# Patient Record
Sex: MALE | Race: WHITE | NOT HISPANIC OR LATINO | Employment: OTHER | URBAN - NONMETROPOLITAN AREA
[De-identification: names, ages, dates, MRNs, and addresses within clinical notes are randomized per-mention and may not be internally consistent; named-entity substitution may affect disease eponyms.]

---

## 2017-05-08 ENCOUNTER — APPOINTMENT (EMERGENCY)
Dept: CT IMAGING | Facility: HOSPITAL | Age: 52
End: 2017-05-08
Payer: COMMERCIAL

## 2017-05-08 ENCOUNTER — HOSPITAL ENCOUNTER (EMERGENCY)
Facility: HOSPITAL | Age: 52
Discharge: HOME | End: 2017-05-08
Attending: EMERGENCY MEDICINE | Admitting: EMERGENCY MEDICINE
Payer: COMMERCIAL

## 2017-05-08 VITALS
WEIGHT: 257.7 LBS | SYSTOLIC BLOOD PRESSURE: 121 MMHG | DIASTOLIC BLOOD PRESSURE: 80 MMHG | TEMPERATURE: 98.6 F | BODY MASS INDEX: 36.89 KG/M2 | HEART RATE: 78 BPM | RESPIRATION RATE: 18 BRPM | OXYGEN SATURATION: 98 % | HEIGHT: 70 IN

## 2017-05-08 DIAGNOSIS — N20.0 NEPHROLITHIASIS: Primary | ICD-10-CM

## 2017-05-08 LAB
ALBUMIN SERPL BCP-MCNC: 3.7 G/DL (ref 3.5–5)
ALP SERPL-CCNC: 72 U/L (ref 46–116)
ALT SERPL W P-5'-P-CCNC: 26 U/L (ref 12–78)
ANION GAP SERPL CALCULATED.3IONS-SCNC: 9 MMOL/L (ref 4–13)
AST SERPL W P-5'-P-CCNC: 15 U/L (ref 5–45)
BACTERIA UR QL AUTO: ABNORMAL /HPF
BASOPHILS # BLD AUTO: 0.03 THOUSANDS/ΜL (ref 0–0.1)
BASOPHILS NFR BLD AUTO: 0 % (ref 0–1)
BILIRUB DIRECT SERPL-MCNC: 0.13 MG/DL (ref 0–0.2)
BILIRUB SERPL-MCNC: 0.7 MG/DL (ref 0.2–1)
BILIRUB UR QL STRIP: NEGATIVE
BUN SERPL-MCNC: 17 MG/DL (ref 5–25)
CALCIUM SERPL-MCNC: 8.5 MG/DL (ref 8.3–10.1)
CHLORIDE SERPL-SCNC: 103 MMOL/L (ref 100–108)
CLARITY UR: CLEAR
CO2 SERPL-SCNC: 27 MMOL/L (ref 21–32)
COLOR UR: YELLOW
CREAT SERPL-MCNC: 1.57 MG/DL (ref 0.6–1.3)
EOSINOPHIL # BLD AUTO: 0.15 THOUSAND/ΜL (ref 0–0.61)
EOSINOPHIL NFR BLD AUTO: 2 % (ref 0–6)
ERYTHROCYTE [DISTWIDTH] IN BLOOD BY AUTOMATED COUNT: 13.7 % (ref 11.6–15.1)
GFR SERPL CREATININE-BSD FRML MDRD: 46.4 ML/MIN/1.73SQ M
GLUCOSE SERPL-MCNC: 82 MG/DL (ref 65–140)
GLUCOSE UR STRIP-MCNC: NEGATIVE MG/DL
HCT VFR BLD AUTO: 41 % (ref 36.5–49.3)
HGB BLD-MCNC: 14.2 G/DL (ref 12–17)
HGB UR QL STRIP.AUTO: ABNORMAL
HOLD SPECIMEN: NORMAL
KETONES UR STRIP-MCNC: ABNORMAL MG/DL
LEUKOCYTE ESTERASE UR QL STRIP: NEGATIVE
LYMPHOCYTES # BLD AUTO: 1.67 THOUSANDS/ΜL (ref 0.6–4.47)
LYMPHOCYTES NFR BLD AUTO: 17 % (ref 14–44)
MCH RBC QN AUTO: 31.1 PG (ref 26.8–34.3)
MCHC RBC AUTO-ENTMCNC: 34.6 G/DL (ref 31.4–37.4)
MCV RBC AUTO: 90 FL (ref 82–98)
MONOCYTES # BLD AUTO: 0.92 THOUSAND/ΜL (ref 0.17–1.22)
MONOCYTES NFR BLD AUTO: 9 % (ref 4–12)
NEUTROPHILS # BLD AUTO: 7.22 THOUSANDS/ΜL (ref 1.85–7.62)
NEUTS SEG NFR BLD AUTO: 72 % (ref 43–75)
NITRITE UR QL STRIP: NEGATIVE
NON-SQ EPI CELLS URNS QL MICRO: ABNORMAL /HPF
PH UR STRIP.AUTO: 5.5 [PH] (ref 4.5–8)
PLATELET # BLD AUTO: 233 THOUSANDS/UL (ref 149–390)
PMV BLD AUTO: 10.6 FL (ref 8.9–12.7)
POTASSIUM SERPL-SCNC: 3.6 MMOL/L (ref 3.5–5.3)
PROT SERPL-MCNC: 7.1 G/DL (ref 6.4–8.2)
PROT UR STRIP-MCNC: NEGATIVE MG/DL
RBC # BLD AUTO: 4.56 MILLION/UL (ref 3.88–5.62)
RBC #/AREA URNS AUTO: ABNORMAL /HPF
SODIUM SERPL-SCNC: 139 MMOL/L (ref 136–145)
SP GR UR STRIP.AUTO: 1.01 (ref 1–1.03)
UROBILINOGEN UR QL STRIP.AUTO: 0.2 E.U./DL
WBC # BLD AUTO: 9.99 THOUSAND/UL (ref 4.31–10.16)
WBC #/AREA URNS AUTO: ABNORMAL /HPF

## 2017-05-08 PROCEDURE — 81001 URINALYSIS AUTO W/SCOPE: CPT | Performed by: PHYSICIAN ASSISTANT

## 2017-05-08 PROCEDURE — 96361 HYDRATE IV INFUSION ADD-ON: CPT

## 2017-05-08 PROCEDURE — 80048 BASIC METABOLIC PNL TOTAL CA: CPT | Performed by: PHYSICIAN ASSISTANT

## 2017-05-08 PROCEDURE — 74176 CT ABD & PELVIS W/O CONTRAST: CPT

## 2017-05-08 PROCEDURE — 96374 THER/PROPH/DIAG INJ IV PUSH: CPT

## 2017-05-08 PROCEDURE — 36415 COLL VENOUS BLD VENIPUNCTURE: CPT | Performed by: PHYSICIAN ASSISTANT

## 2017-05-08 PROCEDURE — 85025 COMPLETE CBC W/AUTO DIFF WBC: CPT | Performed by: PHYSICIAN ASSISTANT

## 2017-05-08 PROCEDURE — 80076 HEPATIC FUNCTION PANEL: CPT | Performed by: PHYSICIAN ASSISTANT

## 2017-05-08 PROCEDURE — 87086 URINE CULTURE/COLONY COUNT: CPT | Performed by: PHYSICIAN ASSISTANT

## 2017-05-08 PROCEDURE — 99284 EMERGENCY DEPT VISIT MOD MDM: CPT

## 2017-05-08 PROCEDURE — 96376 TX/PRO/DX INJ SAME DRUG ADON: CPT

## 2017-05-08 RX ORDER — IBUPROFEN 600 MG/1
600 TABLET ORAL ONCE
Status: COMPLETED | OUTPATIENT
Start: 2017-05-08 | End: 2017-05-08

## 2017-05-08 RX ORDER — OXYCODONE HYDROCHLORIDE AND ACETAMINOPHEN 5; 325 MG/1; MG/1
1 TABLET ORAL EVERY 4 HOURS PRN
Qty: 12 TABLET | Refills: 0 | Status: SHIPPED | OUTPATIENT
Start: 2017-05-08 | End: 2017-05-18

## 2017-05-08 RX ORDER — IBUPROFEN 600 MG/1
600 TABLET ORAL EVERY 6 HOURS PRN
Qty: 20 TABLET | Refills: 0 | Status: SHIPPED | OUTPATIENT
Start: 2017-05-08 | End: 2018-02-09

## 2017-05-08 RX ADMIN — IBUPROFEN 600 MG: 600 TABLET, FILM COATED ORAL at 17:44

## 2017-05-08 RX ADMIN — HYDROMORPHONE HYDROCHLORIDE 1 MG: 1 INJECTION, SOLUTION INTRAMUSCULAR; INTRAVENOUS; SUBCUTANEOUS at 17:45

## 2017-05-08 RX ADMIN — HYDROMORPHONE HYDROCHLORIDE 0.5 MG: 1 INJECTION, SOLUTION INTRAMUSCULAR; INTRAVENOUS; SUBCUTANEOUS at 20:08

## 2017-05-08 RX ADMIN — HYDROMORPHONE HYDROCHLORIDE 1 MG: 1 INJECTION, SOLUTION INTRAMUSCULAR; INTRAVENOUS; SUBCUTANEOUS at 19:19

## 2017-05-08 RX ADMIN — SODIUM CHLORIDE 1000 ML: 0.9 INJECTION, SOLUTION INTRAVENOUS at 17:44

## 2017-05-08 RX ADMIN — SODIUM CHLORIDE 1000 ML: 0.9 INJECTION, SOLUTION INTRAVENOUS at 19:19

## 2017-05-09 LAB — BACTERIA UR CULT: NORMAL

## 2017-11-15 ENCOUNTER — GENERIC CONVERSION - ENCOUNTER (OUTPATIENT)
Dept: OTHER | Facility: OTHER | Age: 52
End: 2017-11-15

## 2017-11-15 ENCOUNTER — ALLSCRIPTS OFFICE VISIT (OUTPATIENT)
Dept: FAMILY MEDICINE CLINIC | Facility: CLINIC | Age: 52
End: 2017-11-15
Payer: COMMERCIAL

## 2017-11-15 DIAGNOSIS — Z12.5 ENCOUNTER FOR SCREENING FOR MALIGNANT NEOPLASM OF PROSTATE: ICD-10-CM

## 2017-11-15 DIAGNOSIS — E66.9 OBESITY: ICD-10-CM

## 2017-11-15 DIAGNOSIS — Z00.00 ENCOUNTER FOR GENERAL ADULT MEDICAL EXAMINATION WITHOUT ABNORMAL FINDINGS: ICD-10-CM

## 2017-11-15 DIAGNOSIS — N20.0 CALCULUS OF KIDNEY: ICD-10-CM

## 2017-11-15 DIAGNOSIS — M79.10 MYALGIA: ICD-10-CM

## 2017-11-15 DIAGNOSIS — F41.8 OTHER SPECIFIED ANXIETY DISORDERS: ICD-10-CM

## 2017-11-15 PROCEDURE — 99204 OFFICE O/P NEW MOD 45 MIN: CPT | Performed by: FAMILY MEDICINE

## 2017-11-16 ENCOUNTER — GENERIC CONVERSION - ENCOUNTER (OUTPATIENT)
Dept: OTHER | Facility: OTHER | Age: 52
End: 2017-11-16

## 2017-11-16 ENCOUNTER — APPOINTMENT (OUTPATIENT)
Dept: LAB | Facility: HOSPITAL | Age: 52
End: 2017-11-16
Payer: COMMERCIAL

## 2017-11-16 ENCOUNTER — TRANSCRIBE ORDERS (OUTPATIENT)
Dept: ADMINISTRATIVE | Facility: HOSPITAL | Age: 52
End: 2017-11-16

## 2017-11-16 DIAGNOSIS — F41.8 OTHER SPECIFIED ANXIETY DISORDERS: ICD-10-CM

## 2017-11-16 DIAGNOSIS — Z12.5 ENCOUNTER FOR SCREENING FOR MALIGNANT NEOPLASM OF PROSTATE: ICD-10-CM

## 2017-11-16 DIAGNOSIS — Z00.00 ENCOUNTER FOR GENERAL ADULT MEDICAL EXAMINATION WITHOUT ABNORMAL FINDINGS: ICD-10-CM

## 2017-11-16 DIAGNOSIS — M79.10 MYALGIA: ICD-10-CM

## 2017-11-16 DIAGNOSIS — E66.9 OBESITY: ICD-10-CM

## 2017-11-16 LAB
25(OH)D3 SERPL-MCNC: 15.4 NG/ML (ref 30–100)
ALBUMIN SERPL BCP-MCNC: 3.8 G/DL (ref 3.5–5)
ALP SERPL-CCNC: 74 U/L (ref 46–116)
ALT SERPL W P-5'-P-CCNC: 29 U/L (ref 12–78)
ANION GAP SERPL CALCULATED.3IONS-SCNC: 8 MMOL/L (ref 4–13)
AST SERPL W P-5'-P-CCNC: 15 U/L (ref 5–45)
BACTERIA UR QL AUTO: ABNORMAL /HPF
BASOPHILS # BLD AUTO: 0.02 THOUSANDS/ΜL (ref 0–0.1)
BASOPHILS NFR BLD AUTO: 0 % (ref 0–1)
BILIRUB SERPL-MCNC: 0.5 MG/DL (ref 0.2–1)
BILIRUB UR QL STRIP: NEGATIVE
BUN SERPL-MCNC: 14 MG/DL (ref 5–25)
CALCIUM SERPL-MCNC: 9.2 MG/DL (ref 8.3–10.1)
CHLORIDE SERPL-SCNC: 103 MMOL/L (ref 100–108)
CHOLEST SERPL-MCNC: 198 MG/DL (ref 50–200)
CLARITY UR: CLEAR
CO2 SERPL-SCNC: 28 MMOL/L (ref 21–32)
COLOR UR: YELLOW
CREAT SERPL-MCNC: 0.89 MG/DL (ref 0.6–1.3)
EOSINOPHIL # BLD AUTO: 0.1 THOUSAND/ΜL (ref 0–0.61)
EOSINOPHIL NFR BLD AUTO: 2 % (ref 0–6)
ERYTHROCYTE [DISTWIDTH] IN BLOOD BY AUTOMATED COUNT: 13.8 % (ref 11.6–15.1)
GFR SERPL CREATININE-BSD FRML MDRD: 98 ML/MIN/1.73SQ M
GLUCOSE P FAST SERPL-MCNC: 98 MG/DL (ref 65–99)
GLUCOSE UR STRIP-MCNC: NEGATIVE MG/DL
HCT VFR BLD AUTO: 42.7 % (ref 36.5–49.3)
HDLC SERPL-MCNC: 36 MG/DL (ref 40–60)
HGB BLD-MCNC: 14.8 G/DL (ref 12–17)
HGB UR QL STRIP.AUTO: ABNORMAL
KETONES UR STRIP-MCNC: NEGATIVE MG/DL
LDLC SERPL CALC-MCNC: 117 MG/DL (ref 0–100)
LEUKOCYTE ESTERASE UR QL STRIP: NEGATIVE
LYMPHOCYTES # BLD AUTO: 1.41 THOUSANDS/ΜL (ref 0.6–4.47)
LYMPHOCYTES NFR BLD AUTO: 22 % (ref 14–44)
MCH RBC QN AUTO: 31 PG (ref 26.8–34.3)
MCHC RBC AUTO-ENTMCNC: 34.7 G/DL (ref 31.4–37.4)
MCV RBC AUTO: 90 FL (ref 82–98)
MONOCYTES # BLD AUTO: 0.46 THOUSAND/ΜL (ref 0.17–1.22)
MONOCYTES NFR BLD AUTO: 7 % (ref 4–12)
NEUTROPHILS # BLD AUTO: 4.36 THOUSANDS/ΜL (ref 1.85–7.62)
NEUTS SEG NFR BLD AUTO: 69 % (ref 43–75)
NITRITE UR QL STRIP: NEGATIVE
NON-SQ EPI CELLS URNS QL MICRO: ABNORMAL /HPF
PH UR STRIP.AUTO: 7 [PH] (ref 4.5–8)
PLATELET # BLD AUTO: 269 THOUSANDS/UL (ref 149–390)
PMV BLD AUTO: 10.3 FL (ref 8.9–12.7)
POTASSIUM SERPL-SCNC: 4.4 MMOL/L (ref 3.5–5.3)
PROT SERPL-MCNC: 7.2 G/DL (ref 6.4–8.2)
PROT UR STRIP-MCNC: NEGATIVE MG/DL
PSA SERPL-MCNC: 0.4 NG/ML (ref 0–4)
RBC # BLD AUTO: 4.77 MILLION/UL (ref 3.88–5.62)
RBC #/AREA URNS AUTO: ABNORMAL /HPF
SODIUM SERPL-SCNC: 139 MMOL/L (ref 136–145)
SP GR UR STRIP.AUTO: 1.01 (ref 1–1.03)
TRIGL SERPL-MCNC: 227 MG/DL
TSH SERPL DL<=0.05 MIU/L-ACNC: 2.14 UIU/ML (ref 0.36–3.74)
UROBILINOGEN UR QL STRIP.AUTO: 0.2 E.U./DL
WBC # BLD AUTO: 6.35 THOUSAND/UL (ref 4.31–10.16)
WBC #/AREA URNS AUTO: ABNORMAL /HPF

## 2017-11-16 PROCEDURE — 81001 URINALYSIS AUTO W/SCOPE: CPT

## 2017-11-16 PROCEDURE — 80061 LIPID PANEL: CPT

## 2017-11-16 PROCEDURE — 82306 VITAMIN D 25 HYDROXY: CPT

## 2017-11-16 PROCEDURE — 84443 ASSAY THYROID STIM HORMONE: CPT

## 2017-11-16 PROCEDURE — 80053 COMPREHEN METABOLIC PANEL: CPT

## 2017-11-16 PROCEDURE — 86618 LYME DISEASE ANTIBODY: CPT

## 2017-11-16 PROCEDURE — G0103 PSA SCREENING: HCPCS

## 2017-11-16 PROCEDURE — 85025 COMPLETE CBC W/AUTO DIFF WBC: CPT

## 2017-11-16 PROCEDURE — 36415 COLL VENOUS BLD VENIPUNCTURE: CPT

## 2017-11-17 LAB
B BURGDOR IGG SER IA-ACNC: 0.12
B BURGDOR IGM SER IA-ACNC: 0.16

## 2017-11-20 ENCOUNTER — TRANSCRIBE ORDERS (OUTPATIENT)
Dept: ADMINISTRATIVE | Facility: HOSPITAL | Age: 52
End: 2017-11-20

## 2017-11-20 ENCOUNTER — APPOINTMENT (OUTPATIENT)
Dept: LAB | Facility: HOSPITAL | Age: 52
End: 2017-11-20
Attending: UROLOGY
Payer: COMMERCIAL

## 2017-11-20 ENCOUNTER — ALLSCRIPTS OFFICE VISIT (OUTPATIENT)
Dept: OTHER | Facility: OTHER | Age: 52
End: 2017-11-20

## 2017-11-20 DIAGNOSIS — N20.0 URIC ACID NEPHROLITHIASIS: Primary | ICD-10-CM

## 2017-11-20 DIAGNOSIS — N20.0 CALCULUS OF KIDNEY: ICD-10-CM

## 2017-11-20 PROCEDURE — 82360 CALCULUS ASSAY QUANT: CPT

## 2017-11-21 NOTE — CONSULTS
Assessment  1  Nephrolithiasis (592 0) (N20 0)    Plan  Nephrolithiasis    · * XR ABDOMEN 1 VIEW KUB; Status:Active; Requested for:2018; Perform:[facility] Radiology; UDF:[id number] Daylin Zimmer;   · CT ABDOMEN PELVIS W WO CONTRAST; Status:Need Information - FinancialAuthorization; Requested for:2017;    Perform:[facility] Radiology; [id number]; Ordered;For:Nephrolithiasis; Ordered By:Yasmany Juárez;   · [address] [place]; Status:Hold For - Scheduling; Requested for:2018; Perform:[facility] Radiology; Order Comments:with ureteral jet please; KWS:[id number];[id number];XZSZOBA Daylin Zimmer;   · Follow-up visit in 1 year Evaluation and Treatment  Follow-up  Status: Hold For -Scheduling  Requested for: [id number]   Ordered;Nephrolithiasis; Ordered By: Karol Olivo Performed:  Due: 28XQO3996    Discussion/Summary  Discussion Summary:   1  Nephrolithiasis-this appears to be chronic and most likely related the patient's dietary habits and poor fluid intake  He is not currently having symptoms although does have small amount of blood in his urine  He has requested repeat imaging and so I have ordered a baseline noncontrast CT scan low dose  As long as there are no obstructing stones, I will follow the patient on a yearly basis with a KUB and ultrasound  reviewed with the patient some general lifestyle and dietary modifications that can improve and prevent stone formation  This includes a large volume of water intake, approximately 1 5-2 L per day  Addition of citric acid with homemade lemonade or orange juice can help to it inhibit stone formation  Decreased sodium in the form of table salt and as sodium in prepared foods as well as decreased animal protein with approximately 1 palm-sized portion per meal can both lead to decrease in overall stone formation    the patient is able to maintain some of these lifestyle modifications and still has recurrent stone formation we discussed the need for a metabolic stone evaluation  reviewed with the patient urgent stones symptoms that should prompt emergent evaluation  These include intractable flank pain that does not respond to oral medications, nausea and vomiting that prevents intake of oral fluids, fevers greater than 100 3 measured by thermometer, or large amounts of blood in the urine  the patient has significant recurrence of his stones, we would consider metabolic evaluation  Prostate cancer screening to formally begin at age 54 based on AUA guidelines  Patient's Capacity to Self-Care: Patient is able to Self-Care  Goals and Barriers: The patient has the current Goals: Monitoring and maintenance of stone disease  The patent has the current Barriers: Poor fluid intake, dietary issues  History of Present Illness  HPI: 76-year-old gentleman with a longstanding history of stone disease  Patient has seen multiple urologists in the past, most recently and [place]  He has had multiple stone procedures including a percutaneous nephrolithotomy of the right kidney for staghorn calculus  The patient was last seen in the emergency room in May and had small nonobstructing stones in the kidney and some right distal ureteral stones  The patient was able to pass the stone spontaneously and brings them for analysis  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  He has no family history of prostate cancer  Review of Systems  Complete-Male Urology:  Constitutional: No fever or chills, feels well, no tiredness, no recent weight gain or weight loss  Respiratory: No complaints of shortness of breath, no wheezing, no cough, no SOB on exertion, no orthopnea or PND  Cardiovascular: No complaints of slow heart rate, no fast heart rate, no chest pain, no palpitations, no leg claudication, no lower extremity    Gastrointestinal: No complaints of abdominal pain, no constipation, no nausea or vomiting, no diarrhea or bloody stools  Genitourinary: No complaints of dysuria, no incontinence, no hesitancy, no nocturia, no genital lesion, no testicular pain  Musculoskeletal: No complaints of arthralgia, no myalgias, no joint swelling or stiffness, no limb pain or swelling  Integumentary: No complaints of skin rash or skin lesions, no itching, no skin wound, no dry skin  Hematologic/Lymphatic: No complaints of swollen glands, no swollen glands in the neck, does not bleed easily, no easy bruising  Neurological: No compliants of headache, no confusion, no convulsions, no numbness or tingling, no dizziness or fainting, no limb weakness, no difficulty walking  ROS Reviewed:   ROS reviewed  Active Problems  1  Chronic musculoskeletal pain (729 1,338 29) (M79 1,G89 29)   2  Depression screen (V79 0) (Z13 89)   3  Depression with anxiety (300 4) (F41 8)   4  Encounter for prostate cancer screening (V76 44) (Z12 5)   5  Generalized obesity (278 00) (E66 9)    Past Medical History  1  History of cardiac arrhythmia (V12 59) (Z86 79)   2  History of kidney stones (V13 01) (H92 962)  Active Problems And Past Medical History Reviewed: The active problems and past medical history were reviewed and updated today  Surgical History  1  History of Heart Surgery   2  History of Hernia Repair Femoral Via Groin Incision   3  History of Kidney Surgery   4  History of Knee Arthroscopy With Medial Meniscus Repair  Surgical History Reviewed: The surgical history was reviewed and updated today  Family History  Mother    1  Family history of malignant neoplasm of breast (V16 3) (Z80 3)  Father    2  Family history of malignant neoplasm of bone (V16 8) (Z80 8)  Family History Reviewed: The family history was reviewed and updated today  Social History   · Does not use illicit drugs (L76 17) (Z78 9)   · Never smoker   · Occasional alcohol use  Social History Reviewed:  The social history was reviewed and updated today       Current Meds   1  No Reported Medications Recorded  Medication List Reviewed: The medication list was reviewed and updated today  Allergies    1  Sulfa Drugs   2  Toradol    Physical Exam   Constitutional  General appearance: No acute distress, well appearing and well nourished  -- Well appearing, comfortable, nontoxic  Pulmonary  Respiratory effort: No increased work of breathing or signs of respiratory distress  Cardiovascular  Examination of extremities for edema and/or varicosities: Normal    Abdomen  Abdomen: Non-tender, no masses  Musculoskeletal  Gait and station: Normal    Skin  Skin and subcutaneous tissue: Normal without rashes or lesions  Results/Data  Diagnostic Studies Reviewed Urology:  CT Scan Review CT scan from 5/8/2017 demonstrates small stones in the distal right ureter ranging from 1-3 mm causing mild right hydro, 4 mm calculus in the right side of the bladder, 4 mm nonobstructing left renal calculus  (1) COMPREHENSIVE METABOLIC PANEL [id number] 63:54PK Nic Lopez    Order Number: [id number]     Test Name Result Flag Reference   SODIUM 139 mmol/L  136-145   POTASSIUM 4 4 mmol/L  3 5-5 3   CHLORIDE 103 mmol/L  100-108   CARBON DIOXIDE 28 mmol/L  21-32   ANION GAP (CALC) 8 mmol/L  4-13   BLOOD UREA NITROGEN 14 mg/dL  5-25   CREATININE 0 89 mg/dL  0 60-1 30   Standardized to IDMS reference method   CALCIUM 9 2 mg/dL  8 3-10 1   BILI, TOTAL 0 50 mg/dL  0 20-1 00   ALK PHOSPHATAS 74 U/L     ALT (SGPT) 29 U/L  12-78   Specimen collection should occur prior to Sulfasalazine administration due to the potential for falsely depressed results  AST(SGOT) 15 U/L  5-45   Specimen collection should occur prior to Sulfasalazine administration due to the potential for falsely depressed results     ALBUMIN 3 8 g/dL  3 5-5 0   TOTAL PROTEIN 7 2 g/dL  6 4-8 2   eGFR 98 ml/min/1 73sq m       [facility] Disease Education Program recommendations are as follows: GFR calculation is accurate only with a steady state creatinine Chronic Kidney disease less than 60 ml/min/1 73 sq  meters Kidney failure less than 15 ml/min/1 73 sq  meters  GLUCOSE FASTING 98 mg/dL  65-99   Specimen collection should occur prior to Sulfasalazine administration due to the potential for falsely depressed results  Specimen collection should occur prior to Sulfapyridine administration due to the potential for falsely elevated results  (1) PSA (SCREEN) (Dx V76 44 Screen for Prostate Cancer) [id number] 07:30AM [facility] Order Number: [id number]     Test Name Result Flag Reference   PROSTATE SPECIFIC ANTIGEN 0 4 ng/mL  0 0-4 0   American Urological Association Guidelines define biochemical recurrence of prostate cancer as a detectable or rising PSA value post-radical prostatectomy that is greater than or equal to 0 2 ng/mL with a second confirmatory level of greater than or equal to 0 2 ng/mL  (1) URINALYSIS w URINE C/S REFLEX (will reflex a microscopy if leukocytes, occult blood, or nitrites are not within normal limits) [id number] 07:30AM [facility] Order Number: [id number]     Test Name Result Flag Reference   COLOR Yellow     CLARITY Clear     PH UA 7 0  4 5-8 0   LEUKOCYTE ESTERASE UA Negative  Negative   NITRITE UA Negative  Negative   PROTEIN UA Negative mg/dl  Negative   GLUCOSE UA Negative mg/dl  Negative   KETONES UA Negative mg/dl  Negative   UROBILINOGEN UA 0 2 E U /dl  0 2, 1 0 E U /dl   BILIRUBIN UA Negative  Negative   BLOOD UA Small A Negative, Trace-Intact   SPECIFIC GRAVITY UA 1 015  1 003-1 030   BACTERIA Occasional /hpf  None Seen, Occasional   EPITHELIAL CELLS Occasional /hpf  None Seen, Occasional   RBC UA 0-1 /hpf A None Seen, 0-5   WBC UA None Seen /hpf  None Seen, 0-5, 5-55, 5-65     Future Appointments    Date/Time Provider Specialty Site   11/21/2018 08:45 AM MOY Henry   Urology [facility]   11/29/2017 09:00 AM Beena York, [address] [facility]       Signatures   Electronically signed by : MOY Darden ; Nov 20 2017  7:45AM EST                       (Author)

## 2017-11-29 ENCOUNTER — APPOINTMENT (OUTPATIENT)
Dept: FAMILY MEDICINE CLINIC | Facility: CLINIC | Age: 52
End: 2017-11-29
Payer: COMMERCIAL

## 2017-11-29 ENCOUNTER — GENERIC CONVERSION - ENCOUNTER (OUTPATIENT)
Dept: OTHER | Facility: OTHER | Age: 52
End: 2017-11-29

## 2017-11-29 PROCEDURE — 90791 PSYCH DIAGNOSTIC EVALUATION: CPT | Performed by: SOCIAL WORKER

## 2017-11-29 PROCEDURE — 99213 OFFICE O/P EST LOW 20 MIN: CPT | Performed by: FAMILY MEDICINE

## 2017-11-30 ENCOUNTER — GENERIC CONVERSION - ENCOUNTER (OUTPATIENT)
Dept: OTHER | Facility: OTHER | Age: 52
End: 2017-11-30

## 2017-12-04 ENCOUNTER — HOSPITAL ENCOUNTER (OUTPATIENT)
Dept: CT IMAGING | Facility: HOSPITAL | Age: 52
Discharge: HOME | End: 2017-12-04
Attending: UROLOGY
Payer: COMMERCIAL

## 2017-12-04 ENCOUNTER — GENERIC CONVERSION - ENCOUNTER (OUTPATIENT)
Dept: OTHER | Facility: OTHER | Age: 52
End: 2017-12-04

## 2017-12-04 DIAGNOSIS — N20.0 URIC ACID NEPHROLITHIASIS: ICD-10-CM

## 2017-12-04 LAB
CA PHOS MFR STONE: 2 %
COLOR STONE: NORMAL
COM MFR STONE: 5 %
COMMENT-STONE3: NORMAL
COMPOSITION: NORMAL
LABORATORY COMMENT REPORT: NORMAL
NIDUS STONE QL: NORMAL
PHOTO: NORMAL
SIZE STONE: NORMAL MM
STONE ANALYSIS-IMP: NORMAL
URATE MFR STONE: 93 %
WT STONE: 66 MG

## 2017-12-04 PROCEDURE — 74177 CT ABD & PELVIS W/CONTRAST: CPT

## 2017-12-04 RX ADMIN — IOHEXOL 100 ML: 350 INJECTION, SOLUTION INTRAVENOUS at 08:49

## 2017-12-06 ENCOUNTER — GENERIC CONVERSION - ENCOUNTER (OUTPATIENT)
Dept: OTHER | Facility: OTHER | Age: 52
End: 2017-12-06

## 2017-12-12 ENCOUNTER — GENERIC CONVERSION - ENCOUNTER (OUTPATIENT)
Dept: OTHER | Facility: OTHER | Age: 52
End: 2017-12-12

## 2017-12-12 ENCOUNTER — ALLSCRIPTS OFFICE VISIT (OUTPATIENT)
Dept: FAMILY MEDICINE CLINIC | Facility: CLINIC | Age: 52
End: 2017-12-12
Payer: COMMERCIAL

## 2017-12-12 ENCOUNTER — APPOINTMENT (OUTPATIENT)
Dept: FAMILY MEDICINE CLINIC | Facility: CLINIC | Age: 52
End: 2017-12-12
Payer: COMMERCIAL

## 2017-12-12 PROCEDURE — 99213 OFFICE O/P EST LOW 20 MIN: CPT | Performed by: FAMILY MEDICINE

## 2017-12-12 PROCEDURE — 90837 PSYTX W PT 60 MINUTES: CPT | Performed by: SOCIAL WORKER

## 2018-01-02 ENCOUNTER — ALLSCRIPTS OFFICE VISIT (OUTPATIENT)
Dept: FAMILY MEDICINE CLINIC | Facility: CLINIC | Age: 53
End: 2018-01-02
Payer: COMMERCIAL

## 2018-01-02 PROCEDURE — 90837 PSYTX W PT 60 MINUTES: CPT | Performed by: SOCIAL WORKER

## 2018-01-12 NOTE — MISCELLANEOUS
Message  Patient called 12:43 pm on 11/16/17  Left voicemail for patient to call to discuss lab results        Signatures   Electronically signed by : JADIEL Lawler; Nov 16 2017 12:44PM EST                       (Author)

## 2018-01-12 NOTE — MISCELLANEOUS
Message  Patient called at 9:30 am on 11/17/17  Reviewed bloodwork with patient  Advised patient to watch diet- decrease red meats, fatty foods and incorporate more fruits and vegetables to improve cholesterol-will recheck in 3-6 months  Advised patient to begin OTC Vitamin D supplement 1000 units daily due to low Vitamin D level  Patient advised to take supplement with food to avoid upset stomach  Patient states he is feeling okay/good and is going to be calling Psychiatry for an appointment  He denies referral to  at this time  He will follow up as scheduled or sooner if needed  Patient understands and agrees with treatment plan        Signatures   Electronically signed by : JADIEL Ching; Nov 17 2017  9:42AM EST                       (Author)

## 2018-01-16 ENCOUNTER — ALLSCRIPTS OFFICE VISIT (OUTPATIENT)
Dept: FAMILY MEDICINE CLINIC | Facility: CLINIC | Age: 53
End: 2018-01-16
Payer: COMMERCIAL

## 2018-01-16 PROCEDURE — 90837 PSYTX W PT 60 MINUTES: CPT | Performed by: SOCIAL WORKER

## 2018-01-22 VITALS
SYSTOLIC BLOOD PRESSURE: 115 MMHG | HEART RATE: 93 BPM | TEMPERATURE: 95.2 F | HEIGHT: 71 IN | BODY MASS INDEX: 35.84 KG/M2 | DIASTOLIC BLOOD PRESSURE: 80 MMHG | OXYGEN SATURATION: 98 % | WEIGHT: 256 LBS

## 2018-01-22 VITALS
DIASTOLIC BLOOD PRESSURE: 80 MMHG | HEIGHT: 71 IN | BODY MASS INDEX: 35.98 KG/M2 | TEMPERATURE: 97.6 F | HEART RATE: 86 BPM | WEIGHT: 257 LBS | SYSTOLIC BLOOD PRESSURE: 122 MMHG | OXYGEN SATURATION: 96 %

## 2018-01-23 NOTE — PSYCH
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  Edel Desouza is still feeling sad about missing the girls on the team that he has known since they were 8 and 6 yrs old, but the loss is not effecting him as deeply as it was prior to treatment  Edel Desouza shared his dilemma abou this current job and his worries about how he can continue in the future  Current Pain Assessment: moderate to severe   On a scale of 0 to 10, the patient rates current pain at 4   Current suicide risk is low   Assessment  Mood is more depressed than last session, but improved over the start of treatment  Edel Desouza is able to talk about his feelings related to recent losses and worried in his life  Plan  See in 2 weeks assess mood and discuss possible options with PCP if depression and insomnia has not improved further  Signatures   Electronically signed by :  Radha Tran [place]; Jan 2 0007 11:05PM EST                       (Author)

## 2018-01-23 NOTE — PSYCH
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, as they graduated last year  Lopez Dickson shares that he feels like he helped raise them and now they have all moved on  Lopez Dickson shares that he thought of these girls as daughter to him  Since then, he lays awake worrying about his own adult daughters and fears that they will have an accident while driving or that something will happen to them  Reason for evaluation and partial hospitalization as an alternative to inpatient hospitalization: N/A  Previous Psychiatric/psychological treatment/year: None  Current Psychiatrist/Therapist: Dolores Dick LCSW  Problem Assessment:   SOCIAL/VOCATION:   Family Constellation (include parents, relationship with each and pertinent Psych/Medical History):   Spouse: marreid for 32 years  Children: 2 adult children- 28 and 25   Other: 2 grandchildren He lives with wife of 28 years  He does not live alone  Additional Comments related to family/relationships/peer support: Lopez Dickson admits that he does not talk to his wife about how he is feeling  Lopez Dickson describes that he "loves" being a father, but is consumed with fear that something could happen to his daughters  Lopez Dickson shares that his grandchildren "get on my nerves", and feels disrespectful and unappreciated by his family in general   Lately, Lopez Dickson shares that he has been "feeling like a failure", bc he cannot keep up with all the demands on his time from his daughters, job and his wife  School or Work History (strengths/limitations/needs:  Lopez Teixeirademetria 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, and recently, he has had to return to a job in which he is more hands on  Carol Phillips describes that he has ongoing physical pain in his back, arms and hands, along with numbing  Carol Phillips relays that 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 nd is feeling "too old for this"  His highest grade level achieved was  graduated from high school  LEISURE ASSESSMENT (Include past and present hobbies/interests and level of involvement (Ex: Group/Club Affiliations): Enjoys coaching softball  His primary language is  [place]  SUBSTANCE ABUSE ASSESSMENT: past substance abuse, but no current substance abuse  Substance/Route/Age/Amount/Frequency/Last Use: Carol Phillips relays that he used to drink beer regularly, about 2 cases /week, but that he stopped when he turned 47 yrs old due to concerns For this health  Carol Phillips shares that he might have "had a problem", but that he had no problems stopping drinking o his own  No previous detox/rehab treatment  HEALTH ASSESSMENT: no referral to PCP needed  no referral to nutritionist needed  referred to PCP  Current PCP: [facility]  PCP notified  LEGAL: He does not want an information packet about Mental Health Advance Directives  The following ratings are based on my observation of this patient over the last During eval    Risk of Harm to Self:   Demographic risk factors include , alaskan, or native [place] and male  Recent Specific Risk Factors include: passive death wishes, sense of hopelessness/helplessness, unable to visualize a realistic positive future, feelings of guilt or self blame, recent losses: Loss of contact with a group of girls he thoguht of as daughters, worries about finances or work and chronic pain or health problems  These risk factors presented within the last few months  Risk of Harm to Others:   Demographic Risk Factors include: male     Recent Specific Risk Factors include: weapons or other means available and multiple stressors  Access to Weapons: The patient has access to the following weapons: Pt admits that he owns guns, but that he does not know where the amunition for it is  He expressed fear that coming into treatment would mean loosing his right to have firearms  Based on the above information, the client presents the following risk of harm to self or others: low  The following interventions are recommended: consultation with PCP  Notes regarding this Risk Assessment: Kim Ghosh admits to having suicidal ideation but also states that he has no plans on acting on these thoughts and he knows that this katt negatively impact his children  Kim Ghosh has access to weapons and is not willing to give them to someone else as a safety precaution,at this point  Therapist reviewed how to contact the Crisis line if suicidal ideation becomes worse, and assured him that if hospitalization would be necessary, that as long as it is voluntary, he would not loose his rights to own firearms  Therapist consulted with PCP and Kim Ghosh was in agreement with trying medication- discussed possible risks of symptoms getting worse and to alert PCP immediately if this occurs  Therapist will ask his wife to join session as soon as appropriate to review a safety plan  Review of Systems  anxiety, depression, interpersonal relationship problems, emotional problems/concerns and sleep disturbances, but no euphoria, no emotional lability, no hostility, not suidical, no compulsive behavior, no impulsive behavior, no unusual behavior, no violent behavior, no disturbing or unusual thoughts, feelings, or sensations, no unreasonable or irrational fears, no magical thinking, not having fantasies, normal functioning ability, no personality change and no character deficiency  Psychiatric: anxiety, sleep disturbances, depression and emotional problems, but not suicidal and no personality change  Active Problems    1   Bilateral hand numbness (782 0) (R20 0)   2  Chronic musculoskeletal pain (729 1,338 29) (M79 1,G89 29)   3  Depression screen (V79 0) (Z13 89)   4  Depression with anxiety (300 4) (F41 8)   5  Encounter for prostate cancer screening (V76 44) (Z12 5)   6  Generalized obesity (278 00) (E66 9)   7  Nephrolithiasis (592 0) (N20 0)   8  Tremor (781 0) (R25 1)   9  Vitamin D deficiency (268 9) (E55 9)    Past Medical History    1  History of cardiac arrhythmia (V12 59) (Z86 79)   2  History of kidney stones (V13 01) (H21 465)    Past Psychiatric History    Past Psychiatric History: No Prior Treatment  Family Psych History  Mother    1  Family history of malignant neoplasm of breast (V16 3) (Z80 3)  Father    2  Family history of malignant neoplasm of bone (V16 8) (Z80 8)    Substance Abuse Hx    Substance Abuse History: Past alcohol abuse/ dependnce-NOne currently  Social History    · Does not use illicit drugs (S45 54) (Z78 9)   · Never smoker   · Occasional alcohol use    Current Meds   1  DULoxetine HCl - 30 MG Oral Capsule Delayed Release Particles; TAKE 1 CAPSULE BY   MOUTH ONCE DAILY FOR ONE WEEK, THEN 1 CAPSULE BY MOUTH TWICE DAILY; Therapy: 71SQG1063 to (Last Rx:29Nov2017)  Requested for: 16XBB4566 Ordered   2  Vitamin D3 1000 UNIT Oral Tablet; TAKE 1 TABLET DAILY; Therapy: 57FCH2238 to (Evaluate:79Ito8912)  Requested for: 85JLR2154; Last   Rx:29Nov2017 Ordered    Allergies    1  Sulfa Drugs   2  Toradol    DSM    Provisional Diagnosis: ADJUSTMENT DISORDER WITH ANXIETY AND DEPRESSION  Cally Ramos is a pleasant,intelligent middle aged man who was referred to this therapist for a consultation due to reporting symptoms of depression    Amber Mccord describes feeling sad and worried most of the time, insomnia in which he lays awake worrying about the welfare of his daughters every night, fear and apprehension about the future, finances and his ability to maintain his current job, feeling sad and close to tears when he is not busy, and is also having significant physical pain  These symptoms seem to correspond with a significant loss i his life  Rosalba Bradley describes that 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, and 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, 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  Rosalba Bradley shares that he knew this was coming, but he didn't ot expect that he would have taken it this hard  Rosalba Bradley relays that they symptoms of anxiety and depression began around this time, and that this is when he began obsessively worrying about his daughters having an accident or something terrible happening to them  Rosalba Bradley als worries abut his pal and his ability to keep up with demands from his family and his job  Prior to 6 months ago, Rosalba Bradley had insomnia due to physical pain, but states he has never had the above stated psychiatric symptoms before in his life  Rosalba Bradley relays that he is  for 32 years, but he does not feel he can talk to his wife about his feeling  Emory Godoy expresses that it is hard for him to admit that he needs help, as he is used to being very independent, although he stated that he felt a little better after talking about what he is going through during the evaluation  Rosalba Bradley would benefit form weekly therapy, however, due to restrictions with therapist's schedule this may not be possible for a few weeks  Therapist is able to schedule Rosalba Bradley for biweekly sessions and has recommended to his PCP that he be started on an anti-depressant  Therapist will assess Gordy's readiness to allow his wife to join a session in the near future to increase support and ensure someone in his family is aware of the severity of his symptoms  Future Appointments    Date/Time Provider Specialty Site   11/21/2018 08:45 AM MOY [place]   Urology [facility]   12/12/2017 03:00 PM Eileen Hendrickson, [address] [place] [address] Signatures   Electronically signed by : Obi Andujar [facility]; Dec  3 7094  4:26PM EST                       (Author)    Electronically signed by :  Obi Andujar [facility]; Dec 12 4128  5:04PM EST                       (Author)

## 2018-01-23 NOTE — PSYCH
Progress Note  Individual Psychotherapy 60 min minutes provided today  Goals addressed in session:   [address] [facility]  "Fer Chatterjee" appears and reports feeling much better since his last session and since beginning to ttake the anti-depressant  Fer Chatterjee shares that he has been able to stay asleep at night and he is no longer remaining awake and worrying about his children  Gordy's PCP joined the session to discuss the effects of the Cymbalta, which Fer Chatterjee brought up as a concern, specifically sexual side effects  A discussion about how to address this took place  Fer Chatterjee shares that he is willing to continue tt venessa the medication and increase accordingly in spite of the side effects  Fer Chatterjee shares that he is feeling less irritated by his grandkids and less angry, although he can tell that if there was an upsetting event, it could lead to regression with is mood  Fer Chatterjee is hopeful that the increase in the medication will lead to feeling even better  Fer Chatterjee shares that it was also helpful to "get things off my chest", and he felt relief after the first session  Fer Chatterjee shared that communication with his wife is one of the stressors in his life, and he agreed that it may be appropriate to have some marital sessions in the future  On a scale of 0 to 10, the patient rates current pain at 4   Current suicide risk is low   Assessment  Mood is less depressed, symptoms have decreased, no suicidal thoughts and not feeling hopeless  Mediation seems to have been effective and Fer Chatterjee agrees that ongoing therapy will also benefit him  Plan  Due tot he holidays, Fer Chatterjee suggested and therapist agreed to next session in 3 weeks- reviewed calling the therapist and/or the [facility] if suicidal ideation returns  Signatures   Electronically signed by :  Adrian Roca [place]; Dec 15 6656  6:07PM EST                       (Author)

## 2018-01-23 NOTE — PSYCH
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 and he has interviews next week  He is feeling encouraged about these prospects  Kristine Butcher shared more information about the mood swings that his wife exhibits and how this interferes with him feeling he can open up with her  Therapist speculated that his wife may also being going through changes, possibly related to menopause, and she may also be depressed  Discussed ways that Kristine Butcher can be more patients and gentle with his wife and how he might encourage her to get help for herself  Current suicide risk is low   Assessment  Mood is less depressed and anxious, although Kristine Butcher is still not sleeping well  Plan  See i 2 weeks- Kristine Butcher will ask his wife to attend a Session when he feels comfortable doing this  Signatures   Electronically signed by :  Manju Luna; Jan 17 0183  9:23PM EST                       (Author)

## 2018-01-23 NOTE — MISCELLANEOUS
Message  Left voicemail for the patient with the results of his CT scan  No signs of stone  Will plan routine follow-up in 1 year        Signatures   Electronically signed by : MOY Napier ; Dec  6 2017 11:04AM EST                       (Author)

## 2018-01-24 VITALS
BODY MASS INDEX: 35.33 KG/M2 | WEIGHT: 252.38 LBS | HEART RATE: 86 BPM | SYSTOLIC BLOOD PRESSURE: 128 MMHG | OXYGEN SATURATION: 98 % | TEMPERATURE: 98 F | DIASTOLIC BLOOD PRESSURE: 80 MMHG | HEIGHT: 71 IN

## 2018-02-06 ENCOUNTER — APPOINTMENT (EMERGENCY)
Dept: CT IMAGING | Facility: HOSPITAL | Age: 53
End: 2018-02-06
Payer: COMMERCIAL

## 2018-02-06 ENCOUNTER — HOSPITAL ENCOUNTER (EMERGENCY)
Facility: HOSPITAL | Age: 53
Discharge: HOME | End: 2018-02-06
Attending: EMERGENCY MEDICINE
Payer: COMMERCIAL

## 2018-02-06 VITALS
SYSTOLIC BLOOD PRESSURE: 121 MMHG | DIASTOLIC BLOOD PRESSURE: 64 MMHG | TEMPERATURE: 97.9 F | RESPIRATION RATE: 17 BRPM | WEIGHT: 258.4 LBS | BODY MASS INDEX: 36.04 KG/M2 | OXYGEN SATURATION: 96 % | HEART RATE: 67 BPM

## 2018-02-06 DIAGNOSIS — N20.1 URETEROLITHIASIS: ICD-10-CM

## 2018-02-06 DIAGNOSIS — N23 URETERAL COLIC: Primary | ICD-10-CM

## 2018-02-06 LAB
ANION GAP SERPL CALCULATED.3IONS-SCNC: 12 MMOL/L (ref 4–13)
BACTERIA UR QL AUTO: ABNORMAL /HPF
BASOPHILS # BLD AUTO: 0.03 THOUSANDS/ΜL (ref 0–0.1)
BASOPHILS NFR BLD AUTO: 0 % (ref 0–1)
BILIRUB UR QL STRIP: NEGATIVE
BUN SERPL-MCNC: 26 MG/DL (ref 5–25)
CALCIUM SERPL-MCNC: 8.3 MG/DL (ref 8.3–10.1)
CHLORIDE SERPL-SCNC: 103 MMOL/L (ref 100–108)
CLARITY UR: CLEAR
CO2 SERPL-SCNC: 23 MMOL/L (ref 21–32)
COLOR UR: YELLOW
CREAT SERPL-MCNC: 1.11 MG/DL (ref 0.6–1.3)
EOSINOPHIL # BLD AUTO: 0.16 THOUSAND/ΜL (ref 0–0.61)
EOSINOPHIL NFR BLD AUTO: 2 % (ref 0–6)
ERYTHROCYTE [DISTWIDTH] IN BLOOD BY AUTOMATED COUNT: 14.3 % (ref 11.6–15.1)
GFR SERPL CREATININE-BSD FRML MDRD: 75 ML/MIN/1.73SQ M
GLUCOSE SERPL-MCNC: 114 MG/DL (ref 65–140)
GLUCOSE UR STRIP-MCNC: NEGATIVE MG/DL
HCT VFR BLD AUTO: 40.7 % (ref 36.5–49.3)
HGB BLD-MCNC: 14.5 G/DL (ref 12–17)
HGB UR QL STRIP.AUTO: ABNORMAL
KETONES UR STRIP-MCNC: NEGATIVE MG/DL
LEUKOCYTE ESTERASE UR QL STRIP: NEGATIVE
LYMPHOCYTES # BLD AUTO: 1.99 THOUSANDS/ΜL (ref 0.6–4.47)
LYMPHOCYTES NFR BLD AUTO: 25 % (ref 14–44)
MAGNESIUM SERPL-MCNC: 2 MG/DL (ref 1.6–2.6)
MCH RBC QN AUTO: 31.7 PG (ref 26.8–34.3)
MCHC RBC AUTO-ENTMCNC: 35.6 G/DL (ref 31.4–37.4)
MCV RBC AUTO: 89 FL (ref 82–98)
MONOCYTES # BLD AUTO: 0.55 THOUSAND/ΜL (ref 0.17–1.22)
MONOCYTES NFR BLD AUTO: 7 % (ref 4–12)
NEUTROPHILS # BLD AUTO: 5.11 THOUSANDS/ΜL (ref 1.85–7.62)
NEUTS SEG NFR BLD AUTO: 66 % (ref 43–75)
NITRITE UR QL STRIP: NEGATIVE
NON-SQ EPI CELLS URNS QL MICRO: ABNORMAL /HPF
PH UR STRIP.AUTO: 5.5 [PH] (ref 4.5–8)
PLATELET # BLD AUTO: 284 THOUSANDS/UL (ref 149–390)
PMV BLD AUTO: 9.8 FL (ref 8.9–12.7)
POTASSIUM SERPL-SCNC: 3.9 MMOL/L (ref 3.5–5.3)
PROT UR STRIP-MCNC: ABNORMAL MG/DL
RBC # BLD AUTO: 4.58 MILLION/UL (ref 3.88–5.62)
RBC #/AREA URNS AUTO: ABNORMAL /HPF
SODIUM SERPL-SCNC: 138 MMOL/L (ref 136–145)
SP GR UR STRIP.AUTO: >=1.03 (ref 1–1.03)
UROBILINOGEN UR QL STRIP.AUTO: 0.2 E.U./DL
WBC # BLD AUTO: 7.84 THOUSAND/UL (ref 4.31–10.16)
WBC #/AREA URNS AUTO: ABNORMAL /HPF

## 2018-02-06 PROCEDURE — 99284 EMERGENCY DEPT VISIT MOD MDM: CPT

## 2018-02-06 PROCEDURE — 74176 CT ABD & PELVIS W/O CONTRAST: CPT

## 2018-02-06 PROCEDURE — 81001 URINALYSIS AUTO W/SCOPE: CPT | Performed by: EMERGENCY MEDICINE

## 2018-02-06 PROCEDURE — 96361 HYDRATE IV INFUSION ADD-ON: CPT

## 2018-02-06 PROCEDURE — 36415 COLL VENOUS BLD VENIPUNCTURE: CPT | Performed by: EMERGENCY MEDICINE

## 2018-02-06 PROCEDURE — 85025 COMPLETE CBC W/AUTO DIFF WBC: CPT | Performed by: EMERGENCY MEDICINE

## 2018-02-06 PROCEDURE — 96375 TX/PRO/DX INJ NEW DRUG ADDON: CPT

## 2018-02-06 PROCEDURE — 96374 THER/PROPH/DIAG INJ IV PUSH: CPT

## 2018-02-06 PROCEDURE — 80048 BASIC METABOLIC PNL TOTAL CA: CPT | Performed by: EMERGENCY MEDICINE

## 2018-02-06 PROCEDURE — 96376 TX/PRO/DX INJ SAME DRUG ADON: CPT

## 2018-02-06 PROCEDURE — 83735 ASSAY OF MAGNESIUM: CPT | Performed by: EMERGENCY MEDICINE

## 2018-02-06 RX ORDER — ONDANSETRON 4 MG/1
4 TABLET, ORALLY DISINTEGRATING ORAL EVERY 6 HOURS PRN
Qty: 12 TABLET | Refills: 0 | Status: SHIPPED | OUTPATIENT
Start: 2018-02-06 | End: 2018-04-19 | Stop reason: ALTCHOICE

## 2018-02-06 RX ORDER — ACETAMINOPHEN 500 MG
1000 TABLET ORAL EVERY 6 HOURS PRN
Qty: 30 TABLET | Refills: 0 | Status: SHIPPED | OUTPATIENT
Start: 2018-02-06 | End: 2019-09-26 | Stop reason: HOSPADM

## 2018-02-06 RX ORDER — ONDANSETRON 2 MG/ML
4 INJECTION INTRAMUSCULAR; INTRAVENOUS ONCE
Status: COMPLETED | OUTPATIENT
Start: 2018-02-06 | End: 2018-02-06

## 2018-02-06 RX ORDER — IBUPROFEN 800 MG/1
800 TABLET ORAL ONCE
Status: COMPLETED | OUTPATIENT
Start: 2018-02-06 | End: 2018-02-06

## 2018-02-06 RX ORDER — DULOXETIN HYDROCHLORIDE 60 MG/1
20 CAPSULE, DELAYED RELEASE ORAL DAILY
COMMUNITY
End: 2018-05-11 | Stop reason: SDUPTHER

## 2018-02-06 RX ORDER — OXYCODONE HYDROCHLORIDE AND ACETAMINOPHEN 5; 325 MG/1; MG/1
1 TABLET ORAL EVERY 8 HOURS PRN
COMMUNITY
End: 2018-02-09

## 2018-02-06 RX ORDER — FENTANYL CITRATE 50 UG/ML
100 INJECTION, SOLUTION INTRAMUSCULAR; INTRAVENOUS ONCE
Status: COMPLETED | OUTPATIENT
Start: 2018-02-06 | End: 2018-02-06

## 2018-02-06 RX ORDER — IBUPROFEN 800 MG/1
800 TABLET ORAL 3 TIMES DAILY
Qty: 30 TABLET | Refills: 0 | Status: SHIPPED | OUTPATIENT
Start: 2018-02-06 | End: 2018-08-20

## 2018-02-06 RX ORDER — OXYCODONE HYDROCHLORIDE 5 MG/1
TABLET ORAL
Qty: 20 TABLET | Refills: 0 | Status: SHIPPED | OUTPATIENT
Start: 2018-02-06 | End: 2018-04-19 | Stop reason: ALTCHOICE

## 2018-02-06 RX ADMIN — FENTANYL CITRATE 100 MCG: 50 INJECTION, SOLUTION INTRAMUSCULAR; INTRAVENOUS at 04:51

## 2018-02-06 RX ADMIN — ONDANSETRON 4 MG: 2 INJECTION INTRAMUSCULAR; INTRAVENOUS at 07:19

## 2018-02-06 RX ADMIN — SODIUM CHLORIDE 1000 ML: 0.9 INJECTION, SOLUTION INTRAVENOUS at 07:19

## 2018-02-06 RX ADMIN — FENTANYL CITRATE 100 MCG: 50 INJECTION, SOLUTION INTRAMUSCULAR; INTRAVENOUS at 07:19

## 2018-02-06 RX ADMIN — SODIUM CHLORIDE 1000 ML: 0.9 INJECTION, SOLUTION INTRAVENOUS at 05:23

## 2018-02-06 RX ADMIN — IBUPROFEN 800 MG: 800 TABLET, FILM COATED ORAL at 08:55

## 2018-02-06 RX ADMIN — ONDANSETRON 4 MG: 2 INJECTION INTRAMUSCULAR; INTRAVENOUS at 05:48

## 2018-02-06 RX ADMIN — FENTANYL CITRATE 100 MCG: 50 INJECTION, SOLUTION INTRAMUSCULAR; INTRAVENOUS at 05:49

## 2018-02-06 NOTE — ED PROVIDER NOTES
History  Chief Complaint   Patient presents with    Flank Pain     Patient woke with right sided flank pain around 3am  Patient has a hx of stones  Patient has some nausea and vomiting   Patient took a Percocet 5-325 at 330am       Patient: Ellie Thurman y o /male  YOB: 1965  MRN: [id number]  PCP: Mk Shea PA-C  Date of evaluation: 2/6/2018    (N B  [address] may have been used in the preparation of this document )    CC: Right flank pain  He awoke pta with right flank pain which reminded him of his prior kidney stone pain  Associated with vomiting  Not improved with Percocet  History provided by:  Patient  Flank Pain   Pain radiation: groin  Pain severity:  Severe  Onset quality:  Sudden  Timing:  Constant  Progression:  Unchanged  Chronicity:  Recurrent  Relieved by:  Nothing  Worsened by:  Nothing  Associated symptoms: no dysuria, no fever and no nausea        Prior to Admission Medications   Prescriptions Last Dose Informant Patient Reported? Taking? DULoxetine (CYMBALTA) 60 mg delayed release capsule 2/5/2018  Yes Yes   Sig: Take 20 mg by mouth daily   ibuprofen (MOTRIN) 600 mg tablet   No No   Sig: Take 1 tablet by mouth every 6 (six) hours as needed for mild pain for up to 30 days   oxyCODONE-acetaminophen (PERCOCET) 5-325 mg per tablet 2/6/2018 at 0330  Yes Yes   Sig: Take 1 tablet by mouth every 8 (eight) hours as needed for moderate pain      Facility-Administered Medications: None       Past Medical History:   Diagnosis Date    Kidney stones        Past Surgical History:   Procedure Laterality Date    CARDIAC ELECTROPHYSIOLOGY STUDY AND ABLATION      HERNIA REPAIR      KIDNEY STONE SURGERY      KNEE CARTILAGE SURGERY         History reviewed  No pertinent family history  I have reviewed and agree with the history as documented      Social History   Substance Use Topics    Smoking status: Never Smoker    Smokeless tobacco: Never Used  Alcohol use No      Comment: moderate        Review of Systems   Constitutional: Negative  Negative for fever  HENT: Negative  Respiratory: Negative  Cardiovascular: Negative  Gastrointestinal: Negative  Negative for nausea  Endocrine: Negative  Genitourinary: Positive for flank pain  Negative for difficulty urinating, dysuria, frequency and urgency  Skin: Negative  Neurological: Negative  Hematological: Negative  All other systems reviewed and are negative  Physical Exam  ED Triage Vitals [02/06/18 0436]   Temperature Pulse Respirations Blood Pressure SpO2   97 9 °F (36 6 °C) 74 18 140/91 96 %      Temp Source Heart Rate Source Patient Position - Orthostatic VS BP Location FiO2 (%)   Temporal Monitor Lying Left arm --      Pain Score       8           Orthostatic Vital Signs  Vitals:    02/06/18 0436 02/06/18 0727 02/06/18 0853 02/06/18 1000   BP: 140/91 132/73 127/77 121/64   Pulse: 74 75 74 67   Patient Position - Orthostatic VS: Lying Lying Lying Lying       Physical Exam   Constitutional: He is oriented to person, place, and time  He appears well-developed and well-nourished  HENT:   Mouth/Throat: Oropharynx is clear and moist and mucous membranes are normal    Voice normal   Eyes: EOM are normal  Pupils are equal, round, and reactive to light  Cardiovascular: Normal rate and regular rhythm  Pulmonary/Chest: Effort normal    Abdominal: Soft  Bowel sounds are normal    Neurological: He is alert and oriented to person, place, and time  GCS eye subscore is 4  GCS verbal subscore is 5  GCS motor subscore is 6  Skin: Skin is warm and dry  Psychiatric: He has a normal mood and affect  His speech is normal and behavior is normal    Nursing note and vitals reviewed        ED Medications  Medications   fentanyl citrate (PF) 100 MCG/2ML 100 mcg (100 mcg Intravenous Given 2/6/18 2471)   sodium chloride 0 9 % bolus 1,000 mL (0 mL Intravenous Stopped 2/6/18 6849)   ondansetron [facility]) injection 4 mg (4 mg Intravenous Given 2/6/18 0548)   fentanyl citrate (PF) 100 MCG/2ML 100 mcg (100 mcg Intravenous Given 2/6/18 0549)   fentanyl citrate (PF) 100 MCG/2ML 100 mcg (100 mcg Intravenous Given 2/6/18 0719)   sodium chloride 0 9 % bolus 1,000 mL (0 mL Intravenous Stopped 2/6/18 1005)   ondansetron (ZOFRAN) injection 4 mg (4 mg Intravenous Given 2/6/18 0719)   ibuprofen (MOTRIN) tablet 800 mg (800 mg Oral Given 2/6/18 0855)       Diagnostic Studies  Results Reviewed     Procedure Component Value Units Date/Time    Urine Microscopic [[id number]]  (Abnormal) Collected:  02/06/18 0453    Lab Status:  Final result Specimen:  Urine from Urine, Clean Catch Updated:  02/06/18 0531     RBC, UA 20-30 (A) /hpf      WBC, UA 0-1 (A) /hpf      Epithelial Cells Occasional /hpf      Bacteria, UA Occasional /hpf     UA w Reflex to Microscopic w Reflex to Culture [[id number]]  (Abnormal) Collected:  02/06/18 0453    Lab Status:  Final result Specimen:  Urine from Urine, Clean Catch Updated:  02/06/18 0523     Color, UA Yellow     Clarity, UA Clear     Specific Gravity, UA >=1 030     pH, UA 5 5     Leukocytes, UA Negative     Nitrite, UA Negative     Protein, UA Trace (A) mg/dl      Glucose, UA Negative mg/dl      Ketones, UA Negative mg/dl      Urobilinogen, UA 0 2 E U /dl      Bilirubin, UA Negative     Blood, UA Large (A)    Basic metabolic panel [[id number]]  (Abnormal) Collected:  02/06/18 0453    Lab Status:  Final result Specimen:  Blood from Arm, Left Updated:  02/06/18 0517     Sodium 138 mmol/L      Potassium 3 9 mmol/L      Chloride 103 mmol/L      CO2 23 mmol/L      Anion Gap 12 mmol/L      BUN 26 (H) mg/dL      Creatinine 1 11 mg/dL      Glucose 114 mg/dL      Calcium 8 3 mg/dL      eGFR 75 ml/min/1 73sq m     Narrative:         National Kidney Disease Education Program recommendations are as follows:  GFR calculation is accurate only with a steady state creatinine  Chronic Kidney disease less than 60 ml/min/1 73 sq  meters  Kidney failure less than 15 ml/min/1 73 sq  meters  Magnesium [[id number]]  (Normal) Collected:  02/06/18 0453    Lab Status:  Final result Specimen:  Blood from Arm, Left Updated:  02/06/18 0517     Magnesium 2 0 mg/dL     CBC and differential [[id number]]  (Normal) Collected:  02/06/18 0453    Lab Status:  Final result Specimen:  Blood from Arm, Left Updated:  02/06/18 0513     WBC 7 84 Thousand/uL      RBC 4 58 Million/uL      Hemoglobin 14 5 g/dL      Hematocrit 40 7 %      MCV 89 fL      MCH 31 7 pg      MCHC 35 6 g/dL      RDW 14 3 %      MPV 9 8 fL      Platelets 246 Thousands/uL      Neutrophils Relative 66 %      Lymphocytes Relative 25 %      Monocytes Relative 7 %      Eosinophils Relative 2 %      Basophils Relative 0 %      Neutrophils Absolute 5 11 Thousands/µL      Lymphocytes Absolute 1 99 Thousands/µL      Monocytes Absolute 0 55 Thousand/µL      Eosinophils Absolute 0 16 Thousand/µL      Basophils Absolute 0 03 Thousands/µL     Narrative: This is an appended report  These results have been appended to a previously verified report  CT renal stone study abdomen pelvis without contrast   Final Result by Allison Thornton MD (02/06 7409)      Mild right-sided hydronephrosis secondary to a 4 mm proximal to mid right ureteral stone  Workstation performed: [id number]                    Procedures  Procedures       Phone Contacts  ED Phone Contact    ED Course  ED Course                                MDM  Number of Diagnoses or Management Options  Ureteral colic:   Ureterolithiasis:   Diagnosis management comments: Ureterolithiasis with obstruction; ureteral colic; nothing to suggest pyelonephritis or other intrinsic renal problem         Amount and/or Complexity of Data Reviewed  Tests in the radiology section of CPT®: ordered and reviewed  Tests in the medicine section of CPT®: ordered and reviewed  Decide to obtain previous medical records or to obtain history from someone other than the patient: yes  Obtain history from someone other than the patient: yes  Independent visualization of images, tracings, or specimens: yes    Patient Progress  Patient progress: improved    CritCare Time    Disposition  Final diagnoses:   Ureteral colic   Ureterolithiasis     Time reflects when diagnosis was documented in both MDM as applicable and the Disposition within this note     Time User Action Codes Description Comment    2/6/2018  9:19 AM Bridget Moreno Add [U26] Ureteral colic     4/6/9135  8:57 AM Amy WINTER Add [N20 1] Ureterolithiasis       ED Disposition     ED Disposition Condition Comment    Discharge  [address] discharge to home/self care  Condition at discharge: Good        Follow-up Information     Follow up With Specialties Details Why Senaida Lesches, MD Urology Call today For followup, Tell about this ER visit    [address]      Harley Curry PA-C Family Medicine Call today Tell about this ER visit   [address]  [phone]          Discharge Medication List as of 2/6/2018  9:25 AM      START taking these medications    Details   acetaminophen (TYLENOL) 500 mg tablet Take 2 tablets (1,000 mg total) by mouth every 6 (six) hours as needed (pain, fever), Starting Tue 2/6/2018, Normal      ondansetron (ZOFRAN-ODT) 4 mg disintegrating tablet Take 1 tablet (4 mg total) by mouth every 6 (six) hours as needed for nausea or vomiting, Starting Tue 2/6/2018, Normal      oxyCODONE (ROXICODONE) 5 mg immediate release tablet 1-2 tabs every 4-6 hours as needed for severe pain, Print         CONTINUE these medications which have CHANGED    Details   ibuprofen (MOTRIN) 800 mg tablet Take 1 tablet (800 mg total) by mouth 3 (three) times a day as needed for pain, fever, Starting Tue 2/6/2018, Normal         CONTINUE these medications which have NOT CHANGED    Details   DULoxetine (CYMBALTA) 60 mg delayed release capsule Take 20 mg by mouth daily, Historical Med      oxyCODONE-acetaminophen (PERCOCET) 5-325 mg per tablet Take 1 tablet by mouth every 8 (eight) hours as needed for moderate pain, Historical Med           No discharge procedures on file      ED Provider  Electronically Signed by           Abad Howell MD  02/08/18 5554

## 2018-02-06 NOTE — ED NOTES
Patient placed on 2L, nasal cannula at this time, Dr Ant Awad made aware  Patient also supplied with ice water, and asking to speak with physician regarding administering IV antibiotics because he has gone septic in the past  Dr Ant Awad made aware        Jeanette Son RN  02/06/18 7892

## 2018-02-06 NOTE — DISCHARGE INSTRUCTIONS
Renal Colic   WHAT YOU NEED TO KNOW:   Renal colic is severe pain in your lower back or sides  The pain is usually on one side, but may be on both sides of your lower back  Renal colic may start quickly, come and go, and become worse over time  Renal colic is caused by a blockage in your urinary tract  The most common cause of a blockage is a kidney stone  Blood clots, ureter spasms, and dead tissue may also block your urinary tract  DISCHARGE INSTRUCTIONS:   Return to the emergency department if:   · You cannot stop vomiting  · You see new or increased bleeding when you urinate  · You are urinating less than usual, or not at all  · Your pain is not getting better even after treatment  Contact your healthcare provider if:   · You have fever  · You need to urinate more often than usual, or right away  · You see a stone in your urine strainer after you urinate  · You have questions or concerns about your condition or care  Medicines:   · Medicines  may help decrease pain and muscle spasms  You may also need medicine to calm your stomach and stop vomiting  · Take your medicine as directed  Contact your healthcare provider if you think your medicine is not helping or if you have side effects  Tell him of her if you are allergic to any medicine  Keep a list of the medicines, vitamins, and herbs you take  Include the amounts, and when and why you take them  Bring the list or the pill bottles to follow-up visits  Carry your medicine list with you in case of an emergency  Manage your symptoms:   · Drink liquids as directed  to help decrease pain and flush blockages from your urinary tract  Ask how much liquid to drink each day and which liquids are best for you  You may need to drink about 3 liters (12 glasses) of liquids each day  Half of your total daily liquids should be water  Limit coffee, tea, and soda to 2 cups daily  Your urine should be pale and clear      · Strain your urine every time you urinate  Urinate into a strainer (funnel with a fine mesh on the bottom) or glass jar to collect kidney stones  Give the kidney stones to your healthcare provider at your next visit  · Eat a variety of healthy foods  Healthy foods include fruits, vegetables, whole-grain breads, low-fat dairy products, beans, lean meats, and fish  You may need to increase the amount of citrus fruit you eat, such as oranges  Ask your healthcare provider how much salt, calcium, and protein you should eat  · Avoid activity in hot temperatures  Heat may cause you to become dehydrated and urinate less  Follow up with your healthcare provider as directed: You may need to return for tests to check if your blockage has cleared  Write down your questions so you remember to ask them during your visits  © 2017 [employer] Information is for End User's use only and may not be sold, redistributed or otherwise used for commercial purposes  All illustrations and images included in CareNotes® are the copyrighted property of A D A M , Inc  or Leonel Hanley  The above information is an  only  It is not intended as medical advice for individual conditions or treatments  Talk to your doctor, nurse or pharmacist before following any medical regimen to see if it is safe and effective for you

## 2018-02-07 ENCOUNTER — HOSPITAL ENCOUNTER (EMERGENCY)
Facility: HOSPITAL | Age: 53
Discharge: HOME | End: 2018-02-08
Attending: EMERGENCY MEDICINE
Payer: COMMERCIAL

## 2018-02-07 DIAGNOSIS — N39.0 URINARY TRACT INFECTION: ICD-10-CM

## 2018-02-07 DIAGNOSIS — N23 RENAL COLIC ON RIGHT SIDE: Primary | ICD-10-CM

## 2018-02-07 LAB
ALBUMIN SERPL BCP-MCNC: 3.3 G/DL (ref 3.5–5)
ALP SERPL-CCNC: 68 U/L (ref 46–116)
ALT SERPL W P-5'-P-CCNC: 23 U/L (ref 12–78)
ANION GAP SERPL CALCULATED.3IONS-SCNC: 14 MMOL/L (ref 4–13)
AST SERPL W P-5'-P-CCNC: 13 U/L (ref 5–45)
BASOPHILS # BLD AUTO: 0.02 THOUSANDS/ΜL (ref 0–0.1)
BASOPHILS NFR BLD AUTO: 0 % (ref 0–1)
BILIRUB SERPL-MCNC: 0.6 MG/DL (ref 0.2–1)
BUN SERPL-MCNC: 20 MG/DL (ref 5–25)
CALCIUM SERPL-MCNC: 8.2 MG/DL (ref 8.3–10.1)
CHLORIDE SERPL-SCNC: 102 MMOL/L (ref 100–108)
CO2 SERPL-SCNC: 22 MMOL/L (ref 21–32)
CREAT SERPL-MCNC: 1.61 MG/DL (ref 0.6–1.3)
EOSINOPHIL # BLD AUTO: 0.1 THOUSAND/ΜL (ref 0–0.61)
EOSINOPHIL NFR BLD AUTO: 1 % (ref 0–6)
ERYTHROCYTE [DISTWIDTH] IN BLOOD BY AUTOMATED COUNT: 13.8 % (ref 11.6–15.1)
GFR SERPL CREATININE-BSD FRML MDRD: 48 ML/MIN/1.73SQ M
GLUCOSE SERPL-MCNC: 114 MG/DL (ref 65–140)
HCT VFR BLD AUTO: 38.4 % (ref 36.5–49.3)
HGB BLD-MCNC: 14 G/DL (ref 12–17)
LIPASE SERPL-CCNC: 117 U/L (ref 73–393)
LYMPHOCYTES # BLD AUTO: 1.92 THOUSANDS/ΜL (ref 0.6–4.47)
LYMPHOCYTES NFR BLD AUTO: 17 % (ref 14–44)
MCH RBC QN AUTO: 32.1 PG (ref 26.8–34.3)
MCHC RBC AUTO-ENTMCNC: 36.5 G/DL (ref 31.4–37.4)
MCV RBC AUTO: 88 FL (ref 82–98)
MONOCYTES # BLD AUTO: 1.26 THOUSAND/ΜL (ref 0.17–1.22)
MONOCYTES NFR BLD AUTO: 11 % (ref 4–12)
NEUTROPHILS # BLD AUTO: 7.8 THOUSANDS/ΜL (ref 1.85–7.62)
NEUTS SEG NFR BLD AUTO: 71 % (ref 43–75)
PLATELET # BLD AUTO: 260 THOUSANDS/UL (ref 149–390)
PMV BLD AUTO: 10 FL (ref 8.9–12.7)
POTASSIUM SERPL-SCNC: 3.5 MMOL/L (ref 3.5–5.3)
PROT SERPL-MCNC: 7 G/DL (ref 6.4–8.2)
RBC # BLD AUTO: 4.36 MILLION/UL (ref 3.88–5.62)
SODIUM SERPL-SCNC: 138 MMOL/L (ref 136–145)
WBC # BLD AUTO: 11.1 THOUSAND/UL (ref 4.31–10.16)

## 2018-02-07 PROCEDURE — 83690 ASSAY OF LIPASE: CPT | Performed by: EMERGENCY MEDICINE

## 2018-02-07 PROCEDURE — 36415 COLL VENOUS BLD VENIPUNCTURE: CPT | Performed by: EMERGENCY MEDICINE

## 2018-02-07 PROCEDURE — 80053 COMPREHEN METABOLIC PANEL: CPT | Performed by: EMERGENCY MEDICINE

## 2018-02-07 PROCEDURE — 96375 TX/PRO/DX INJ NEW DRUG ADDON: CPT

## 2018-02-07 PROCEDURE — 96361 HYDRATE IV INFUSION ADD-ON: CPT

## 2018-02-07 PROCEDURE — 85025 COMPLETE CBC W/AUTO DIFF WBC: CPT | Performed by: EMERGENCY MEDICINE

## 2018-02-07 RX ORDER — ONDANSETRON 2 MG/ML
4 INJECTION INTRAMUSCULAR; INTRAVENOUS ONCE
Status: COMPLETED | OUTPATIENT
Start: 2018-02-07 | End: 2018-02-07

## 2018-02-07 RX ORDER — FENTANYL CITRATE 50 UG/ML
75 INJECTION, SOLUTION INTRAMUSCULAR; INTRAVENOUS ONCE
Status: COMPLETED | OUTPATIENT
Start: 2018-02-07 | End: 2018-02-07

## 2018-02-07 RX ADMIN — FENTANYL CITRATE 75 MCG: 50 INJECTION INTRAMUSCULAR; INTRAVENOUS at 23:29

## 2018-02-07 RX ADMIN — SODIUM CHLORIDE 1000 ML: 0.9 INJECTION, SOLUTION INTRAVENOUS at 23:29

## 2018-02-07 RX ADMIN — ONDANSETRON 4 MG: 2 INJECTION INTRAMUSCULAR; INTRAVENOUS at 23:29

## 2018-02-08 ENCOUNTER — APPOINTMENT (EMERGENCY)
Dept: RADIOLOGY | Facility: HOSPITAL | Age: 53
End: 2018-02-08
Payer: COMMERCIAL

## 2018-02-08 ENCOUNTER — APPOINTMENT (EMERGENCY)
Dept: ULTRASOUND IMAGING | Facility: HOSPITAL | Age: 53
End: 2018-02-08
Payer: COMMERCIAL

## 2018-02-08 VITALS
DIASTOLIC BLOOD PRESSURE: 68 MMHG | BODY MASS INDEX: 35.98 KG/M2 | WEIGHT: 257.94 LBS | RESPIRATION RATE: 18 BRPM | OXYGEN SATURATION: 96 % | SYSTOLIC BLOOD PRESSURE: 134 MMHG | TEMPERATURE: 98.9 F | HEART RATE: 81 BPM

## 2018-02-08 LAB
BACTERIA UR QL AUTO: ABNORMAL /HPF
BILIRUB UR QL STRIP: NEGATIVE
CLARITY UR: ABNORMAL
COLOR UR: YELLOW
FINE GRAN CASTS URNS QL MICRO: ABNORMAL /LPF
GLUCOSE UR STRIP-MCNC: NEGATIVE MG/DL
HGB UR QL STRIP.AUTO: ABNORMAL
KETONES UR STRIP-MCNC: NEGATIVE MG/DL
LEUKOCYTE ESTERASE UR QL STRIP: ABNORMAL
MUCOUS THREADS UR QL AUTO: ABNORMAL
NITRITE UR QL STRIP: NEGATIVE
NON-SQ EPI CELLS URNS QL MICRO: ABNORMAL /HPF
PH UR STRIP.AUTO: 5 [PH] (ref 4.5–8)
PROT UR STRIP-MCNC: ABNORMAL MG/DL
RBC #/AREA URNS AUTO: ABNORMAL /HPF
SP GR UR STRIP.AUTO: 1.02 (ref 1–1.03)
UROBILINOGEN UR QL STRIP.AUTO: 0.2 E.U./DL
WBC #/AREA URNS AUTO: ABNORMAL /HPF
WBC CASTS URNS QL MICRO: ABNORMAL /LPF

## 2018-02-08 PROCEDURE — 99284 EMERGENCY DEPT VISIT MOD MDM: CPT

## 2018-02-08 PROCEDURE — 87086 URINE CULTURE/COLONY COUNT: CPT | Performed by: EMERGENCY MEDICINE

## 2018-02-08 PROCEDURE — 96361 HYDRATE IV INFUSION ADD-ON: CPT

## 2018-02-08 PROCEDURE — 74018 RADEX ABDOMEN 1 VIEW: CPT

## 2018-02-08 PROCEDURE — 76770 US EXAM ABDO BACK WALL COMP: CPT

## 2018-02-08 PROCEDURE — 81001 URINALYSIS AUTO W/SCOPE: CPT | Performed by: EMERGENCY MEDICINE

## 2018-02-08 PROCEDURE — 96365 THER/PROPH/DIAG IV INF INIT: CPT

## 2018-02-08 RX ORDER — CEFDINIR 300 MG/1
300 CAPSULE ORAL EVERY 12 HOURS SCHEDULED
Qty: 20 CAPSULE | Refills: 0 | Status: SHIPPED | OUTPATIENT
Start: 2018-02-08 | End: 2018-02-09

## 2018-02-08 RX ADMIN — CEFTRIAXONE 1000 MG: 1 INJECTION, SOLUTION INTRAVENOUS at 01:26

## 2018-02-08 NOTE — ED PROVIDER NOTES
History  Chief Complaint   Patient presents with    Flank Pain     Patient has a 4mm kidney stone on the right side  patient is in extreme pain and is throwing up  This is a 58-year-old male with an extensive history of nephrolithiasis who was seen in this emergency department on 6 February 2018 for right-sided flank pain and found to have a mid ureter 4 mm calculus  Most recent episode of nephrolithiasis was 4 months prior; this ultimately resolved without issue  He has had previous complicated nephrolithiasis that required nephrostomy tube placement well as multiple stents; he follows with Dr Ruiz Fails in this facility  Patient did ultimately have good symptomatic control while in the ED and was discharged home  He subsequently developed recurrent and significantly worsening right flank pain radiating into right lower quadrant within the past 12 hours  States he has taken 500 mg acetaminophen as well as 2 x 5 mg oxycodone IR tablets without relief of pain; did have multiple episodes of nausea and nonbilious/nonbloody vomiting at home with his worsening pain  Has sensation of abdominal distention and constipation over the past 3 days as well that he attributes to his oxycodone  Denies associated hematuria/dysuria/urgency/frequency/fever/chills  Has previous hx abdominal hernia repair in addition to  surgery       Reviewed results of previous diagnostic workup from 6 Feb visit  Had normal urinalysis apart from hematuria as well as right mid/proximal 4 mm ureteral calculus producing right-sided hydronephrosis  Will check CBC/CMP/lipase/urinalysis and obtain KUB and renal ultrasound  Symptomatic therapy while workup ongoing          History provided by:  Medical records and patient  Flank Pain   Associated symptoms: nausea and vomiting    Associated symptoms: no chest pain, no chills, no cough, no diarrhea, no dysuria, no fever, no hematuria and no shortness of breath        Prior to Admission Medications   Prescriptions Last Dose Informant Patient Reported? Taking? DULoxetine (CYMBALTA) 60 mg delayed release capsule   Yes Yes   Sig: Take 20 mg by mouth daily   acetaminophen (TYLENOL) 500 mg tablet   No Yes   Sig: Take 2 tablets (1,000 mg total) by mouth every 6 (six) hours as needed (pain, fever)   ibuprofen (MOTRIN) 600 mg tablet   No No   Sig: Take 1 tablet by mouth every 6 (six) hours as needed for mild pain for up to 30 days   ibuprofen (MOTRIN) 800 mg tablet   No Yes   Sig: Take 1 tablet (800 mg total) by mouth 3 (three) times a day as needed for pain, fever   ondansetron (ZOFRAN-ODT) 4 mg disintegrating tablet   No Yes   Sig: Take 1 tablet (4 mg total) by mouth every 6 (six) hours as needed for nausea or vomiting   oxyCODONE (ROXICODONE) 5 mg immediate release tablet   No Yes   Si-2 tabs every 4-6 hours as needed for severe pain   oxyCODONE-acetaminophen (PERCOCET) 5-325 mg per tablet   Yes No   Sig: Take 1 tablet by mouth every 8 (eight) hours as needed for moderate pain      Facility-Administered Medications: None       Past Medical History:   Diagnosis Date    Kidney stones        Past Surgical History:   Procedure Laterality Date    CARDIAC ELECTROPHYSIOLOGY STUDY AND ABLATION      HERNIA REPAIR      KIDNEY STONE SURGERY      KNEE CARTILAGE SURGERY         History reviewed  No pertinent family history  I have reviewed and agree with the history as documented  Social History   Substance Use Topics    Smoking status: Never Smoker    Smokeless tobacco: Never Used    Alcohol use No      Comment: moderate        Review of Systems   Constitutional: Negative for chills and fever  Respiratory: Negative for cough and shortness of breath  Cardiovascular: Negative for chest pain and palpitations  Gastrointestinal: Positive for abdominal pain, nausea and vomiting  Negative for diarrhea  Genitourinary: Positive for flank pain   Negative for difficulty urinating, dysuria and hematuria  Skin: Negative for color change, pallor, rash and wound  Hematological: Negative for adenopathy  Does not bruise/bleed easily  All other systems reviewed and are negative  Physical Exam  ED Triage Vitals   Temperature Pulse Respirations Blood Pressure SpO2   02/07/18 2330 02/07/18 2319 02/07/18 2319 02/07/18 2319 02/07/18 2319   98 9 °F (37 2 °C) 87 18 140/76 99 %      Temp Source Heart Rate Source Patient Position - Orthostatic VS BP Location FiO2 (%)   02/07/18 2330 02/07/18 2319 02/07/18 2319 02/07/18 2319 --   Temporal Monitor Lying Left arm       Pain Score       02/07/18 2319       Worst Possible Pain           Orthostatic Vital Signs  Vitals:    02/07/18 2319 02/07/18 2330 02/08/18 0200   BP: 140/76 138/66 134/68   Pulse: 87 83 81   Patient Position - Orthostatic VS: Lying Lying Lying       Physical Exam   Constitutional: He is oriented to person, place, and time  Vital signs are normal  He appears well-developed and well-nourished  He is cooperative  He appears distressed (moderate painful distress; patient was actively retching shortly prior to my examination)  HENT:   Head: Normocephalic and atraumatic  Right Ear: Hearing and external ear normal    Left Ear: Hearing and external ear normal    Nose: Nose normal    Neck: Trachea normal, normal range of motion and phonation normal  Neck supple  No JVD present  No tracheal tenderness, no spinous process tenderness and no muscular tenderness present  No tracheal deviation present  No thyroid mass and no thyromegaly present  Cardiovascular: Normal rate, regular rhythm, S1 normal, S2 normal, normal heart sounds and intact distal pulses  Exam reveals no gallop and no friction rub  No murmur heard  Pulses:       Radial pulses are 2+ on the right side, and 2+ on the left side  Pulmonary/Chest: Effort normal and breath sounds normal  No stridor  No respiratory distress  He has no decreased breath sounds  He has no wheezes   He has no rhonchi  He has no rales  He exhibits no tenderness  Abdominal: Soft  He exhibits no distension and no mass  There is tenderness in the right lower quadrant  There is CVA tenderness (very mild R CVA ttp)  There is no rigidity, no rebound and no guarding  Musculoskeletal: Normal range of motion  He exhibits no edema, tenderness or deformity  Neurological: He is alert and oriented to person, place, and time  GCS eye subscore is 4  GCS verbal subscore is 5  GCS motor subscore is 6  Skin: Skin is warm, dry and intact  No rash noted  No erythema  Nursing note and vitals reviewed  ED Medications  Medications   ondansetron (ZOFRAN) injection 4 mg (4 mg Intravenous Given 2/7/18 2329)   sodium chloride 0 9 % bolus 1,000 mL (0 mL Intravenous Stopped 2/8/18 0123)   fentanyl citrate (PF) 100 MCG/2ML 75 mcg (75 mcg Intravenous Given 2/7/18 2329)   cefTRIAXone (ROCEPHIN) IVPB (premix) 1,000 mg (0 mg Intravenous Stopped 2/8/18 0154)       Diagnostic Studies  Results Reviewed     Procedure Component Value Units Date/Time    Urine Microscopic [[id number]]  (Abnormal) Collected:  02/08/18 0013    Lab Status:  Final result Specimen:  Urine from Urine, Clean Catch Updated:  02/08/18 0055     RBC, UA 10-20 (A) /hpf      WBC, UA 30-50 (A) /hpf      Epithelial Cells Occasional /hpf      Bacteria, UA Moderate (A) /hpf      Fine granular casts 0-1 /lpf      MUCOUS THREADS Occasional     WBC Casts, UA 0-3 (A) /lpf     Urine culture [[id number]] Collected:  02/08/18 0013    Lab Status:   In process Specimen:  Urine from Urine, Clean Catch Updated:  02/08/18 0055    UA w Reflex to Microscopic w Reflex to Culture [[id number]]  (Abnormal) Collected:  02/08/18 0013    Lab Status:  Final result Specimen:  Urine from Urine, Clean Catch Updated:  02/08/18 0054     Color, UA Yellow     Clarity, UA Cloudy     Specific Gravity, UA 1 025     pH, UA 5 0     Leukocytes, UA Small (A)     Nitrite, UA Negative     Protein, UA 30 (1+) (A) mg/dl Glucose, UA Negative mg/dl      Ketones, UA Negative mg/dl      Urobilinogen, UA 0 2 E U /dl      Bilirubin, UA Negative     Blood, UA Large (A)    CMP [[id number]]  (Abnormal) Collected:  02/07/18 2330    Lab Status:  Final result Specimen:  Blood from Arm, Left Updated:  02/07/18 2350     Sodium 138 mmol/L      Potassium 3 5 mmol/L      Chloride 102 mmol/L      CO2 22 mmol/L      Anion Gap 14 (H) mmol/L      BUN 20 mg/dL      Creatinine 1 61 (H) mg/dL      Glucose 114 mg/dL      Calcium 8 2 (L) mg/dL      AST 13 U/L      ALT 23 U/L      Alkaline Phosphatase 68 U/L      Total Protein 7 0 g/dL      Albumin 3 3 (L) g/dL      Total Bilirubin 0 60 mg/dL      eGFR 48 ml/min/1 73sq m     Narrative:         National Kidney Disease Education Program recommendations are as follows:  GFR calculation is accurate only with a steady state creatinine  Chronic Kidney disease less than 60 ml/min/1 73 sq  meters  Kidney failure less than 15 ml/min/1 73 sq  meters      Lipase [[id number]]  (Normal) Collected:  02/07/18 2330    Lab Status:  Final result Specimen:  Blood from Arm, Left Updated:  02/07/18 2350     Lipase 117 u/L     CBC and differential [[id number]]  (Abnormal) Collected:  02/07/18 2330    Lab Status:  Final result Specimen:  Blood from Arm, Left Updated:  02/07/18 2336     WBC 11 10 (H) Thousand/uL      RBC 4 36 Million/uL      Hemoglobin 14 0 g/dL      Hematocrit 38 4 %      MCV 88 fL      MCH 32 1 pg      MCHC 36 5 g/dL      RDW 13 8 %      MPV 10 0 fL      Platelets 680 Thousands/uL      Neutrophils Relative 71 %      Lymphocytes Relative 17 %      Monocytes Relative 11 %      Eosinophils Relative 1 %      Basophils Relative 0 %      Neutrophils Absolute 7 80 (H) Thousands/µL      Lymphocytes Absolute 1 92 Thousands/µL      Monocytes Absolute 1 26 (H) Thousand/µL      Eosinophils Absolute 0 10 Thousand/µL      Basophils Absolute 0 02 Thousands/µL                  XR abdomen 1 view kub   ED Interpretation by Minnie Bustos Darrion Zamora DO (02/08 0670)   Nonobstructive bowel gas pattern  Unable to visualize any renal or bladder calculi  US kidney and bladder   ED Interpretation by Mode Dumont DO (02/08 0107)   Reviewed virtual radiology report:      IMPRESSION:   Nonobstructive nephrolithiasis and bladder calculus                 Procedures  Procedures       Phone Contacts  ED Phone Contact    ED Course  ED Course as of Feb 08 0204   Thu Feb 08, 2018   0022 1  WBC mildly elevated--increased from 7 84 two days prior  2  Hg/Hct wnl   3  Plt wnl   4  Electrolytes wnl  Cr increased from baseline 1 11 two days prior; baseline ~0 9   5  UA collected and pending  6  KUB/Renal US pending at this time also  7  Lipase wnl  Patient had transient improvement of pain immediately prior to urinating but then subsequently developed some recurrent severe right-sided flank pain with additional retching  This subsided after several minutes  Urinalysis was collected  1118 TRAVIS completed and awaiting interpretation at this time  Patient is currently pain-free and has been pain-free since start of [address] UA result: small leukocytes with large blood and moderate bacteria  Would treat for UTI in setting of sx and elevated WBC  Will give ceftriaxone IV and observe briefly        MDM  Number of Diagnoses or Management Options  Renal colic on right side: established and improving  Urinary tract infection: new and does not require workup     Amount and/or Complexity of Data Reviewed  Clinical lab tests: ordered  Tests in the radiology section of CPT®: ordered and reviewed  Decide to obtain previous medical records or to obtain history from someone other than the patient: yes  Review and summarize past medical records: yes  Independent visualization of images, tracings, or specimens: yes    Risk of Complications, Morbidity, and/or Mortality  Presenting problems: high  Diagnostic procedures: high  Management options: high  General comments: I discussed results of the diagnostic workup with the patient  Reviewed relevant findings and likely diagnosis: R-sided ureterolithiasis now with completed stone passage to bladder  Associated UTI without evidence of sepsis; patient additionally has mild JS that I suspect is related to dehydration from vomiting/decreased PO intake  Reviewed treatment plan: Patient has analgesic meds from previous ED visit as well as antiemetics; he is asymptomatic and has been pain-free since performance of US  Will not require the prescription of additional anaglesics  Will give oral abx to cover urinary pathogens--has no previous positive urine culture to guide therapy  Will give cefdinir 300 mg BID x 10d  Reviewed follow-up plan: Has f/u appt scheduled with Dr Griselda Rajan on 13 Feb 2018 and I encouraged him to keep this appt  Reviewed ED return precautions: return to ED for fever/uncontrollable pain/uncontrollable vomiting  All questions answered prior to discharge  Patient expressed understanding and agreed to plan  Ambulatory in ED prior to discharge without difficulty  Patient Progress  Patient progress: improved    CritCare Time    Disposition  Final diagnoses:   Renal colic on right side   Urinary tract infection     Time reflects when diagnosis was documented in both MDM as applicable and the Disposition within this note     Time User Action Codes Description Comment    2/8/2018  1:48 AM Mliss Citizen Add [Z75] Renal colic on right side     2/8/2018  1:48 AM Mliss Citizen Add [N39 0] Urinary tract infection       ED Disposition     ED Disposition Condition Comment    Discharge  [address] discharge to home/self care      Condition at discharge: Good        Follow-up Information     Follow up With Specialties Details Why Contact Info Additional Information    Nakia Starkey MD Urology Go to As scheduled on February 13 for further evaluation [address] Floor  [facility] [address] [address]  [phone]       [facility] Emergency Medicine Go to If you develop uncontrollable abdominal pain, fever, or uncontrollable vomiting  [address] [id number]  [phone] [place] [facility], [address], [facility] [employer] [place], [place], [address]        Discharge Medication List as of 2/8/2018  1:51 AM      START taking these medications    Details   cefdinir (OMNICEF) 300 mg capsule Take 1 capsule (300 mg total) by mouth every 12 (twelve) hours for 10 days, Starting u 2/8/2018, Until Sun 2/18/2018, Normal         CONTINUE these medications which have NOT CHANGED    Details   acetaminophen (TYLENOL) 500 mg tablet Take 2 tablets (1,000 mg total) by mouth every 6 (six) hours as needed (pain, fever), Starting Tue 2/6/2018, Normal      DULoxetine (CYMBALTA) 60 mg delayed release capsule Take 20 mg by mouth daily, Historical Med      ibuprofen (MOTRIN) 800 mg tablet Take 1 tablet (800 mg total) by mouth 3 (three) times a day as needed for pain, fever, Starting Tue 2/6/2018, Normal      ondansetron (ZOFRAN-ODT) 4 mg disintegrating tablet Take 1 tablet (4 mg total) by mouth every 6 (six) hours as needed for nausea or vomiting, Starting Tue 2/6/2018, Normal      oxyCODONE (ROXICODONE) 5 mg immediate release tablet 1-2 tabs every 4-6 hours as needed for severe pain, Print      oxyCODONE-acetaminophen (PERCOCET) 5-325 mg per tablet Take 1 tablet by mouth every 8 (eight) hours as needed for moderate pain, Historical Med           No discharge procedures on file      ED Provider  Electronically Signed by           Alex Rodriguez DO  02/08/18 6593

## 2018-02-08 NOTE — DISCHARGE INSTRUCTIONS
Renal Colic   WHAT YOU NEED TO KNOW:   Renal colic is severe pain in your lower back or sides  The pain is usually on one side, but may be on both sides of your lower back  Renal colic may start quickly, come and go, and become worse over time  Renal colic is caused by a blockage in your urinary tract  The most common cause of a blockage is a kidney stone  Blood clots, ureter spasms, and dead tissue may also block your urinary tract  DISCHARGE INSTRUCTIONS:   Return to the emergency department if:   · You cannot stop vomiting  · You see new or increased bleeding when you urinate  · You are urinating less than usual, or not at all  · Your pain is not getting better even after treatment  Contact your healthcare provider if:   · You have fever  · You need to urinate more often than usual, or right away  · You see a stone in your urine strainer after you urinate  · You have questions or concerns about your condition or care  Medicines:   · Medicines  may help decrease pain and muscle spasms  You may also need medicine to calm your stomach and stop vomiting  · Take your medicine as directed  Contact your healthcare provider if you think your medicine is not helping or if you have side effects  Tell him of her if you are allergic to any medicine  Keep a list of the medicines, vitamins, and herbs you take  Include the amounts, and when and why you take them  Bring the list or the pill bottles to follow-up visits  Carry your medicine list with you in case of an emergency  Manage your symptoms:   · Drink liquids as directed  to help decrease pain and flush blockages from your urinary tract  Ask how much liquid to drink each day and which liquids are best for you  You may need to drink about 3 liters (12 glasses) of liquids each day  Half of your total daily liquids should be water  Limit coffee, tea, and soda to 2 cups daily  Your urine should be pale and clear      · Strain your urine every time you urinate  Urinate into a strainer (funnel with a fine mesh on the bottom) or glass jar to collect kidney stones  Give the kidney stones to your healthcare provider at your next visit  · Eat a variety of healthy foods  Healthy foods include fruits, vegetables, whole-grain breads, low-fat dairy products, beans, lean meats, and fish  You may need to increase the amount of citrus fruit you eat, such as oranges  Ask your healthcare provider how much salt, calcium, and protein you should eat  · Avoid activity in hot temperatures  Heat may cause you to become dehydrated and urinate less  Follow up with your healthcare provider as directed: You may need to return for tests to check if your blockage has cleared  Write down your questions so you remember to ask them during your visits  © 2017 [employer] Information is for End User's use only and may not be sold, redistributed or otherwise used for commercial purposes  All illustrations and images included in CareNotes® are the copyrighted property of A D A M , [employer]  or Leonel Hanley  The above information is an  only  It is not intended as medical advice for individual conditions or treatments  Talk to your doctor, nurse or pharmacist before following any medical regimen to see if it is safe and effective for you  Urinary Tract Infection in Men   WHAT YOU NEED TO KNOW:   A urinary tract infection (UTI) is caused by bacteria that get inside your urinary tract  Most bacteria that enter your urinary tract come out when you urinate  If the bacteria stay in your urinary tract, you may get an infection  Your urinary tract includes your kidneys, ureters, bladder, and urethra  Urine is made in your kidneys, and it flows from the ureters to the bladder  Urine leaves the bladder through the urethra  A UTI is more common in your lower urinary tract, which includes your bladder and urethra          DISCHARGE INSTRUCTIONS:   Return to the emergency department if:   · You are urinating very little or not at all  · You have a high fever with shaking chills  · You have side or back pain that gets worse  Contact your healthcare provider if:   · You have a mild fever  · You do not feel better after 2 days of taking antibiotics  · You are vomiting  · You have new symptoms, such as blood or pus in your urine  · You have questions or concerns about your condition or care  Medicines:   · Antibiotics  help fight a bacterial infection  · Medicines  may be given to decrease pain and burning when you urinate  They will also help decrease the feeling that you need to urinate often  These medicines will make your urine orange or red  · Take your medicine as directed  Contact your healthcare provider if you think your medicine is not helping or if you have side effects  Tell him of her if you are allergic to any medicine  Keep a list of the medicines, vitamins, and herbs you take  Include the amounts, and when and why you take them  Bring the list or the pill bottles to follow-up visits  Carry your medicine list with you in case of an emergency  Prevent another UTI:   · Empty your bladder often  Urinate and empty your bladder as soon as you feel the need  Do not hold your urine for long periods of time  · Drink liquids as directed  Ask how much liquid to drink each day and which liquids are best for you  You may need to drink more liquids than usual to help flush out the bacteria  Do not drink alcohol, caffeine, or citrus juices  These can irritate your bladder and increase your symptoms  Your healthcare provider may recommend cranberry juice to help prevent a UTI  · Urinate after you have sex  This can help flush out bacteria passed during sex  · Do pelvic muscle exercises often  Pelvic muscle exercises may help you start and stop urinating   Strong pelvic muscles may help you empty your bladder easier  Squeeze these muscles tightly for 5 seconds like you are trying to hold back urine  Then relax for 5 seconds  Gradually work up to squeezing for 10 seconds  Do 3 sets of 15 repetitions a day, or as directed  Follow up with your healthcare provider as directed:  Write down your questions so you remember to ask them during your visits  © 2017 [employer] Information is for End User's use only and may not be sold, redistributed or otherwise used for commercial purposes  All illustrations and images included in CareNotes® are the copyrighted property of [employer] , [employer]  or Leonel Hanley  The above information is an  only  It is not intended as medical advice for individual conditions or treatments  Talk to your doctor, nurse or pharmacist before following any medical regimen to see if it is safe and effective for you

## 2018-02-09 ENCOUNTER — HOSPITAL ENCOUNTER (EMERGENCY)
Facility: HOSPITAL | Age: 53
Discharge: HOME | End: 2018-02-09
Payer: COMMERCIAL

## 2018-02-09 ENCOUNTER — APPOINTMENT (EMERGENCY)
Dept: CT IMAGING | Facility: HOSPITAL | Age: 53
End: 2018-02-09
Payer: COMMERCIAL

## 2018-02-09 VITALS
WEIGHT: 255 LBS | TEMPERATURE: 97.5 F | HEART RATE: 78 BPM | BODY MASS INDEX: 35.57 KG/M2 | DIASTOLIC BLOOD PRESSURE: 55 MMHG | OXYGEN SATURATION: 94 % | RESPIRATION RATE: 18 BRPM | SYSTOLIC BLOOD PRESSURE: 110 MMHG

## 2018-02-09 DIAGNOSIS — Z87.898 HISTORY OF DIZZINESS: Primary | ICD-10-CM

## 2018-02-09 DIAGNOSIS — Z87.442 HISTORY OF KIDNEY STONES: ICD-10-CM

## 2018-02-09 LAB
ALBUMIN SERPL BCP-MCNC: 3.3 G/DL (ref 3.5–5)
ALP SERPL-CCNC: 63 U/L (ref 46–116)
ALT SERPL W P-5'-P-CCNC: 23 U/L (ref 12–78)
ANION GAP SERPL CALCULATED.3IONS-SCNC: 9 MMOL/L (ref 4–13)
AST SERPL W P-5'-P-CCNC: 15 U/L (ref 5–45)
BACTERIA UR CULT: NORMAL
BACTERIA UR QL AUTO: ABNORMAL /HPF
BASOPHILS # BLD AUTO: 0.03 THOUSANDS/ΜL (ref 0–0.1)
BASOPHILS NFR BLD AUTO: 0 % (ref 0–1)
BILIRUB SERPL-MCNC: 0.6 MG/DL (ref 0.2–1)
BILIRUB UR QL STRIP: NEGATIVE
BUN SERPL-MCNC: 16 MG/DL (ref 5–25)
CALCIUM SERPL-MCNC: 9 MG/DL (ref 8.3–10.1)
CAOX CRY URNS QL MICRO: ABNORMAL /HPF
CHLORIDE SERPL-SCNC: 104 MMOL/L (ref 100–108)
CLARITY UR: CLEAR
CO2 SERPL-SCNC: 27 MMOL/L (ref 21–32)
COLOR UR: YELLOW
CREAT SERPL-MCNC: 0.99 MG/DL (ref 0.6–1.3)
EOSINOPHIL # BLD AUTO: 0.14 THOUSAND/ΜL (ref 0–0.61)
EOSINOPHIL NFR BLD AUTO: 2 % (ref 0–6)
ERYTHROCYTE [DISTWIDTH] IN BLOOD BY AUTOMATED COUNT: 13.9 % (ref 11.6–15.1)
GFR SERPL CREATININE-BSD FRML MDRD: 86 ML/MIN/1.73SQ M
GLUCOSE SERPL-MCNC: 93 MG/DL (ref 65–140)
GLUCOSE UR STRIP-MCNC: NEGATIVE MG/DL
HCT VFR BLD AUTO: 40.4 % (ref 36.5–49.3)
HGB BLD-MCNC: 14.4 G/DL (ref 12–17)
HGB UR QL STRIP.AUTO: NORMAL
KETONES UR STRIP-MCNC: NEGATIVE MG/DL
LEUKOCYTE ESTERASE UR QL STRIP: NEGATIVE
LYMPHOCYTES # BLD AUTO: 1.61 THOUSANDS/ΜL (ref 0.6–4.47)
LYMPHOCYTES NFR BLD AUTO: 21 % (ref 14–44)
MAGNESIUM SERPL-MCNC: 2 MG/DL (ref 1.6–2.6)
MCH RBC QN AUTO: 31.4 PG (ref 26.8–34.3)
MCHC RBC AUTO-ENTMCNC: 35.6 G/DL (ref 31.4–37.4)
MCV RBC AUTO: 88 FL (ref 82–98)
MONOCYTES # BLD AUTO: 0.67 THOUSAND/ΜL (ref 0.17–1.22)
MONOCYTES NFR BLD AUTO: 9 % (ref 4–12)
NEUTROPHILS # BLD AUTO: 5.42 THOUSANDS/ΜL (ref 1.85–7.62)
NEUTS SEG NFR BLD AUTO: 68 % (ref 43–75)
NITRITE UR QL STRIP: NEGATIVE
NON-SQ EPI CELLS URNS QL MICRO: ABNORMAL /HPF
PH UR STRIP.AUTO: 6 [PH] (ref 4.5–8)
PLATELET # BLD AUTO: 259 THOUSANDS/UL (ref 149–390)
PMV BLD AUTO: 9.9 FL (ref 8.9–12.7)
POTASSIUM SERPL-SCNC: 3.7 MMOL/L (ref 3.5–5.3)
PROT SERPL-MCNC: 7.3 G/DL (ref 6.4–8.2)
PROT UR STRIP-MCNC: NEGATIVE MG/DL
RBC # BLD AUTO: 4.58 MILLION/UL (ref 3.88–5.62)
RBC #/AREA URNS AUTO: ABNORMAL /HPF
SODIUM SERPL-SCNC: 140 MMOL/L (ref 136–145)
SP GR UR STRIP.AUTO: 1.01 (ref 1–1.03)
TROPONIN I SERPL-MCNC: <0.02 NG/ML
UROBILINOGEN UR QL STRIP.AUTO: 1 E.U./DL
WBC # BLD AUTO: 7.87 THOUSAND/UL (ref 4.31–10.16)
WBC #/AREA URNS AUTO: ABNORMAL /HPF

## 2018-02-09 PROCEDURE — 96375 TX/PRO/DX INJ NEW DRUG ADDON: CPT

## 2018-02-09 PROCEDURE — 81001 URINALYSIS AUTO W/SCOPE: CPT | Performed by: PHYSICIAN ASSISTANT

## 2018-02-09 PROCEDURE — 84484 ASSAY OF TROPONIN QUANT: CPT | Performed by: PHYSICIAN ASSISTANT

## 2018-02-09 PROCEDURE — 93005 ELECTROCARDIOGRAM TRACING: CPT

## 2018-02-09 PROCEDURE — 36415 COLL VENOUS BLD VENIPUNCTURE: CPT | Performed by: PHYSICIAN ASSISTANT

## 2018-02-09 PROCEDURE — 85025 COMPLETE CBC W/AUTO DIFF WBC: CPT | Performed by: PHYSICIAN ASSISTANT

## 2018-02-09 PROCEDURE — 80053 COMPREHEN METABOLIC PANEL: CPT | Performed by: PHYSICIAN ASSISTANT

## 2018-02-09 PROCEDURE — 83735 ASSAY OF MAGNESIUM: CPT | Performed by: PHYSICIAN ASSISTANT

## 2018-02-09 PROCEDURE — 99284 EMERGENCY DEPT VISIT MOD MDM: CPT

## 2018-02-09 PROCEDURE — 96374 THER/PROPH/DIAG INJ IV PUSH: CPT

## 2018-02-09 RX ORDER — METOCLOPRAMIDE HYDROCHLORIDE 5 MG/ML
10 INJECTION INTRAMUSCULAR; INTRAVENOUS ONCE
Status: COMPLETED | OUTPATIENT
Start: 2018-02-09 | End: 2018-02-09

## 2018-02-09 RX ORDER — CIPROFLOXACIN 500 MG/1
500 TABLET, FILM COATED ORAL 2 TIMES DAILY
Qty: 6 TABLET | Refills: 0 | Status: SHIPPED | OUTPATIENT
Start: 2018-02-09 | End: 2018-02-12

## 2018-02-09 RX ORDER — DIPHENHYDRAMINE HYDROCHLORIDE 50 MG/ML
25 INJECTION INTRAMUSCULAR; INTRAVENOUS ONCE
Status: COMPLETED | OUTPATIENT
Start: 2018-02-09 | End: 2018-02-09

## 2018-02-09 RX ADMIN — DIPHENHYDRAMINE HYDROCHLORIDE 25 MG: 50 INJECTION, SOLUTION INTRAMUSCULAR; INTRAVENOUS at 12:06

## 2018-02-09 RX ADMIN — METOCLOPRAMIDE 10 MG: 5 INJECTION, SOLUTION INTRAMUSCULAR; INTRAVENOUS at 12:08

## 2018-02-09 NOTE — ED PROVIDER NOTES
History  Chief Complaint   Patient presents with    Dizziness     dizziness and abdominal pain into testicles after passing 13 kidney stones in the past week     46 yr male presents with dizziness nausea and abdominal pain since yesterday    Seen in ED twice this week for ureteral colic  Right mid-ureter 4mm stone on CT 2/6/18  Patient reports passing stone and admits to passing additional "stones" (gravel)  No longer having any flank pain  Now having bilateral lower quadrant pain right worse than left  Does have h/o right inguinal hernia repair years ago  The pain is constant, unrelated to urination, standing/position  Pt reports taking ibuprofen at home for pain control  Denies taking oxycodone although prescribed states he did not feel like he needed it  Pt states that after receiving iv rocephin in ED 2 nights ago he developed a severe headache that resolved with 400mg advil  On the next dose 12 h later of PO cefdinir he developed the same headache  He did not take any further doses of abx for fear that it was cause more headaches  Urine culture reviewed No growth, however given sx will continue abx tx will switch abx  He also feels dizzy describes as spinning sensation which is accompanied by nausea at times  Seems unrelated to urination or pain  Pt admits to stopping his cymbalta 3 days ago due to starting the new meds for the kidney stone  He has been on cymbalta for 4-5 months and never stopped it previously  This could be attributing to his headaches/dizziness component of cymbalta withdrawal         History provided by:  Patient, medical records and relative      Prior to Admission Medications   Prescriptions Last Dose Informant Patient Reported? Taking?    DULoxetine (CYMBALTA) 60 mg delayed release capsule   Yes Yes   Sig: Take 20 mg by mouth daily   acetaminophen (TYLENOL) 500 mg tablet   No Yes   Sig: Take 2 tablets (1,000 mg total) by mouth every 6 (six) hours as needed (pain, fever) cefdinir (OMNICEF) 300 mg capsule   No Yes   Sig: Take 1 capsule (300 mg total) by mouth every 12 (twelve) hours for 10 days   ibuprofen (MOTRIN) 600 mg tablet   No Yes   Sig: Take 1 tablet by mouth every 6 (six) hours as needed for mild pain for up to 30 days   ibuprofen (MOTRIN) 800 mg tablet   No No   Sig: Take 1 tablet (800 mg total) by mouth 3 (three) times a day as needed for pain, fever   ondansetron (ZOFRAN-ODT) 4 mg disintegrating tablet   No Yes   Sig: Take 1 tablet (4 mg total) by mouth every 6 (six) hours as needed for nausea or vomiting   oxyCODONE (ROXICODONE) 5 mg immediate release tablet   No Yes   Si-2 tabs every 4-6 hours as needed for severe pain   oxyCODONE-acetaminophen (PERCOCET) 5-325 mg per tablet   Yes No   Sig: Take 1 tablet by mouth every 8 (eight) hours as needed for moderate pain      Facility-Administered Medications: None       Past Medical History:   Diagnosis Date    Kidney stones        Past Surgical History:   Procedure Laterality Date    CARDIAC ELECTROPHYSIOLOGY STUDY AND ABLATION      HERNIA REPAIR      KIDNEY STONE SURGERY      KNEE CARTILAGE SURGERY         History reviewed  No pertinent family history  I have reviewed and agree with the history as documented  Social History   Substance Use Topics    Smoking status: Never Smoker    Smokeless tobacco: Never Used    Alcohol use No      Comment: moderate        Review of Systems   Constitutional: Negative for activity change, appetite change, chills, diaphoresis, fatigue, fever and unexpected weight change  HENT: Negative for congestion, ear pain, rhinorrhea, sinus pressure, sore throat and tinnitus  Eyes: Negative for visual disturbance  Respiratory: Negative for cough, chest tightness, shortness of breath and wheezing  Cardiovascular: Negative for chest pain, palpitations and leg swelling  Gastrointestinal: Positive for abdominal pain and nausea  Negative for constipation, diarrhea and vomiting  Genitourinary: Positive for urgency  Negative for decreased urine volume, dysuria, flank pain, frequency, hematuria, penile pain and testicular pain  Musculoskeletal: Negative for arthralgias, back pain and myalgias  Skin: Negative for rash and wound  Neurological: Positive for dizziness and headaches  Negative for tremors, seizures, syncope, facial asymmetry, speech difficulty, weakness, light-headedness and numbness  Physical Exam  ED Triage Vitals [02/09/18 1109]   Temperature Pulse Respirations Blood Pressure SpO2   97 5 °F (36 4 °C) 78 18 143/86 98 %      Temp Source Heart Rate Source Patient Position - Orthostatic VS BP Location FiO2 (%)   Temporal Left Sitting Left arm --      Pain Score       2           Orthostatic Vital Signs  Vitals:    02/09/18 1109 02/09/18 1300 02/09/18 1500   BP: 143/86 109/56 110/55   Pulse: 78 73 78   Patient Position - Orthostatic VS: Sitting Sitting Sitting       Physical Exam   Constitutional: He is oriented to person, place, and time  Vital signs are normal  He appears well-developed and well-nourished  Non-toxic appearance  No distress  HENT:   Head: Normocephalic and atraumatic  Right Ear: Tympanic membrane and external ear normal    Left Ear: Tympanic membrane and external ear normal    Nose: Nose normal  No rhinorrhea  Mouth/Throat: Uvula is midline and oropharynx is clear and moist    Eyes: Conjunctivae, EOM and lids are normal  Pupils are equal, round, and reactive to light  Right eye exhibits no discharge  Left eye exhibits no discharge  Neck: Normal range of motion and full passive range of motion without pain  Neck supple  No JVD present  No thyromegaly present  Cardiovascular: Normal rate, regular rhythm, normal heart sounds and intact distal pulses  No murmur heard  Pulmonary/Chest: Effort normal and breath sounds normal  No accessory muscle usage  No tachypnea  No respiratory distress  He has no wheezes  He has no rales   He exhibits no tenderness  Abdominal: Soft  Normal appearance and bowel sounds are normal  He exhibits no distension and no mass  There is no hepatosplenomegaly  There is no tenderness (suprapubic, bilateral LQ subjective pain, no tenderness to palpation)  There is no rebound, no guarding and no CVA tenderness  No hernia  Musculoskeletal: Normal range of motion  He exhibits no edema or tenderness  Lymphadenopathy:     He has no cervical adenopathy  Neurological: He is alert and oriented to person, place, and time  No cranial nerve deficit or sensory deficit  Skin: Skin is warm, dry and intact  Capillary refill takes less than 2 seconds  No rash noted  He is not diaphoretic  No erythema  No pallor  Psychiatric: He has a normal mood and affect  His speech is normal and behavior is normal    Nursing note and vitals reviewed        ED Medications  Medications   metoclopramide (REGLAN) injection 10 mg (10 mg Intravenous Given 2/9/18 1208)   diphenhydrAMINE (BENADRYL) injection 25 mg (25 mg Intravenous Given 2/9/18 1206)       Diagnostic Studies  Results Reviewed     Procedure Component Value Units Date/Time    Urine Microscopic [[id number]]  (Abnormal) Collected:  02/09/18 1422    Lab Status:  Final result Specimen:  Urine from Urine, Clean Catch Updated:  02/09/18 1453     RBC, UA 2-4 (A) /hpf      WBC, UA None Seen /hpf      Epithelial Cells None Seen /hpf      Bacteria, UA Occasional /hpf      Ca Oxalate Charlee, UA Occasional (A) /hpf     UA w Reflex to Microscopic w Reflex to Culture [[id number]]  (Normal) Collected:  02/09/18 1422    Lab Status:  Final result Specimen:  Urine from Urine, Clean Catch Updated:  02/09/18 1435     Color, UA Yellow     Clarity, UA Clear     Specific Gravity, UA 1 015     pH, UA 6 0     Leukocytes, UA Negative     Nitrite, UA Negative     Protein, UA Negative mg/dl      Glucose, UA Negative mg/dl      Ketones, UA Negative mg/dl      Urobilinogen, UA 1 0 E U /dl      Bilirubin, UA Negative Blood, UA Trace-Intact    Dumont draw [[id number]] Collected:  02/09/18 1159    Lab Status: In process Specimen:  Blood Updated:  02/09/18 1402    Narrative: The following orders were created for panel order Dumont draw  Procedure                               Abnormality         Status                     ---------                               -----------         ------                     Jo Ann Jeong Top on KEOH[[id number]]                            Final result               Gold top on SIJO[[id number]]                                  In process                   Please view results for these tests on the individual orders  Troponin I [[id number]]  (Normal) Collected:  02/09/18 1159    Lab Status:  Final result Specimen:  Blood from Arm, Right Updated:  02/09/18 1226     Troponin I <0 02 ng/mL     Narrative:         [employer] Chemistry analyzer 99% cutoff is > 0 04 ng/mL in network labs    o cTnI 99% cutoff is useful only when applied to patients in the clinical setting of myocardial ischemia  o cTnI 99% cutoff should be interpreted in the context of clinical history, ECG findings and possibly cardiac imaging to establish correct diagnosis  o cTnI 99% cutoff may be suggestive but clearly not indicative of a coronary event without the clinical setting of myocardial ischemia      Comprehensive metabolic panel [[id number]]  (Abnormal) Collected:  02/09/18 1159    Lab Status:  Final result Specimen:  Blood from Arm, Right Updated:  02/09/18 1223     Sodium 140 mmol/L      Potassium 3 7 mmol/L      Chloride 104 mmol/L      CO2 27 mmol/L      Anion Gap 9 mmol/L      BUN 16 mg/dL      Creatinine 0 99 mg/dL      Glucose 93 mg/dL      Calcium 9 0 mg/dL      AST 15 U/L      ALT 23 U/L      Alkaline Phosphatase 63 U/L      Total Protein 7 3 g/dL      Albumin 3 3 (L) g/dL      Total Bilirubin 0 60 mg/dL      eGFR 86 ml/min/1 73sq m     Narrative:         National Kidney Disease Education Program recommendations are as follows:  GFR calculation is accurate only with a steady state creatinine  Chronic Kidney disease less than 60 ml/min/1 73 sq  meters  Kidney failure less than 15 ml/min/1 73 sq  meters      Magnesium [[id number]]  (Normal) Collected:  02/09/18 1159    Lab Status:  Final result Specimen:  Blood from Arm, Right Updated:  02/09/18 1223     Magnesium 2 0 mg/dL     CBC and differential [[id number]]  (Normal) Collected:  02/09/18 1159    Lab Status:  Final result Specimen:  Blood from Arm, Right Updated:  02/09/18 1208     WBC 7 87 Thousand/uL      RBC 4 58 Million/uL      Hemoglobin 14 4 g/dL      Hematocrit 40 4 %      MCV 88 fL      MCH 31 4 pg      MCHC 35 6 g/dL      RDW 13 9 %      MPV 9 9 fL      Platelets 655 Thousands/uL      Neutrophils Relative 68 %      Lymphocytes Relative 21 %      Monocytes Relative 9 %      Eosinophils Relative 2 %      Basophils Relative 0 %      Neutrophils Absolute 5 42 Thousands/µL      Lymphocytes Absolute 1 61 Thousands/µL      Monocytes Absolute 0 67 Thousand/µL      Eosinophils Absolute 0 14 Thousand/µL      Basophils Absolute 0 03 Thousands/µL                  No orders to display              Procedures  ECG 12 Lead Documentation  Date/Time: 2/9/2018 11:07 AM  Performed by: Harshal Soria  Authorized by: Harshal Soria     Indications / Diagnosis:  Dizzy  ECG reviewed by me, the ED Provider: yes    Patient location:  ED  Rate:     ECG rate:  77  Rhythm:     Rhythm: sinus rhythm    Ectopy:     Ectopy: none    QRS:     QRS axis:  Normal  Conduction:     Conduction: normal    T waves:     T waves: normal             Phone Contacts  ED Phone Contact    ED Course  ED Course as of Feb 09 2025 Fri Feb 09, 2018   1218 WBC: 7 87   1218 Hemoglobin: 14 4   1218 Hematocrit: 40 4   1218 Platelets: 748   6147 Troponin I: <0 02   1238 Magnesium: 2 0   1238 Sodium: 140   1238 Potassium: 3 7   1238 Chloride: 104   1238 CO2: 27   1238 Anion Gap: 9   1238 Creatinine: 0 99   1238 BUN: 16   1238 eGFR: 86     [facility]  Number of Diagnoses or Management Options     Amount and/or Complexity of Data Reviewed  Clinical lab tests: ordered and reviewed  Tests in the radiology section of CPT®: ordered and reviewed  Review and summarize past medical records: yes (ED visit notes 2/6 and 2/7)  Independent visualization of images, tracings, or specimens: yes    Patient Progress  Patient progress: stable    CritCare Time    Disposition  Final diagnoses:   History of dizziness   History of kidney stones     Time reflects when diagnosis was documented in both MDM as applicable and the Disposition within this note     Time User Action Codes Description Comment    2/9/2018  2:42 PM Wilbert Manleyith Add [[id number] [id number]] History of dizziness     2/9/2018  2:42 PM Johnnynor Caterina Add [[id number]] History of kidney stones       ED Disposition     ED Disposition Condition Comment    Discharge  [address] discharge to home/self care      Condition at discharge: Good        Follow-up Information     Follow up With Specialties Details Why Contact Info    Cesar Parra PA-C Family Medicine Schedule an appointment as soon as possible for a visit in 2 days ER followup [address])      Radha Price MD Urology  urology followup as scheduled next week [address]  [address] [facility] [address] [facility]  [phone]          Discharge Medication List as of 2/9/2018  2:43 PM      START taking these medications    Details   ciprofloxacin (CIPRO) 500 mg tablet Take 1 tablet (500 mg total) by mouth 2 (two) times a day for 3 days, Starting Fri 2/9/2018, Until Mon 2/12/2018, Print         CONTINUE these medications which have NOT CHANGED    Details   acetaminophen (TYLENOL) 500 mg tablet Take 2 tablets (1,000 mg total) by mouth every 6 (six) hours as needed (pain, fever), Starting Tue 2/6/2018, Normal      DULoxetine (CYMBALTA) 60 mg delayed release capsule Take 20 mg by mouth daily, Historical Med !! ibuprofen (MOTRIN) 600 mg tablet Take 1 tablet by mouth every 6 (six) hours as needed for mild pain for up to 30 days, Starting Mon 5/8/2017, Until Fri 2/9/2018, Print      ondansetron (ZOFRAN-ODT) 4 mg disintegrating tablet Take 1 tablet (4 mg total) by mouth every 6 (six) hours as needed for nausea or vomiting, Starting Tue 2/6/2018, Normal      oxyCODONE (ROXICODONE) 5 mg immediate release tablet 1-2 tabs every 4-6 hours as needed for severe pain, Print      !! ibuprofen (MOTRIN) 800 mg tablet Take 1 tablet (800 mg total) by mouth 3 (three) times a day as needed for pain, fever, Starting Tue 2/6/2018, Normal       !! - Potential duplicate medications found  Please discuss with provider  STOP taking these medications       cefdinir (OMNICEF) 300 mg capsule Comments:   Reason for Stopping:         oxyCODONE-acetaminophen (PERCOCET) 5-325 mg per tablet Comments:   Reason for Stopping:             No discharge procedures on file      ED Provider  Electronically Signed by           Ishaan Oden PA-C  02/09/18 2025

## 2018-02-09 NOTE — DISCHARGE INSTRUCTIONS
Dizziness   WHAT YOU NEED TO KNOW:   Dizziness is a feeling of being off balance or unsteady  Common causes of dizziness are an inner ear fluid imbalance or a lack of oxygen in your blood  Dizziness may be acute (lasts 3 days or less) or chronic (lasts longer than 3 days)  You may have dizzy spells that last from seconds to a few hours  DISCHARGE INSTRUCTIONS:   Return to the emergency department if:   · You have a headache and a stiff neck  · You have shaking chills and a fever  · You vomit over and over with no relief  · Your vomit or bowel movements are red or black  · You have pain in your chest, back, or abdomen  · You have numbness, especially in your face, arms, or legs  · You have trouble moving your arms or legs  · You are confused  Contact your healthcare provider if:   · You have a fever  · Your symptoms do not get better with treatment  · You have questions or concerns about your condition or care  Manage your symptoms:   · Do not drive  or operate heavy machinery when you are dizzy  · Get up slowly  from sitting or lying down  · Drink plenty of liquids  Liquids help prevent dehydration  Ask how much liquid to drink each day and which liquids are best for you  Follow up with your healthcare provider as directed:  Write down your questions so you remember to ask them during your visits  © 2017 [employer]  Information is for End User's use only and may not be sold, redistributed or otherwise used for commercial purposes  All illustrations and images included in CareNotes® are the copyrighted property of A D A M , Inc  or Leonel Hanley  The above information is an  only  It is not intended as medical advice for individual conditions or treatments  Talk to your doctor, nurse or pharmacist before following any medical regimen to see if it is safe and effective for you

## 2018-02-11 LAB
ATRIAL RATE: 77 BPM
P AXIS: 8 DEGREES
PR INTERVAL: 182 MS
QRS AXIS: 32 DEGREES
QRSD INTERVAL: 72 MS
QT INTERVAL: 402 MS
QTC INTERVAL: 454 MS
T WAVE AXIS: 32 DEGREES
VENTRICULAR RATE: 77 BPM

## 2018-02-11 PROCEDURE — 93010 ELECTROCARDIOGRAM REPORT: CPT | Performed by: INTERNAL MEDICINE

## 2018-02-12 NOTE — PROGRESS NOTES
2/13/2018      Chief Complaint   Patient presents with   Chuckie Pert Hydronephrosis     Hospital Follow up mild Right Hydro         Assessment and Plan    46 y o  male managed by Dr Skinny Yu    1   4 mm mid ureteral calculi on the right    Reviewed the patient's stone analysis with him and discussed that we can start him on potassium citrate due to his recurrent stone burden and prior stone analysis revealing uric acid  SE profile reviewed  Encouraged continue proper hydration with 60 oz of water daily  Will see him back in 6 months to see how he is doing on his new medication and will obtain an ultrasound to determine his current stone burden  Currently he has a 4 mm stone within his lower pole of his left kidney  Will start prostate cancer screening in 1 year  The patient understands agrees this treatment plan  All questions and concerns have been addressed answered  History of Present Illness  Calvin Vega is a 46 y o  male here for follow up evaluation of a right sided 4 mm mid ureteral calculi on the seen on CT scan during the ER visit 2/6/18  KUB obtained 2/8/18 reveals no stones and ultrasound reveals bilateral ureteral jets with mild right-sided fullness, a 4 mm echogenic focus in the lower pole measuring 4 mm, and a stone seen within the bladder that is likely the patient's recent ureteral stone seen on CT scan  The patient brings this stone today with him  Prior stone analysis reveals that his stones are composed of uric acid  Review of Systems    Urinary Incontinence Screening    Flowsheet Row Most Recent Value   Urinary Incontinence   Urinary Incontinence? No   Incomplete emptying? No   Urinary frequency? No   Urinary urgency? No   Urinary hesitancy? No   Dysuria (painful difficult urination)? No   Nocturia (waking up to use the bathroom)? Yes [3 times per night ]   Straining (having to push to go)? No   Weak stream?  No   Intermittent stream?  No   Post void dribbling?   Yes Vaginal pressure? No   Vaginal dryness? No          Past Medical History  Past Medical History:   Diagnosis Date    Kidney stones        Past Social History  Past Surgical History:   Procedure Laterality Date    CARDIAC ELECTROPHYSIOLOGY STUDY AND ABLATION      HERNIA REPAIR      KIDNEY STONE SURGERY      KNEE CARTILAGE SURGERY       History   Smoking Status    Never Smoker   Smokeless Tobacco    Never Used       Past Family History  No family history on file  Past Social history  Social History     Social History    Marital status: /Civil Union     Spouse name: N/A    Number of children: N/A    Years of education: N/A     Occupational History    Not on file       Social History Main Topics    Smoking status: Never Smoker    Smokeless tobacco: Never Used    Alcohol use No      Comment: moderate    Drug use: No    Sexual activity: Not on file     Other Topics Concern    Not on file     Social History Narrative    No narrative on file       Current Medications  Current Outpatient Prescriptions   Medication Sig Dispense Refill    acetaminophen (TYLENOL) 500 mg tablet Take 2 tablets (1,000 mg total) by mouth every 6 (six) hours as needed (pain, fever) 30 tablet 0    DULoxetine (CYMBALTA) 60 mg delayed release capsule Take 20 mg by mouth daily      ibuprofen (MOTRIN) 800 mg tablet Take 1 tablet (800 mg total) by mouth 3 (three) times a day as needed for pain, fever 30 tablet 0    ondansetron (ZOFRAN-ODT) 4 mg disintegrating tablet Take 1 tablet (4 mg total) by mouth every 6 (six) hours as needed for nausea or vomiting 12 tablet 0    oxyCODONE (ROXICODONE) 5 mg immediate release tablet 1-2 tabs every 4-6 hours as needed for severe pain 20 tablet 0    ibuprofen (MOTRIN) 600 mg tablet Take 1 tablet by mouth every 6 (six) hours as needed for mild pain for up to 30 days 20 tablet 0    potassium citrate (UROCIT-K) 10 mEq Take 1 tablet (10 mEq total) by mouth 3 (three) times a day with meals Start with 1 tablet and increase to 3 daily  90 tablet 6     No current facility-administered medications for this visit  Allergies  Allergies   Allergen Reactions    Other      Peanuts     Peanut-Containing Drug Products     Sulfa Antibiotics Rash    Toradol [Ketorolac Tromethamine] Rash     Tolerates ibuprofen         The following portions of the patient's history were reviewed and updated as appropriate: allergies, current medications, past medical history, past social history, past surgical history and problem list       Vitals  Vitals:    02/13/18 1314   BP: 122/70   Pulse: 81   Weight: 116 kg (255 lb)   Height: 5' 10" (1 778 m)         Physical Exam    Constitutional   General appearance: Patient is seated and in no acute distress, well appearing and well nourished  Head and Face   Head and face: Normal     Eyes   Conjunctiva and lids: No erythema, swelling or discharge  Ears, Nose, Mouth, and Throat   Hearing: Normal     Pulmonary   Respiratory effort: No increased work of breathing or signs of respiratory distress  Cardiovascular   Examination of extremities for edema and/or varicosities: Normal     Abdomen   Abdomen: Non-tender, no masses  Musculoskeletal   Gait and station: Normal   Skin   Skin and subcutaneous tissue: Warm, dry, and intact  No visible lesions or rashes    Psychiatric   Judgment and insight: Normal  Recent and remote memory:  Normal  Mood and affect: Normal        Results  Recent Results (from the past 1 hour(s))   POCT urine dip    Collection Time: 02/13/18  1:20 PM   Result Value Ref Range    LEUKOCYTE ESTERASE,UA -      NITRITE,UA -     SL AMB POCT UROBILINOGEN -     SL AMB POCT URINE PROTEIN trace      PH,UA 5 0      BLOOD,UA moderate      SPECIFIC GRAVITY,UA 1 010      KETONES,UA -      BILIRUBIN,UA -     GLUCOSE, UA -      COLOR,UA Yellow      CLARITY,UA Trans    ]  Lab Results   Component Value Date    PSA 0 4 11/16/2017    PSA 0 58 05/23/2014     Lab Results   Component Value Date    GLUCOSE 93 02/09/2018    CALCIUM 9 0 02/09/2018     02/09/2018    K 3 7 02/09/2018    CO2 27 02/09/2018     02/09/2018    BUN 16 02/09/2018    CREATININE 0 99 02/09/2018     Lab Results   Component Value Date    WBC 7 87 02/09/2018    HGB 14 4 02/09/2018    HCT 40 4 02/09/2018    MCV 88 02/09/2018     02/09/2018           Orders  Orders Placed This Encounter   Procedures    Urine culture    US kidney and bladder     Standing Status:   Future     Standing Expiration Date:   2/13/2022     Scheduling Instructions:      "Prep required if being scheduled in conjunction with other studies, refer to those examination's Preps first before scheduling  All patients for US Kidney and Bladder they must drink 24 oz of water 60 minutes before your scheduled appointment time  This test requires you to have a FULL bladder  Please do not urinate before your test             Please bring your physician order, insurance cards, a form of photo ID and a list of your medications with you  Arrive 15 minutes prior to your appointment time in order to register              If you need to have lab work or a urinalysis, please do this AFTER your ultrasound  "     Order Specific Question:   Reason for Exam:     Answer:   nephrolithiasis    Stone analysis    Urinalysis with microscopic    POCT urine dip

## 2018-02-13 ENCOUNTER — OFFICE VISIT (OUTPATIENT)
Dept: UROLOGY | Facility: HOSPITAL | Age: 53
End: 2018-02-13
Payer: COMMERCIAL

## 2018-02-13 VITALS
BODY MASS INDEX: 36.51 KG/M2 | HEART RATE: 81 BPM | WEIGHT: 255 LBS | DIASTOLIC BLOOD PRESSURE: 70 MMHG | SYSTOLIC BLOOD PRESSURE: 122 MMHG | HEIGHT: 70 IN

## 2018-02-13 DIAGNOSIS — N20.0 KIDNEY CALCULI: Primary | ICD-10-CM

## 2018-02-13 DIAGNOSIS — N13.30 HYDRONEPHROSIS, RIGHT: ICD-10-CM

## 2018-02-13 LAB
BACTERIA UR QL AUTO: ABNORMAL /HPF
BILIRUB UR QL STRIP: NEGATIVE
CLARITY UR: CLEAR
COLOR UR: YELLOW
GLUCOSE UR STRIP-MCNC: NEGATIVE MG/DL
HGB UR QL STRIP.AUTO: ABNORMAL
HYALINE CASTS #/AREA URNS LPF: ABNORMAL /LPF
KETONES UR STRIP-MCNC: NEGATIVE MG/DL
LEUKOCYTE ESTERASE UR QL STRIP: NEGATIVE
NITRITE UR QL STRIP: NEGATIVE
NON-SQ EPI CELLS URNS QL MICRO: ABNORMAL /HPF
PH UR STRIP.AUTO: 5.5 [PH] (ref 4.5–8)
PROT UR STRIP-MCNC: NEGATIVE MG/DL
RBC #/AREA URNS AUTO: ABNORMAL /HPF
SL AMB  POCT GLUCOSE, UA: ABNORMAL
SL AMB LEUKOCYTE ESTERASE,UA: ABNORMAL
SL AMB POCT BILIRUBIN,UA: ABNORMAL
SL AMB POCT BLOOD,UA: ABNORMAL
SL AMB POCT CLARITY,UA: ABNORMAL
SL AMB POCT COLOR,UA: YELLOW
SL AMB POCT KETONES,UA: ABNORMAL
SL AMB POCT NITRITE,UA: ABNORMAL
SL AMB POCT PH,UA: 5
SL AMB POCT SPECIFIC GRAVITY,UA: 1.01
SL AMB POCT URINE PROTEIN: ABNORMAL
SL AMB POCT UROBILINOGEN: ABNORMAL
SP GR UR STRIP.AUTO: 1.02 (ref 1–1.03)
UROBILINOGEN UR QL STRIP.AUTO: 0.2 E.U./DL
WBC #/AREA URNS AUTO: ABNORMAL /HPF

## 2018-02-13 PROCEDURE — 99213 OFFICE O/P EST LOW 20 MIN: CPT | Performed by: PHYSICIAN ASSISTANT

## 2018-02-13 PROCEDURE — 81002 URINALYSIS NONAUTO W/O SCOPE: CPT | Performed by: PHYSICIAN ASSISTANT

## 2018-02-13 PROCEDURE — 82360 CALCULUS ASSAY QUANT: CPT | Performed by: PHYSICIAN ASSISTANT

## 2018-02-13 PROCEDURE — 81001 URINALYSIS AUTO W/SCOPE: CPT | Performed by: PHYSICIAN ASSISTANT

## 2018-02-13 PROCEDURE — 87086 URINE CULTURE/COLONY COUNT: CPT | Performed by: PHYSICIAN ASSISTANT

## 2018-02-13 RX ORDER — POTASSIUM CITRATE 10 MEQ/1
10 TABLET, EXTENDED RELEASE ORAL
Qty: 90 TABLET | Refills: 6 | Status: SHIPPED | OUTPATIENT
Start: 2018-02-13 | End: 2019-02-15 | Stop reason: SDUPTHER

## 2018-02-14 ENCOUNTER — TELEPHONE (OUTPATIENT)
Dept: UROLOGY | Facility: HOSPITAL | Age: 53
End: 2018-02-14

## 2018-02-14 ENCOUNTER — OFFICE VISIT (OUTPATIENT)
Dept: FAMILY MEDICINE CLINIC | Facility: CLINIC | Age: 53
End: 2018-02-14
Payer: COMMERCIAL

## 2018-02-14 DIAGNOSIS — F43.23 ADJUSTMENT DISORDER WITH MIXED ANXIETY AND DEPRESSED MOOD: Primary | ICD-10-CM

## 2018-02-14 DIAGNOSIS — R31.29 HEMATURIA, MICROSCOPIC: Primary | ICD-10-CM

## 2018-02-14 LAB — BACTERIA UR CULT: NORMAL

## 2018-02-14 PROCEDURE — 90837 PSYTX W PT 60 MINUTES: CPT | Performed by: SOCIAL WORKER

## 2018-02-18 NOTE — PROGRESS NOTES
Psychotherapy Provided: Individual Psychotherapy 60  minutes          Goals addressed in session: Processing recent events/ addressing issues with meds  Tatianna Verde relays that it has been a very difficult 2 -3 weeks  He had kidney stones and was in the ER on several occasions, in sever pain  After the kidney stone passed, he suddenly became very dizzy and began to cry and sob uncontrollably, and he had chest pain  Tatianna Verde described a hough of depression and not wanting to live again, with any provocation for feeling this way  Tatianna Verde called his sister and went to the Er, and was informed that he is going through withdrawal from his anti-depressant, since he had not taken it in 5 days due to needing to take so many meds and the other medical issues he was dealing with  Tatianna Verde also called his brother and had him remove all of his guns from the house as a precaution  Tatianna Verde states he has been feeling stable with no thoughts or desires to die the day after he resumed his medication  Interventions: Therapist helped Tatianna Verde process how frightening this experience was and affirmed that this was a bio-chemical response, it does not represent a regression in his treatment  Therapist apologized to Tatianna Verde that the risks of abruptly going off of this medication was not explained to him  Assessment and Plan: Tatianna Verde is feeling stable again  Toy shares that softball season has started again and he is feeling excited vs when he first came in form treatment and he had no interest   Therapist affirmed how remaining involved with this sport has been a healthy outlet for him  See in 2 weeks  Pain:      PSYCH MENTAL STATUS PAIN :3    PHYSICAL PAIN SCALE NUMBERS:4    Current suicide risk : Low    Behavioral Health Treatment Plan [facility]: Diagnosis and Treatment Plan explained to Florence, Florence  relates understanding diagnosis and is agreeable to Treatment Plan  Yes

## 2018-02-23 LAB
COLOR STONE: NORMAL
COMMENT-STONE3: NORMAL
COMPOSITION: NORMAL
LABORATORY COMMENT REPORT: NORMAL
NIDUS STONE QL: NORMAL
PHOTO: NORMAL
SIZE STONE: NORMAL MM
STONE ANALYSIS-IMP: NORMAL
URATE MFR STONE: 100 %
WT STONE: 117 MG

## 2018-04-19 ENCOUNTER — OFFICE VISIT (OUTPATIENT)
Dept: FAMILY MEDICINE CLINIC | Facility: CLINIC | Age: 53
End: 2018-04-19
Payer: COMMERCIAL

## 2018-04-19 VITALS
OXYGEN SATURATION: 95 % | DIASTOLIC BLOOD PRESSURE: 78 MMHG | HEART RATE: 101 BPM | WEIGHT: 254 LBS | BODY MASS INDEX: 36.36 KG/M2 | SYSTOLIC BLOOD PRESSURE: 124 MMHG | TEMPERATURE: 96 F | HEIGHT: 70 IN

## 2018-04-19 DIAGNOSIS — F41.8 DEPRESSION WITH ANXIETY: ICD-10-CM

## 2018-04-19 DIAGNOSIS — E55.9 VITAMIN D DEFICIENCY: ICD-10-CM

## 2018-04-19 DIAGNOSIS — M79.18 CHRONIC MUSCULOSKELETAL PAIN: Primary | ICD-10-CM

## 2018-04-19 DIAGNOSIS — G89.29 CHRONIC MUSCULOSKELETAL PAIN: Primary | ICD-10-CM

## 2018-04-19 DIAGNOSIS — K59.00 CONSTIPATION, UNSPECIFIED CONSTIPATION TYPE: ICD-10-CM

## 2018-04-19 PROBLEM — R25.1 TREMOR: Status: ACTIVE | Noted: 2017-11-29

## 2018-04-19 PROBLEM — E66.9 GENERALIZED OBESITY: Status: ACTIVE | Noted: 2017-11-15

## 2018-04-19 PROBLEM — F52.32 ANORGASMIA OF MALE: Status: ACTIVE | Noted: 2017-12-12

## 2018-04-19 PROBLEM — N20.0 NEPHROLITHIASIS: Status: ACTIVE | Noted: 2017-11-20

## 2018-04-19 PROBLEM — R20.0 BILATERAL HAND NUMBNESS: Status: ACTIVE | Noted: 2017-11-29

## 2018-04-19 PROCEDURE — 99213 OFFICE O/P EST LOW 20 MIN: CPT | Performed by: FAMILY MEDICINE

## 2018-04-19 NOTE — PROGRESS NOTES
Assessment/Plan:    No problem-specific Assessment & Plan notes found for this encounter  Diagnoses and all orders for this visit:    Chronic musculoskeletal pain  -     diclofenac sodium (VOLTAREN) 1 %; Apply 2 g topically 4 (four) times a day    Constipation, unspecified constipation type    Depression with anxiety  -     CBC and differential; Future  -     Comprehensive metabolic panel; Future  -     TSH, 3rd generation with T4 reflex; Future    Vitamin D deficiency  -     Lipid Panel with Direct LDL reflex; Future  -     Vitamin D 1,25 dihydroxy; Future        Discussion/Plan:  Chronic Musculoskeletal Pain - Voltaren gel renewed  Constipation - advised OTC Miralax and stool softner as needed  F/U if sx worsen  Routine labs ordered  F/U in 4 months or sooner if needed  Subjective:      Patient ID: Rocio Forrest is a 46 y o  male  Chief Complaint   Patient presents with    Follow-up     Patient fell through a floor and went to ED, he is still sore from the fall   Medication Refill     Patient would like a RX for voltaren gel for arm pain, knee pain, and neck pain  Patient presents to the office today for ED f/u and renewal of voltaren gel  Patient went to [facility] in [place] on Sunday after he fell through the floor  He reports that he had left gluteal pain, still sore  He reports his CT scans were negative and he was discharged  Patient reports he was on Voltaren gel in the past for elbow pain and patient reports he has been putting it on his arms at night, which has been really helping his arm pain and numbness  Patient states he is able to sleep now           The following portions of the patient's history were reviewed and updated as appropriate: allergies, current medications, past family history, past medical history, past social history, past surgical history and problem list   Patient Active Problem List   Diagnosis    Adjustment disorder with mixed anxiety and depressed mood    Alcohol abuse    Anorgasmia of male    Bilateral hand numbness    Chronic musculoskeletal pain    Depression with anxiety    Generalized obesity    Nephrolithiasis    Tobacco use    Tremor    Urinary tract stones    Vitamin D deficiency     Current Outpatient Prescriptions on File Prior to Visit   Medication Sig Dispense Refill    acetaminophen (TYLENOL) 500 mg tablet Take 2 tablets (1,000 mg total) by mouth every 6 (six) hours as needed (pain, fever) 30 tablet 0    DULoxetine (CYMBALTA) 60 mg delayed release capsule Take 20 mg by mouth daily      ibuprofen (MOTRIN) 800 mg tablet Take 1 tablet (800 mg total) by mouth 3 (three) times a day as needed for pain, fever 30 tablet 0    potassium citrate (UROCIT-K) 10 mEq Take 1 tablet (10 mEq total) by mouth 3 (three) times a day with meals Start with 1 tablet and increase to 3 daily  90 tablet 6    [DISCONTINUED] ondansetron (ZOFRAN-ODT) 4 mg disintegrating tablet Take 1 tablet (4 mg total) by mouth every 6 (six) hours as needed for nausea or vomiting 12 tablet 0    ibuprofen (MOTRIN) 600 mg tablet Take 1 tablet by mouth every 6 (six) hours as needed for mild pain for up to 30 days 20 tablet 0    [DISCONTINUED] oxyCODONE (ROXICODONE) 5 mg immediate release tablet 1-2 tabs every 4-6 hours as needed for severe pain 20 tablet 0     No current facility-administered medications on file prior to visit  Review of Systems   Constitutional: Negative  Respiratory: Negative  Cardiovascular: Negative  Gastrointestinal: Positive for constipation  Musculoskeletal: Positive for myalgias  Psychiatric/Behavioral: Negative            Objective:      /78 (BP Location: Left arm, Patient Position: Sitting, Cuff Size: Large)   Pulse 101   Temp (!) 96 °F (35 6 °C) (Tympanic)   Ht 5' 10" (1 778 m)   Wt 115 kg (254 lb)   SpO2 95%   BMI 36 45 kg/m²          Physical Exam   Constitutional: He is oriented to person, place, and time  He appears well-developed and well-nourished  HENT:   Head: Normocephalic and atraumatic  Eyes: Conjunctivae are normal  Pupils are equal, round, and reactive to light  Neck: Neck supple  Cardiovascular: Normal rate and regular rhythm  Pulmonary/Chest: Effort normal and breath sounds normal    Abdominal: Soft  Bowel sounds are normal  There is no tenderness  Musculoskeletal: Normal range of motion  Ecchymosis of posterior upper arms bilaterally  Neurological: He is oriented to person, place, and time  Skin: Skin is warm and dry  Psychiatric: He has a normal mood and affect   His behavior is normal

## 2018-05-04 NOTE — PSYCH
Treatment Plan    Date of Initial Treatment Plan: 12/12/2017  Date of Current Treatment Plan: 12/12/2017  Treatment Plan 1  Strengths/Personal Resources for Self Care: HARD WORKER, LOVE AND COMMITTED TO FAMILY, MENTOR TO YOUNG PEOPLE,   SENSE OF HUMOR, CARING, LOVE COACHING JOB,  Diagnosis:   Axis I: ADJUSTMENT DISORDER WITH DEPRESSION AND ANXIETY    Axis II:      Area of Needs: Chepe Hammans OF PATIENCE,  FLEETING SUICIDAL IDEATION,  CHRONIC PHYSICAL PAIN  Long Term Goals:   MORE OPEN COMMUNICATION WITH WIFE AND CHILDREN   Target Date: 8/12/2018      SIGNIFICANT REDUCTION IN DEPRESSION AND ANXIETY   Target Date: 8/12/2018      EXPLORE OPTIONS FOR ALTERNATIVE JOBS   Target Date: 8/12/2018    Short Term Objectives:   Goal 1:   TAKE STEPS TO IMPROVE COMMUNICATION WITH WIFE AND CHILDREN  CONSIDER MARTIAL THERAPY  Target Date: 4//6/2018      Goal 2:   IDENTIFY TRIGGERS FOR ANXIETY, FRUSTRATION, DEPRESSION  ELIMINATION OF SUICIDAL IDEATION  CONTINUE TO WORK WITH PCP FOR MED MGMNT  Target Date: 4/6/2018      Goal 3:   CONTINUE TO LOOK FOR ALTERNATIVE JOBS AND DISCUSS PROS AND CONS  Target Date: 4/6/2018      GOAL 1: Modality: Individual 2-4 x per month Target Date: 4/6/2018    GOAL 1: Modality: Couples AS NEEDED x per month Target Date: 4/6/2018       GOAL 2: Modality: Individual 2-4 x per month Target Date: 4/6/2018         GOAL 3: Modality: Individual 2-4 x per month Target Date: 4/6/2018             The first scheduled review date is 4/6/2018  The expected length of service is 6- 12 MONTHS  Patient Signature: _________________________________ Date/Time: ______________      Active Problems    1  Bilateral hand numbness (782 0) (R20 0)   2  Chronic musculoskeletal pain (729 1,338 29) (M79 1,G89 29)   3  Depression screen (V79 0) (Z13 89)   4  Depression with anxiety (300 4) (F41 8)   5  Encounter for prostate cancer screening (V76 44) (Z12 5)   6   Generalized obesity (278 00) (E66 9)   7  Tremor (781 0) (R25 1)   8  Vitamin D deficiency (268 9) (E55 9)    Signatures   Electronically signed by :  Manju Yap; Dec 12 0492  5:00PM EST                       (Author) Yes

## 2018-05-11 DIAGNOSIS — F32.A DEPRESSION, UNSPECIFIED DEPRESSION TYPE: Primary | ICD-10-CM

## 2018-05-11 RX ORDER — DULOXETIN HYDROCHLORIDE 60 MG/1
60 CAPSULE, DELAYED RELEASE ORAL DAILY
Qty: 30 CAPSULE | Refills: 2 | Status: SHIPPED | OUTPATIENT
Start: 2018-05-11 | End: 2018-08-13 | Stop reason: SDUPTHER

## 2018-07-17 ENCOUNTER — DOCUMENTATION (OUTPATIENT)
Dept: FAMILY MEDICINE CLINIC | Facility: CLINIC | Age: 53
End: 2018-07-17

## 2018-07-17 DIAGNOSIS — L23.7 CONTACT DERMATITIS DUE TO POISON IVY: Primary | ICD-10-CM

## 2018-07-17 RX ORDER — METHYLPREDNISOLONE 4 MG/1
TABLET ORAL
Qty: 21 TABLET | Refills: 0 | Status: SHIPPED | OUTPATIENT
Start: 2018-07-17 | End: 2018-08-20

## 2018-07-17 NOTE — PROGRESS NOTES
Patient called with poison ivy dermatitis on face  Will send medrol dosepak and advised OTC calamine lotion  F/U if not improving  Seek ED if sx acutely worsen or if swelling of face, lips, tongue, throat, or sob occurs  RTC as scheduled

## 2018-07-17 NOTE — PROGRESS NOTES
Assessment/Plan:      There are no diagnoses linked to this encounter  Adjustment Disorder with Depression and Anxiety  Subjective:    Jose Root", began therapy at the advice of his PCP due to having bouts of severe depression and anxiety  Symptoms included feeling sad and crying easily, feeling hopeless about the future, laying awake at night worrying about his family, and having thoughts that he did not to live  Romana Jeffrey denied any history of depression until these sympotms became more prominent with the last 6 months  Romana Jeffrey was able to trace the change in his mood to shortly after the end of the last school year when he had 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24  Romana Jeffrey was able to talk about a grieve the loss of the girls in his life, and identify other stressors that may be impacting him  Romana Jeffrey was feeling stressed out and trapped in his job, and there were some chronic problems with communication in his marriage  Romana Jeffrey was able to gain some perspective of his external stressors and to accept things that are out of his control through the course of therapy  Gordy's depression improved after beginning an anti-depressant  Hi sleep improved and he no longer worried as much about his family, and the suicidal thoughts had dissipated  While Romana Jeffrey could have benefited from continued treatment, he was was functioning at a higher capacity and and was managing his symptoms effetively  Therapist assured Romana Jeffrey he could resume therapy in the future if he felt this was needed  Patient ID: Keyon Salazar is a 46 y o  male       Outpatient Discharge Summary:   Admission Date: 11/29/2018  Emeraldjessica Medina was referred by PCP  Discharge Date: 4/2/2018    Discharge Diagnosis:  Adjustment Disorder with Depression and Anxiety  No diagnosis found  Treating Physician: Kathie DUVALL  Treatment Complications: schedule conflicts    Presenting Problem: Severe depression, suicidal ideation  Course of treatment includes:    medication management and individual therapy   Treatment Progress: good  Criteria for Discharge: Scheduling conflicts  Aftercare recommendations include To continue with medication and to reach out for help if suicidal ideation returns, and to contact therapist to resume therapy if needed in the future  Discharge Medications include:  Current Outpatient Prescriptions:     acetaminophen (TYLENOL) 500 mg tablet, Take 2 tablets (1,000 mg total) by mouth every 6 (six) hours as needed (pain, fever), Disp: 30 tablet, Rfl: 0    diclofenac sodium (VOLTAREN) 1 %, Apply 2 g topically 4 (four) times a day, Disp: 200 g, Rfl: 3    DULoxetine (CYMBALTA) 60 mg delayed release capsule, Take 1 capsule (60 mg total) by mouth daily, Disp: 30 capsule, Rfl: 2    ibuprofen (MOTRIN) 600 mg tablet, Take 1 tablet by mouth every 6 (six) hours as needed for mild pain for up to 30 days, Disp: 20 tablet, Rfl: 0    ibuprofen (MOTRIN) 800 mg tablet, Take 1 tablet (800 mg total) by mouth 3 (three) times a day as needed for pain, fever, Disp: 30 tablet, Rfl: 0    Methylprednisolone 4 MG TBPK, Use as directed on package, Disp: 21 tablet, Rfl: 0    potassium citrate (UROCIT-K) 10 mEq, Take 1 tablet (10 mEq total) by mouth 3 (three) times a day with meals Start with 1 tablet and increase to 3 daily  , Disp: 90 tablet, Rfl: 6    Prognosis: good

## 2018-07-17 NOTE — PROGRESS NOTES
On 3/1/2018- "Lindsay Carlton" contacted therapist to state that he can no longer come in for therapy due to schedule demands  Lindsay Carlton states that he is doing ok, depression is better  Therapist offered a closing session, however, Lindsay Carlton did not return the call  4/2/2018- Therapist contacted Lindsay Carlton again and reached out to ask about discontinuing therapy for now  Lindsay Carlton stated that he is doing ok and then he does not kn ow when he can come in again, so he needs to  Discontinue for now  Therapist assured Lindsay Carlton that he can reach out to resume therapy if needed in the future

## 2018-08-08 DIAGNOSIS — N20.0 NEPHROLITHIASIS: Primary | ICD-10-CM

## 2018-08-09 ENCOUNTER — HOSPITAL ENCOUNTER (OUTPATIENT)
Dept: ULTRASOUND IMAGING | Facility: HOSPITAL | Age: 53
Discharge: HOME | End: 2018-08-09
Payer: COMMERCIAL

## 2018-08-09 DIAGNOSIS — N20.0 NEPHROLITHIASIS: ICD-10-CM

## 2018-08-09 PROCEDURE — 76770 US EXAM ABDO BACK WALL COMP: CPT

## 2018-08-10 NOTE — PROGRESS NOTES
8/13/2018      Chief Complaint   Patient presents with    Nephrolithiasis       Assessment and Plan    46 y o  male managed by Dr Carmen Bansal    1  Nephrolithiasis  - patient stone free on U/S  - will discontinue potassium citrate and proceed to nephrology at this time, may require a workup to see if potassium citrate is still necessary    2  Prostate cancer screening  - will being in 1 year    3  LUTs  - offered further evaluation and possible medication, patient defers for now    FU 1 year with KUB, U/S, and PSA prior and MARIA GUADALUPE at visit      History of Present Illness  Alejandrina Culver is a 46 y o  male here for follow up evaluation of nephrolithiasis  Patient previously found to have uric acid stones so potassium citrate was initiated  He presents today with an ultrasound revealing no current stone burden  His lower urinary tract symptoms are listed as below  He is not significantly bothered by these  Review of Systems   Constitutional: Negative for activity change, chills and fever  Gastrointestinal: Negative for abdominal distention and abdominal pain  Musculoskeletal: Negative for back pain and gait problem  Psychiatric/Behavioral: Negative for behavioral problems and confusion  Urinary Incontinence Screening      Most Recent Value   Urinary Incontinence   Urinary Incontinence? No   Incomplete emptying? No   Urinary frequency? Yes   Urinary urgency? Yes   Urinary hesitancy? No   Dysuria (painful difficult urination)? No   Nocturia (waking up to use the bathroom)? No   Straining (having to push to go)? No   Weak stream?  Yes [occasional]   Intermittent stream?  Yes   Post void dribbling? Yes        AUA SYMPTOM SCORE      Most Recent Value   AUA SYMPTOM SCORE   How often have you had a sensation of not emptying your bladder completely after you finished urinating? 1   How often have you had to urinate again less than two hours after you finished urinating?   4   How often have you found you stopped and started again several times when you urinate? 3   How often have you found it difficult to postpone urination? 1   How often have you had a weak urinary stream?  2   How often have you had to push or strain to begin urination? 0   How many times did you most typically get up to urinate from the time you went to bed at night until the time you got up in the morning? 1   Quality of Life: If you were to spend the rest of your life with your urinary condition just the way it is now, how would you feel about that?  3   AUA SYMPTOM SCORE  12          Past Medical History  Past Medical History:   Diagnosis Date    Kidney stones        Past Social History  Past Surgical History:   Procedure Laterality Date    CARDIAC ELECTROPHYSIOLOGY STUDY AND ABLATION      HERNIA REPAIR      KIDNEY STONE SURGERY      KNEE CARTILAGE SURGERY       History   Smoking Status    Never Smoker   Smokeless Tobacco    Never Used       Past Family History  History reviewed  No pertinent family history  Past Social history  Social History     Social History    Marital status: /Civil Union     Spouse name: N/A    Number of children: N/A    Years of education: N/A     Occupational History    Not on file       Social History Main Topics    Smoking status: Never Smoker    Smokeless tobacco: Never Used    Alcohol use No      Comment: moderate    Drug use: No    Sexual activity: Not on file     Other Topics Concern    Not on file     Social History Narrative    No narrative on file       Current Medications  Current Outpatient Prescriptions   Medication Sig Dispense Refill    acetaminophen (TYLENOL) 500 mg tablet Take 2 tablets (1,000 mg total) by mouth every 6 (six) hours as needed (pain, fever) 30 tablet 0    diclofenac sodium (VOLTAREN) 1 % Apply 2 g topically 4 (four) times a day 200 g 3    DULoxetine (CYMBALTA) 60 mg delayed release capsule Take 1 capsule (60 mg total) by mouth daily 30 capsule 2  ibuprofen (MOTRIN) 800 mg tablet Take 1 tablet (800 mg total) by mouth 3 (three) times a day as needed for pain, fever 30 tablet 0    Methylprednisolone 4 MG TBPK Use as directed on package 21 tablet 0    potassium citrate (UROCIT-K) 10 mEq Take 1 tablet (10 mEq total) by mouth 3 (three) times a day with meals Start with 1 tablet and increase to 3 daily  90 tablet 6    ibuprofen (MOTRIN) 600 mg tablet Take 1 tablet by mouth every 6 (six) hours as needed for mild pain for up to 30 days 20 tablet 0     No current facility-administered medications for this visit  Allergies  Allergies   Allergen Reactions    Other      Peanuts     Peanut-Containing Drug Products     Sulfa Antibiotics Rash    Toradol [Ketorolac Tromethamine] Rash     Tolerates ibuprofen         The following portions of the patient's history were reviewed and updated as appropriate: allergies, current medications, past medical history, past social history, past surgical history and problem list       Vitals  Vitals:    08/13/18 1444   BP: 122/78   Pulse: 83   Weight: 117 kg (257 lb)   Height: 5' 10" (1 778 m)           Physical Exam  Constitutional   General appearance: Patient is seated and in no acute distress, well appearing and well nourished  Head and Face   Head and face: Normal     Eyes   Conjunctiva and lids: No erythema, swelling or discharge  Ears, Nose, Mouth, and Throat   Hearing: Normal     Pulmonary   Respiratory effort: No increased work of breathing or signs of respiratory distress  Cardiovascular   Examination of extremities for edema and/or varicosities: Normal     Abdomen   Abdomen: Non-tender, no masses  Musculoskeletal   Gait and station: Normal     Skin  Skin and subcutaneous tissue: Warm, dry, and intact  No visible lesions or rashes    Psychiatric   Judgment and insight: Normal  Recent and remote memory:  Normal  Mood and affect: Norm      Results  No results found for this or any previous visit (from the past 1 hour(s)) ]  Lab Results   Component Value Date    PSA 0 4 11/16/2017    PSA 0 58 05/23/2014     Lab Results   Component Value Date    GLUCOSE 93 02/09/2018    CALCIUM 9 0 02/09/2018     02/09/2018    K 3 7 02/09/2018    CO2 27 02/09/2018     02/09/2018    BUN 16 02/09/2018    CREATININE 0 99 02/09/2018     Lab Results   Component Value Date    WBC 7 87 02/09/2018    HGB 14 4 02/09/2018    HCT 40 4 02/09/2018    MCV 88 02/09/2018     02/09/2018       RENAL ULTRASOUND  INDICATION:   N20 0: Calculus of kidney  COMPARISON: 2/8/2018; 2/6/2018  TECHNIQUE:   Ultrasound of the retroperitoneum was performed with a curvilinear transducer utilizing volumetric sweeps and still imaging techniques  FINDINGS:     KIDNEYS:  Symmetric and normal size  Right kidney:  10 cm  Left kidney:  11 9 cm      Right kidney  Normal echogenicity and contour  No suspicious masses detected  No hydronephrosis  No shadowing calculi  No perinephric fluid collections      Left kidney  Normal echogenicity and contour  No suspicious masses detected  No hydronephrosis  No shadowing calculi  No perinephric fluid collections      URETERS:  Nonvisualized      BLADDER:   Normally distended  No focal thickening or mass lesions  Bilateral ureteral jets detected      IMPRESSION:  1  Normal        Orders  Orders Placed This Encounter   Procedures    US kidney and bladder     Standing Status:   Future     Standing Expiration Date:   8/13/2022     Scheduling Instructions:      "Prep required if being scheduled in conjunction with other studies, refer to those examination's Preps first before scheduling  All patients for US Kidney and Bladder they must drink 24 oz of water 60 minutes before your scheduled appointment time  This test requires you to have a FULL bladder   Please do not urinate before your test             Please bring your physician order, insurance cards, a form of photo ID and a list of your medications with you  Arrive 15 minutes prior to your appointment time in order to register  If you need to have lab work or a urinalysis, please do this AFTER your ultrasound  "            To schedule this appointment, please contact Central Scheduling at [phone]  Order Specific Question:   Reason for Exam:     Answer:   calculi    XR abdomen 1 view kub     Standing Status:   Future     Standing Expiration Date:   8/13/2022     Scheduling Instructions:      Bring along any outside films relating to this procedure  Order Specific Question:   Reason for Exam:     Answer:   calculi    PSA, Total Screen     This is a patient instruction: This test is non-fasting  Please drink two glasses of water morning of bloodwork          Standing Status:   Future     Standing Expiration Date:   2/13/2020    Ambulatory referral to Nephrology     Standing Status:   Future     Standing Expiration Date:   2/13/2019     Referral Priority:   Routine     Referral Type:   Consult - AMB     Referral Reason:   Specialty Services Required     Requested Specialty:   Nephrology     Number of Visits Requested:   1     Expiration Date:   8/13/2019

## 2018-08-13 ENCOUNTER — OFFICE VISIT (OUTPATIENT)
Dept: UROLOGY | Facility: CLINIC | Age: 53
End: 2018-08-13
Payer: COMMERCIAL

## 2018-08-13 VITALS
WEIGHT: 257 LBS | HEIGHT: 70 IN | SYSTOLIC BLOOD PRESSURE: 122 MMHG | HEART RATE: 83 BPM | DIASTOLIC BLOOD PRESSURE: 78 MMHG | BODY MASS INDEX: 36.79 KG/M2

## 2018-08-13 DIAGNOSIS — F41.8 ANXIETY WITH DEPRESSION: Primary | ICD-10-CM

## 2018-08-13 DIAGNOSIS — F32.A DEPRESSION, UNSPECIFIED DEPRESSION TYPE: ICD-10-CM

## 2018-08-13 DIAGNOSIS — N20.0 NEPHROLITHIASIS: Primary | ICD-10-CM

## 2018-08-13 PROCEDURE — 99213 OFFICE O/P EST LOW 20 MIN: CPT | Performed by: PHYSICIAN ASSISTANT

## 2018-08-14 RX ORDER — DULOXETIN HYDROCHLORIDE 60 MG/1
60 CAPSULE, DELAYED RELEASE ORAL DAILY
Qty: 30 CAPSULE | Refills: 3 | Status: SHIPPED | OUTPATIENT
Start: 2018-08-14 | End: 2019-01-07 | Stop reason: SDUPTHER

## 2018-08-20 ENCOUNTER — OFFICE VISIT (OUTPATIENT)
Dept: FAMILY MEDICINE CLINIC | Facility: CLINIC | Age: 53
End: 2018-08-20
Payer: COMMERCIAL

## 2018-08-20 VITALS
TEMPERATURE: 97.3 F | BODY MASS INDEX: 36.79 KG/M2 | SYSTOLIC BLOOD PRESSURE: 134 MMHG | DIASTOLIC BLOOD PRESSURE: 78 MMHG | OXYGEN SATURATION: 94 % | WEIGHT: 257 LBS | HEART RATE: 92 BPM | HEIGHT: 70 IN | RESPIRATION RATE: 16 BRPM

## 2018-08-20 DIAGNOSIS — G89.29 CHRONIC MUSCULOSKELETAL PAIN: ICD-10-CM

## 2018-08-20 DIAGNOSIS — F41.8 DEPRESSION WITH ANXIETY: ICD-10-CM

## 2018-08-20 DIAGNOSIS — M79.18 CHRONIC MUSCULOSKELETAL PAIN: ICD-10-CM

## 2018-08-20 DIAGNOSIS — Z09 FOLLOW UP: Primary | ICD-10-CM

## 2018-08-20 DIAGNOSIS — J30.2 SEASONAL ALLERGIC RHINITIS, UNSPECIFIED TRIGGER: ICD-10-CM

## 2018-08-20 PROCEDURE — 99213 OFFICE O/P EST LOW 20 MIN: CPT | Performed by: FAMILY MEDICINE

## 2018-08-20 RX ORDER — LORATADINE 10 MG/1
10 TABLET ORAL DAILY
Qty: 30 TABLET | Refills: 2 | Status: SHIPPED | OUTPATIENT
Start: 2018-08-20 | End: 2019-01-08 | Stop reason: ALTCHOICE

## 2018-08-20 NOTE — PROGRESS NOTES
Assessment/Plan:     Diagnoses and all orders for this visit:    Follow up    Seasonal allergic rhinitis, unspecified trigger  -     loratadine (CLARITIN) 10 mg tablet; Take 1 tablet (10 mg total) by mouth daily    Chronic musculoskeletal pain  -     diclofenac sodium (VOLTAREN) 1 %; Apply 2 g topically 4 (four) times a day      Discussion/Plan:  Follow up - labs as previously ordered  Colonoscopy due next year  Seasonal allergies - start claritin 10mg daily  Monitor sx and f/u if symptoms worsen  Advised not to use q tips  Chronic musculoskeletal pain - voltaren gel renewed  Depression with anxiety - continue cymbalta 60mg daily  F/U with nephrology and urology as scheduled for nephrolithiasis  RTC 6 months or sooner if needed  Subjective:      Patient ID: Maki Oliver is a 46 y o  male  Chief Complaint   Patient presents with    Follow-up     left ear itchy       Patient is a 46year old male who presents to the office today for routine f/u  Patient reports left ear itching x 1 month  He states itching is relieved with peroxide and q tips  He states he does not have much wax in ears, but they are itching  He denies other symptoms  He is otherwise feeling well  He has not had labs done yet, states he forgot to get them done  He is on Cymbalta for chronic pain and depression, states this is working well for him  He is also requesting refill on voltaren gel for PRN use, states this works very well for him  He states he has appointment to see nephrology for kidney stones and is also following urology  He was on potassium citrate supplement with improvement in stones per patient, needs nephrology referral for evaluation of continued use of medication  He had colonoscopy at age 47 and is due at age 53           The following portions of the patient's history were reviewed and updated as appropriate: allergies, current medications, past family history, past medical history, past social history, past surgical history and problem list     Patient Active Problem List   Diagnosis    Adjustment disorder with mixed anxiety and depressed mood    Alcohol abuse    Anorgasmia of male    Bilateral hand numbness    Chronic musculoskeletal pain    Depression with anxiety    Generalized obesity    Nephrolithiasis    Tobacco use    Tremor    Urinary tract stones    Vitamin D deficiency     Current Outpatient Prescriptions on File Prior to Visit   Medication Sig Dispense Refill    acetaminophen (TYLENOL) 500 mg tablet Take 2 tablets (1,000 mg total) by mouth every 6 (six) hours as needed (pain, fever) 30 tablet 0    DULoxetine (CYMBALTA) 60 mg delayed release capsule Take 1 capsule (60 mg total) by mouth daily 30 capsule 3    ibuprofen (MOTRIN) 600 mg tablet Take 1 tablet by mouth every 6 (six) hours as needed for mild pain for up to 30 days 20 tablet 0    potassium citrate (UROCIT-K) 10 mEq Take 1 tablet (10 mEq total) by mouth 3 (three) times a day with meals Start with 1 tablet and increase to 3 daily  90 tablet 6    [DISCONTINUED] diclofenac sodium (VOLTAREN) 1 % Apply 2 g topically 4 (four) times a day 200 g 3    [DISCONTINUED] ibuprofen (MOTRIN) 800 mg tablet Take 1 tablet (800 mg total) by mouth 3 (three) times a day as needed for pain, fever 30 tablet 0    [DISCONTINUED] Methylprednisolone 4 MG TBPK Use as directed on package 21 tablet 0     No current facility-administered medications on file prior to visit  Review of Systems   Constitutional: Negative  HENT: Negative  (+) ear itching   Respiratory: Negative  Cardiovascular: Negative  Gastrointestinal: Negative  Genitourinary: Negative  Musculoskeletal: Negative  Neurological: Negative  Hematological: Negative  Psychiatric/Behavioral: Negative              Objective:      /78   Pulse 92   Temp (!) 97 3 °F (36 3 °C)   Resp 16   Ht 5' 10" (1 778 m)   Wt 117 kg (257 lb)   SpO2 94%   BMI 36 88 kg/m²        Physical Exam   Constitutional: He is oriented to person, place, and time  He appears well-developed and well-nourished  obese   HENT:   Head: Normocephalic and atraumatic  Right Ear: External ear normal    Left Ear: External ear normal    Nose: Nose normal    Mouth/Throat: Oropharynx is clear and moist    Eyes: Conjunctivae are normal  Pupils are equal, round, and reactive to light  Neck: Neck supple  No thyromegaly present  Cardiovascular: Normal rate and regular rhythm  Pulmonary/Chest: Effort normal and breath sounds normal    Abdominal: Soft  Bowel sounds are normal  He exhibits no distension and no mass  There is no tenderness  There is no rebound and no guarding  Lymphadenopathy:     He has no cervical adenopathy  Neurological: He is alert and oriented to person, place, and time  Skin: Skin is warm and dry  Psychiatric: He has a normal mood and affect   His behavior is normal  Judgment and thought content normal

## 2018-11-20 DIAGNOSIS — N20.0 CALCULUS OF KIDNEY: ICD-10-CM

## 2019-01-07 DIAGNOSIS — F41.8 ANXIETY WITH DEPRESSION: ICD-10-CM

## 2019-01-07 RX ORDER — DULOXETIN HYDROCHLORIDE 60 MG/1
60 CAPSULE, DELAYED RELEASE ORAL DAILY
Qty: 30 CAPSULE | Refills: 2 | Status: SHIPPED | OUTPATIENT
Start: 2019-01-07 | End: 2019-05-31 | Stop reason: SDUPTHER

## 2019-01-08 ENCOUNTER — OFFICE VISIT (OUTPATIENT)
Dept: FAMILY MEDICINE CLINIC | Facility: CLINIC | Age: 54
End: 2019-01-08
Payer: COMMERCIAL

## 2019-01-08 VITALS
HEART RATE: 83 BPM | RESPIRATION RATE: 16 BRPM | HEIGHT: 70 IN | OXYGEN SATURATION: 96 % | TEMPERATURE: 98.1 F | WEIGHT: 269 LBS | SYSTOLIC BLOOD PRESSURE: 124 MMHG | BODY MASS INDEX: 38.51 KG/M2 | DIASTOLIC BLOOD PRESSURE: 90 MMHG

## 2019-01-08 DIAGNOSIS — J02.9 SORE THROAT: ICD-10-CM

## 2019-01-08 DIAGNOSIS — J06.9 ACUTE URI: Primary | ICD-10-CM

## 2019-01-08 LAB — S PYO AG THROAT QL: NEGATIVE

## 2019-01-08 PROCEDURE — 87070 CULTURE OTHR SPECIMN AEROBIC: CPT | Performed by: FAMILY MEDICINE

## 2019-01-08 PROCEDURE — 99213 OFFICE O/P EST LOW 20 MIN: CPT | Performed by: FAMILY MEDICINE

## 2019-01-08 RX ORDER — DEXTROMETHORPHAN HYDROBROMIDE AND PROMETHAZINE HYDROCHLORIDE 15; 6.25 MG/5ML; MG/5ML
2.5 SYRUP ORAL 4 TIMES DAILY PRN
Qty: 180 ML | Refills: 0 | Status: SHIPPED | OUTPATIENT
Start: 2019-01-08 | End: 2019-01-28

## 2019-01-08 RX ORDER — CETIRIZINE HYDROCHLORIDE 10 MG/1
10 TABLET ORAL DAILY
Qty: 30 TABLET | Refills: 1 | Status: ON HOLD | OUTPATIENT
Start: 2019-01-08 | End: 2019-06-28 | Stop reason: CLARIF

## 2019-01-08 NOTE — PATIENT INSTRUCTIONS
Upper Respiratory Infection   WHAT YOU NEED TO KNOW:   An upper respiratory infection is also called the common cold  It is an infection that can affect your nose, throat, ears, and sinuses  For healthy people, the common cold is usually not serious and does not need special treatment  Cold symptoms are usually worst for the first 3 to 5 days  Most people get better in 7 to 14 days  You may continue to cough for 2 to 3 weeks  Colds are caused by viruses and do not get better with antibiotics  DISCHARGE INSTRUCTIONS:   Return to the emergency department if:   · You have chest pain or trouble breathing  Contact your healthcare provider if:   · You have a fever over 102ºF (39°C)  · Your sore throat gets worse or you see white or yellow spots in your throat  · Your symptoms get worse after 3 to 5 days or your cold is not better in 14 days  · You have a rash anywhere on your skin  · You have large, tender lumps in your neck  · You have thick, green or yellow drainage from your nose  · You cough up thick yellow, green, or bloody mucus  · You have vomiting for more than 24 hours and cannot keep fluids down  · You have a bad earache  · You have questions or concerns about your condition or care  Medicines: You may need any of the following:  · Decongestants  help reduce nasal congestion and help you breathe more easily  If you take decongestant pills, they may make you feel restless or cause problems with your sleep  Do not use decongestant sprays for more than a few days  · Cough suppressants  help reduce coughing  Ask your healthcare provider which type of cough medicine is best for you  · NSAIDs , such as ibuprofen, help decrease swelling, pain, and fever  NSAIDs can cause stomach bleeding or kidney problems in certain people  If you take blood thinner medicine, always ask your healthcare provider if NSAIDs are safe for you   Always read the medicine label and follow directions  · Acetaminophen  decreases pain and fever  It is available without a doctor's order  Ask how much to take and how often to take it  Follow directions  Read the labels of all other medicines you are using to see if they also contain acetaminophen, or ask your doctor or pharmacist  Acetaminophen can cause liver damage if not taken correctly  Do not use more than 4 grams (4,000 milligrams) total of acetaminophen in one day  · Take your medicine as directed  Contact your healthcare provider if you think your medicine is not helping or if you have side effects  Tell him or her if you are allergic to any medicine  Keep a list of the medicines, vitamins, and herbs you take  Include the amounts, and when and why you take them  Bring the list or the pill bottles to follow-up visits  Carry your medicine list with you in case of an emergency  Follow up with your healthcare provider as directed:  Write down your questions so you remember to ask them during your visits  Self-care:   · Rest as much as possible  Slowly start to do more each day  · Drink more liquids as directed  Liquids will help thin and loosen mucus so you can cough it up  Liquids will also help prevent dehydration  Liquids that help prevent dehydration include water, fruit juice, and broth  Do not drink liquids that contain caffeine  Caffeine can increase your risk for dehydration  Ask your healthcare provider how much liquid to drink each day  · Soothe a sore throat  Gargle with warm salt water  This helps your sore throat feel better  Make salt water by dissolving ¼ teaspoon salt in 1 cup warm water  You may also suck on hard candy or throat lozenges  You may use a sore throat spray  · Use a humidifier or vaporizer  Use a cool mist humidifier or a vaporizer to increase air moisture in your home  This may make it easier for you to breathe and help decrease your cough  · Use saline nasal drops as directed    These help relieve congestion  · Apply petroleum-based jelly around the outside of your nostrils  This can decrease irritation from blowing your nose  · Do not smoke  Nicotine and other chemicals in cigarettes and cigars can make your symptoms worse  They can also cause infections such as bronchitis or pneumonia  Ask your healthcare provider for information if you currently smoke and need help to quit  E-cigarettes or smokeless tobacco still contain nicotine  Talk to your healthcare provider before you use these products  Prevent spreading your cold to others:   · Try to stay away from other people during the first 2 to 3 days of your cold when it is more easily spread  · Do not share food or drinks  · Do not share hand towels with household members  · Wash your hands often, especially after you blow your nose  Turn away from other people and cover your mouth and nose with a tissue when you sneeze or cough  © 2017 [address] [facility] Information is for End User's use only and may not be sold, redistributed or otherwise used for commercial purposes  All illustrations and images included in CareNotes® are the copyrighted property of [employer]  or [employer]  The above information is an  only  It is not intended as medical advice for individual conditions or treatments  Talk to your doctor, nurse or pharmacist before following any medical regimen to see if it is safe and effective for you  Pharyngitis   WHAT YOU NEED TO KNOW:   Pharyngitis, or sore throat, is inflammation of the tissues and structures in your pharynx (throat)  Pharyngitis is most often caused by bacteria  It may also be caused by a cold or flu virus  Other causes include smoking, allergies, or acid reflux  DISCHARGE INSTRUCTIONS:   Call 911 for any of the following:   · You have trouble breathing or swallowing because your throat is swollen or sore      Return to the emergency department if: · You are drooling because it hurts too much to swallow  · Your fever is higher than 102? F (39?C) or lasts longer than 3 days  · You are confused  · You taste blood in your throat  Contact your healthcare provider if:   · Your throat pain gets worse  · You have a painful lump in your throat that does not go away after 5 days  · Your symptoms do not improve after 5 days  · You have questions or concerns about your condition or care  Medicines:  Viral pharyngitis will go away on its own without treatment  Your sore throat should start to feel better in 3 to 5 days for both viral and bacterial infections  You may need any of the following:  · Antibiotics  treat a bacterial infection  · NSAIDs , such as ibuprofen, help decrease swelling, pain, and fever  NSAIDs can cause stomach bleeding or kidney problems in certain people  If you take blood thinner medicine, always ask your healthcare provider if NSAIDs are safe for you  Always read the medicine label and follow directions  · Acetaminophen  decreases pain and fever  It is available without a doctor's order  Ask how much to take and how often to take it  Follow directions  Acetaminophen can cause liver damage if not taken correctly  · Take your medicine as directed  Contact your healthcare provider if you think your medicine is not helping or if you have side effects  Tell him or her if you are allergic to any medicine  Keep a list of the medicines, vitamins, and herbs you take  Include the amounts, and when and why you take them  Bring the list or the pill bottles to follow-up visits  Carry your medicine list with you in case of an emergency  Manage your symptoms:   · Gargle salt water  Mix ¼ teaspoon salt in an 8 ounce glass of warm water and gargle  This may help decrease swelling in your throat  · Drink liquids as directed    You may need to drink more liquids than usual  Liquids may help soothe your throat and prevent dehydration  Ask how much liquid to drink each day and which liquids are best for you  · Use a cool-steam humidifier  to help moisten the air in your room and calm your cough  · Soothe your throat  with cough drops, ice, soft foods, or popsicles  Prevent the spread of pharyngitis:  Cover your mouth and nose when you cough or sneeze  Do not share food or drinks  Wash your hands often  Use soap and water  If soap and water are unavailable, use an alcohol based hand   Follow up with your healthcare provider as directed:  Write down your questions so you remember to ask them during your visits  © 2017 [address] [facility] Information is for End User's use only and may not be sold, redistributed or otherwise used for commercial purposes  All illustrations and images included in CareNotes® are the copyrighted property of [employer]  or [employer]  The above information is an  only  It is not intended as medical advice for individual conditions or treatments  Talk to your doctor, nurse or pharmacist before following any medical regimen to see if it is safe and effective for you

## 2019-01-08 NOTE — PROGRESS NOTES
Assessment/Plan:     Diagnoses and all orders for this visit:    Acute URI  -     cetirizine (ZyrTEC) 10 mg tablet; Take 1 tablet (10 mg total) by mouth daily  -     promethazine-dextromethorphan (PHENERGAN-DM) 6 25-15 mg/5 mL oral syrup; Take 2 5 mL by mouth 4 (four) times a day as needed for cough    Sore throat  -     POCT rapid strepA  -     Throat culture; Future      Discussion/Plan:  Acute URI - etiology likely viral  Rest, fluids, tylenol/motrin PRN, zyrtec and phenergan DM syrup  Sore throat - rapid strep negative, will send for culture  Supportive measures advised  Seek ED or f/u if sx persist or acutely worsen  RTC 6 weeks for routine 6 month follow up or sooner if needed  Subjective:      Patient ID: Shama Krause is a 46 y o  male  Chief Complaint   Patient presents with    Sore Throat     started yesterday    Headache    Cough    Nausea    Chills       Patient is a 46year old male who presents to the office today for acute sick visit  He reports sore/raw throat, headache, congestion, swollen glands, body aches, chills, dry cough, intermittent wheezing, diarrhea, nausea that started Sunday night  He has been using advil for chills and ricola cough drops Afebrile currently  He denies headache, ear pain, PND, rhinorrhea, sneezing, vomiting, abdominal pain           The following portions of the patient's history were reviewed and updated as appropriate: allergies, current medications, past family history, past medical history, past social history, past surgical history and problem list     Patient Active Problem List   Diagnosis    Adjustment disorder with mixed anxiety and depressed mood    Alcohol abuse    Anorgasmia of male    Bilateral hand numbness    Chronic musculoskeletal pain    Depression with anxiety    Generalized obesity    Nephrolithiasis    Tobacco use    Tremor    Urinary tract stones    Vitamin D deficiency     Current Outpatient Prescriptions on File Prior to Visit   Medication Sig Dispense Refill    acetaminophen (TYLENOL) 500 mg tablet Take 2 tablets (1,000 mg total) by mouth every 6 (six) hours as needed (pain, fever) 30 tablet 0    diclofenac sodium (VOLTAREN) 1 % Apply 2 g topically 4 (four) times a day 200 g 5    DULoxetine (CYMBALTA) 60 mg delayed release capsule Take 1 capsule (60 mg total) by mouth daily 30 capsule 2    ibuprofen (MOTRIN) 600 mg tablet Take 1 tablet by mouth every 6 (six) hours as needed for mild pain for up to 30 days 20 tablet 0    potassium citrate (UROCIT-K) 10 mEq Take 1 tablet (10 mEq total) by mouth 3 (three) times a day with meals Start with 1 tablet and increase to 3 daily  90 tablet 6    [DISCONTINUED] loratadine (CLARITIN) 10 mg tablet Take 1 tablet (10 mg total) by mouth daily 30 tablet 2     No current facility-administered medications on file prior to visit  Review of Systems   Constitutional: Positive for chills and fatigue  Negative for activity change, appetite change, diaphoresis, fever and unexpected weight change  HENT: Positive for congestion and sore throat  Negative for ear discharge, ear pain, postnasal drip, rhinorrhea, sinus pain, sinus pressure, sneezing, tinnitus, trouble swallowing and voice change  Respiratory: Positive for cough and wheezing  Cardiovascular: Negative  Gastrointestinal: Positive for diarrhea and nausea  Negative for abdominal distention, abdominal pain, anal bleeding, blood in stool, constipation, rectal pain and vomiting  Musculoskeletal: Positive for myalgias  Neurological: Negative for dizziness and headaches  Hematological: Negative  Objective:      /90   Pulse 83   Temp 98 1 °F (36 7 °C)   Resp 16   Ht 5' 10" (1 778 m)   Wt 122 kg (269 lb)   SpO2 96%   BMI 38 60 kg/m²          Physical Exam   Constitutional: He is oriented to person, place, and time  He appears well-developed and well-nourished     obese   HENT:   Head: Normocephalic and atraumatic  Right Ear: Tympanic membrane, external ear and ear canal normal    Left Ear: Tympanic membrane, external ear and ear canal normal    Nose: Mucosal edema present  No rhinorrhea  Right sinus exhibits no maxillary sinus tenderness and no frontal sinus tenderness  Left sinus exhibits no maxillary sinus tenderness and no frontal sinus tenderness  Mouth/Throat: Uvula is midline and mucous membranes are normal  Posterior oropharyngeal erythema present  No oropharyngeal exudate, posterior oropharyngeal edema or tonsillar abscesses  Eyes: Pupils are equal, round, and reactive to light  Conjunctivae are normal    Neck: Neck supple  Cardiovascular: Normal rate, regular rhythm and normal heart sounds  Pulmonary/Chest: Effort normal and breath sounds normal    Abdominal: Soft  Bowel sounds are normal    Lymphadenopathy:     He has no cervical adenopathy  Neurological: He is alert and oriented to person, place, and time  Skin: Skin is warm and dry  Psychiatric: He has a normal mood and affect   His behavior is normal

## 2019-01-10 LAB — BACTERIA THROAT CULT: NORMAL

## 2019-01-28 ENCOUNTER — OFFICE VISIT (OUTPATIENT)
Dept: URGENT CARE | Facility: CLINIC | Age: 54
End: 2019-01-28
Payer: COMMERCIAL

## 2019-01-28 VITALS
TEMPERATURE: 101.1 F | WEIGHT: 260 LBS | OXYGEN SATURATION: 97 % | HEART RATE: 113 BPM | RESPIRATION RATE: 18 BRPM | HEIGHT: 70 IN | BODY MASS INDEX: 37.22 KG/M2

## 2019-01-28 DIAGNOSIS — B34.9 VIRAL INFECTION: Primary | ICD-10-CM

## 2019-01-28 PROCEDURE — 99203 OFFICE O/P NEW LOW 30 MIN: CPT | Performed by: PHYSICIAN ASSISTANT

## 2019-01-28 RX ORDER — OSELTAMIVIR PHOSPHATE 75 MG/1
75 CAPSULE ORAL EVERY 12 HOURS SCHEDULED
Qty: 10 CAPSULE | Refills: 0 | Status: SHIPPED | OUTPATIENT
Start: 2019-01-28 | End: 2019-02-02

## 2019-01-28 NOTE — PROGRESS NOTES
[facility] [address] [facility]  (office) [phone]  (fax) [phone]        NAME: Juan Cuevas is a 46 y o  male  : 1965    MRN: [id number]  DATE: 2019  TIME: 4:00 PM    Assessment and Plan   Viral infection [B34 9]  1  Viral infection  oseltamivir (TAMIFLU) 75 mg capsule       Patient Instructions   Discussed with patient that he does have flu like symptoms and that we could treat with Tamiflu  Reviewed side effects of medication and usefulness of this medication and patient did accept  Offered to swab for flu but patient declined  I reviewed with patient that viral infections last 7-10 days  If he has no improvement in symptoms in next week to follow up with PCP for further evaluation  If patient is unable to keep fluids down or unable to keep fever under 104F to present to ER  Patient did verbalize understanding  To present to the ER if symptoms worsen  Chief Complaint     Chief Complaint   Patient presents with    Generalized Body Aches     chills, cough x 1 day          History of Present Illness   Carlie Sanders presents to the clinic c/o    Did not get his flu shot this year  Generalized Body Aches   The current episode started yesterday  The problem occurs constantly  The problem is unchanged  The pain is moderate  Nothing aggravates the symptoms  Associated symptoms include congestion, fatigue, a fever and muscle aches  Pertinent negatives include no ear discharge, ear pain, eye discharge, eye itching, eye pain, eye redness, headaches, photophobia, rhinorrhea, sore throat, chest pain, coughing, shortness of breath, wheezing, abdominal pain, constipation, diarrhea, nausea, vomiting, joint swelling, neck pain or rash  Past treatments include acetaminophen  The treatment provided mild relief  Review of Systems   Review of Systems   Constitutional: Positive for fatigue and fever   Negative for activity change, appetite change, chills and diaphoresis  HENT: Positive for congestion  Negative for ear discharge, ear pain, facial swelling, rhinorrhea, sinus pain, sinus pressure, sneezing and sore throat  Eyes: Negative for photophobia, pain, discharge, redness, itching and visual disturbance  Respiratory: Negative for apnea, cough, chest tightness, shortness of breath and wheezing  Cardiovascular: Negative for chest pain  Gastrointestinal: Negative for abdominal distention, abdominal pain, anal bleeding, blood in stool, constipation, diarrhea, nausea and vomiting  Genitourinary: Negative for dysuria, flank pain, frequency, hematuria and urgency  Musculoskeletal: Negative for arthralgias, back pain, gait problem, joint swelling, myalgias, neck pain and neck stiffness  Skin: Negative for color change, rash and wound  Allergic/Immunologic: Negative for immunocompromised state  Neurological: Negative for dizziness, facial asymmetry and headaches  Hematological: Negative for adenopathy  Psychiatric/Behavioral: Negative for confusion and decreased concentration  Current Medications     Long-Term Prescriptions   Medication Sig Dispense Refill    cetirizine (ZyrTEC) 10 mg tablet Take 1 tablet (10 mg total) by mouth daily 30 tablet 1    diclofenac sodium (VOLTAREN) 1 % Apply 2 g topically 4 (four) times a day 200 g 5    DULoxetine (CYMBALTA) 60 mg delayed release capsule Take 1 capsule (60 mg total) by mouth daily 30 capsule 2    ibuprofen (MOTRIN) 600 mg tablet Take 1 tablet by mouth every 6 (six) hours as needed for mild pain for up to 30 days 20 tablet 0    potassium citrate (UROCIT-K) 10 mEq Take 1 tablet (10 mEq total) by mouth 3 (three) times a day with meals Start with 1 tablet and increase to 3 daily   90 tablet 6       Current Allergies     Allergies as of 01/28/2019 - Reviewed 01/28/2019   Allergen Reaction Noted    Other  02/06/2018    Peanut-containing drug products  02/06/2018    Sulfa antibiotics Rash 05/08/2017    Toradol [ketorolac tromethamine] Rash 05/08/2017            The following portions of the patient's history were reviewed and updated as appropriate: allergies, current medications, past family history, past medical history, past social history, past surgical history and problem list   Past Medical History:   Diagnosis Date    Kidney stones      Past Surgical History:   Procedure Laterality Date    CARDIAC ELECTROPHYSIOLOGY STUDY AND ABLATION      HERNIA REPAIR      KIDNEY STONE SURGERY      KNEE CARTILAGE SURGERY       Social History     Social History    Marital status: /Civil Union     Spouse name: N/A    Number of children: N/A    Years of education: N/A     Occupational History    Not on file  Social History Main Topics    Smoking status: Never Smoker    Smokeless tobacco: Never Used    Alcohol use No      Comment: moderate    Drug use: No    Sexual activity: Not on file     Other Topics Concern    Not on file     Social History Narrative    No narrative on file       Objective   Pulse (!) 113   Temp (!) 101 1 °F (38 4 °C)   Resp 18   Ht 5' 10" (1 778 m)   Wt 118 kg (260 lb)   SpO2 97%   BMI 37 31 kg/m²      Physical Exam     Physical Exam   Constitutional: He is oriented to person, place, and time  He appears well-developed and well-nourished  No distress  HENT:   Head: Normocephalic and atraumatic  Right Ear: Tympanic membrane and external ear normal    Left Ear: Tympanic membrane and external ear normal    Nose: Nose normal  Right sinus exhibits no maxillary sinus tenderness and no frontal sinus tenderness  Left sinus exhibits no maxillary sinus tenderness and no frontal sinus tenderness  Mouth/Throat: Oropharynx is clear and moist  No oropharyngeal exudate or posterior oropharyngeal erythema  Eyes: Pupils are equal, round, and reactive to light  Conjunctivae and EOM are normal  Right eye exhibits no discharge   Left eye exhibits no discharge  No scleral icterus  Neck: Normal range of motion  Neck supple  No JVD present  No tracheal deviation present  No thyromegaly present  Cardiovascular: Normal rate, regular rhythm and normal heart sounds  Exam reveals no gallop and no friction rub  No murmur heard  Pulmonary/Chest: Effort normal and breath sounds normal  No stridor  No respiratory distress  He has no decreased breath sounds  He has no wheezes  He has no rhonchi  He has no rales  He exhibits no tenderness  Musculoskeletal: Normal range of motion  He exhibits no tenderness or deformity  Lymphadenopathy:     He has no cervical adenopathy  Neurological: He is alert and oriented to person, place, and time  He has normal reflexes  Coordination normal    Skin: Skin is warm and dry  No rash noted  He is not diaphoretic  No erythema  No pallor  Psychiatric: He has a normal mood and affect  His behavior is normal  Judgment and thought content normal    Nursing note and vitals reviewed        Rubin Covington PA-C

## 2019-02-07 ENCOUNTER — OFFICE VISIT (OUTPATIENT)
Dept: FAMILY MEDICINE CLINIC | Facility: CLINIC | Age: 54
End: 2019-02-07
Payer: COMMERCIAL

## 2019-02-07 VITALS
WEIGHT: 270 LBS | HEIGHT: 70 IN | SYSTOLIC BLOOD PRESSURE: 138 MMHG | BODY MASS INDEX: 38.65 KG/M2 | DIASTOLIC BLOOD PRESSURE: 86 MMHG

## 2019-02-07 DIAGNOSIS — R53.83 FATIGUE, UNSPECIFIED TYPE: Primary | ICD-10-CM

## 2019-02-07 DIAGNOSIS — E66.01 SEVERE OBESITY (BMI 35.0-39.9) WITH COMORBIDITY (HCC): ICD-10-CM

## 2019-02-07 DIAGNOSIS — R05.9 COUGH: ICD-10-CM

## 2019-02-07 DIAGNOSIS — E55.9 VITAMIN D DEFICIENCY: ICD-10-CM

## 2019-02-07 DIAGNOSIS — Z13.220 SCREENING FOR HYPERLIPIDEMIA: ICD-10-CM

## 2019-02-07 DIAGNOSIS — Z11.59 NEED FOR HEPATITIS C SCREENING TEST: ICD-10-CM

## 2019-02-07 PROCEDURE — 99203 OFFICE O/P NEW LOW 30 MIN: CPT | Performed by: PHYSICIAN ASSISTANT

## 2019-02-07 PROCEDURE — 3008F BODY MASS INDEX DOCD: CPT | Performed by: PHYSICIAN ASSISTANT

## 2019-02-07 RX ORDER — BENZONATATE 100 MG/1
100 CAPSULE ORAL 3 TIMES DAILY PRN
Qty: 20 CAPSULE | Refills: 0 | Status: ON HOLD | OUTPATIENT
Start: 2019-02-07 | End: 2019-06-28 | Stop reason: CLARIF

## 2019-02-08 NOTE — PROGRESS NOTES
Assessment/Plan:     Diagnoses and all orders for this visit:    Fatigue, unspecified type  -     CBC and Platelet; Future  -     Comprehensive metabolic panel; Future  -     TSH, 3rd generation; Future    Vitamin D deficiency  -     Vitamin D 25 hydroxy; Future    Need for hepatitis C screening test  -     Hepatitis C antibody; Future    Screening for hyperlipidemia  -     Lipid panel; Future    Cough  -     benzonatate (TESSALON PERLES) 100 mg capsule; Take 1 capsule (100 mg total) by mouth 3 (three) times a day as needed for cough        Ordered screening labs and labs to assess fatigue  Discussed weight loss  Prescription for tessalon perles to help treat cough  Cough is most likely sequela from influenza  He will let us know if symptoms persist       Subjective:      Patient ID: Ayah Stevens is a 53 y o  male  Godwin Asher is a 53year old male who presents to [facility] care  He was seen last week at an Urgent Care and diagnosed with influenza  He was treated with Tamiflu, and is feeling much better  However, he complains of a residual cough  He states that the cough is productive at times  He has been taking Delsym, which provides mild relief  He denies any fevers, chills, or shortness of breath  He also complains of fatigue that has been going on for the past 6 months  He would like to have some labs checked to see if there is a cause for his fatigue  He suffers from chronic kidney stones, and follows with the urologist every 6 months  He has a history of depression that is well-controlled with Cymbalta  The following portions of the patient's history were reviewed and updated as appropriate:   He  has a past medical history of Kidney stones    He   Patient Active Problem List    Diagnosis Date Noted    Adjustment disorder with mixed anxiety and depressed mood 02/14/2018    Anorgasmia of male 12/12/2017    Bilateral hand numbness 11/29/2017    Tremor 11/29/2017    Vitamin D deficiency 11/29/2017    Nephrolithiasis 11/20/2017    Chronic musculoskeletal pain 11/15/2017    Depression with anxiety 11/15/2017    Generalized obesity 11/15/2017    Alcohol abuse 08/02/2013    Tobacco use 08/02/2013    Urinary tract stones 08/02/2013     He  has a past surgical history that includes Hernia repair; Knee cartilage surgery; Kidney stone surgery; and Cardiac electrophysiology study and ablation  His family history includes Cancer in his father and mother  He  reports that he has never smoked  He has never used smokeless tobacco  He reports that he does not drink alcohol or use drugs  Current Outpatient Prescriptions   Medication Sig Dispense Refill    acetaminophen (TYLENOL) 500 mg tablet Take 2 tablets (1,000 mg total) by mouth every 6 (six) hours as needed (pain, fever) 30 tablet 0    cetirizine (ZyrTEC) 10 mg tablet Take 1 tablet (10 mg total) by mouth daily 30 tablet 1    DULoxetine (CYMBALTA) 60 mg delayed release capsule Take 1 capsule (60 mg total) by mouth daily 30 capsule 2    potassium citrate (UROCIT-K) 10 mEq Take 1 tablet (10 mEq total) by mouth 3 (three) times a day with meals Start with 1 tablet and increase to 3 daily  90 tablet 6    benzonatate (TESSALON PERLES) 100 mg capsule Take 1 capsule (100 mg total) by mouth 3 (three) times a day as needed for cough 20 capsule 0    ibuprofen (MOTRIN) 600 mg tablet Take 1 tablet by mouth every 6 (six) hours as needed for mild pain for up to 30 days 20 tablet 0     No current facility-administered medications for this visit        Current Outpatient Prescriptions on File Prior to Visit   Medication Sig    acetaminophen (TYLENOL) 500 mg tablet Take 2 tablets (1,000 mg total) by mouth every 6 (six) hours as needed (pain, fever)    cetirizine (ZyrTEC) 10 mg tablet Take 1 tablet (10 mg total) by mouth daily    DULoxetine (CYMBALTA) 60 mg delayed release capsule Take 1 capsule (60 mg total) by mouth daily    potassium citrate (UROCIT-K) 10 mEq Take 1 tablet (10 mEq total) by mouth 3 (three) times a day with meals Start with 1 tablet and increase to 3 daily   ibuprofen (MOTRIN) 600 mg tablet Take 1 tablet by mouth every 6 (six) hours as needed for mild pain for up to 30 days    [DISCONTINUED] diclofenac sodium (VOLTAREN) 1 % Apply 2 g topically 4 (four) times a day     No current facility-administered medications on file prior to visit  He is allergic to other; peanut-containing drug products; sulfa antibiotics; and toradol [ketorolac tromethamine]       Review of Systems   Constitutional: Positive for fatigue  Negative for chills, diaphoresis and fever  HENT: Negative for congestion, ear pain, postnasal drip, rhinorrhea, sneezing, sore throat and trouble swallowing  Eyes: Negative for pain and visual disturbance  Respiratory: Positive for cough  Negative for apnea, shortness of breath and wheezing  Cardiovascular: Negative for chest pain and palpitations  Gastrointestinal: Negative for abdominal pain, constipation, diarrhea, nausea and vomiting  Genitourinary: Negative for dysuria and hematuria  Musculoskeletal: Positive for arthralgias and back pain  Negative for gait problem and myalgias  Neurological: Negative for dizziness, syncope, weakness, light-headedness, numbness and headaches  Psychiatric/Behavioral: Negative for suicidal ideas  The patient is not nervous/anxious  Objective:  /86   Ht 5' 10" (1 778 m)   Wt 122 kg (270 lb)   BMI 38 74 kg/m²      Physical Exam   Constitutional: He is oriented to person, place, and time  He appears well-developed and well-nourished  HENT:   Head: Normocephalic and atraumatic     Right Ear: Tympanic membrane, external ear and ear canal normal    Left Ear: Tympanic membrane, external ear and ear canal normal    Nose: Nose normal    Mouth/Throat: Oropharynx is clear and moist and mucous membranes are normal  No oropharyngeal exudate, posterior oropharyngeal edema or posterior oropharyngeal erythema  Eyes: Pupils are equal, round, and reactive to light  EOM are normal    Neck: Normal range of motion  Neck supple  Cardiovascular: Normal rate, regular rhythm and normal heart sounds  Exam reveals no gallop and no friction rub  No murmur heard  Pulmonary/Chest: Effort normal and breath sounds normal  No respiratory distress  He has no wheezes  He has no rales  Musculoskeletal: Normal range of motion  Lymphadenopathy:     He has no cervical adenopathy  Neurological: He is alert and oriented to person, place, and time  Skin: Skin is warm and dry  Psychiatric: He has a normal mood and affect  His behavior is normal  Judgment and thought content normal    Vitals reviewed  BMI Counseling: Body mass index is 38 74 kg/m²  Discussed the patient's BMI with him  The BMI is above average  BMI counseling and education was provided to the patient  Nutrition recommendations include reducing portion sizes, decreasing overall calorie intake, reducing fast food intake and consuming healthier snacks  Exercise recommendations include exercising 3-5 times per week

## 2019-02-11 ENCOUNTER — APPOINTMENT (EMERGENCY)
Dept: RADIOLOGY | Facility: HOSPITAL | Age: 54
End: 2019-02-11
Payer: COMMERCIAL

## 2019-02-11 ENCOUNTER — HOSPITAL ENCOUNTER (EMERGENCY)
Facility: HOSPITAL | Age: 54
Discharge: HOME | End: 2019-02-11
Attending: EMERGENCY MEDICINE
Payer: COMMERCIAL

## 2019-02-11 VITALS
RESPIRATION RATE: 18 BRPM | WEIGHT: 268.3 LBS | HEIGHT: 70 IN | BODY MASS INDEX: 38.41 KG/M2 | SYSTOLIC BLOOD PRESSURE: 124 MMHG | HEART RATE: 86 BPM | TEMPERATURE: 98.9 F | OXYGEN SATURATION: 93 % | DIASTOLIC BLOOD PRESSURE: 72 MMHG

## 2019-02-11 DIAGNOSIS — J02.9 ACUTE PHARYNGITIS, UNSPECIFIED ETIOLOGY: Primary | ICD-10-CM

## 2019-02-11 DIAGNOSIS — J20.9 ACUTE BRONCHITIS, UNSPECIFIED ORGANISM: ICD-10-CM

## 2019-02-11 LAB
ALBUMIN SERPL BCP-MCNC: 3.3 G/DL (ref 3.5–5)
ALP SERPL-CCNC: 81 U/L (ref 46–116)
ALT SERPL W P-5'-P-CCNC: 26 U/L (ref 12–78)
ANION GAP SERPL CALCULATED.3IONS-SCNC: 9 MMOL/L (ref 4–13)
AST SERPL W P-5'-P-CCNC: 16 U/L (ref 5–45)
BASOPHILS # BLD AUTO: 0.03 THOUSANDS/ΜL (ref 0–0.1)
BASOPHILS NFR BLD AUTO: 0 % (ref 0–1)
BILIRUB SERPL-MCNC: 0.8 MG/DL (ref 0.2–1)
BUN SERPL-MCNC: 16 MG/DL (ref 5–25)
CALCIUM SERPL-MCNC: 8.4 MG/DL (ref 8.3–10.1)
CHLORIDE SERPL-SCNC: 102 MMOL/L (ref 100–108)
CO2 SERPL-SCNC: 26 MMOL/L (ref 21–32)
CREAT SERPL-MCNC: 1.08 MG/DL (ref 0.6–1.3)
EOSINOPHIL # BLD AUTO: 0.13 THOUSAND/ΜL (ref 0–0.61)
EOSINOPHIL NFR BLD AUTO: 1 % (ref 0–6)
ERYTHROCYTE [DISTWIDTH] IN BLOOD BY AUTOMATED COUNT: 13.9 % (ref 11.6–15.1)
GFR SERPL CREATININE-BSD FRML MDRD: 77 ML/MIN/1.73SQ M
GLUCOSE SERPL-MCNC: 102 MG/DL (ref 65–140)
HCT VFR BLD AUTO: 41 % (ref 36.5–49.3)
HETEROPH AB SER QL: NEGATIVE
HGB BLD-MCNC: 14.3 G/DL (ref 12–17)
IMM GRANULOCYTES # BLD AUTO: 0.07 THOUSAND/UL (ref 0–0.2)
IMM GRANULOCYTES NFR BLD AUTO: 1 % (ref 0–2)
LYMPHOCYTES # BLD AUTO: 1.3 THOUSANDS/ΜL (ref 0.6–4.47)
LYMPHOCYTES NFR BLD AUTO: 14 % (ref 14–44)
MAGNESIUM SERPL-MCNC: 1.7 MG/DL (ref 1.6–2.6)
MCH RBC QN AUTO: 31.4 PG (ref 26.8–34.3)
MCHC RBC AUTO-ENTMCNC: 34.9 G/DL (ref 31.4–37.4)
MCV RBC AUTO: 90 FL (ref 82–98)
MONOCYTES # BLD AUTO: 0.86 THOUSAND/ΜL (ref 0.17–1.22)
MONOCYTES NFR BLD AUTO: 9 % (ref 4–12)
NEUTROPHILS # BLD AUTO: 6.87 THOUSANDS/ΜL (ref 1.85–7.62)
NEUTS SEG NFR BLD AUTO: 75 % (ref 43–75)
NRBC BLD AUTO-RTO: 0 /100 WBCS
PLATELET # BLD AUTO: 213 THOUSANDS/UL (ref 149–390)
PMV BLD AUTO: 9.3 FL (ref 8.9–12.7)
POTASSIUM SERPL-SCNC: 4.1 MMOL/L (ref 3.5–5.3)
PROT SERPL-MCNC: 7.1 G/DL (ref 6.4–8.2)
RBC # BLD AUTO: 4.56 MILLION/UL (ref 3.88–5.62)
S PYO AG THROAT QL: NEGATIVE
SODIUM SERPL-SCNC: 137 MMOL/L (ref 136–145)
TROPONIN I SERPL-MCNC: <0.02 NG/ML
WBC # BLD AUTO: 9.26 THOUSAND/UL (ref 4.31–10.16)

## 2019-02-11 PROCEDURE — 85025 COMPLETE CBC W/AUTO DIFF WBC: CPT | Performed by: EMERGENCY MEDICINE

## 2019-02-11 PROCEDURE — 87430 STREP A AG IA: CPT | Performed by: EMERGENCY MEDICINE

## 2019-02-11 PROCEDURE — 96360 HYDRATION IV INFUSION INIT: CPT

## 2019-02-11 PROCEDURE — 36415 COLL VENOUS BLD VENIPUNCTURE: CPT | Performed by: EMERGENCY MEDICINE

## 2019-02-11 PROCEDURE — 71046 X-RAY EXAM CHEST 2 VIEWS: CPT

## 2019-02-11 PROCEDURE — 99284 EMERGENCY DEPT VISIT MOD MDM: CPT

## 2019-02-11 PROCEDURE — 80053 COMPREHEN METABOLIC PANEL: CPT | Performed by: EMERGENCY MEDICINE

## 2019-02-11 PROCEDURE — 84484 ASSAY OF TROPONIN QUANT: CPT | Performed by: EMERGENCY MEDICINE

## 2019-02-11 PROCEDURE — 94640 AIRWAY INHALATION TREATMENT: CPT

## 2019-02-11 PROCEDURE — 96361 HYDRATE IV INFUSION ADD-ON: CPT

## 2019-02-11 PROCEDURE — 83735 ASSAY OF MAGNESIUM: CPT | Performed by: EMERGENCY MEDICINE

## 2019-02-11 PROCEDURE — 86308 HETEROPHILE ANTIBODY SCREEN: CPT | Performed by: EMERGENCY MEDICINE

## 2019-02-11 RX ORDER — BENZONATATE 100 MG/1
CAPSULE ORAL
Qty: 30 CAPSULE | Refills: 0 | Status: ON HOLD | OUTPATIENT
Start: 2019-02-11 | End: 2019-06-28 | Stop reason: CLARIF

## 2019-02-11 RX ORDER — PREDNISONE 10 MG/1
TABLET ORAL
Qty: 30 TABLET | Refills: 0 | Status: ON HOLD | OUTPATIENT
Start: 2019-02-11 | End: 2019-06-28 | Stop reason: CLARIF

## 2019-02-11 RX ORDER — IBUPROFEN 600 MG/1
600 TABLET ORAL ONCE
Status: COMPLETED | OUTPATIENT
Start: 2019-02-11 | End: 2019-02-11

## 2019-02-11 RX ORDER — IPRATROPIUM BROMIDE AND ALBUTEROL SULFATE 2.5; .5 MG/3ML; MG/3ML
3 SOLUTION RESPIRATORY (INHALATION) ONCE
Status: COMPLETED | OUTPATIENT
Start: 2019-02-11 | End: 2019-02-11

## 2019-02-11 RX ORDER — ALBUTEROL SULFATE 90 UG/1
AEROSOL, METERED RESPIRATORY (INHALATION)
Qty: 1 INHALER | Refills: 0 | Status: SHIPPED | OUTPATIENT
Start: 2019-02-11 | End: 2021-11-30

## 2019-02-11 RX ADMIN — SODIUM CHLORIDE 1000 ML: 0.9 INJECTION, SOLUTION INTRAVENOUS at 06:19

## 2019-02-11 RX ADMIN — IBUPROFEN 600 MG: 600 TABLET ORAL at 08:42

## 2019-02-11 RX ADMIN — IPRATROPIUM BROMIDE AND ALBUTEROL SULFATE 3 ML: 2.5; .5 SOLUTION RESPIRATORY (INHALATION) at 07:55

## 2019-02-13 DIAGNOSIS — N20.0 KIDNEY CALCULI: ICD-10-CM

## 2019-02-14 NOTE — ED PROVIDER NOTES
History  Chief Complaint   Patient presents with    Sore Throat     Pt c/o return of sore throat on Saturday - had been sick with URI then flu 2 weeks previously and was feeling better Friday - feeling worse since return of sore throat     Patient: Tawnya Ellis  54 y o /male  YOB: 1965  MRN: [id number]  PCP: True Gant DO  Date of evaluation: 2/11/2019    (JACINTO Mosquera may have been used in the preparation of this document  Occasional wrong word or "sound-alike" substitutions may have occurred due to the inherent limitations of voice recognition software  Interpretation should be guided by context )    History provided by:  Patient  Sore Throat   Location:  Generalized  Quality:  Sharp  Severity:  Moderate  Onset quality:  Gradual  Timing:  Constant  Progression:  Waxing and waning  Chronicity:  New  Relieved by:  Nothing  Worsened by:  Drinking and eating  Ineffective treatments:  None tried  Associated symptoms: cough    Associated symptoms: no abdominal pain, no adenopathy, no chest pain, no chills, no fever, no rash, no shortness of breath, no trouble swallowing and no voice change    Risk factors: no sick contacts        Prior to Admission Medications   Prescriptions Last Dose Informant Patient Reported? Taking?    DULoxetine (CYMBALTA) 60 mg delayed release capsule 2/10/2019 at Unknown time Self No Yes   Sig: Take 1 capsule (60 mg total) by mouth daily   acetaminophen (TYLENOL) 500 mg tablet Past Month at Unknown time Self No Yes   Sig: Take 2 tablets (1,000 mg total) by mouth every 6 (six) hours as needed (pain, fever)   benzonatate (TESSALON PERLES) 100 mg capsule 2/10/2019 at Unknown time Self No Yes   Sig: Take 1 capsule (100 mg total) by mouth 3 (three) times a day as needed for cough   cetirizine (ZyrTEC) 10 mg tablet Not Taking at Unknown time Self No No   Sig: Take 1 tablet (10 mg total) by mouth daily   Patient not taking: Reported on 2/11/2019 potassium citrate (UROCIT-K) 10 mEq 2/10/2019 at Unknown time Self No Yes   Sig: Take 1 tablet (10 mEq total) by mouth 3 (three) times a day with meals Start with 1 tablet and increase to 3 daily  Facility-Administered Medications: None       Past Medical History:   Diagnosis Date    Kidney stones        Past Surgical History:   Procedure Laterality Date    CARDIAC ELECTROPHYSIOLOGY STUDY AND ABLATION      HERNIA REPAIR      KIDNEY STONE SURGERY      KNEE CARTILAGE SURGERY         Family History   Problem Relation Age of Onset    Cancer Mother     Cancer Father      I have reviewed and agree with the history as documented  Social History     Tobacco Use    Smoking status: Never Smoker    Smokeless tobacco: Former User   Substance Use Topics    Alcohol use: Yes     Comment: occasionally    Drug use: No        Review of Systems   Constitutional: Negative  Negative for chills and fever  HENT: Positive for sore throat  Negative for trouble swallowing and voice change  Eyes: Negative for photophobia and visual disturbance  Respiratory: Positive for cough  Negative for shortness of breath  Cardiovascular: Negative  Negative for chest pain and palpitations  Gastrointestinal: Negative  Negative for abdominal pain and vomiting  Endocrine: Negative  Negative for polydipsia and polyuria  Genitourinary: Negative  Negative for difficulty urinating and urgency  Musculoskeletal: Negative  Negative for back pain and neck pain  Skin: Negative  Negative for rash and wound  Allergic/Immunologic: Negative for immunocompromised state  Neurological: Negative  Negative for speech difficulty and weakness  Hematological: Negative  Negative for adenopathy  Does not bruise/bleed easily  All other systems reviewed and are negative  Physical Exam  Physical Exam   Constitutional: He is oriented to person, place, and time  He appears well-developed and well-nourished     HENT:   Head: Normocephalic and atraumatic  Mouth/Throat: Mucous membranes are normal  Posterior oropharyngeal erythema present  Voice normal   Eyes: Pupils are equal, round, and reactive to light  EOM are normal    Cardiovascular: Normal rate and regular rhythm  Pulmonary/Chest: Effort normal    Abdominal: Soft  Bowel sounds are normal    Neurological: He is alert and oriented to person, place, and time  GCS eye subscore is 4  GCS verbal subscore is 5  GCS motor subscore is 6  Skin: Skin is warm and dry  Psychiatric: He has a normal mood and affect  His speech is normal and behavior is normal    Nursing note and vitals reviewed        Vital Signs  ED Triage Vitals [02/11/19 0456]   Temperature Pulse Respirations Blood Pressure SpO2   98 9 °F (37 2 °C) 90 16 127/78 96 %      Temp Source Heart Rate Source Patient Position - Orthostatic VS BP Location FiO2 (%)   Temporal Monitor Lying Left arm --      Pain Score       6           Vitals:    02/11/19 0530 02/11/19 0630 02/11/19 0700 02/11/19 0730   BP: 137/77 111/61 112/70 124/72   Pulse: 92 92 88 86   Patient Position - Orthostatic VS:           Visual Acuity      ED Medications  Medications   sodium chloride 0 9 % bolus 1,000 mL (0 mL Intravenous Stopped 2/11/19 0751)   ipratropium-albuterol (DUO-NEB) 0 5-2 5 mg/3 mL inhalation solution 3 mL (3 mL Nebulization Given 2/11/19 0755)   ibuprofen (MOTRIN) tablet 600 mg (600 mg Oral Given 2/11/19 0842)       Diagnostic Studies  Results Reviewed     Procedure Component Value Units Date/Time    Mononucleosis screen [[id number]]  (Normal) Collected:  02/11/19 0612    Lab Status:  Final result Specimen:  Blood from Arm, Left Updated:  02/11/19 1525     Monotest Negative    Troponin I [[id number]]  (Normal) Collected:  02/11/19 0612    Lab Status:  Final result Specimen:  Blood from Arm, Left Updated:  02/11/19 0637     Troponin I <0 02 ng/mL     Rapid Strep A Screen Only, Adults [[id number]]  (Normal) Collected:  02/11/19 0613    Lab Status:  Final result Specimen:  Throat Updated:  02/11/19 0636     Rapid Strep A Screen Negative    Comprehensive metabolic panel [[id number]]  (Abnormal) Collected:  02/11/19 0612    Lab Status:  Final result Specimen:  Blood from Arm, Left Updated:  02/11/19 5382     Sodium 137 mmol/L      Potassium 4 1 mmol/L      Chloride 102 mmol/L      CO2 26 mmol/L      ANION GAP 9 mmol/L      BUN 16 mg/dL      Creatinine 1 08 mg/dL      Glucose 102 mg/dL      Calcium 8 4 mg/dL      AST 16 U/L      ALT 26 U/L      Alkaline Phosphatase 81 U/L      Total Protein 7 1 g/dL      Albumin 3 3 g/dL      Total Bilirubin 0 80 mg/dL      eGFR 77 ml/min/1 73sq m     Narrative:       National Kidney Disease Education Program recommendations are as follows:  GFR calculation is accurate only with a steady state creatinine  Chronic Kidney disease less than 60 ml/min/1 73 sq  meters  Kidney failure less than 15 ml/min/1 73 sq  meters      Magnesium [[id number]]  (Normal) Collected:  02/11/19 0612    Lab Status:  Final result Specimen:  Blood from Arm, Left Updated:  02/11/19 0635     Magnesium 1 7 mg/dL     CBC and differential [[id number]] Collected:  02/11/19 0612    Lab Status:  Final result Specimen:  Blood from Arm, Left Updated:  02/11/19 0618     WBC 9 26 Thousand/uL      RBC 4 56 Million/uL      Hemoglobin 14 3 g/dL      Hematocrit 41 0 %      MCV 90 fL      MCH 31 4 pg      MCHC 34 9 g/dL      RDW 13 9 %      MPV 9 3 fL      Platelets 795 Thousands/uL      nRBC 0 /100 WBCs      Neutrophils Relative 75 %      Immat GRANS % 1 %      Lymphocytes Relative 14 %      Monocytes Relative 9 %      Eosinophils Relative 1 %      Basophils Relative 0 %      Neutrophils Absolute 6 87 Thousands/µL      Immature Grans Absolute 0 07 Thousand/uL      Lymphocytes Absolute 1 30 Thousands/µL      Monocytes Absolute 0 86 Thousand/µL      Eosinophils Absolute 0 13 Thousand/µL      Basophils Absolute 0 03 Thousands/µL                  XR chest 2 views   Final Result by Katerina Benson MD (02/11 2577)      No acute cardiopulmonary disease  Workstation performed: [id number]                    Procedures  Procedures       Phone Contacts  ED Phone Contact    ED Course                               MDM  Number of Diagnoses or Management Options  Acute bronchitis, unspecified organism: new and requires workup  Acute pharyngitis, unspecified etiology: new and requires workup     Amount and/or Complexity of Data Reviewed  Tests in the radiology section of CPT®: ordered and reviewed  Tests in the medicine section of CPT®: ordered and reviewed    Risk of Complications, Morbidity, and/or Mortality  Presenting problems: moderate    Patient Progress  Patient progress: improved      Disposition  Final diagnoses:   Acute pharyngitis, unspecified etiology   Acute bronchitis, unspecified organism     Time reflects when diagnosis was documented in both MDM as applicable and the Disposition within this note     Time User Action Codes Description Comment    2/11/2019  8:04 AM Chyna Moreno [J02 9] Acute pharyngitis, unspecified etiology     2/11/2019  8:04 AM Chyna Moreno Add [J20 9] Acute bronchitis, unspecified organism       ED Disposition     ED Disposition Condition Date/Time Comment    Discharge Stable Mon Feb 11, 2019  8:04 AM Su Foley discharge to home/self care  Follow-up Information     Follow up With Specialties Details Why [address], DO Family Medicine Call today Say you are following up from an ER visit   [address] [place] [address] [facility]            Discharge Medication List as of 2/11/2019  8:07 AM      START taking these medications    Details   albuterol (PROVENTIL HFA,VENTOLIN HFA) 90 mcg/act inhaler 2 puffs every 4 hours with spacer as needed for wheeze, cough, short of breath , Print      !! benzonatate (TESSALON PERLES) 100 mg capsule 1-2 tid prn cough, Print      predniSONE 10 mg tablet Taper daily as follows: 5 5 4 4 3 3 2 2 1 1 stop  Disp QS , Print      Spacer/Aero-Holding Chambers RAINER Spacer chamber for use with inhaler, Print       !! - Potential duplicate medications found  Please discuss with provider  CONTINUE these medications which have NOT CHANGED    Details   acetaminophen (TYLENOL) 500 mg tablet Take 2 tablets (1,000 mg total) by mouth every 6 (six) hours as needed (pain, fever), Starting Tue 2/6/2018, Normal      !! benzonatate (TESSALON PERLES) 100 mg capsule Take 1 capsule (100 mg total) by mouth 3 (three) times a day as needed for cough, Starting Thu 2/7/2019, Normal      DULoxetine (CYMBALTA) 60 mg delayed release capsule Take 1 capsule (60 mg total) by mouth daily, Starting Mon 1/7/2019, Normal      potassium citrate (UROCIT-K) 10 mEq Take 1 tablet (10 mEq total) by mouth 3 (three) times a day with meals Start with 1 tablet and increase to 3 daily  , Starting Tue 2/13/2018, Normal      cetirizine (ZyrTEC) 10 mg tablet Take 1 tablet (10 mg total) by mouth daily, Starting Tue 1/8/2019, Normal       !! - Potential duplicate medications found  Please discuss with provider  No discharge procedures on file      ED Provider  Electronically Signed by           Chucky De Leon MD  02/13/19 8746

## 2019-02-15 DIAGNOSIS — N20.0 KIDNEY CALCULI: ICD-10-CM

## 2019-02-15 RX ORDER — POTASSIUM CITRATE 10 MEQ/1
10 TABLET, EXTENDED RELEASE ORAL
Qty: 90 TABLET | Refills: 6 | Status: ON HOLD | OUTPATIENT
Start: 2019-02-15 | End: 2019-06-28 | Stop reason: CLARIF

## 2019-05-19 ENCOUNTER — APPOINTMENT (EMERGENCY)
Dept: CT IMAGING | Facility: HOSPITAL | Age: 54
End: 2019-05-19
Payer: COMMERCIAL

## 2019-05-19 ENCOUNTER — HOSPITAL ENCOUNTER (EMERGENCY)
Facility: HOSPITAL | Age: 54
Discharge: HOME | End: 2019-05-19
Attending: EMERGENCY MEDICINE | Admitting: EMERGENCY MEDICINE
Payer: COMMERCIAL

## 2019-05-19 ENCOUNTER — TELEPHONE (OUTPATIENT)
Dept: OTHER | Facility: HOSPITAL | Age: 54
End: 2019-05-19

## 2019-05-19 VITALS
WEIGHT: 266.1 LBS | TEMPERATURE: 98.4 F | HEART RATE: 74 BPM | OXYGEN SATURATION: 97 % | BODY MASS INDEX: 38.18 KG/M2 | RESPIRATION RATE: 18 BRPM | SYSTOLIC BLOOD PRESSURE: 128 MMHG | DIASTOLIC BLOOD PRESSURE: 81 MMHG

## 2019-05-19 DIAGNOSIS — R10.9 RIGHT FLANK PAIN: ICD-10-CM

## 2019-05-19 DIAGNOSIS — N20.1 URETEROLITHIASIS: ICD-10-CM

## 2019-05-19 DIAGNOSIS — N20.0 KIDNEY STONE: Primary | ICD-10-CM

## 2019-05-19 LAB
ALBUMIN SERPL BCP-MCNC: 3.6 G/DL (ref 3.5–5)
ALP SERPL-CCNC: 78 U/L (ref 46–116)
ALT SERPL W P-5'-P-CCNC: 25 U/L (ref 12–78)
ANION GAP SERPL CALCULATED.3IONS-SCNC: 10 MMOL/L (ref 4–13)
AST SERPL W P-5'-P-CCNC: 16 U/L (ref 5–45)
BACTERIA UR QL AUTO: ABNORMAL /HPF
BASOPHILS # BLD AUTO: 0.02 THOUSANDS/ΜL (ref 0–0.1)
BASOPHILS NFR BLD AUTO: 0 % (ref 0–1)
BILIRUB SERPL-MCNC: 1.1 MG/DL (ref 0.2–1)
BILIRUB UR QL STRIP: NEGATIVE
BUN SERPL-MCNC: 18 MG/DL (ref 5–25)
CALCIUM SERPL-MCNC: 8.8 MG/DL (ref 8.3–10.1)
CHLORIDE SERPL-SCNC: 97 MMOL/L (ref 100–108)
CLARITY UR: ABNORMAL
CO2 SERPL-SCNC: 25 MMOL/L (ref 21–32)
COLOR UR: YELLOW
CREAT SERPL-MCNC: 1.42 MG/DL (ref 0.6–1.3)
EOSINOPHIL # BLD AUTO: 0.03 THOUSAND/ΜL (ref 0–0.61)
EOSINOPHIL NFR BLD AUTO: 0 % (ref 0–6)
ERYTHROCYTE [DISTWIDTH] IN BLOOD BY AUTOMATED COUNT: 13.4 % (ref 11.6–15.1)
GFR SERPL CREATININE-BSD FRML MDRD: 55 ML/MIN/1.73SQ M
GLUCOSE SERPL-MCNC: 119 MG/DL (ref 65–140)
GLUCOSE UR STRIP-MCNC: NEGATIVE MG/DL
HCT VFR BLD AUTO: 42.6 % (ref 36.5–49.3)
HGB BLD-MCNC: 14.7 G/DL (ref 12–17)
HGB UR QL STRIP.AUTO: ABNORMAL
IMM GRANULOCYTES # BLD AUTO: 0.04 THOUSAND/UL (ref 0–0.2)
IMM GRANULOCYTES NFR BLD AUTO: 0 % (ref 0–2)
KETONES UR STRIP-MCNC: NEGATIVE MG/DL
LACTATE SERPL-SCNC: 1.8 MMOL/L (ref 0.5–2)
LEUKOCYTE ESTERASE UR QL STRIP: NEGATIVE
LIPASE SERPL-CCNC: 97 U/L (ref 73–393)
LYMPHOCYTES # BLD AUTO: 1.41 THOUSANDS/ΜL (ref 0.6–4.47)
LYMPHOCYTES NFR BLD AUTO: 11 % (ref 14–44)
MAGNESIUM SERPL-MCNC: 2.1 MG/DL (ref 1.6–2.6)
MCH RBC QN AUTO: 30.8 PG (ref 26.8–34.3)
MCHC RBC AUTO-ENTMCNC: 34.5 G/DL (ref 31.4–37.4)
MCV RBC AUTO: 89 FL (ref 82–98)
MONOCYTES # BLD AUTO: 0.98 THOUSAND/ΜL (ref 0.17–1.22)
MONOCYTES NFR BLD AUTO: 8 % (ref 4–12)
NEUTROPHILS # BLD AUTO: 10.41 THOUSANDS/ΜL (ref 1.85–7.62)
NEUTS SEG NFR BLD AUTO: 81 % (ref 43–75)
NITRITE UR QL STRIP: NEGATIVE
NON-SQ EPI CELLS URNS QL MICRO: ABNORMAL /HPF
NRBC BLD AUTO-RTO: 0 /100 WBCS
PH UR STRIP.AUTO: 6 [PH]
PLATELET # BLD AUTO: 270 THOUSANDS/UL (ref 149–390)
PMV BLD AUTO: 10 FL (ref 8.9–12.7)
POTASSIUM SERPL-SCNC: 3.8 MMOL/L (ref 3.5–5.3)
PROT SERPL-MCNC: 7.8 G/DL (ref 6.4–8.2)
PROT UR STRIP-MCNC: NEGATIVE MG/DL
RBC # BLD AUTO: 4.77 MILLION/UL (ref 3.88–5.62)
RBC #/AREA URNS AUTO: ABNORMAL /HPF
SODIUM SERPL-SCNC: 132 MMOL/L (ref 136–145)
SP GR UR STRIP.AUTO: <=1.005 (ref 1–1.03)
UROBILINOGEN UR QL STRIP.AUTO: 0.2 E.U./DL
WBC # BLD AUTO: 12.89 THOUSAND/UL (ref 4.31–10.16)
WBC #/AREA URNS AUTO: ABNORMAL /HPF

## 2019-05-19 PROCEDURE — 83605 ASSAY OF LACTIC ACID: CPT | Performed by: EMERGENCY MEDICINE

## 2019-05-19 PROCEDURE — 74176 CT ABD & PELVIS W/O CONTRAST: CPT

## 2019-05-19 PROCEDURE — 81001 URINALYSIS AUTO W/SCOPE: CPT | Performed by: EMERGENCY MEDICINE

## 2019-05-19 PROCEDURE — 96375 TX/PRO/DX INJ NEW DRUG ADDON: CPT

## 2019-05-19 PROCEDURE — 99285 EMERGENCY DEPT VISIT HI MDM: CPT | Performed by: EMERGENCY MEDICINE

## 2019-05-19 PROCEDURE — 80053 COMPREHEN METABOLIC PANEL: CPT | Performed by: EMERGENCY MEDICINE

## 2019-05-19 PROCEDURE — 36415 COLL VENOUS BLD VENIPUNCTURE: CPT | Performed by: EMERGENCY MEDICINE

## 2019-05-19 PROCEDURE — 96361 HYDRATE IV INFUSION ADD-ON: CPT

## 2019-05-19 PROCEDURE — 99284 EMERGENCY DEPT VISIT MOD MDM: CPT

## 2019-05-19 PROCEDURE — 83690 ASSAY OF LIPASE: CPT | Performed by: EMERGENCY MEDICINE

## 2019-05-19 PROCEDURE — 96374 THER/PROPH/DIAG INJ IV PUSH: CPT

## 2019-05-19 PROCEDURE — 85025 COMPLETE CBC W/AUTO DIFF WBC: CPT | Performed by: EMERGENCY MEDICINE

## 2019-05-19 PROCEDURE — 83735 ASSAY OF MAGNESIUM: CPT | Performed by: EMERGENCY MEDICINE

## 2019-05-19 PROCEDURE — NC001 PR NO CHARGE: Performed by: EMERGENCY MEDICINE

## 2019-05-19 RX ORDER — ONDANSETRON 4 MG/1
4 TABLET, FILM COATED ORAL EVERY 8 HOURS PRN
Qty: 30 TABLET | Refills: 0 | Status: SHIPPED | OUTPATIENT
Start: 2019-05-19 | End: 2019-09-09

## 2019-05-19 RX ORDER — TAMSULOSIN HYDROCHLORIDE 0.4 MG/1
0.4 CAPSULE ORAL
Qty: 15 CAPSULE | Refills: 0 | Status: SHIPPED | OUTPATIENT
Start: 2019-05-19 | End: 2019-09-26 | Stop reason: HOSPADM

## 2019-05-19 RX ORDER — OXYCODONE HYDROCHLORIDE AND ACETAMINOPHEN 5; 325 MG/1; MG/1
1 TABLET ORAL EVERY 4 HOURS PRN
Qty: 16 TABLET | Refills: 0 | Status: SHIPPED | OUTPATIENT
Start: 2019-05-19 | End: 2019-06-30 | Stop reason: HOSPADM

## 2019-05-19 RX ORDER — ONDANSETRON 2 MG/ML
4 INJECTION INTRAMUSCULAR; INTRAVENOUS ONCE
Status: COMPLETED | OUTPATIENT
Start: 2019-05-19 | End: 2019-05-19

## 2019-05-19 RX ORDER — MORPHINE SULFATE 4 MG/ML
4 INJECTION, SOLUTION INTRAMUSCULAR; INTRAVENOUS ONCE
Status: COMPLETED | OUTPATIENT
Start: 2019-05-19 | End: 2019-05-19

## 2019-05-19 RX ORDER — TAMSULOSIN HYDROCHLORIDE 0.4 MG/1
0.4 CAPSULE ORAL ONCE
Status: COMPLETED | OUTPATIENT
Start: 2019-05-19 | End: 2019-05-19

## 2019-05-19 RX ADMIN — ONDANSETRON 4 MG: 2 INJECTION INTRAMUSCULAR; INTRAVENOUS at 05:32

## 2019-05-19 RX ADMIN — MORPHINE SULFATE 4 MG: 4 INJECTION INTRAVENOUS at 05:34

## 2019-05-19 RX ADMIN — SODIUM CHLORIDE 1000 ML: 0.9 INJECTION, SOLUTION INTRAVENOUS at 05:33

## 2019-05-19 RX ADMIN — SODIUM CHLORIDE 1000 ML: 0.9 INJECTION, SOLUTION INTRAVENOUS at 07:12

## 2019-05-19 RX ADMIN — TAMSULOSIN HYDROCHLORIDE 0.4 MG: 0.4 CAPSULE ORAL at 06:34

## 2019-05-20 ENCOUNTER — TELEPHONE (OUTPATIENT)
Dept: UROLOGY | Facility: CLINIC | Age: 54
End: 2019-05-20

## 2019-05-21 ENCOUNTER — OFFICE VISIT (OUTPATIENT)
Dept: UROLOGY | Facility: CLINIC | Age: 54
End: 2019-05-21
Payer: COMMERCIAL

## 2019-05-21 VITALS
WEIGHT: 267 LBS | BODY MASS INDEX: 38.31 KG/M2 | HEART RATE: 78 BPM | DIASTOLIC BLOOD PRESSURE: 80 MMHG | SYSTOLIC BLOOD PRESSURE: 130 MMHG

## 2019-05-21 DIAGNOSIS — N20.1 URETERAL STONE: Primary | ICD-10-CM

## 2019-05-21 PROCEDURE — 99214 OFFICE O/P EST MOD 30 MIN: CPT | Performed by: PHYSICIAN ASSISTANT

## 2019-05-31 ENCOUNTER — TELEPHONE (OUTPATIENT)
Dept: FAMILY MEDICINE CLINIC | Facility: CLINIC | Age: 54
End: 2019-05-31

## 2019-05-31 DIAGNOSIS — F41.8 ANXIETY WITH DEPRESSION: ICD-10-CM

## 2019-05-31 RX ORDER — DULOXETIN HYDROCHLORIDE 60 MG/1
60 CAPSULE, DELAYED RELEASE ORAL DAILY
Qty: 2 CAPSULE | Refills: 0 | Status: SHIPPED | OUTPATIENT
Start: 2019-05-31 | End: 2019-11-08 | Stop reason: SDUPTHER

## 2019-06-28 ENCOUNTER — APPOINTMENT (EMERGENCY)
Dept: CT IMAGING | Facility: HOSPITAL | Age: 54
End: 2019-06-28
Payer: COMMERCIAL

## 2019-06-28 ENCOUNTER — HOSPITAL ENCOUNTER (INPATIENT)
Facility: HOSPITAL | Age: 54
LOS: 2 days | Discharge: HOME | DRG: 660 | End: 2019-06-30
Attending: INTERNAL MEDICINE | Admitting: INTERNAL MEDICINE
Payer: COMMERCIAL

## 2019-06-28 ENCOUNTER — HOSPITAL ENCOUNTER (EMERGENCY)
Facility: HOSPITAL | Age: 54
End: 2019-06-28
Attending: EMERGENCY MEDICINE
Payer: COMMERCIAL

## 2019-06-28 ENCOUNTER — ANESTHESIA EVENT (INPATIENT)
Dept: PERIOP | Facility: HOSPITAL | Age: 54
DRG: 660 | End: 2019-06-28
Payer: COMMERCIAL

## 2019-06-28 ENCOUNTER — TELEPHONE (OUTPATIENT)
Dept: UROLOGY | Facility: MEDICAL CENTER | Age: 54
End: 2019-06-28

## 2019-06-28 VITALS
BODY MASS INDEX: 37.96 KG/M2 | TEMPERATURE: 98 F | RESPIRATION RATE: 20 BRPM | HEART RATE: 87 BPM | SYSTOLIC BLOOD PRESSURE: 134 MMHG | DIASTOLIC BLOOD PRESSURE: 81 MMHG | WEIGHT: 264.55 LBS | OXYGEN SATURATION: 93 %

## 2019-06-28 DIAGNOSIS — N20.1 URETERAL CALCULUS: Primary | ICD-10-CM

## 2019-06-28 DIAGNOSIS — N13.30 HYDROURETERONEPHROSIS: ICD-10-CM

## 2019-06-28 PROBLEM — N17.9 AKI (ACUTE KIDNEY INJURY) (HCC): Status: ACTIVE | Noted: 2019-06-28

## 2019-06-28 PROBLEM — K59.00 CONSTIPATION: Status: ACTIVE | Noted: 2019-06-28

## 2019-06-28 LAB
ALBUMIN SERPL BCP-MCNC: 3.7 G/DL (ref 3.5–5)
ALP SERPL-CCNC: 83 U/L (ref 46–116)
ALT SERPL W P-5'-P-CCNC: 28 U/L (ref 12–78)
ANION GAP SERPL CALCULATED.3IONS-SCNC: 9 MMOL/L (ref 4–13)
APTT PPP: 27 SECONDS (ref 23–37)
AST SERPL W P-5'-P-CCNC: 20 U/L (ref 5–45)
BACTERIA UR QL AUTO: ABNORMAL /HPF
BASOPHILS # BLD AUTO: 0.01 THOUSANDS/ΜL (ref 0–0.1)
BASOPHILS NFR BLD AUTO: 0 % (ref 0–1)
BILIRUB SERPL-MCNC: 0.6 MG/DL (ref 0.2–1)
BILIRUB UR QL STRIP: NEGATIVE
BUN SERPL-MCNC: 25 MG/DL (ref 5–25)
CALCIUM SERPL-MCNC: 8.6 MG/DL (ref 8.3–10.1)
CHLORIDE SERPL-SCNC: 104 MMOL/L (ref 100–108)
CLARITY UR: CLEAR
CO2 SERPL-SCNC: 25 MMOL/L (ref 21–32)
COLOR UR: YELLOW
CREAT SERPL-MCNC: 1.43 MG/DL (ref 0.6–1.3)
EOSINOPHIL # BLD AUTO: 0.05 THOUSAND/ΜL (ref 0–0.61)
EOSINOPHIL NFR BLD AUTO: 1 % (ref 0–6)
ERYTHROCYTE [DISTWIDTH] IN BLOOD BY AUTOMATED COUNT: 13.5 % (ref 11.6–15.1)
GFR SERPL CREATININE-BSD FRML MDRD: 55 ML/MIN/1.73SQ M
GLUCOSE SERPL-MCNC: 134 MG/DL (ref 65–140)
GLUCOSE UR STRIP-MCNC: NEGATIVE MG/DL
HCT VFR BLD AUTO: 44.4 % (ref 36.5–49.3)
HGB BLD-MCNC: 15 G/DL (ref 12–17)
HGB UR QL STRIP.AUTO: ABNORMAL
IMM GRANULOCYTES # BLD AUTO: 0.06 THOUSAND/UL (ref 0–0.2)
IMM GRANULOCYTES NFR BLD AUTO: 1 % (ref 0–2)
INR PPP: 0.94 (ref 0.84–1.19)
KETONES UR STRIP-MCNC: NEGATIVE MG/DL
LEUKOCYTE ESTERASE UR QL STRIP: NEGATIVE
LIPASE SERPL-CCNC: 101 U/L (ref 73–393)
LYMPHOCYTES # BLD AUTO: 1.35 THOUSANDS/ΜL (ref 0.6–4.47)
LYMPHOCYTES NFR BLD AUTO: 13 % (ref 14–44)
MCH RBC QN AUTO: 30.7 PG (ref 26.8–34.3)
MCHC RBC AUTO-ENTMCNC: 33.8 G/DL (ref 31.4–37.4)
MCV RBC AUTO: 91 FL (ref 82–98)
MONOCYTES # BLD AUTO: 0.78 THOUSAND/ΜL (ref 0.17–1.22)
MONOCYTES NFR BLD AUTO: 8 % (ref 4–12)
NEUTROPHILS # BLD AUTO: 8.21 THOUSANDS/ΜL (ref 1.85–7.62)
NEUTS SEG NFR BLD AUTO: 77 % (ref 43–75)
NITRITE UR QL STRIP: NEGATIVE
NON-SQ EPI CELLS URNS QL MICRO: ABNORMAL /HPF
NRBC BLD AUTO-RTO: 0 /100 WBCS
PH UR STRIP.AUTO: 6 [PH]
PLATELET # BLD AUTO: 260 THOUSANDS/UL (ref 149–390)
PMV BLD AUTO: 9.8 FL (ref 8.9–12.7)
POTASSIUM SERPL-SCNC: 4.1 MMOL/L (ref 3.5–5.3)
PROT SERPL-MCNC: 7.7 G/DL (ref 6.4–8.2)
PROT UR STRIP-MCNC: NEGATIVE MG/DL
PROTHROMBIN TIME: 12.6 SECONDS (ref 11.6–14.5)
RBC # BLD AUTO: 4.88 MILLION/UL (ref 3.88–5.62)
RBC #/AREA URNS AUTO: ABNORMAL /HPF
SODIUM SERPL-SCNC: 138 MMOL/L (ref 136–145)
SP GR UR STRIP.AUTO: >=1.03 (ref 1–1.03)
URATE CRY URNS QL MICRO: ABNORMAL /HPF
UROBILINOGEN UR QL STRIP.AUTO: 0.2 E.U./DL
WBC # BLD AUTO: 10.46 THOUSAND/UL (ref 4.31–10.16)
WBC #/AREA URNS AUTO: ABNORMAL /HPF

## 2019-06-28 PROCEDURE — 87040 BLOOD CULTURE FOR BACTERIA: CPT | Performed by: PHYSICIAN ASSISTANT

## 2019-06-28 PROCEDURE — 74176 CT ABD & PELVIS W/O CONTRAST: CPT

## 2019-06-28 PROCEDURE — 96361 HYDRATE IV INFUSION ADD-ON: CPT

## 2019-06-28 PROCEDURE — 36415 COLL VENOUS BLD VENIPUNCTURE: CPT | Performed by: PHYSICIAN ASSISTANT

## 2019-06-28 PROCEDURE — 81001 URINALYSIS AUTO W/SCOPE: CPT | Performed by: PHYSICIAN ASSISTANT

## 2019-06-28 PROCEDURE — 99285 EMERGENCY DEPT VISIT HI MDM: CPT | Performed by: PHYSICIAN ASSISTANT

## 2019-06-28 PROCEDURE — 96376 TX/PRO/DX INJ SAME DRUG ADON: CPT

## 2019-06-28 PROCEDURE — 99254 IP/OBS CNSLTJ NEW/EST MOD 60: CPT | Performed by: UROLOGY

## 2019-06-28 PROCEDURE — 96365 THER/PROPH/DIAG IV INF INIT: CPT

## 2019-06-28 PROCEDURE — 85610 PROTHROMBIN TIME: CPT | Performed by: PHYSICIAN ASSISTANT

## 2019-06-28 PROCEDURE — 96375 TX/PRO/DX INJ NEW DRUG ADDON: CPT

## 2019-06-28 PROCEDURE — 85730 THROMBOPLASTIN TIME PARTIAL: CPT | Performed by: PHYSICIAN ASSISTANT

## 2019-06-28 PROCEDURE — 83690 ASSAY OF LIPASE: CPT | Performed by: PHYSICIAN ASSISTANT

## 2019-06-28 PROCEDURE — 85025 COMPLETE CBC W/AUTO DIFF WBC: CPT | Performed by: PHYSICIAN ASSISTANT

## 2019-06-28 PROCEDURE — 80053 COMPREHEN METABOLIC PANEL: CPT | Performed by: PHYSICIAN ASSISTANT

## 2019-06-28 PROCEDURE — 99223 1ST HOSP IP/OBS HIGH 75: CPT | Performed by: INTERNAL MEDICINE

## 2019-06-28 PROCEDURE — 99285 EMERGENCY DEPT VISIT HI MDM: CPT

## 2019-06-28 RX ORDER — ONDANSETRON 2 MG/ML
4 INJECTION INTRAMUSCULAR; INTRAVENOUS ONCE
Status: COMPLETED | OUTPATIENT
Start: 2019-06-28 | End: 2019-06-28

## 2019-06-28 RX ORDER — ONDANSETRON 2 MG/ML
4 INJECTION INTRAMUSCULAR; INTRAVENOUS EVERY 6 HOURS PRN
Status: DISCONTINUED | OUTPATIENT
Start: 2019-06-28 | End: 2019-06-30

## 2019-06-28 RX ORDER — HEPARIN SODIUM 5000 [USP'U]/ML
5000 INJECTION, SOLUTION INTRAVENOUS; SUBCUTANEOUS EVERY 8 HOURS SCHEDULED
Status: DISCONTINUED | OUTPATIENT
Start: 2019-06-28 | End: 2019-06-30

## 2019-06-28 RX ORDER — CEFAZOLIN SODIUM 2 G/50ML
2000 SOLUTION INTRAVENOUS ONCE
Status: COMPLETED | OUTPATIENT
Start: 2019-06-28 | End: 2019-06-28

## 2019-06-28 RX ORDER — SODIUM CHLORIDE 9 MG/ML
100 INJECTION, SOLUTION INTRAVENOUS CONTINUOUS
Status: DISCONTINUED | OUTPATIENT
Start: 2019-06-28 | End: 2019-06-30

## 2019-06-28 RX ORDER — ACETAMINOPHEN 325 MG/1
650 TABLET ORAL EVERY 6 HOURS PRN
Status: DISCONTINUED | OUTPATIENT
Start: 2019-06-28 | End: 2019-06-30

## 2019-06-28 RX ORDER — MORPHINE SULFATE 4 MG/ML
4 INJECTION, SOLUTION INTRAMUSCULAR; INTRAVENOUS ONCE
Status: COMPLETED | OUTPATIENT
Start: 2019-06-28 | End: 2019-06-28

## 2019-06-28 RX ORDER — OXYCODONE HYDROCHLORIDE AND ACETAMINOPHEN 5; 325 MG/1; MG/1
1 TABLET ORAL EVERY 4 HOURS PRN
Status: DISCONTINUED | OUTPATIENT
Start: 2019-06-28 | End: 2019-06-30

## 2019-06-28 RX ORDER — POLYETHYLENE GLYCOL 3350 17 G/17G
17 POWDER, FOR SOLUTION ORAL DAILY
Status: DISCONTINUED | OUTPATIENT
Start: 2019-06-29 | End: 2019-06-30

## 2019-06-28 RX ORDER — DULOXETIN HYDROCHLORIDE 60 MG/1
60 CAPSULE, DELAYED RELEASE ORAL DAILY
Status: DISCONTINUED | OUTPATIENT
Start: 2019-06-29 | End: 2019-06-30 | Stop reason: HOSPADM

## 2019-06-28 RX ORDER — TAMSULOSIN HYDROCHLORIDE 0.4 MG/1
0.4 CAPSULE ORAL
Status: DISCONTINUED | OUTPATIENT
Start: 2019-06-29 | End: 2019-06-30 | Stop reason: HOSPADM

## 2019-06-28 RX ORDER — AMOXICILLIN 250 MG
1 CAPSULE ORAL 2 TIMES DAILY
Status: DISCONTINUED | OUTPATIENT
Start: 2019-06-28 | End: 2019-06-30

## 2019-06-28 RX ADMIN — ONDANSETRON 4 MG: 2 INJECTION INTRAMUSCULAR; INTRAVENOUS at 11:57

## 2019-06-28 RX ADMIN — CEFAZOLIN SODIUM 2000 MG: 2 SOLUTION INTRAVENOUS at 14:43

## 2019-06-28 RX ADMIN — HEPARIN SODIUM 5000 UNITS: 5000 INJECTION INTRAVENOUS; SUBCUTANEOUS at 21:43

## 2019-06-28 RX ADMIN — ACETAMINOPHEN 650 MG: 325 TABLET ORAL at 20:05

## 2019-06-28 RX ADMIN — SODIUM CHLORIDE 1000 ML: 0.9 INJECTION, SOLUTION INTRAVENOUS at 11:56

## 2019-06-28 RX ADMIN — MORPHINE SULFATE 4 MG: 4 INJECTION INTRAVENOUS at 13:19

## 2019-06-28 RX ADMIN — MORPHINE SULFATE 4 MG: 4 INJECTION INTRAVENOUS at 15:47

## 2019-06-28 RX ADMIN — SODIUM CHLORIDE 100 ML/HR: 0.9 INJECTION, SOLUTION INTRAVENOUS at 20:13

## 2019-06-28 NOTE — EMTALA/ACUTE CARE TRANSFER
[facility] EMERGENCY DEPARTMENT  [address] [place] [id number]  Dept: [phone]      EMTALA TRANSFER CONSENT    NAME Kenyetta Wall                                         1965                              MRN [id number]    I have been informed of my rights regarding examination, treatment, and transfer   by Penny Mayorga MD    Benefits:  Inpt urology/surgery     Risks:  Transport risks      Consent for Transfer:  I acknowledge that my medical condition has been evaluated and explained to me by the emergency department physician or other qualified medical person and/or my attending physician, who has recommended that I be transferred to the service of   Dr Mohamud Schneider at  [address] [place] [address]  The above potential benefits of such transfer, the potential risks associated with such transfer, and the probable risks of not being transferred have been explained to me, and I fully understand them  The doctor has explained that, in my case, the benefits of transfer outweigh the risks  I agree to be transferred  I authorize the performance of emergency medical procedures and treatments upon me in both transit and upon arrival at the receiving facility  Additionally, I authorize the release of any and all medical records to the receiving facility and request they be transported with me, if possible  I understand that the safest mode of transportation during a medical emergency is an ambulance and that the Hospital advocates the use of this mode of transport  Risks of traveling to the receiving facility by car, including absence of medical control, life sustaining equipment, such as oxygen, and medical personnel has been explained to me and I fully understand them  (DO CORRECT BOX BELOW)  [  ]  I consent to the stated transfer and to be transported by ambulance/helicopter    [  ]  I consent to the stated transfer, but refuse transportation by ambulance and accept full responsibility for my transportation by car  I understand the risks of non-ambulance transfers and I exonerate the Hospital and its staff from any deterioration in my condition that results from this refusal     X___________________________________________    DATE  19  TIME________  Signature of patient or legally responsible individual signing on patient behalf           RELATIONSHIP TO PATIENT_________________________          Provider Certification    NAME Rocio Forrest                                         1965                              MRN [id number]    A medical screening exam was performed on the above named patient  Based on the examination:    Condition Necessitating Transfer There were no encounter diagnoses  Patient Condition:  Stable    Reason for Transfer:  Urology    Transfer Requirements: [address]  · Space available and qualified personnel available for treatment as acknowledged by  Vernon Nova  · Agreed to accept transfer and to provide appropriate medical treatment as acknowledged by          · Appropriate medical records of the examination and treatment of the patient are provided at the time of transfer   [address], [address] _______  · Transfer will be performed by qualified personnel from    and appropriate transfer equipment as required, including the use of necessary and appropriate life support measures      Provider Certification: I have examined the patient and explained the following risks and benefits of being transferred/refusing transfer to the patient/family:         Based on these reasonable risks and benefits to the patient and/or the unborn child(ihsan), and based upon the information available at the time of the patients examination, I certify that the medical benefits reasonably to be expected from the provision of appropriate medical treatments at another medical facility outweigh the increasing risks, if any, to the individuals medical condition, and in the case of labor to the unborn child, from effecting the transfer      X____________________________________________ DATE 06/28/19        TIME_______      ORIGINAL - SEND TO MEDICAL RECORDS   COPY - SEND WITH PATIENT DURING TRANSFER

## 2019-06-28 NOTE — ED PROVIDER NOTES
History  Chief Complaint   Patient presents with    Flank Pain     right flank pain starting yesterday  nausea with pain  history of kidney stones     Patient presents to the emergency department today offering a chief complaint of right-sided flank pain that started yesterday  It is associated with nausea no vomiting  Pain does radiate into the right testicle  States this is classic for his presentation of renal calculi which she has had in the past   He sees our Urology group  He denies hematuria however states he does have less volume of urine noted  Denies chest pain shortness of breath  Denies fever chills sweats  Prior to Admission Medications   Prescriptions Last Dose Informant Patient Reported? Taking? DULoxetine (CYMBALTA) 60 mg delayed release capsule   No Yes   Sig: Take 1 capsule (60 mg total) by mouth daily   Spacer/Aero-Holding Jason Jang   No No   Sig: Spacer chamber for use with inhaler   Patient not taking: Reported on 2019   acetaminophen (TYLENOL) 500 mg tablet  Self No Yes   Sig: Take 2 tablets (1,000 mg total) by mouth every 6 (six) hours as needed (pain, fever)   albuterol (PROVENTIL HFA,VENTOLIN HFA) 90 mcg/act inhaler   No No   Si puffs every 4 hours with spacer as needed for wheeze, cough, short of breath     Patient not taking: Reported on 2019   benzonatate (TESSALON PERLES) 100 mg capsule  Self No No   Sig: Take 1 capsule (100 mg total) by mouth 3 (three) times a day as needed for cough   Patient not taking: Reported on 2019   benzonatate (TESSALON PERLES) 100 mg capsule   No No   Si-2 tid prn cough   Patient not taking: Reported on 2019   cetirizine (ZyrTEC) 10 mg tablet  Self No No   Sig: Take 1 tablet (10 mg total) by mouth daily   Patient not taking: Reported on 2019   ondansetron (ZOFRAN) 4 mg tablet   No Yes   Sig: Take 1 tablet (4 mg total) by mouth every 8 (eight) hours as needed for nausea for up to 30 doses oxyCODONE-acetaminophen (PERCOCET) 5-325 mg per tablet   No Yes   Sig: Take 1 tablet by mouth every 4 (four) hours as needed for moderate pain for up to 16 dosesMax Daily Amount: 6 tablets   potassium citrate (UROCIT-K) 10 mEq   No Yes   Sig: Take 1 tablet (10 mEq total) by mouth 3 (three) times a day with meals Start with 1 tablet and increase to 3 daily  predniSONE 10 mg tablet   No No   Sig: Taper daily as follows: 5 5 4 4 3 3 2 2 1 1 stop  Disp QS  Patient not taking: Reported on 5/19/2019   tamsulosin (FLOMAX) 0 4 mg 6/28/2019 at Unknown time  No Yes   Sig: Take 1 capsule (0 4 mg total) by mouth daily with dinner for 15 days      Facility-Administered Medications: None       Past Medical History:   Diagnosis Date    Kidney stones        Past Surgical History:   Procedure Laterality Date    CARDIAC ELECTROPHYSIOLOGY STUDY AND ABLATION      HERNIA REPAIR      KIDNEY STONE SURGERY      KNEE CARTILAGE SURGERY         Family History   Problem Relation Age of Onset    Cancer Mother     Cancer Father      I have reviewed and agree with the history as documented  Social History     Tobacco Use    Smoking status: Never Smoker    Smokeless tobacco: Former User   Substance Use Topics    Alcohol use: Yes     Comment: occasionally    Drug use: No        Review of Systems   Constitutional: Negative  Negative for activity change, appetite change, chills, diaphoresis, fatigue, fever and unexpected weight change  HENT: Negative  Negative for sore throat, trouble swallowing and voice change  Eyes: Negative  Respiratory: Negative  Negative for cough, chest tightness, shortness of breath and wheezing  Cardiovascular: Negative  Negative for chest pain, palpitations and leg swelling  Gastrointestinal: Positive for nausea  Negative for abdominal pain, blood in stool and vomiting  Endocrine: Negative  Genitourinary: Positive for flank pain and testicular pain  Negative for hematuria  Musculoskeletal: Negative for arthralgias, back pain, gait problem, joint swelling, myalgias, neck pain and neck stiffness  Skin: Negative  Negative for rash and wound  Allergic/Immunologic: Negative  Neurological: Negative  Negative for dizziness, seizures, syncope, weakness, light-headedness and headaches  Hematological: Negative  Psychiatric/Behavioral: Negative  All other systems reviewed and are negative  Physical Exam  Physical Exam   Constitutional: He is oriented to person, place, and time  Vital signs are normal  He appears well-developed and well-nourished  He does not have a sickly appearance  He does not appear ill  No distress  HENT:   Right Ear: External ear normal  No swelling  Tympanic membrane is not bulging  Left Ear: External ear normal  No swelling  Tympanic membrane is not bulging  Nose: Nose normal    Mouth/Throat: Oropharynx is clear and moist  No oropharyngeal exudate  Eyes: Pupils are equal, round, and reactive to light  Conjunctivae, EOM and lids are normal    Neck: Normal range of motion  Neck supple  No JVD present  No tracheal deviation, no edema and normal range of motion present  No thyromegaly present  Cardiovascular: Normal rate, regular rhythm, normal heart sounds, intact distal pulses and normal pulses  Exam reveals no gallop and no friction rub  No murmur heard  Pulmonary/Chest: Effort normal and breath sounds normal  No stridor  No respiratory distress  He has no wheezes  He has no rales  He exhibits no tenderness  Abdominal: Soft  Bowel sounds are normal  He exhibits no distension and no mass  There is no tenderness  There is no rebound, no guarding and negative Douglas's sign  No hernia  Musculoskeletal: Normal range of motion  He exhibits no edema or tenderness  Minimal right-sided flank tenderness   Lymphadenopathy:     He has no cervical adenopathy  Neurological: He is alert and oriented to person, place, and time   He has normal strength and normal reflexes  No cranial nerve deficit or sensory deficit  GCS eye subscore is 4  GCS verbal subscore is 5  GCS motor subscore is 6  Skin: Skin is warm and dry  Capillary refill takes less than 2 seconds  No rash noted  He is not diaphoretic  No erythema  No pallor  Psychiatric: He has a normal mood and affect  His speech is normal and behavior is normal    Vitals reviewed        Vital Signs  ED Triage Vitals   Temperature Pulse Respirations Blood Pressure SpO2   06/28/19 1140 06/28/19 1140 06/28/19 1140 06/28/19 1140 06/28/19 1140   98 °F (36 7 °C) 90 18 125/77 96 %      Temp Source Heart Rate Source Patient Position - Orthostatic VS BP Location FiO2 (%)   06/28/19 1140 06/28/19 1400 06/28/19 1140 06/28/19 1140 --   Temporal Monitor Sitting Left arm       Pain Score       06/28/19 1140       5           Vitals:    06/28/19 1140 06/28/19 1400 06/28/19 1445   BP: 125/77 121/68 124/74   Pulse: 90 79 84   Patient Position - Orthostatic VS: Sitting Lying Lying         Visual Acuity      ED Medications  Medications   ceFAZolin (ANCEF) IVPB (premix) 2,000 mg (2,000 mg Intravenous New Bag 6/28/19 1443)   morphine (PF) 4 mg/mL injection 4 mg (has no administration in time range)   sodium chloride 0 9 % bolus 1,000 mL (0 mL Intravenous Stopped 6/28/19 1442)   ondansetron (ZOFRAN) injection 4 mg (4 mg Intravenous Given 6/28/19 1157)   morphine (PF) 4 mg/mL injection 4 mg (4 mg Intravenous Given 6/28/19 1319)       Diagnostic Studies  Results Reviewed     Procedure Component Value Units Date/Time    Blood culture #1 [[id number]]     Lab Status:  No result Specimen:  Blood     Blood culture #2 [[id number]]     Lab Status:  No result Specimen:  Blood     Urine Microscopic [[id number]]  (Abnormal) Collected:  06/28/19 1241    Lab Status:  Final result Specimen:  Urine, Clean Catch Updated:  06/28/19 1310     RBC, UA 2-4 /hpf      WBC, UA 0-1 /hpf      Epithelial Cells None Seen /hpf      Bacteria, UA None Seen /hpf      Uric Acid Charlee, UA Occasional /hpf     UA w Reflex to Microscopic [[id number]]  (Abnormal) Collected:  06/28/19 1241    Lab Status:  Final result Specimen:  Urine, Clean Catch Updated:  06/28/19 1249     Color, UA Yellow     Clarity, UA Clear     Specific Gravity, UA >=1 030     pH, UA 6 0     Leukocytes, UA Negative     Nitrite, UA Negative     Protein, UA Negative mg/dl      Glucose, UA Negative mg/dl      Ketones, UA Negative mg/dl      Urobilinogen, UA 0 2 E U /dl      Bilirubin, UA Negative     Blood, UA Moderate    Comprehensive metabolic panel [[id number]]  (Abnormal) Collected:  06/28/19 1159    Lab Status:  Final result Specimen:  Blood from Arm, Right Updated:  06/28/19 1221     Sodium 138 mmol/L      Potassium 4 1 mmol/L      Chloride 104 mmol/L      CO2 25 mmol/L      ANION GAP 9 mmol/L      BUN 25 mg/dL      Creatinine 1 43 mg/dL      Glucose 134 mg/dL      Calcium 8 6 mg/dL      AST 20 U/L      ALT 28 U/L      Alkaline Phosphatase 83 U/L      Total Protein 7 7 g/dL      Albumin 3 7 g/dL      Total Bilirubin 0 60 mg/dL      eGFR 55 ml/min/1 73sq m     Narrative:       [place] guidelines for Chronic Kidney Disease (CKD):     Stage 1 with normal or high GFR (GFR > 90 mL/min/1 73 square meters)    Stage 2 Mild CKD (GFR = 60-89 mL/min/1 73 square meters)    Stage 3A Moderate CKD (GFR = 45-59 mL/min/1 73 square meters)    Stage 3B Moderate CKD (GFR = 30-44 mL/min/1 73 square meters)    Stage 4 Severe CKD (GFR = 15-29 mL/min/1 73 square meters)    Stage 5 End Stage CKD (GFR <15 mL/min/1 73 square meters)  Note: GFR calculation is accurate only with a steady state creatinine    Lipase [[id number]]  (Normal) Collected:  06/28/19 1159    Lab Status:  Final result Specimen:  Blood from Arm, Right Updated:  06/28/19 1214     Lipase 101 u/L     Protime-INR [[id number]]  (Normal) Collected:  06/28/19 1159    Lab Status:  Final result Specimen:  Blood from Arm, Right Updated: 06/28/19 1213     Protime 12 6 seconds      INR 0 94    APTT [[id number]]  (Normal) Collected:  06/28/19 1159    Lab Status:  Final result Specimen:  Blood from Arm, Right Updated:  06/28/19 1213     PTT 27 seconds     CBC and differential [[id number]]  (Abnormal) Collected:  06/28/19 1159    Lab Status:  Final result Specimen:  Blood from Arm, Right Updated:  06/28/19 1205     WBC 10 46 Thousand/uL      RBC 4 88 Million/uL      Hemoglobin 15 0 g/dL      Hematocrit 44 4 %      MCV 91 fL      MCH 30 7 pg      MCHC 33 8 g/dL      RDW 13 5 %      MPV 9 8 fL      Platelets 578 Thousands/uL      nRBC 0 /100 WBCs      Neutrophils Relative 77 %      Immat GRANS % 1 %      Lymphocytes Relative 13 %      Monocytes Relative 8 %      Eosinophils Relative 1 %      Basophils Relative 0 %      Neutrophils Absolute 8 21 Thousands/µL      Immature Grans Absolute 0 06 Thousand/uL      Lymphocytes Absolute 1 35 Thousands/µL      Monocytes Absolute 0 78 Thousand/µL      Eosinophils Absolute 0 05 Thousand/µL      Basophils Absolute 0 01 Thousands/µL                  CT renal stone study abdomen pelvis wo contrast   Final Result by Clau Stokes MD (06/28 1249)      Right ureteropelvic junction calculus measuring up to 13 mm and resulting in right-sided hydronephrosis and perinephric stranding  Nonobstructing granuloma in the lower pole left renal calculus  Workstation performed: [id number]                    Procedures  Procedures       ED Course  ED Course as of Jun 28 1507   Fri Jun 28, 2019   1144 Blood Pressure: 125/77   1144 Temperature: 98 °F (36 7 °C)   1144 Pulse: 90   1144 Respirations: 18   1144 SpO2: 96 %   1220 Lipase: 101   1220 INR: 0 94   1220 Patient appears to have an approximate 13 mm stone at the UPJ with associated hydroureteronephrosis        1223 Contacting on call urologyKristine      1225 Sodium: 138   1225 Potassium: 4 1   1225 Glucose, Random: 134   1225 Albumin: 3 7   1225 TOTAL BILIRUBIN: 0 60   1226 eGFR: 55   1226 Albumin: 3 7   1259 Blood, UA(!): Moderate   1259 Bilirubin, UA: Negative   1259 SL AMB POCT UROBILINOGEN: 0 2   1259 POCT URINE PROTEIN: Negative   1259 pH, UA: 6 0   1259 Color, UA: Yellow   1259 Awaiting urine microscopy      1310 Impression       Right ureteropelvic junction calculus measuring up to 13 mm and resulting in right-sided hydronephrosis and perinephric stranding  Nonobstructing granuloma in the lower pole left renal calculus  1313 RBC, UA(!): 2-4   1313 Uric Acid Charlee, UA: Occasional   1313 No evidence of infection  There is stranding noted on the CT scan however  Patient now complaining of increased pain  Will likely require admission  [phone] Patient was re-evaluated again at 1315 hours  He feels the pain is too great for him to be discharged home  1403 I spoke with Dr Sedrick Holguin   He is recommending stenting in this patient is requesting patient be transferred  [address] [facility] from [facility]                                  MDM    Disposition  Final diagnoses:   Ureteral calculus   Hydroureteronephrosis     Time reflects when diagnosis was documented in both MDM as applicable and the Disposition within this note     Time User Action Codes Description Comment    6/28/2019  2:34 PM Guyuliana Nevarez D Add [N20 1] Ureteral calculus     6/28/2019  2:34 PM Charlie July Add [N13 30] Hydroureteronephrosis       ED Disposition     ED Disposition Condition Date/Time Comment    Transfer to Another Facility-In Network  Fri Jun 28, 2019  2:04 PM Judson Olmstead should be transferred out to [facility]          MD Documentation      Most Recent Value   Accepting Physician  Dr Vandana Hernandez Name, Ignacia Kumar PA-C      RN Documentation      Most Recent Value   Accepting Facility Name, [address]   [facility]    Bed Assignment  Room 218 bed 1       Follow-up Information    None         Patient's Medications   Discharge Prescriptions    No medications on file     No discharge procedures on file      ED Provider  Electronically Signed by           Anastasia Barrera PA-C  06/28/19 5161

## 2019-06-29 ENCOUNTER — APPOINTMENT (INPATIENT)
Dept: RADIOLOGY | Facility: HOSPITAL | Age: 54
DRG: 660 | End: 2019-06-29
Payer: COMMERCIAL

## 2019-06-29 ENCOUNTER — ANESTHESIA (INPATIENT)
Dept: PERIOP | Facility: HOSPITAL | Age: 54
DRG: 660 | End: 2019-06-29
Payer: COMMERCIAL

## 2019-06-29 LAB
ALBUMIN SERPL BCP-MCNC: 3.5 G/DL (ref 3.5–5)
ALP SERPL-CCNC: 79 U/L (ref 46–116)
ALT SERPL W P-5'-P-CCNC: 22 U/L (ref 12–78)
ANION GAP SERPL CALCULATED.3IONS-SCNC: 14 MMOL/L (ref 4–13)
AST SERPL W P-5'-P-CCNC: 17 U/L (ref 5–45)
BILIRUB SERPL-MCNC: 0.87 MG/DL (ref 0.2–1)
BUN SERPL-MCNC: 22 MG/DL (ref 5–25)
CALCIUM SERPL-MCNC: 8.9 MG/DL (ref 8.3–10.1)
CHLORIDE SERPL-SCNC: 103 MMOL/L (ref 100–108)
CO2 SERPL-SCNC: 22 MMOL/L (ref 21–32)
CREAT SERPL-MCNC: 1.57 MG/DL (ref 0.6–1.3)
ERYTHROCYTE [DISTWIDTH] IN BLOOD BY AUTOMATED COUNT: 13.9 % (ref 11.6–15.1)
GFR SERPL CREATININE-BSD FRML MDRD: 49 ML/MIN/1.73SQ M
GLUCOSE SERPL-MCNC: 115 MG/DL (ref 65–140)
HCT VFR BLD AUTO: 42.7 % (ref 36.5–49.3)
HGB BLD-MCNC: 14.4 G/DL (ref 12–17)
MCH RBC QN AUTO: 31 PG (ref 26.8–34.3)
MCHC RBC AUTO-ENTMCNC: 33.7 G/DL (ref 31.4–37.4)
MCV RBC AUTO: 92 FL (ref 82–98)
PLATELET # BLD AUTO: 257 THOUSANDS/UL (ref 149–390)
PMV BLD AUTO: 10.3 FL (ref 8.9–12.7)
POTASSIUM SERPL-SCNC: 3.8 MMOL/L (ref 3.5–5.3)
PROT SERPL-MCNC: 7.7 G/DL (ref 6.4–8.2)
RBC # BLD AUTO: 4.64 MILLION/UL (ref 3.88–5.62)
SODIUM SERPL-SCNC: 139 MMOL/L (ref 136–145)
WBC # BLD AUTO: 10.75 THOUSAND/UL (ref 4.31–10.16)

## 2019-06-29 PROCEDURE — 0T7D8ZZ DILATION OF URETHRA, VIA NATURAL OR ARTIFICIAL OPENING ENDOSCOPIC: ICD-10-PCS | Performed by: UROLOGY

## 2019-06-29 PROCEDURE — C2617 STENT, NON-COR, TEM W/O DEL: HCPCS | Performed by: UROLOGY

## 2019-06-29 PROCEDURE — 99232 SBSQ HOSP IP/OBS MODERATE 35: CPT | Performed by: INTERNAL MEDICINE

## 2019-06-29 PROCEDURE — 52332 CYSTOSCOPY AND TREATMENT: CPT | Performed by: UROLOGY

## 2019-06-29 PROCEDURE — 80053 COMPREHEN METABOLIC PANEL: CPT | Performed by: PHYSICIAN ASSISTANT

## 2019-06-29 PROCEDURE — 74420 UROGRAPHY RTRGR +-KUB: CPT

## 2019-06-29 PROCEDURE — 85027 COMPLETE CBC AUTOMATED: CPT | Performed by: PHYSICIAN ASSISTANT

## 2019-06-29 PROCEDURE — 0T768DZ DILATION OF RIGHT URETER WITH INTRALUMINAL DEVICE, VIA NATURAL OR ARTIFICIAL OPENING ENDOSCOPIC: ICD-10-PCS | Performed by: UROLOGY

## 2019-06-29 DEVICE — STENT CONTOUR INJ 6FR 30CM
Type: IMPLANTABLE DEVICE | Site: URETER | Status: NON-FUNCTIONAL
Removed: 2019-09-25

## 2019-06-29 RX ORDER — FENTANYL CITRATE/PF 50 MCG/ML
25 SYRINGE (ML) INJECTION
Status: CANCELLED | OUTPATIENT
Start: 2019-06-29

## 2019-06-29 RX ORDER — SODIUM CHLORIDE 9 MG/ML
125 INJECTION, SOLUTION INTRAVENOUS CONTINUOUS
Status: CANCELLED | OUTPATIENT
Start: 2019-06-29

## 2019-06-29 RX ORDER — PROPOFOL 10 MG/ML
INJECTION, EMULSION INTRAVENOUS AS NEEDED
Status: DISCONTINUED | OUTPATIENT
Start: 2019-06-29 | End: 2019-06-29 | Stop reason: SURG

## 2019-06-29 RX ORDER — FENTANYL CITRATE/PF 50 MCG/ML
25 SYRINGE (ML) INJECTION
Status: DISCONTINUED | OUTPATIENT
Start: 2019-06-29 | End: 2019-06-29 | Stop reason: HOSPADM

## 2019-06-29 RX ORDER — ONDANSETRON 2 MG/ML
4 INJECTION INTRAMUSCULAR; INTRAVENOUS ONCE AS NEEDED
Status: CANCELLED | OUTPATIENT
Start: 2019-06-29

## 2019-06-29 RX ORDER — ROCURONIUM BROMIDE 10 MG/ML
INJECTION, SOLUTION INTRAVENOUS AS NEEDED
Status: DISCONTINUED | OUTPATIENT
Start: 2019-06-29 | End: 2019-06-29 | Stop reason: SURG

## 2019-06-29 RX ORDER — ONDANSETRON 2 MG/ML
INJECTION INTRAMUSCULAR; INTRAVENOUS AS NEEDED
Status: DISCONTINUED | OUTPATIENT
Start: 2019-06-29 | End: 2019-06-29 | Stop reason: SURG

## 2019-06-29 RX ORDER — LIDOCAINE HYDROCHLORIDE 20 MG/ML
JELLY TOPICAL AS NEEDED
Status: DISCONTINUED | OUTPATIENT
Start: 2019-06-29 | End: 2019-06-29 | Stop reason: HOSPADM

## 2019-06-29 RX ORDER — FENTANYL CITRATE 50 UG/ML
INJECTION, SOLUTION INTRAMUSCULAR; INTRAVENOUS AS NEEDED
Status: DISCONTINUED | OUTPATIENT
Start: 2019-06-29 | End: 2019-06-29 | Stop reason: SURG

## 2019-06-29 RX ORDER — CEFAZOLIN SODIUM 2 G/50ML
2000 SOLUTION INTRAVENOUS ONCE
Status: DISCONTINUED | OUTPATIENT
Start: 2019-06-29 | End: 2019-06-29 | Stop reason: HOSPADM

## 2019-06-29 RX ORDER — GLYCOPYRROLATE 0.2 MG/ML
INJECTION INTRAMUSCULAR; INTRAVENOUS AS NEEDED
Status: DISCONTINUED | OUTPATIENT
Start: 2019-06-29 | End: 2019-06-29 | Stop reason: SURG

## 2019-06-29 RX ORDER — NEOSTIGMINE METHYLSULFATE 1 MG/ML
INJECTION INTRAVENOUS AS NEEDED
Status: DISCONTINUED | OUTPATIENT
Start: 2019-06-29 | End: 2019-06-29 | Stop reason: SURG

## 2019-06-29 RX ORDER — ONDANSETRON 2 MG/ML
4 INJECTION INTRAMUSCULAR; INTRAVENOUS ONCE AS NEEDED
Status: DISCONTINUED | OUTPATIENT
Start: 2019-06-29 | End: 2019-06-29 | Stop reason: HOSPADM

## 2019-06-29 RX ORDER — MIDAZOLAM HYDROCHLORIDE 1 MG/ML
INJECTION INTRAMUSCULAR; INTRAVENOUS AS NEEDED
Status: DISCONTINUED | OUTPATIENT
Start: 2019-06-29 | End: 2019-06-29 | Stop reason: SURG

## 2019-06-29 RX ORDER — MAGNESIUM HYDROXIDE 1200 MG/15ML
LIQUID ORAL AS NEEDED
Status: DISCONTINUED | OUTPATIENT
Start: 2019-06-29 | End: 2019-06-29 | Stop reason: HOSPADM

## 2019-06-29 RX ORDER — POTASSIUM CITRATE 10 MEQ/1
10 TABLET, EXTENDED RELEASE ORAL 2 TIMES DAILY
Status: DISCONTINUED | OUTPATIENT
Start: 2019-06-30 | End: 2019-06-30 | Stop reason: HOSPADM

## 2019-06-29 RX ADMIN — SODIUM CHLORIDE 100 ML/HR: 0.9 INJECTION, SOLUTION INTRAVENOUS at 07:43

## 2019-06-29 RX ADMIN — ACETAMINOPHEN 650 MG: 325 TABLET ORAL at 14:50

## 2019-06-29 RX ADMIN — FENTANYL CITRATE 150 MCG: 50 INJECTION, SOLUTION INTRAMUSCULAR; INTRAVENOUS at 08:09

## 2019-06-29 RX ADMIN — HEPARIN SODIUM 5000 UNITS: 5000 INJECTION INTRAVENOUS; SUBCUTANEOUS at 21:12

## 2019-06-29 RX ADMIN — SODIUM CHLORIDE 100 ML/HR: 0.9 INJECTION, SOLUTION INTRAVENOUS at 14:35

## 2019-06-29 RX ADMIN — ROCURONIUM BROMIDE 35 MG: 10 INJECTION, SOLUTION INTRAVENOUS at 08:11

## 2019-06-29 RX ADMIN — PROPOFOL 200 MG: 10 INJECTION, EMULSION INTRAVENOUS at 08:09

## 2019-06-29 RX ADMIN — MORPHINE SULFATE 1 MG: 2 INJECTION, SOLUTION INTRAMUSCULAR; INTRAVENOUS at 03:54

## 2019-06-29 RX ADMIN — Medication 3000 MG: at 08:16

## 2019-06-29 RX ADMIN — MIDAZOLAM 2 MG: 1 INJECTION INTRAMUSCULAR; INTRAVENOUS at 08:01

## 2019-06-29 RX ADMIN — ONDANSETRON HYDROCHLORIDE 4 MG: 2 INJECTION, SOLUTION INTRAVENOUS at 08:44

## 2019-06-29 RX ADMIN — TAMSULOSIN HYDROCHLORIDE 0.4 MG: 0.4 CAPSULE ORAL at 17:42

## 2019-06-29 RX ADMIN — SODIUM CHLORIDE: 0.9 INJECTION, SOLUTION INTRAVENOUS at 08:23

## 2019-06-29 RX ADMIN — FENTANYL CITRATE 50 MCG: 50 INJECTION, SOLUTION INTRAMUSCULAR; INTRAVENOUS at 08:49

## 2019-06-29 RX ADMIN — GLYCOPYRROLATE 0.4 MG: 0.2 INJECTION INTRAMUSCULAR; INTRAVENOUS at 08:43

## 2019-06-29 RX ADMIN — NEOSTIGMINE METHYLSULFATE 3 MG: 1 INJECTION, SOLUTION INTRAVENOUS at 08:43

## 2019-06-29 RX ADMIN — ONDANSETRON 4 MG: 2 INJECTION INTRAMUSCULAR; INTRAVENOUS at 08:59

## 2019-06-29 RX ADMIN — HEPARIN SODIUM 5000 UNITS: 5000 INJECTION INTRAVENOUS; SUBCUTANEOUS at 13:12

## 2019-06-29 RX ADMIN — PHENYLEPHRINE HYDROCHLORIDE 100 MCG: 10 INJECTION INTRAVENOUS at 08:30

## 2019-06-29 RX ADMIN — LIDOCAINE HYDROCHLORIDE 50 MG: 20 INJECTION, SOLUTION INTRAVENOUS at 08:09

## 2019-06-29 RX ADMIN — PHENYLEPHRINE HYDROCHLORIDE 100 MCG: 10 INJECTION INTRAVENOUS at 08:32

## 2019-06-29 RX ADMIN — DULOXETINE HYDROCHLORIDE 60 MG: 60 CAPSULE, DELAYED RELEASE ORAL at 09:00

## 2019-06-29 RX ADMIN — ACETAMINOPHEN 650 MG: 325 TABLET ORAL at 05:20

## 2019-06-29 RX ADMIN — ONDANSETRON 4 MG: 2 INJECTION INTRAMUSCULAR; INTRAVENOUS at 04:00

## 2019-06-29 NOTE — UTILIZATION REVIEW
Initial Clinical Review    Admission: Date/Time/Statement: 6/28/19 @ 1720  TRANSFERRED FROM [facility] TO [facility] FOR HIGHER LEVEL OF CARE - UROLOGIC CONSULT AND STENT INSERTION    Orders Placed This Encounter   Procedures    Inpatient Admission     Standing Status:   Standing     Number of Occurrences:   1     Order Specific Question:   Admitting Physician     Answer:   nEoch Fortune     Order Specific Question:   Level of Care     Answer:   Med Surg [16]     Order Specific Question:   Estimated length of stay     Answer:   More than 2 Midnights     Order Specific Question:   Certification     Answer:   I certify that inpatient services are medically necessary for this patient for a duration of greater than two midnights  See H&P and MD Progress Notes for additional information about the patient's course of treatment  Assessment/Plan: MR JACKMAN PRESENTED TO THE ED AT [facility] WITH R FLANK PAIN AND WAS FOUND TO HAVE A 13 MM URETERAL CALCULUS WITH HYDROURTERTONEPHROSIS AND JS WITH CREAT 1 43  HE WAS TRANSFERRED TO [facility] FOR UROLOGY CONSULT  ABD PAIN PERSISTED AND PT FELT BLOATED WITH + CONSTIPATION  HE IS ADMITTED AS AN INPATIENT WITH URETERAL CALCULUS AND WAS TAKEN TO THE OR 6/29 FOR CYSTO WITH INSERTION OF URETERAL STENT  PT CONTINUES ON IV FLUIDS, PAIN CONTROL AND WILL START ON POTASSIUM CITRATE        ED Triage Vitals   Temperature Pulse Respirations Blood Pressure SpO2   06/28/19 1748 06/28/19 1748 06/28/19 1748 06/28/19 1748 06/28/19 1748   97 9 °F (36 6 °C) 87 18 145/91 96 %      Temp Source Heart Rate Source Patient Position - Orthostatic VS BP Location FiO2 (%)   06/28/19 1748 06/29/19 0733 06/28/19 1748 06/28/19 1748 --   Temporal Monitor Lying Left arm       Pain Score       06/28/19 1727       5        Wt Readings from Last 1 Encounters:   06/28/19 120 kg (264 lb 8 8 oz)     Additional Vital Signs:   06/29/19 1519  97 1 °F (36 2 °C)Abnormal   84  18  126/82  95 %  None (Room air)     06/29/19 0925    87  16  123/64  93 %   None (Room air)     06/29/19 0855  98 °F (36 7 °C)  98  20  147/85  94 %   Nasal cannula  WDL   06/29/19 0733    84  18  121/82  92 %  None (Room air)     06/28/19 2211  98 5 °F (36 9 °C)  95  20  131/81  96 %  None (Room air)     06/28/19 1855  99 3 °F (37 4 °C)                 Pertinent Labs/Diagnostic Test Results:     6/28 CT RENAL STONE STUDY ABD, PELVIS - Right ureteropelvic junction calculus measuring up to 13 mm and resulting in right-sided hydronephrosis and perinephric stranding  Nonobstructing granuloma in the lower pole left renal calculus       Results from last 7 days   Lab Units 06/29/19  0514 06/28/19  1159   WBC Thousand/uL 10 75* 10 46*   HEMOGLOBIN g/dL 14 4 15 0   HEMATOCRIT % 42 7 44 4   PLATELETS Thousands/uL 257 260   NEUTROS ABS Thousands/µL  --  8 21*     Results from last 7 days   Lab Units 06/29/19  0514 06/28/19  1159   SODIUM mmol/L 139 138   POTASSIUM mmol/L 3 8 4 1   CHLORIDE mmol/L 103 104   CO2 mmol/L 22 25   ANION GAP mmol/L 14* 9   BUN mg/dL 22 25   CREATININE mg/dL 1 57* 1 43*   EGFR ml/min/1 73sq m 49 55   CALCIUM mg/dL 8 9 8 6     Results from last 7 days   Lab Units 06/29/19  0514 06/28/19  1159   AST U/L 17 20   ALT U/L 22 28   ALK PHOS U/L 79 83   TOTAL PROTEIN g/dL 7 7 7 7   ALBUMIN g/dL 3 5 3 7   TOTAL BILIRUBIN mg/dL 0 87 0 60     Results from last 7 days   Lab Units 06/29/19  0514 06/28/19  1159   GLUCOSE RANDOM mg/dL 115 134     Results from last 7 days   Lab Units 06/28/19  1159   PROTIME seconds 12 6   INR  0 94   PTT seconds 27     Results from last 7 days   Lab Units 06/28/19  1159   LIPASE u/L 101     Results from last 7 days   Lab Units 06/28/19  1241   CLARITY UA  Clear   COLOR UA  Yellow   SPEC GRAV UA  >=1 030   PH UA  6 0   GLUCOSE UA mg/dl Negative   KETONES UA mg/dl Negative   BLOOD UA  Moderate*   PROTEIN UA mg/dl Negative   NITRITE UA  Negative   BILIRUBIN UA  Negative   UROBILINOGEN UA E U /dl 0 2   LEUKOCYTES UA  Negative   WBC UA /hpf 0-1*   RBC UA /hpf 2-4*   BACTERIA UA /hpf None Seen   EPITHELIAL CELLS WET PREP /hpf None Seen       Past Medical History:   Diagnosis Date    Kidney stones      Present on Admission:   Ureteral calculus   Hydroureteronephrosis   Depression with anxiety   JS (acute kidney injury) ([facility] [address] )   Constipation    Admitting Diagnosis: Kidney stone [N20 0]     Age/Sex: 53 y o  male     Admission Orders:    Current Facility-Administered Medications:  acetaminophen 650 mg Oral Q6H PRN  X3 SINCE ADMIT     DULoxetine 60 mg Oral Daily     heparin (porcine) 5,000 Units Subcutaneous Q8H [address]     MORPHINE  1 MG  IV  X1 6/29 STOPPED 6/29 @ 1158   ondansetron 4 mg Intravenous Q6H PRN  X1 6/29   oxyCODONE-acetaminophen 1 tablet Oral Q4H PRN     polyethylene glycol 17 g Oral Daily     [START ON 6/30/2019] potassium citrate 10 mEq Oral BID     senna-docusate sodium 1 tablet Oral BID     sodium chloride 100 mL/hr Intravenous Continuous Last Rate: 100 mL/hr (06/29/19 1435)    tamsulosin 0 4 mg Oral Daily With Dinner       SCDs  STRAIN ALL URINE   OOB AS TOLD   NPO PRE-OP, REGULAR DIET POST OP   IP CONSULT TO UROLOGY    Network Utilization Review Department  Phone: [phone]; Fax [phone]  [email or web address]  ATTENTION: Please call with any questions or concerns to [phone]  and carefully listen to the prompts so that you are directed to the right person  Send all requests for admission clinical reviews, approved or denied determinations and any other requests to fax [phone]   All voicemails are confidential

## 2019-06-29 NOTE — PROGRESS NOTES
Progress Note - Rosalia Son 53 y o  male MRN: [id number]    Unit/Bed#: [address] 2 -02 Encounter: [id number]    Assessment/Plan:    Ureteral calculi  patient is status post cystoscopy with stent placement, continue IV fluids follow up with Urology    A KI    related to obstructive uropathy creatinine increased to 1 57 continue IV fluids and monitor    Renal calculi   discussed with Urology possible uric acid related will initiate potassium citrate    MDD/anxiety   controlled with Cymbalta    Subjective:   Denies flank pain no fevers chills today no chest pain no shortness of breath no nausea vomiting diarrhea appetite okay    Objective:     Vitals: Blood pressure 123/64, pulse 87, temperature 98 °F (36 7 °C), temperature source Tympanic, resp  rate 16, SpO2 93 %  ,There is no height or weight on file to calculate BMI        Results from last 7 days   Lab Units 06/29/19  0514 06/28/19  1159   WBC Thousand/uL 10 75* 10 46*   HEMOGLOBIN g/dL 14 4 15 0   HEMATOCRIT % 42 7 44 4   PLATELETS Thousands/uL 257 260   INR   --  0 94     Results from last 7 days   Lab Units 06/29/19  0514   POTASSIUM mmol/L 3 8   CHLORIDE mmol/L 103   CO2 mmol/L 22   BUN mg/dL 22   CREATININE mg/dL 1 57*   CALCIUM mg/dL 8 9   ALK PHOS U/L 79   ALT U/L 22   AST U/L 17       Scheduled Meds:    Current Facility-Administered Medications:  acetaminophen 650 mg Oral Q6H PRN Shiraz Huntley MD    DULoxetine 60 mg Oral Daily Shiraz Huntley MD    heparin (porcine) 5,000 Units Subcutaneous [facility] Shiraz Huntley MD    morphine injection 1 mg Intravenous Q4H PRN Shiraz Huntley MD    ondansetron 4 mg Intravenous Q6H PRN Shiraz Huntley MD    oxyCODONE-acetaminophen 1 tablet Oral Q4H PRN Shiraz Huntley MD    polyethylene glycol 17 g Oral Daily Shiraz Huntley MD    [START ON 6/30/2019] potassium citrate 10 mEq Oral BID Shiraz Huntely MD    senna-docusate sodium 1 tablet Oral BID Shiraz Huntley MD    sodium chloride 100 mL/hr Intravenous Continuous Jetolivier Sultana MD Last Rate: 100 mL/hr (06/29/19 0743)   tamsulosin 0 4 mg Oral Daily With Francesca Enciso MD        Continuous Infusions:    sodium chloride 100 mL/hr Last Rate: 100 mL/hr (06/29/19 0743)         Physical exam:  General appearance:  Alert no distress interaction appropriate  Head/Eyes:  Nonicteric PERRL EOMI  Neck:  Supple  Lungs: CTA bilateral no wheezing rhonchi or rales  Heart: normal S1 S2 regular  Abdomen:  Obese nontender with bowel sounds  Extremities: no edema  Skin: no rash    Invasive Devices     Peripheral Intravenous Line            Peripheral IV 06/28/19 Right Antecubital less than 1 day          Drain            Ureteral Drain/Stent Right ureter 6 Fr  less than 1 day                  VTE Pharmacologic Prophylaxis:  Heparin      Counseling / Coordination of Care  Total floor / unit time spent today 30  minutes  Greater than 50% of total time was spent with the patient and / or family counseling and / or coordination of care    A description of the counseling / coordination of care:  Discussed with Urology

## 2019-06-29 NOTE — PLAN OF CARE
Problem: GASTROINTESTINAL - ADULT  Goal: Minimal or absence of nausea and/or vomiting  Description  INTERVENTIONS:  - Administer IV fluids as ordered to ensure adequate hydration  - Maintain NPO status until nausea and vomiting are resolved  - Nasogastric tube as ordered  - Administer ordered antiemetic medications as needed  - Provide nonpharmacologic comfort measures as appropriate  - Advance diet as tolerated, if ordered  - Nutrition services referral to assist patient with adequate nutrition and appropriate food choices  Outcome: Progressing     Problem: GENITOURINARY - ADULT  Goal: Maintains or returns to baseline urinary function  Description  INTERVENTIONS:  - Assess urinary function  - Encourage oral fluids to ensure adequate hydration  - Administer IV fluids as ordered to ensure adequate hydration  - Administer ordered medications as needed  - Offer frequent toileting  - Follow urinary retention protocol if ordered  Outcome: Progressing  Goal: Absence of urinary retention  Description  INTERVENTIONS:  - Assess patients ability to void and empty bladder  - Monitor I/O  - Bladder scan as needed  - Discuss with physician/AP medications to alleviate retention as needed  - Discuss catheterization for long term situations as appropriate  Outcome: Progressing     Problem: MUSCULOSKELETAL - ADULT  Goal: Maintain or return mobility to safest level of function  Description  INTERVENTIONS:  - Assess patient's ability to carry out ADLs; assess patient's baseline for ADL function and identify physical deficits which impact ability to perform ADLs (bathing, care of mouth/teeth, toileting, grooming, dressing, etc )  - Assess/evaluate cause of self-care deficits   - Assess range of motion  - Assess patient's mobility; develop plan if impaired  - Assess patient's need for assistive devices and provide as appropriate  - Encourage maximum independence but intervene and supervise when necessary  - Involve family in performance of ADLs  - Assess for home care needs following discharge   - Request OT consult to assist with ADL evaluation and planning for discharge  - Provide patient education as appropriate  Outcome: Progressing  Goal: Maintain proper alignment of affected body part  Description  INTERVENTIONS:  - Support, maintain and protect limb and body alignment  - Provide pt/fam with appropriate education  Outcome: Progressing     Problem: PAIN - ADULT  Goal: Verbalizes/displays adequate comfort level or baseline comfort level  Description  Interventions:  - Encourage patient to monitor pain and request assistance  - Assess pain using appropriate pain scale  - Administer analgesics based on type and severity of pain and evaluate response  - Implement non-pharmacological measures as appropriate and evaluate response  - Consider cultural and social influences on pain and pain management  - Notify physician/advanced practitioner if interventions unsuccessful or patient reports new pain  Outcome: Progressing     Problem: INFECTION - ADULT  Goal: Absence or prevention of progression during hospitalization  Description  INTERVENTIONS:  - Assess and monitor for signs and symptoms of infection  - Monitor lab/diagnostic results  - Monitor all insertion sites, i e  indwelling lines, tubes, and drains  - Monitor endotracheal (as able) and nasal secretions for changes in amount and color  - [place] appropriate cooling/warming therapies per order  - Administer medications as ordered  - Instruct and encourage patient and family to use good hand hygiene technique  - Identify and instruct in appropriate isolation precautions for identified infection/condition  Outcome: Progressing  Goal: Absence of fever/infection during neutropenic period  Description  INTERVENTIONS:  - Monitor WBC  - Implement neutropenic guidelines  Outcome: Progressing     Problem: SAFETY ADULT  Goal: Patient will remain free of falls  Description  INTERVENTIONS:  - Assess patient frequently for physical needs  -  Identify cognitive and physical deficits and behaviors that affect risk of falls    -  [place] fall precautions as indicated by assessment   - Educate patient/family on patient safety including physical limitations  - Instruct patient to call for assistance with activity based on assessment  - Modify environment to reduce risk of injury  - Consider OT/PT consult to assist with strengthening/mobility  Outcome: Progressing  Goal: Maintain or return to baseline ADL function  Description  INTERVENTIONS:  -  Assess patient's ability to carry out ADLs; assess patient's baseline for ADL function and identify physical deficits which impact ability to perform ADLs (bathing, care of mouth/teeth, toileting, grooming, dressing, etc )  - Assess/evaluate cause of self-care deficits   - Assess range of motion  - Assess patient's mobility; develop plan if impaired  - Assess patient's need for assistive devices and provide as appropriate  - Encourage maximum independence but intervene and supervise when necessary  ¯ Involve family in performance of ADLs  ¯ Assess for home care needs following discharge   ¯ Request OT consult to assist with ADL evaluation and planning for discharge  ¯ Provide patient education as appropriate  Outcome: Progressing  Goal: Maintain or return mobility status to optimal level  Description  INTERVENTIONS:  - Assess patient's baseline mobility status (ambulation, transfers, stairs, etc )    - Identify cognitive and physical deficits and behaviors that affect mobility  - Identify mobility aids required to assist with transfers and/or ambulation (gait belt, sit-to-stand, lift, walker, cane, etc )  - [place] fall precautions as indicated by assessment  - Record patient progress and toleration of activity level on Mobility SBAR; progress patient to next Phase/Stage  - Instruct patient to call for assistance with activity based on assessment  - Request Rehabilitation consult to assist with strengthening/weightbearing, etc   Outcome: Progressing     Problem: DISCHARGE PLANNING  Goal: Discharge to home or other facility with appropriate resources  Description  INTERVENTIONS:  - Identify barriers to discharge w/patient and caregiver  - Arrange for needed discharge resources and transportation as appropriate  - Identify discharge learning needs (meds, wound care, etc )  - Arrange for interpretive services to assist at discharge as needed  - Refer to Case Management Department for coordinating discharge planning if the patient needs post-hospital services based on physician/advanced practitioner order or complex needs related to functional status, cognitive ability, or social support system  Outcome: Progressing     Problem: Knowledge Deficit  Goal: Patient/family/caregiver demonstrates understanding of disease process, treatment plan, medications, and discharge instructions  Description  Complete learning assessment and assess knowledge base    Interventions:  - Provide teaching at level of understanding  - Provide teaching via preferred learning methods  Outcome: Progressing

## 2019-06-29 NOTE — PLAN OF CARE
Problem: GASTROINTESTINAL - ADULT  Goal: Minimal or absence of nausea and/or vomiting  Description  INTERVENTIONS:  - Administer IV fluids as ordered to ensure adequate hydration  - Maintain NPO status until nausea and vomiting are resolved  - Nasogastric tube as ordered  - Administer ordered antiemetic medications as needed  - Provide nonpharmacologic comfort measures as appropriate  - Advance diet as tolerated, if ordered  - Nutrition services referral to assist patient with adequate nutrition and appropriate food choices  6/28/2019 2118 by Ros Guerrero RN  Outcome: Progressing  6/28/2019 2026 by Ros Guerrero RN  Outcome: Progressing     Problem: GENITOURINARY - ADULT  Goal: Maintains or returns to baseline urinary function  Description  INTERVENTIONS:  - Assess urinary function  - Encourage oral fluids to ensure adequate hydration  - Administer IV fluids as ordered to ensure adequate hydration  - Administer ordered medications as needed  - Offer frequent toileting  - Follow urinary retention protocol if ordered  6/28/2019 2118 by Ros Guerrero RN  Outcome: Progressing  6/28/2019 2026 by Ros Guerrero RN  Outcome: Progressing  Goal: Absence of urinary retention  Description  INTERVENTIONS:  - Assess patients ability to void and empty bladder  - Monitor I/O  - Bladder scan as needed  - Discuss with physician/AP medications to alleviate retention as needed  - Discuss catheterization for long term situations as appropriate  6/28/2019 2118 by Ros Guerrero RN  Outcome: Progressing  6/28/2019 2026 by Ros Guerrero RN  Outcome: Progressing     Problem: MUSCULOSKELETAL - ADULT  Goal: Maintain or return mobility to safest level of function  Description  INTERVENTIONS:  - Assess patient's ability to carry out ADLs; assess patient's baseline for ADL function and identify physical deficits which impact ability to perform ADLs (bathing, care of mouth/teeth, toileting, grooming, dressing, etc )  - Assess/evaluate cause of self-care deficits   - Assess range of motion  - Assess patient's mobility; develop plan if impaired  - Assess patient's need for assistive devices and provide as appropriate  - Encourage maximum independence but intervene and supervise when necessary  - Involve family in performance of ADLs  - Assess for home care needs following discharge   - Request OT consult to assist with ADL evaluation and planning for discharge  - Provide patient education as appropriate  6/28/2019 2118 by Deion Hartmann RN  Outcome: Progressing  6/28/2019 2026 by Deion Hartmann RN  Outcome: Progressing  Goal: Maintain proper alignment of affected body part  Description  INTERVENTIONS:  - Support, maintain and protect limb and body alignment  - Provide pt/fam with appropriate education  6/28/2019 2118 by Deion Hartmann RN  Outcome: Progressing  6/28/2019 2026 by Deion Hartmann RN  Outcome: Progressing     Problem: PAIN - ADULT  Goal: Verbalizes/displays adequate comfort level or baseline comfort level  Description  Interventions:  - Encourage patient to monitor pain and request assistance  - Assess pain using appropriate pain scale  - Administer analgesics based on type and severity of pain and evaluate response  - Implement non-pharmacological measures as appropriate and evaluate response  - Consider cultural and social influences on pain and pain management  - Notify physician/advanced practitioner if interventions unsuccessful or patient reports new pain  6/28/2019 2118 by Deion Hartmann RN  Outcome: Progressing  6/28/2019 2026 by Deion Hartmann RN  Outcome: Progressing     Problem: INFECTION - ADULT  Goal: Absence or prevention of progression during hospitalization  Description  INTERVENTIONS:  - Assess and monitor for signs and symptoms of infection  - Monitor lab/diagnostic results  - Monitor all insertion sites, i e  indwelling lines, tubes, and drains  - Monitor endotracheal (as able) and nasal secretions for changes in amount and color  - [place] appropriate cooling/warming therapies per order  - Administer medications as ordered  - Instruct and encourage patient and family to use good hand hygiene technique  - Identify and instruct in appropriate isolation precautions for identified infection/condition  6/28/2019 2118 by Liu Mistry RN  Outcome: Progressing  6/28/2019 2026 by Liu Mistry RN  Outcome: Progressing  Goal: Absence of fever/infection during neutropenic period  Description  INTERVENTIONS:  - Monitor WBC  - Implement neutropenic guidelines  6/28/2019 2118 by Liu Mistry RN  Outcome: Progressing  6/28/2019 2026 by Liu Mistry RN  Outcome: Progressing     Problem: SAFETY ADULT  Goal: Patient will remain free of falls  Description  INTERVENTIONS:  - Assess patient frequently for physical needs  -  Identify cognitive and physical deficits and behaviors that affect risk of falls    -  [place] fall precautions as indicated by assessment   - Educate patient/family on patient safety including physical limitations  - Instruct patient to call for assistance with activity based on assessment  - Modify environment to reduce risk of injury  - Consider OT/PT consult to assist with strengthening/mobility  6/28/2019 2118 by Liu Mistry RN  Outcome: Progressing  6/28/2019 2026 by Liu Mistry RN  Outcome: Progressing  Goal: Maintain or return to baseline ADL function  Description  INTERVENTIONS:  -  Assess patient's ability to carry out ADLs; assess patient's baseline for ADL function and identify physical deficits which impact ability to perform ADLs (bathing, care of mouth/teeth, toileting, grooming, dressing, etc )  - Assess/evaluate cause of self-care deficits   - Assess range of motion  - Assess patient's mobility; develop plan if impaired  - Assess patient's need for assistive devices and provide as appropriate  - Encourage maximum independence but intervene and supervise when necessary  ¯ Involve family in performance of ADLs  ¯ Assess for home care needs following discharge   ¯ Request OT consult to assist with ADL evaluation and planning for discharge  ¯ Provide patient education as appropriate  6/28/2019 2118 by Deepali Nance RN  Outcome: Progressing  6/28/2019 2026 by Deepali Nance RN  Outcome: Progressing  Goal: Maintain or return mobility status to optimal level  Description  INTERVENTIONS:  - Assess patient's baseline mobility status (ambulation, transfers, stairs, etc )    - Identify cognitive and physical deficits and behaviors that affect mobility  - Identify mobility aids required to assist with transfers and/or ambulation (gait belt, sit-to-stand, lift, walker, cane, etc )  - [place] fall precautions as indicated by assessment  - Record patient progress and toleration of activity level on Mobility SBAR; progress patient to next Phase/Stage  - Instruct patient to call for assistance with activity based on assessment  - Request Rehabilitation consult to assist with strengthening/weightbearing, etc   6/28/2019 2118 by Deepali Nance RN  Outcome: Progressing  6/28/2019 2026 by Deepali Nance RN  Outcome: Progressing     Problem: DISCHARGE PLANNING  Goal: Discharge to home or other facility with appropriate resources  Description  INTERVENTIONS:  - Identify barriers to discharge w/patient and caregiver  - Arrange for needed discharge resources and transportation as appropriate  - Identify discharge learning needs (meds, wound care, etc )  - Arrange for interpretive services to assist at discharge as needed  - Refer to Case Management Department for coordinating discharge planning if the patient needs post-hospital services based on physician/advanced practitioner order or complex needs related to functional status, cognitive ability, or social support system  6/28/2019 2118 by Deepali Nance RN  Outcome: Progressing  6/28/2019 2026 by Deepali Nance RN  Outcome: Progressing     Problem: Knowledge Deficit  Goal: Patient/family/caregiver demonstrates understanding of disease process, treatment plan, medications, and discharge instructions  Description  Complete learning assessment and assess knowledge base    Interventions:  - Provide teaching at level of understanding  - Provide teaching via preferred learning methods  6/28/2019 2118 by Shelton Edmondson RN  Outcome: Progressing  6/28/2019 2026 by Shelton Edmondson RN  Outcome: Progressing

## 2019-06-29 NOTE — INTERVAL H&P NOTE
H&P reviewed  After examining the patient I find no changes in the patients condition since the H&P had been written  Laterality marked (R)

## 2019-06-29 NOTE — H&P
H&P- Mark Anthony Schultz 1965, 53 y o  male MRN: [id number]    Unit/Bed#: [facility] 2 -02 Encounter: [id number]    Primary Care Provider: Dylan Trujillo DO   Date and time admitted to hospital: 6/28/2019  5:20 PM        Ureteral calculus  Assessment & Plan  History of multiple ureteral calculus  Prior stone extraction from right kidney  Initially presented [facility] with the complaint of R flank pain  Found to have a right ureteropelvic junction calculus measuring up to 13 mm, resulting in right-sided hydronephrosis with perinephric stranding  Transfer to [facility] for further urological intervention  Dr Patel Kil aware of patient and plans to take him to the OR tomorrow  NPO after midnight  Continue IV fluids  Already received IV cefazolin at [facility]  Hold off on further antibiotics at this point as patient is afebrile, no white count, clean UA- defer to Urology  Strain urine  Constipation  Assessment & Plan  Has not had BM in 2 days  Start on bowel regimen senokot and miralax    JS (acute kidney injury) ([facility] [address] )  Assessment & Plan  Presenting with creatinine of 1 43  Baseline appears to be around 1  Continue with IV fluids and recheck in a m  Hydroureteronephrosis  Assessment & Plan  Secondary to 13 mm calculus  Plans for OR tomorrow for removal    Depression with anxiety  Assessment & Plan  Continue Cymbalta  Mood stable      VTE Prophylaxis: Heparin  / sequential compression device   Code Status: full code  Anticipated Length of Stay:  Patient will be admitted on an Inpatient basis with an anticipated length of stay of  Greater than 2 midnights   Justification for Hospital Stay:  Ureteral calculus with need for further removal in the OR by Urology    Chief Complaint:   Transfer from [facility] - ureteral calculus with hydronephrosis    History of Present Illness:    Mark Anthony Schultz is a 53 y o  male with past medical history of recurrent ureteral/nephrolithiasis, depression/anxiety  Follows with Dr Amairani Asher of Urology in the office  Patient initially despite presented to [facility] with complaint right-sided flank pain  He was found to have a 13 mm ureteral calculus with hydroureteronephrosis  Additionally found to have an JS with a creatinine 1 43  Baseline around 1  [facility]  Patient complains of right-sided flank pain as well as abdominal pain  Notes his abdomen feels bloated like he just ate many large meals  He also admits to constipation and has not had a bowel movement for the past 2 days  Denies any dysuria, frequency, urgency, hesitancy  No chest pain, chest pressure, palpitations  No nausea or vomiting  Lives at home  No assistive devices ambulate  Occasional alcoholic beverage  Denies current tobacco abuse or drug use  Review of Systems:    General:   No Fever + chills; No significant weight loss or gain  EENT:   No ear pain, facial swelling;    Skin:   No rashes, color changes  Respiratory:     No shortness of breath, cough,    Cardiovascular:     No chest pain, palpitations  Gastrointestinal:    No nausea, vomiting, diarrhea; + abdominal pain  Musculoskeletal:     No arthralgias, myalgias, swelling  Neurologic:   No dizziness, numbness, weakness  No speech difficulties     Psych:   No agitation,     Otherwise, All other twelve-point review of systems normal      Past Medical and Surgical History:     Past Medical History:   Diagnosis Date    Kidney stones        Past Surgical History:   Procedure Laterality Date    CARDIAC ELECTROPHYSIOLOGY STUDY AND ABLATION      HERNIA REPAIR      KIDNEY STONE SURGERY      KNEE CARTILAGE SURGERY         Meds/Allergies:    Current Facility-Administered Medications   Medication Dose Route Frequency Provider Last Rate Last Dose    acetaminophen (TYLENOL) tablet 650 mg  650 mg Oral Q6H PRN Jack Bridges PA-C   650 mg at 06/28/19 2005    [START ON 6/29/2019] DULoxetine (CYMBALTA) delayed release capsule 60 mg  60 mg Oral Daily Elicia Santillan PA-C        heparin (porcine) subcutaneous injection 5,000 Units  5,000 Units Subcutaneous Q8H [address] Elicia Santillan PA-C        morphine injection 1 mg  1 mg Intravenous Q4H PRN Jack Bridges PA-C        ondansetron (ZOFRAN) injection 4 mg  4 mg Intravenous Q6H PRN Jack Bridges PA-C        oxyCODONE-acetaminophen (PERCOCET) 5-325 mg per tablet 1 tablet  1 tablet Oral Q4H PRN Jack Bridges PA-C        sodium chloride 0 9 % infusion  100 mL/hr Intravenous Continuous Elicia Santillan PA-C 100 mL/hr at 06/28/19 2013 100 mL/hr at 06/28/19 2013    [START ON 6/29/2019] tamsulosin (FLOMAX) capsule 0 4 mg  0 4 mg Oral Daily With [employer]           Allergies   Allergen Reactions    Other Itching     Peanuts     Peanut-Containing Drug Products Itching    Sulfa Antibiotics Rash    Toradol [Ketorolac Tromethamine] Rash     Tolerates ibuprofen       Allergies:    Allergies   Allergen Reactions    Other Itching     Peanuts     Peanut-Containing Drug Products Itching    Sulfa Antibiotics Rash    Toradol [Ketorolac Tromethamine] Rash     Tolerates ibuprofen       Social History:     Marital Status: /Civil Union     Substance Use History:   Social History     Substance and Sexual Activity   Alcohol Use Not Currently    Frequency: Monthly or less    Comment: occasionally     Social History     Tobacco Use   Smoking Status Never Smoker   Smokeless Tobacco Former User     Social History     Substance and Sexual Activity   Drug Use No       Family History:    non-contributory    Physical Exam:     Vitals:   Blood Pressure: 145/91 (06/28/19 1748)  Pulse: 87 (06/28/19 1748)  Temperature: 99 3 °F (37 4 °C) (06/28/19 1855)  Temp Source: Temporal (06/28/19 1855)  Respirations: 18 (06/28/19 1748)  SpO2: 96 % (06/28/19 1748)    Physical Exam Constitutional: He is oriented to person, place, and time  He appears well-developed and well-nourished  No distress  HENT:   Head: Normocephalic and atraumatic  Cardiovascular: Normal rate, regular rhythm and normal heart sounds  Pulmonary/Chest: Effort normal and breath sounds normal  No stridor  No respiratory distress  He has no wheezes  He has no rales  He exhibits no tenderness  Abdominal: Soft  Bowel sounds are normal  He exhibits no distension and no mass  There is no tenderness  There is no rebound and no guarding  No hernia  Neurological: He is alert and oriented to person, place, and time  Skin: Skin is warm and dry  He is not diaphoretic  Nursing note and vitals reviewed  Additional Data:     Lab Results: I have personally reviewed pertinent reports  Results from last 7 days   Lab Units 06/28/19  1159   WBC Thousand/uL 10 46*   HEMOGLOBIN g/dL 15 0   HEMATOCRIT % 44 4   PLATELETS Thousands/uL 260   NEUTROS PCT % 77*   LYMPHS PCT % 13*   MONOS PCT % 8   EOS PCT % 1     Results from last 7 days   Lab Units 06/28/19  1159   POTASSIUM mmol/L 4 1   CHLORIDE mmol/L 104   CO2 mmol/L 25   BUN mg/dL 25   CREATININE mg/dL 1 43*   CALCIUM mg/dL 8 6   ALK PHOS U/L 83   ALT U/L 28   AST U/L 20     Results from last 7 days   Lab Units 06/28/19  1159   INR  0 94       Imaging: I have personally reviewed pertinent reports  Ct Renal Stone Study Abdomen Pelvis Wo Contrast    Result Date: 6/28/2019  Narrative: CT ABDOMEN AND PELVIS WITHOUT IV CONTRAST - LOW DOSE RENAL STONE INDICATION:   Right-sided flank pain  COMPARISON:  May 19, 2019  TECHNIQUE:  Low dose thin section CT examination of the abdomen and pelvis was performed without intravenous or oral contrast according to a protocol specifically designed to evaluate for urinary tract calculus  Axial, sagittal, and coronal 2D reformatted images were created from the source data and submitted for interpretation     Evaluation for pathology in the abdomen and pelvis that is unrelated to urinary tract calculi is limited  Radiation dose length product (DLP) for this visit:  924 33 mGy-cm   This examination, like all CT scans performed in the [facility], was performed utilizing techniques to minimize radiation dose exposure, including the use of iterative  reconstruction and automated exposure control  FINDINGS: RIGHT KIDNEY AND URETER: There is a 13 x 5 mm right ureteropelvic junction calculus resulting in right-sided hydronephrosis and perinephric stranding  The right ureter is collapsed beyond this point no other right-sided urinary tract calculi evident  LEFT KIDNEY AND URETER: There is a nonobstructing 3 mm calculus at the lower pole of the left kidney  No other left-sided urinary tract calculi are seen  URINARY BLADDER: Unremarkable  No significant abnormality in the visualized lung bases  Limited low radiation dose noncontrast CT evaluation demonstrates no clinically significant abnormality of liver, spleen, pancreas, or adrenal glands  No calcified gallstones or gallbladder wall thickening noted  No ascites or bulky lymphadenopathy on this limited noncontrast study  Colonic diverticula are noted, without evidence to suggest acute diverticulitis  Visualized bowel appears otherwise unremarkable  Limited evaluation demonstrates no evidence to suggest acute appendicitis  No acute fracture or destructive osseous lesion is identified  Impression: Right ureteropelvic junction calculus measuring up to 13 mm and resulting in right-sided hydronephrosis and perinephric stranding  Nonobstructing granuloma in the lower pole left renal calculus  Workstation performed: [id number]       [employer] Records Reviewed: Yes     Total Time for Visit, including Counseling / Coordination of Care: 45 minutes  Greater than 50% of this total time spent on direct patient counseling and coordination of care        ** Please Note: This note has been constructed using a voice recognition system   **

## 2019-06-29 NOTE — ASSESSMENT & PLAN NOTE
History of multiple ureteral calculus  Prior stone extraction from right kidney  Initially presented to [facility] 6/28/19 with the complaint of R flank pain  Found to have a right ureteropelvic junction calculus measuring up to 13 mm, resulting in right-sided hydronephrosis with perinephric stranding  Transfer to [facility] for further urological intervention  Dr Hebert Barrier aware of patient and plans to take him to the OR tomorrow  NPO after midnight  Continue IV fluids  Already received IV cefazolin at [facility]  Hold off on further antibiotics at this point as patient is afebrile, no white count, clean UA- defer to Urology  Strain urine

## 2019-06-29 NOTE — PLAN OF CARE
Problem: GASTROINTESTINAL - ADULT  Goal: Minimal or absence of nausea and/or vomiting  Description  INTERVENTIONS:  - Administer IV fluids as ordered to ensure adequate hydration  - Maintain NPO status until nausea and vomiting are resolved  - Nasogastric tube as ordered  - Administer ordered antiemetic medications as needed  - Provide nonpharmacologic comfort measures as appropriate  - Advance diet as tolerated, if ordered  - Nutrition services referral to assist patient with adequate nutrition and appropriate food choices  Outcome: Progressing     Problem: GENITOURINARY - ADULT  Goal: Maintains or returns to baseline urinary function  Description  INTERVENTIONS:  - Assess urinary function  - Encourage oral fluids to ensure adequate hydration  - Administer IV fluids as ordered to ensure adequate hydration  - Administer ordered medications as needed  - Offer frequent toileting  - Follow urinary retention protocol if ordered  Outcome: Progressing  Goal: Absence of urinary retention  Description  INTERVENTIONS:  - Assess patients ability to void and empty bladder  - Monitor I/O  - Bladder scan as needed  - Discuss with physician/AP medications to alleviate retention as needed  - Discuss catheterization for long term situations as appropriate  Outcome: Progressing     Problem: MUSCULOSKELETAL - ADULT  Goal: Maintain or return mobility to safest level of function  Description  INTERVENTIONS:  - Assess patient's ability to carry out ADLs; assess patient's baseline for ADL function and identify physical deficits which impact ability to perform ADLs (bathing, care of mouth/teeth, toileting, grooming, dressing, etc )  - Assess/evaluate cause of self-care deficits   - Assess range of motion  - Assess patient's mobility; develop plan if impaired  - Assess patient's need for assistive devices and provide as appropriate  - Encourage maximum independence but intervene and supervise when necessary  - Involve family in performance of ADLs  - Assess for home care needs following discharge   - Request OT consult to assist with ADL evaluation and planning for discharge  - Provide patient education as appropriate  Outcome: Progressing  Goal: Maintain proper alignment of affected body part  Description  INTERVENTIONS:  - Support, maintain and protect limb and body alignment  - Provide pt/fam with appropriate education  Outcome: Progressing     Problem: PAIN - ADULT  Goal: Verbalizes/displays adequate comfort level or baseline comfort level  Description  Interventions:  - Encourage patient to monitor pain and request assistance  - Assess pain using appropriate pain scale  - Administer analgesics based on type and severity of pain and evaluate response  - Implement non-pharmacological measures as appropriate and evaluate response  - Consider cultural and social influences on pain and pain management  - Notify physician/advanced practitioner if interventions unsuccessful or patient reports new pain  Outcome: Progressing     Problem: INFECTION - ADULT  Goal: Absence or prevention of progression during hospitalization  Description  INTERVENTIONS:  - Assess and monitor for signs and symptoms of infection  - Monitor lab/diagnostic results  - Monitor all insertion sites, i e  indwelling lines, tubes, and drains  - Monitor endotracheal (as able) and nasal secretions for changes in amount and color  - [place] appropriate cooling/warming therapies per order  - Administer medications as ordered  - Instruct and encourage patient and family to use good hand hygiene technique  - Identify and instruct in appropriate isolation precautions for identified infection/condition  Outcome: Progressing  Goal: Absence of fever/infection during neutropenic period  Description  INTERVENTIONS:  - Monitor WBC  - Implement neutropenic guidelines  Outcome: Progressing     Problem: SAFETY ADULT  Goal: Patient will remain free of falls  Description  INTERVENTIONS:  - Assess patient frequently for physical needs  -  Identify cognitive and physical deficits and behaviors that affect risk of falls    -  [place] fall precautions as indicated by assessment   - Educate patient/family on patient safety including physical limitations  - Instruct patient to call for assistance with activity based on assessment  - Modify environment to reduce risk of injury  - Consider OT/PT consult to assist with strengthening/mobility  Outcome: Progressing  Goal: Maintain or return to baseline ADL function  Description  INTERVENTIONS:  -  Assess patient's ability to carry out ADLs; assess patient's baseline for ADL function and identify physical deficits which impact ability to perform ADLs (bathing, care of mouth/teeth, toileting, grooming, dressing, etc )  - Assess/evaluate cause of self-care deficits   - Assess range of motion  - Assess patient's mobility; develop plan if impaired  - Assess patient's need for assistive devices and provide as appropriate  - Encourage maximum independence but intervene and supervise when necessary  ¯ Involve family in performance of ADLs  ¯ Assess for home care needs following discharge   ¯ Request OT consult to assist with ADL evaluation and planning for discharge  ¯ Provide patient education as appropriate  Outcome: Progressing  Goal: Maintain or return mobility status to optimal level  Description  INTERVENTIONS:  - Assess patient's baseline mobility status (ambulation, transfers, stairs, etc )    - Identify cognitive and physical deficits and behaviors that affect mobility  - Identify mobility aids required to assist with transfers and/or ambulation (gait belt, sit-to-stand, lift, walker, cane, etc )  - [place] fall precautions as indicated by assessment  - Record patient progress and toleration of activity level on Mobility SBAR; progress patient to next Phase/Stage  - Instruct patient to call for assistance with activity based on assessment  - Request Rehabilitation consult to assist with strengthening/weightbearing, etc   Outcome: Progressing     Problem: DISCHARGE PLANNING  Goal: Discharge to home or other facility with appropriate resources  Description  INTERVENTIONS:  - Identify barriers to discharge w/patient and caregiver  - Arrange for needed discharge resources and transportation as appropriate  - Identify discharge learning needs (meds, wound care, etc )  - Arrange for interpretive services to assist at discharge as needed  - Refer to Case Management Department for coordinating discharge planning if the patient needs post-hospital services based on physician/advanced practitioner order or complex needs related to functional status, cognitive ability, or social support system  Outcome: Progressing     Problem: Knowledge Deficit  Goal: Patient/family/caregiver demonstrates understanding of disease process, treatment plan, medications, and discharge instructions  Description  Complete learning assessment and assess knowledge base    Interventions:  - Provide teaching at level of understanding  - Provide teaching via preferred learning methods  Outcome: Progressing

## 2019-06-29 NOTE — OP NOTE
OPERATIVE REPORT  PATIENT NAME: Mynor Milan    :  1965  MRN: [id number]  Pt Location: [facility] OR ROOM 08    SURGERY DATE: 2019    Surgeon(s) and Role:     * Sharon Crowe MD - Primary    Preop Diagnosis:  Ureteral calculus [N20 1]  Hydroureteronephrosis [N13 30]    Post-Op Diagnosis Codes:     * Ureteral calculus [N20 1]     * Hydroureteronephrosis [N13 30]    Procedure(s) (LRB):  CYSTOSCOPY RETROGRADE PYELOGRAM WITH INSERTION STENT URETERAL (Right)    Specimen(s):  * No specimens in log *    Estimated Blood Loss:   Minimal    Drains:  Ureteral Drain/Stent Right ureter 6 Fr  (Active)   Number of days: 0       Anesthesia Type:   General    Operative Indications:  Ureteral calculus [N20 1]  Hydroureteronephrosis [N13 30]      Operative Findings:  Tight urethral meatus requiring dilation to 25 [place] otherwise normal urethra, predominantly bilobar prostatic hypertrophy causing was complete visual outflow obstruction  Bladder is mildly trabeculated  Ureteral orifices are unremarkable  The stone was not seen on preliminary fluoroscopic images but did appear as a filling defect on right retrograde pyelography  A 6 [place] right stent was successfully placed and seen to be in good position fluoroscopically and cystoscopically  Complications:   None    Procedure and Technique:  The patient was brought to the operating room properly identified  General anesthesia was administered  He was placed in lithotomy position prepped draped in usual sterile fashion  Compression boots were employed  Intravenous antibiotic was administered   An appropriate time-out was performed  Cystourethroscopy was performed with a 22 [place] cystoscope  The urethral meatus required dilation with sounds to 24 [place] to except the scope  Findings of cystoscopy are as above  An open-ended ureteral catheter was then used along with dilute Omnipaque to perform a right retrograde pyelogram   Again, findings are as above    No other stones or filling defects were seen  A 0 035 guidewire was passed up the right ureter under fluoroscopic guidance  Manipulation was required to get past the stone  The guidewire was then placed into the kidney  A 6 [place] stent was then passed over the guidewire and pushed into position with the pusher  The guidewire was withdrawn  A contrast injection was then performed and the stent seen to be coiled in good position in the renal pelvis  The stent was detached from the pusher leaving a nice coil in the bladder  The bladder was drained and the cystoscope removed  The patient tolerated the procedure well  He was awakened from anesthesia and taken to the recovery room in satisfactory condition       I was present for the entire procedure and A qualified resident physician was not available    Patient Disposition:  PACU     SIGNATURE: Steven Patrick MD  DATE: June 29, 2019  TIME: 8:45 AM

## 2019-06-29 NOTE — H&P (VIEW-ONLY)
Consultation - Urology   Koffi Ellis 53 y o  male MRN: [id number]  Unit/Bed#: [address] 2 [address] 218-02 Encounter: [id number] 6/28/2019           Assessment/Plan      Assessment:  1) Right upper ureteral stone- large with right-sided hydro and perirenal stranding  2) Acute kidney injury  Plan:  The patient has been admitted for pain control , antibiotics and further therapy  He remains symptomatic  Treatment options were discussed with the patient  We discussed cystoscopy and right stent placement  The procedure was described and risks discussed  Consent form was signed  History of Present Illness   53year old male with history of multiple ureteral stones in the past s/p  percutaneous nephrostolithotomy approximately 5 years ago we will the onset of right testicular discomfort and right flank pain yesterday  The pain became more severe and he felt bloated  He had nausea  He presented to the emergency room where a CT scan revealed a large right upper ureteral stone with hydronephrosis  Acute kidney injury was noted  He remains symptomatic in the emergency room  The patient has been transferred to [facility] for further evaluation and therapy  The patient notes he voided in small amounts yesterday but today he is voiding in better quantities  He denies gross hematuria, dysuria or symptoms of infection  He did not have a fever but he has had chills  Urology has been consulted for further therapy        Attending: Clifton Vargas DO  Reason for Consult / Principal Problem:  Patient Active Problem List   Diagnosis    Adjustment disorder with mixed anxiety and depressed mood    Alcohol abuse    Anorgasmia of male    Bilateral hand numbness    Chronic musculoskeletal pain    Depression with anxiety    Generalized obesity    Kidney stone    Tobacco use    Tremor    Urinary tract stones    Vitamin D deficiency    Ureteral calculus    Hydroureteronephrosis    Kidney stones    JS (acute kidney injury) ([facility] [address] )    Constipation       Inpatient consult to Urology  Consult performed by: Grayson Crawford MD  Consult ordered by: Christina Sutton PA-C          Review of Systems   Constitutional: Positive for chills  Negative for diaphoresis, fatigue and fever  HENT: Negative  Eyes: Negative  Respiratory: Negative  Cardiovascular: Negative  Gastrointestinal: Positive for abdominal distention, abdominal pain and nausea  Endocrine: Negative  Genitourinary: Positive for flank pain and testicular pain  See HPI   Skin: Negative  Allergic/Immunologic: Negative  Neurological: Negative  Hematological: Negative  Psychiatric/Behavioral: Negative          Historical Information   Allergies   Allergen Reactions    Other Itching     Peanuts     Peanut-Containing Drug Products Itching    Sulfa Antibiotics Rash    Toradol [Ketorolac Tromethamine] Rash     Tolerates ibuprofen     Past Medical History:   Diagnosis Date    Kidney stones      Past Surgical History:   Procedure Laterality Date    CARDIAC ELECTROPHYSIOLOGY STUDY AND ABLATION      HERNIA REPAIR      KIDNEY STONE SURGERY      KNEE CARTILAGE SURGERY       Social History   Social History     Substance and Sexual Activity   Alcohol Use Not Currently    Frequency: Monthly or less    Comment: occasionally     Social History     Substance and Sexual Activity   Drug Use No     Social History     Tobacco Use   Smoking Status Never Smoker   Smokeless Tobacco Former User     Family History: non-contributory    Meds/Allergies   all current active meds have been reviewed    Current Facility-Administered Medications:     acetaminophen (TYLENOL) tablet 650 mg, 650 mg, Oral, Q6H PRN, Elicia Santillan PA-C, 650 mg at 06/28/19 2005    [START ON 6/29/2019] DULoxetine (CYMBALTA) delayed release capsule 60 mg, 60 mg, Oral, Daily, Elicia Santillan PA-C    heparin (porcine) subcutaneous injection 5,000 Units, 5,000 Units, Subcutaneous, Q8H [facility] **AND** Platelet count, , , Once, [employer], PA-C    morphine injection 1 mg, 1 mg, Intravenous, Q4H PRN, Elicia Santillan PA-C    ondansetron (ZOFRAN) injection 4 mg, 4 mg, Intravenous, Q6H PRN, Lily Romero PA-C    oxyCODONE-acetaminophen (PERCOCET) 5-325 mg per tablet 1 tablet, 1 tablet, Oral, Q4H PRN, Lily Romero PA-C    [START ON 6/29/2019] polyethylene glycol (MIRALAX) packet 17 g, 17 g, Oral, Daily, Elicia Santillan PA-C    senna-docusate sodium (SENOKOT S) 8 6-50 mg per tablet 1 tablet, 1 tablet, Oral, BID, Elicia Santillan PA-C    sodium chloride 0 9 % infusion, 100 mL/hr, Intravenous, Continuous, Elicia Santillan PA-C, Last Rate: 100 mL/hr at 06/28/19 2013, 100 mL/hr at 06/28/19 2013    [START ON 6/29/2019] tamsulosin (FLOMAX) capsule 0 4 mg, 0 4 mg, Oral, Daily With Ceferino Pineda PA-C    Current Facility-Administered Medications:  acetaminophen 650 mg Oral Q6H PRN Lily Romero PA-C    [START ON 6/29/2019] DULoxetine 60 mg Oral Daily Elicia Santillan PA-C    heparin (porcine) 5,000 Units Subcutaneous Q8H [facility] Elicia Santillan PA-C    morphine injection 1 mg Intravenous Q4H PRN Elicia Santillan PA-C    ondansetron 4 mg Intravenous Q6H PRN Lily Romero PA-C    oxyCODONE-acetaminophen 1 tablet Oral Q4H PRN Lily Romero PA-C    [START ON 6/29/2019] polyethylene glycol 17 g Oral Daily Elicia Santillan PA-C    senna-docusate sodium 1 tablet Oral BID Elicia Santillan PA-C    sodium chloride 100 mL/hr Intravenous Continuous Elicia Santillan PA-C Last Rate: 100 mL/hr (06/28/19 2013)   [START ON 6/29/2019] tamsulosin 0 4 mg Oral Daily With Ceferino Pineda PA-C        acetaminophen    morphine injection    ondansetron    oxyCODONE-acetaminophen    sodium chloride 100 mL/hr Last Rate: 100 mL/hr (06/28/19 2013)       Objective   Vitals: Blood pressure 145/91, pulse 87, temperature 99 3 °F (37 4 °C), temperature source Temporal, resp  rate 18, SpO2 96 %      No intake/output data recorded  Invasive Devices     Peripheral Intravenous Line            Peripheral IV 06/28/19 Right Antecubital less than 1 day                Physical Exam    Lab Results:   I have personally reviewed pertinent reports  CBC:   Lab Results   Component Value Date    WBC 10 46 (H) 06/28/2019    HGB 15 0 06/28/2019    HCT 44 4 06/28/2019    MCV 91 06/28/2019     06/28/2019    MCH 30 7 06/28/2019    MCHC 33 8 06/28/2019    RDW 13 5 06/28/2019    MPV 9 8 06/28/2019    NRBC 0 06/28/2019     CMP:   Lab Results   Component Value Date    SODIUM 138 06/28/2019     06/28/2019    CO2 25 06/28/2019    BUN 25 06/28/2019    CREATININE 1 43 (H) 06/28/2019    CALCIUM 8 6 06/28/2019    AST 20 06/28/2019    ALT 28 06/28/2019    ALKPHOS 83 06/28/2019    EGFR 55 06/28/2019     Urinalysis:   Lab Results   Component Value Date    COLORU Yellow 06/28/2019    CLARITYU Clear 06/28/2019    SPECGRAV >=1 030 06/28/2019    PHUR 6 0 06/28/2019    LEUKOCYTESUR Negative 06/28/2019    NITRITE Negative 06/28/2019    GLUCOSEU Negative 06/28/2019    KETONESU Negative 06/28/2019    BILIRUBINUR Negative 06/28/2019    BLOODU Moderate (A) 06/28/2019       Imaging Studies: I have personally reviewed pertinent films in PACS  Ct Renal Stone Study Abdomen Pelvis Wo Contrast    Result Date: 6/28/2019  Narrative: CT ABDOMEN AND PELVIS WITHOUT IV CONTRAST - LOW DOSE RENAL STONE INDICATION:   Right-sided flank pain  COMPARISON:  May 19, 2019  TECHNIQUE:  Low dose thin section CT examination of the abdomen and pelvis was performed without intravenous or oral contrast according to a protocol specifically designed to evaluate for urinary tract calculus  Axial, sagittal, and coronal 2D reformatted images were created from the source data and submitted for interpretation  Evaluation for pathology in the abdomen and pelvis that is unrelated to urinary tract calculi is limited  Radiation dose length product (DLP) for this visit:  924 33 mGy-cm   This examination, like all CT scans performed in the [facility], was performed utilizing techniques to minimize radiation dose exposure, including the use of iterative  reconstruction and automated exposure control  FINDINGS: RIGHT KIDNEY AND URETER: There is a 13 x 5 mm right ureteropelvic junction calculus resulting in right-sided hydronephrosis and perinephric stranding  The right ureter is collapsed beyond this point no other right-sided urinary tract calculi evident  LEFT KIDNEY AND URETER: There is a nonobstructing 3 mm calculus at the lower pole of the left kidney  No other left-sided urinary tract calculi are seen  URINARY BLADDER: Unremarkable  No significant abnormality in the visualized lung bases  Limited low radiation dose noncontrast CT evaluation demonstrates no clinically significant abnormality of liver, spleen, pancreas, or adrenal glands  No calcified gallstones or gallbladder wall thickening noted  No ascites or bulky lymphadenopathy on this limited noncontrast study  Colonic diverticula are noted, without evidence to suggest acute diverticulitis  Visualized bowel appears otherwise unremarkable  Limited evaluation demonstrates no evidence to suggest acute appendicitis  No acute fracture or destructive osseous lesion is identified  Impression: Right ureteropelvic junction calculus measuring up to 13 mm and resulting in right-sided hydronephrosis and perinephric stranding  Nonobstructing granuloma in the lower pole left renal calculus  Workstation performed: [id number]       EKG, Pathology, and Other Studies: I have personally reviewed pertinent reports        Code Status: Level 1 - Full Code     Bill Blanca MD

## 2019-06-29 NOTE — ASSESSMENT & PLAN NOTE
Presenting with creatinine of 1 43  Baseline appears to be around 1  Continue with IV fluids and recheck in a m

## 2019-06-29 NOTE — CONSULTS
Consultation - Urology   Andres Kincaid 53 y o  male MRN: [id number]  Unit/Bed#: [address] 218-02 Encounter: [id number] 6/28/2019           Assessment/Plan      Assessment:  1) Right upper ureteral stone- large with right-sided hydro and perirenal stranding  2) Acute kidney injury  Plan:  The patient has been admitted for pain control , antibiotics and further therapy  He remains symptomatic  Treatment options were discussed with the patient  We discussed cystoscopy and right stent placement  The procedure was described and risks discussed  Consent form was signed  History of Present Illness   53year old male with history of multiple ureteral stones in the past s/p  percutaneous nephrostolithotomy approximately 5 years ago we will the onset of right testicular discomfort and right flank pain yesterday  The pain became more severe and he felt bloated  He had nausea  He presented to the emergency room where a CT scan revealed a large right upper ureteral stone with hydronephrosis  Acute kidney injury was noted  He remains symptomatic in the emergency room  The patient has been transferred to [place] [address] for further evaluation and therapy  The patient notes he voided in small amounts yesterday but today he is voiding in better quantities  He denies gross hematuria, dysuria or symptoms of infection  He did not have a fever but he has had chills  Urology has been consulted for further therapy        Attending: Marilu Muhammad DO  Reason for Consult / Principal Problem:  Patient Active Problem List   Diagnosis    Adjustment disorder with mixed anxiety and depressed mood    Alcohol abuse    Anorgasmia of male    Bilateral hand numbness    Chronic musculoskeletal pain    Depression with anxiety    Generalized obesity    Kidney stone    Tobacco use    Tremor    Urinary tract stones    Vitamin D deficiency    Ureteral calculus    Hydroureteronephrosis    Kidney stones    JS (acute kidney injury) ([facility] [address]    Constipation       Inpatient consult to Urology  Consult performed by: Ramiro Lin MD  Consult ordered by: Govind Pressley PA-C          Review of Systems   Constitutional: Positive for chills  Negative for diaphoresis, fatigue and fever  HENT: Negative  Eyes: Negative  Respiratory: Negative  Cardiovascular: Negative  Gastrointestinal: Positive for abdominal distention, abdominal pain and nausea  Endocrine: Negative  Genitourinary: Positive for flank pain and testicular pain  See HPI   Skin: Negative  Allergic/Immunologic: Negative  Neurological: Negative  Hematological: Negative  Psychiatric/Behavioral: Negative          Historical Information   Allergies   Allergen Reactions    Other Itching     Peanuts     Peanut-Containing Drug Products Itching    Sulfa Antibiotics Rash    Toradol [Ketorolac Tromethamine] Rash     Tolerates ibuprofen     Past Medical History:   Diagnosis Date    Kidney stones      Past Surgical History:   Procedure Laterality Date    CARDIAC ELECTROPHYSIOLOGY STUDY AND ABLATION      HERNIA REPAIR      KIDNEY STONE SURGERY      KNEE CARTILAGE SURGERY       Social History   Social History     Substance and Sexual Activity   Alcohol Use Not Currently    Frequency: Monthly or less    Comment: occasionally     Social History     Substance and Sexual Activity   Drug Use No     Social History     Tobacco Use   Smoking Status Never Smoker   Smokeless Tobacco Former User     Family History: non-contributory    Meds/Allergies   all current active meds have been reviewed    Current Facility-Administered Medications:     acetaminophen (TYLENOL) tablet 650 mg, 650 mg, Oral, Q6H PRN, Elicia Santillan PA-C, 650 mg at 06/28/19 2005    [START ON 6/29/2019] DULoxetine (CYMBALTA) delayed release capsule 60 mg, 60 mg, Oral, Daily, Elicia Santillan PA-C    heparin (porcine) subcutaneous injection 5,000 Units, 5,000 Units, Subcutaneous, Q8H [facility] **AND** Platelet count, , , Once, Teresa Stiles PA-C    morphine injection 1 mg, 1 mg, Intravenous, Q4H PRN, Elicia Santillan PA-C    ondansetron (ZOFRAN) injection 4 mg, 4 mg, Intravenous, Q6H PRN, Teresa Stiles PA-C    oxyCODONE-acetaminophen (PERCOCET) 5-325 mg per tablet 1 tablet, 1 tablet, Oral, Q4H PRN, Teresa Stiles PA-C    [START ON 6/29/2019] polyethylene glycol (MIRALAX) packet 17 g, 17 g, Oral, Daily, Elicia Santillan PA-C    senna-docusate sodium (SENOKOT S) 8 6-50 mg per tablet 1 tablet, 1 tablet, Oral, BID, Elicia Santillan PA-C    sodium chloride 0 9 % infusion, 100 mL/hr, Intravenous, Continuous, Elicia Santillan PA-C, Last Rate: 100 mL/hr at 06/28/19 2013, 100 mL/hr at 06/28/19 2013    [START ON 6/29/2019] tamsulosin (FLOMAX) capsule 0 4 mg, 0 4 mg, Oral, Daily With [employer]    Current Facility-Administered Medications:  acetaminophen 650 mg Oral Q6H PRN Teresa Stiles PA-C    [START ON 6/29/2019] DULoxetine 60 mg Oral Daily Elicia Santillan PA-C    heparin (porcine) 5,000 Units Subcutaneous Q8H [facility] Elicia Santillan PA-C    morphine injection 1 mg Intravenous Q4H PRN Eilcia Santillan PA-C    ondansetron 4 mg Intravenous Q6H PRN Teresa Stiles PA-C    oxyCODONE-acetaminophen 1 tablet Oral Q4H PRN Teresa Stiles PA-C    [START ON 6/29/2019] polyethylene glycol 17 g Oral Daily Elicia Santillan PA-C    senna-docusate sodium 1 tablet Oral BID Elicia Santillan PA-C    sodium chloride 100 mL/hr Intravenous Continuous Elicia Santillan PA-C Last Rate: 100 mL/hr (06/28/19 2013)   [START ON 6/29/2019] tamsulosin 0 4 mg Oral Daily With [employer], AYAAN        acetaminophen    morphine injection    ondansetron    oxyCODONE-acetaminophen    sodium chloride 100 mL/hr Last Rate: 100 mL/hr (06/28/19 2013)       Objective   Vitals: Blood pressure 145/91, pulse 87, temperature 99 3 °F (37 4 °C), temperature source Temporal, resp  rate 18, SpO2 96 %      No intake/output data recorded  Invasive Devices     Peripheral Intravenous Line            Peripheral IV 06/28/19 Right Antecubital less than 1 day                Physical Exam    Lab Results:   I have personally reviewed pertinent reports  CBC:   Lab Results   Component Value Date    WBC 10 46 (H) 06/28/2019    HGB 15 0 06/28/2019    HCT 44 4 06/28/2019    MCV 91 06/28/2019     06/28/2019    MCH 30 7 06/28/2019    MCHC 33 8 06/28/2019    RDW 13 5 06/28/2019    MPV 9 8 06/28/2019    NRBC 0 06/28/2019     CMP:   Lab Results   Component Value Date    SODIUM 138 06/28/2019     06/28/2019    CO2 25 06/28/2019    BUN 25 06/28/2019    CREATININE 1 43 (H) 06/28/2019    CALCIUM 8 6 06/28/2019    AST 20 06/28/2019    ALT 28 06/28/2019    ALKPHOS 83 06/28/2019    EGFR 55 06/28/2019     Urinalysis:   Lab Results   Component Value Date    COLORU Yellow 06/28/2019    CLARITYU Clear 06/28/2019    SPECGRAV >=1 030 06/28/2019    PHUR 6 0 06/28/2019    LEUKOCYTESUR Negative 06/28/2019    NITRITE Negative 06/28/2019    GLUCOSEU Negative 06/28/2019    KETONESU Negative 06/28/2019    BILIRUBINUR Negative 06/28/2019    BLOODU Moderate (A) 06/28/2019       Imaging Studies: I have personally reviewed pertinent films in PACS  Ct Renal Stone Study Abdomen Pelvis Wo Contrast    Result Date: 6/28/2019  Narrative: CT ABDOMEN AND PELVIS WITHOUT IV CONTRAST - LOW DOSE RENAL STONE INDICATION:   Right-sided flank pain  COMPARISON:  May 19, 2019  TECHNIQUE:  Low dose thin section CT examination of the abdomen and pelvis was performed without intravenous or oral contrast according to a protocol specifically designed to evaluate for urinary tract calculus  Axial, sagittal, and coronal 2D reformatted images were created from the source data and submitted for interpretation  Evaluation for pathology in the abdomen and pelvis that is unrelated to urinary tract calculi is limited  Radiation dose length product (DLP) for this visit:  924 33 mGy-cm   This examination, like all CT scans performed in the [facility], was performed utilizing techniques to minimize radiation dose exposure, including the use of iterative  reconstruction and automated exposure control  FINDINGS: RIGHT KIDNEY AND URETER: There is a 13 x 5 mm right ureteropelvic junction calculus resulting in right-sided hydronephrosis and perinephric stranding  The right ureter is collapsed beyond this point no other right-sided urinary tract calculi evident  LEFT KIDNEY AND URETER: There is a nonobstructing 3 mm calculus at the lower pole of the left kidney  No other left-sided urinary tract calculi are seen  URINARY BLADDER: Unremarkable  No significant abnormality in the visualized lung bases  Limited low radiation dose noncontrast CT evaluation demonstrates no clinically significant abnormality of liver, spleen, pancreas, or adrenal glands  No calcified gallstones or gallbladder wall thickening noted  No ascites or bulky lymphadenopathy on this limited noncontrast study  Colonic diverticula are noted, without evidence to suggest acute diverticulitis  Visualized bowel appears otherwise unremarkable  Limited evaluation demonstrates no evidence to suggest acute appendicitis  No acute fracture or destructive osseous lesion is identified  Impression: Right ureteropelvic junction calculus measuring up to 13 mm and resulting in right-sided hydronephrosis and perinephric stranding  Nonobstructing granuloma in the lower pole left renal calculus  Workstation performed: [id number]       EKG, Pathology, and Other Studies: I have personally reviewed pertinent reports        Code Status: Level 1 - Full Code     Bud Scanlon MD

## 2019-06-30 ENCOUNTER — TELEPHONE (OUTPATIENT)
Dept: UROLOGY | Facility: MEDICAL CENTER | Age: 54
End: 2019-06-30

## 2019-06-30 VITALS
TEMPERATURE: 98 F | OXYGEN SATURATION: 93 % | RESPIRATION RATE: 18 BRPM | HEART RATE: 92 BPM | DIASTOLIC BLOOD PRESSURE: 78 MMHG | SYSTOLIC BLOOD PRESSURE: 124 MMHG

## 2019-06-30 LAB
ANION GAP SERPL CALCULATED.3IONS-SCNC: 8 MMOL/L (ref 4–13)
BUN SERPL-MCNC: 20 MG/DL (ref 5–25)
CALCIUM SERPL-MCNC: 8.7 MG/DL (ref 8.3–10.1)
CHLORIDE SERPL-SCNC: 106 MMOL/L (ref 100–108)
CO2 SERPL-SCNC: 27 MMOL/L (ref 21–32)
CREAT SERPL-MCNC: 1.13 MG/DL (ref 0.6–1.3)
GFR SERPL CREATININE-BSD FRML MDRD: 73 ML/MIN/1.73SQ M
GLUCOSE SERPL-MCNC: 99 MG/DL (ref 65–140)
POTASSIUM SERPL-SCNC: 3.9 MMOL/L (ref 3.5–5.3)
SODIUM SERPL-SCNC: 141 MMOL/L (ref 136–145)

## 2019-06-30 PROCEDURE — 99239 HOSP IP/OBS DSCHRG MGMT >30: CPT | Performed by: INTERNAL MEDICINE

## 2019-06-30 PROCEDURE — 80048 BASIC METABOLIC PNL TOTAL CA: CPT | Performed by: INTERNAL MEDICINE

## 2019-06-30 PROCEDURE — 99232 SBSQ HOSP IP/OBS MODERATE 35: CPT | Performed by: UROLOGY

## 2019-06-30 RX ORDER — POTASSIUM CITRATE 10 MEQ/1
10 TABLET, EXTENDED RELEASE ORAL 2 TIMES DAILY
Qty: 60 TABLET | Refills: 0 | Status: SHIPPED | OUTPATIENT
Start: 2019-06-30 | End: 2019-10-08

## 2019-06-30 RX ADMIN — ACETAMINOPHEN 650 MG: 325 TABLET ORAL at 00:45

## 2019-06-30 RX ADMIN — POLYETHYLENE GLYCOL 3350 17 G: 17 POWDER, FOR SOLUTION ORAL at 08:41

## 2019-06-30 RX ADMIN — DULOXETINE HYDROCHLORIDE 60 MG: 60 CAPSULE, DELAYED RELEASE ORAL at 08:42

## 2019-06-30 RX ADMIN — HEPARIN SODIUM 5000 UNITS: 5000 INJECTION INTRAVENOUS; SUBCUTANEOUS at 05:01

## 2019-06-30 RX ADMIN — POTASSIUM CITRATE 10 MEQ: 10 TABLET, EXTENDED RELEASE ORAL at 08:42

## 2019-06-30 NOTE — PROGRESS NOTES
Progress Note - [facility] 53 y o  male MRN: [id number]    Unit/Bed#: [address] 68 2 -02 Encounter: [id number]    Assessment/Plan:    A KI    related to obstructive uropathy, status post stenting and IV fluids creatinine improved to 1 13 today DC IV fluids    Ureteral calculi  status post cystoscopy and stent placement follow-up with Urology    Renal calculi   possible uric acid related continue potassium citrate alkalize urine    Subjective:   Feels good today no complaints denies chest pain shortness of breath nausea vomiting diarrhea no fevers chills appetite is okay no flank pain urine clear    Objective:     Vitals: Blood pressure 124/78, pulse 92, temperature 98 °F (36 7 °C), temperature source Temporal, resp  rate 18, SpO2 93 %  ,There is no height or weight on file to calculate BMI        Results from last 7 days   Lab Units 06/29/19  0514 06/28/19  1159   WBC Thousand/uL 10 75* 10 46*   HEMOGLOBIN g/dL 14 4 15 0   HEMATOCRIT % 42 7 44 4   PLATELETS Thousands/uL 257 260   INR   --  0 94     Results from last 7 days   Lab Units 06/30/19  0457 06/29/19  0514   POTASSIUM mmol/L 3 9 3 8   CHLORIDE mmol/L 106 103   CO2 mmol/L 27 22   BUN mg/dL 20 22   CREATININE mg/dL 1 13 1 57*   CALCIUM mg/dL 8 7 8 9   ALK PHOS U/L  --  79   ALT U/L  --  22   AST U/L  --  17       Scheduled Meds:    Current Facility-Administered Medications:  acetaminophen 650 mg Oral Q6H PRN Steven Patrick MD    DULoxetine 60 mg Oral Daily Steven Patrick MD    heparin (porcine) 5,000 Units Subcutaneous [facility] Steven Patrick MD    ondansetron 4 mg Intravenous Q6H PRN Steven Patrick MD    oxyCODONE-acetaminophen 1 tablet Oral Q4H PRN Steven Patrick MD    polyethylene glycol 17 g Oral Daily Steven Patrick MD    potassium citrate 10 mEq Oral BID Steven Patrick MD    senna-docusate sodium 1 tablet Oral BID Steven Patrick MD    sodium chloride 100 mL/hr Intravenous Continuous Steven Patrick MD Last Rate: 100 mL/hr (06/29/19 1435)   tamsulosin 0 4 mg Oral Daily With Aman Eisenberg MD        Continuous Infusions:    sodium chloride 100 mL/hr Last Rate: 100 mL/hr (06/29/19 1435)     Physical exam:  General appearance:  Alert no distress interaction appropriate  Head/Eyes:  Nonicteric PERRL EOMI  Neck:  Supple  Lungs: CTA bilateral no wheezing rhonchi or rales  Heart: normal S1 S2 regular  Abdomen:  Obese nontender with bowel sounds  Extremities: no edema  Skin: no rash    Invasive Devices     Peripheral Intravenous Line            Peripheral IV 06/28/19 Right Antecubital 1 day          Drain            Ureteral Drain/Stent Right ureter 6 Fr  1 day                  Counseling / Coordination of Care  Total floor / unit time spent today 30  minutes  Greater than 50% of total time was spent with the patient and / or family counseling and / or coordination of care    A description of the counseling / coordination of care:

## 2019-06-30 NOTE — DISCHARGE SUMMARY
Discharge Summary - Medical Mynor Milan 53 y o  male MRN: [id number]    [facility] Room [facility] Bed: [place] [address] 2 /South 2 M* Encounter: [id number]    BRIEF OVERVIEW    Admitting Provider: Td Quach DO  Discharge Provider: Td Quach DO  Admission Date: 6/28/2019     Discharge Date: No discharge date for patient encounter    Primary Care Physician at Discharge: Cong Wolff [phone]    Primary Discharge Diagnosis  A KI  Ureteral calculi    Other Problems Addressed  Patient Active Problem List    Diagnosis Date Noted    Ureteral calculus 06/28/2019    Hydroureteronephrosis 06/28/2019    JS (acute kidney injury) ([facility] [address] ) 06/28/2019    Constipation 06/28/2019    Kidney stones     Adjustment disorder with mixed anxiety and depressed mood 02/14/2018    Anorgasmia of male 12/12/2017    Bilateral hand numbness 11/29/2017    Tremor 11/29/2017    Vitamin D deficiency 11/29/2017    Kidney stone 11/20/2017    Chronic musculoskeletal pain 11/15/2017    Depression with anxiety 11/15/2017    Generalized obesity 11/15/2017    Alcohol abuse 08/02/2013    Tobacco use 08/02/2013    Urinary tract stones 08/02/2013       Consulting Providers   Urology  Therapeutic Operative Procedures Performed  Cystoscopy with stent placement    Discharge Disposition: home    Allergies  Allergies   Allergen Reactions    Other Itching     Peanuts     Peanut-Containing Drug Products Itching    Sulfa Antibiotics Rash    Toradol [Ketorolac Tromethamine] Rash     Tolerates ibuprofen     Diet restrictions:  None  Activity restrictions:  None  Discharge Condition:  Good    Outpatient Follow-Up  Dr Mark Rice in 1 week  Follow up with consulting providers  Urology in 1 week     [address] [place] [address]    Presenting Problem/History of Present Illness  Ureteral calculus  Hospital Course  53year-old presented with right flank pain    Right ureteropelvic CALCULI right flank pain related to renal calculus and associated right-sided hydronephrosis and perinephric stranding  Admitted for IV fluids and IV narcotic pain control  He was seen in consultation with Urology  OR procedure including cystoscopy and stent placement  Patient claimed relieved urine cleared before discharge  He will follow in with Urology for possible stone removal   Urology felt may be uric acid stone, started on potassium citrate to alkalize the urine hopefully to dissolve  A KI    related to obstructive uropathy creatinine increased to 1 57, with IV fluids improved to 1 13, reminded patient to increase hydration at home  Other medical problems stable discharge        Discharge  Statement   I spent 35 minutes discharging the patient  This time was spent on the day of discharge  I had direct contact with the patient on the day of discharge  Additional documentation is required if more than 30 minutes were spent on discharge  Discussed discharge and follow-up with patient and Urology    Discharge instructions/Information to patient and family:   See after visit summary for information provided to patient and family

## 2019-06-30 NOTE — PLAN OF CARE
Problem: GASTROINTESTINAL - ADULT  Goal: Minimal or absence of nausea and/or vomiting  Description  INTERVENTIONS:  - Administer IV fluids as ordered to ensure adequate hydration  - Maintain NPO status until nausea and vomiting are resolved  - Administer ordered antiemetic medications as needed  - Provide nonpharmacologic comfort measures as appropriate  - Advance diet as tolerated, if ordered  - Nutrition services referral to assist patient with adequate nutrition and appropriate food choices   Outcome: Progressing     Problem: GENITOURINARY - ADULT  Goal: Maintains or returns to baseline urinary function  Description  INTERVENTIONS:  - Assess urinary function  - Encourage oral fluids to ensure adequate hydration  - Administer IV fluids as ordered to ensure adequate hydration  - Administer ordered medications as needed  - Offer frequent toileting  - Follow urinary retention protocol if ordered  Outcome: Progressing  Goal: Absence of urinary retention  Description  INTERVENTIONS:  - Assess patients ability to void and empty bladder  - Monitor I/O  - Bladder scan as needed  - Discuss with physician/AP medications to alleviate retention as needed  - Discuss catheterization for long term situations as appropriate  Outcome: Progressing     Problem: MUSCULOSKELETAL - ADULT  Goal: Maintain or return mobility to safest level of function  Description  INTERVENTIONS:  - Assess patient's ability to carry out ADLs; assess patient's baseline for ADL function and identify physical deficits which impact ability to perform ADLs (bathing, care of mouth/teeth, toileting, grooming, dressing, etc )  - Assess/evaluate cause of self-care deficits   - Assess range of motion  - Assess patient's mobility; develop plan if impaired  - Assess patient's need for assistive devices and provide as appropriate  - Encourage maximum independence but intervene and supervise when necessary  - Involve family in performance of ADLs  - Assess for home care needs following discharge   - Request OT consult to assist with ADL evaluation and planning for discharge  - Provide patient education as appropriate  Outcome: Progressing  Goal: Maintain proper alignment of affected body part  Description  INTERVENTIONS:  - Support, maintain and protect limb and body alignment  - Provide pt/fam with appropriate education  Outcome: Progressing     Problem: PAIN - ADULT  Goal: Verbalizes/displays adequate comfort level or baseline comfort level  Description  Interventions:  - Encourage patient to monitor pain and request assistance  - Assess pain using appropriate pain scale  - Administer analgesics based on type and severity of pain and evaluate response  - Implement non-pharmacological measures as appropriate and evaluate response  - Consider cultural and social influences on pain and pain management  - Notify physician/advanced practitioner if interventions unsuccessful or patient reports new pain  Outcome: Progressing     Problem: INFECTION - ADULT  Goal: Absence or prevention of progression during hospitalization  Description  INTERVENTIONS:  - Assess and monitor for signs and symptoms of infection  - Monitor lab/diagnostic results  - Monitor all insertion sites, i e  indwelling lines, tubes, and drains  - Monitor endotracheal (as able) and nasal secretions for changes in amount and color  - [place] appropriate cooling/warming therapies per order  - Administer medications as ordered  - Instruct and encourage patient and family to use good hand hygiene technique  - Identify and instruct in appropriate isolation precautions for identified infection/condition  Outcome: Progressing  Goal: Absence of fever/infection during neutropenic period  Description  INTERVENTIONS:  - Monitor WBC  - Implement neutropenic guidelines  Outcome: Progressing     Problem: SAFETY ADULT  Goal: Patient will remain free of falls  Description  INTERVENTIONS:  - Assess patient frequently for physical needs  -  Identify cognitive and physical deficits and behaviors that affect risk of falls    -  [place] fall precautions as indicated by assessment   - Educate patient/family on patient safety including physical limitations  - Instruct patient to call for assistance with activity based on assessment  - Modify environment to reduce risk of injury  - Consider OT/PT consult to assist with strengthening/mobility  Outcome: Progressing  Goal: Maintain or return to baseline ADL function  Description  INTERVENTIONS:  -  Assess patient's ability to carry out ADLs; assess patient's baseline for ADL function and identify physical deficits which impact ability to perform ADLs (bathing, care of mouth/teeth, toileting, grooming, dressing, etc )  - Assess/evaluate cause of self-care deficits   - Assess range of motion  - Assess patient's mobility; develop plan if impaired  - Assess patient's need for assistive devices and provide as appropriate  - Encourage maximum independence but intervene and supervise when necessary  ¯ Involve family in performance of ADLs  ¯ Assess for home care needs following discharge   ¯ Request OT consult to assist with ADL evaluation and planning for discharge  ¯ Provide patient education as appropriate  Outcome: Progressing  Goal: Maintain or return mobility status to optimal level  Description  INTERVENTIONS:  - Assess patient's baseline mobility status (ambulation, transfers, stairs, etc )    - Identify cognitive and physical deficits and behaviors that affect mobility  - Identify mobility aids required to assist with transfers and/or ambulation (gait belt, sit-to-stand, lift, walker, cane, etc )  - [place] fall precautions as indicated by assessment  - Record patient progress and toleration of activity level on Mobility SBAR; progress patient to next Phase/Stage  - Instruct patient to call for assistance with activity based on assessment  - Request Rehabilitation consult to assist with strengthening/weightbearing, etc   Outcome: Progressing     Problem: DISCHARGE PLANNING  Goal: Discharge to home or other facility with appropriate resources  Description  INTERVENTIONS:  - Identify barriers to discharge w/patient and caregiver  - Arrange for needed discharge resources and transportation as appropriate  - Identify discharge learning needs (meds, wound care, etc )  - Arrange for interpretive services to assist at discharge as needed  - Refer to Case Management Department for coordinating discharge planning if the patient needs post-hospital services based on physician/advanced practitioner order or complex needs related to functional status, cognitive ability, or social support system  Outcome: Progressing     Problem: Knowledge Deficit  Goal: Patient/family/caregiver demonstrates understanding of disease process, treatment plan, medications, and discharge instructions  Description  Complete learning assessment and assess knowledge base    Interventions:  - Provide teaching at level of understanding  - Provide teaching via preferred learning methods  Outcome: Progressing

## 2019-06-30 NOTE — PROGRESS NOTES
Progress Note - Urology Progress  Laren Rubinstein 53 y o  male MRN: [id number]  Unit/Bed#: [place] 68 2 -02 Encounter: [id number]    Assessment:  Doing well post right stent placement- his serum creatinine has normalized  He is feeling well  Plan:  Patient is stable for discharge from a urologic standpoint  He takes potassium citrate at home and should continue this medicine to alkalinize his urine as the stone may be uric acid  I have tasks the [place] office to call the patient to arrange follow-up  Subjective/Objective       Subjective:  No complaints  He feels well and is voiding well  Urine is clear  Objective:     Blood pressure 124/78, pulse 92, temperature 98 °F (36 7 °C), temperature source Temporal, resp  rate 18, SpO2 93 %  ,There is no height or weight on file to calculate BMI  Intake/Output Summary (Last 24 hours) at 6/30/2019 0917  Last data filed at 6/30/2019 0744  Gross per 24 hour   Intake 585 ml   Output 600 ml   Net -15 ml       Invasive Devices     Peripheral Intravenous Line            Peripheral IV 06/28/19 Right Antecubital 1 day          Drain            Ureteral Drain/Stent Right ureter 6 Fr  1 day                Review of Systems   Constitutional: Negative for chills, diaphoresis, fatigue and fever  HENT: Negative  Eyes: Negative  Respiratory: Negative  Cardiovascular: Negative  Gastrointestinal: Negative  Endocrine: Negative  Genitourinary:        See HPI   Musculoskeletal: Negative  Skin: Negative  Allergic/Immunologic: Negative  Neurological: Negative  Hematological: Negative  Psychiatric/Behavioral: Negative  Physical Exam: General appearance: alert and oriented, in no acute distress  Head: Normocephalic, without obvious abnormality, atraumatic  Back: symmetric, no curvature  ROM normal  No CVA tenderness    Lungs: Normal respiratory effort  Heart: regular rate and rhythm, S1, S2 normal, no murmur, click, rub or gallop and regular rate and rhythm  Abdomen: soft, non-tender; bowel sounds normal; no masses,  no organomegaly  Extremities: extremities normal, warm and well-perfused; no cyanosis, clubbing, or edema  Neurologic: Grossly normal  Voided urine is clear    Labs reviewed-BMP is normal

## 2019-06-30 NOTE — PLAN OF CARE
Problem: GASTROINTESTINAL - ADULT  Goal: Minimal or absence of nausea and/or vomiting  Description  INTERVENTIONS:  - Administer IV fluids as ordered to ensure adequate hydration  - Maintain NPO status until nausea and vomiting are resolved  - Administer ordered antiemetic medications as needed  - Provide nonpharmacologic comfort measures as appropriate  - Advance diet as tolerated, if ordered  - Nutrition services referral to assist patient with adequate nutrition and appropriate food choices   Outcome: Progressing     Problem: GENITOURINARY - ADULT  Goal: Maintains or returns to baseline urinary function  Description  INTERVENTIONS:  - Assess urinary function  - Encourage oral fluids to ensure adequate hydration  - Administer IV fluids as ordered to ensure adequate hydration  - Administer ordered medications as needed  - Offer frequent toileting  - Follow urinary retention protocol if ordered  Outcome: Progressing  Goal: Absence of urinary retention  Description  INTERVENTIONS:  - Assess patients ability to void and empty bladder  - Monitor I/O  - Bladder scan as needed  - Discuss with physician/AP medications to alleviate retention as needed  - Discuss catheterization for long term situations as appropriate  Outcome: Progressing     Problem: MUSCULOSKELETAL - ADULT  Goal: Maintain or return mobility to safest level of function  Description  INTERVENTIONS:  - Assess patient's ability to carry out ADLs; assess patient's baseline for ADL function and identify physical deficits which impact ability to perform ADLs (bathing, care of mouth/teeth, toileting, grooming, dressing, etc )  - Assess/evaluate cause of self-care deficits   - Assess range of motion  - Assess patient's mobility; develop plan if impaired  - Assess patient's need for assistive devices and provide as appropriate  - Encourage maximum independence but intervene and supervise when necessary  - Involve family in performance of ADLs  - Assess for home care needs following discharge   - Request OT consult to assist with ADL evaluation and planning for discharge  - Provide patient education as appropriate  Outcome: Progressing  Goal: Maintain proper alignment of affected body part  Description  INTERVENTIONS:  - Support, maintain and protect limb and body alignment  - Provide pt/fam with appropriate education  Outcome: Progressing     Problem: PAIN - ADULT  Goal: Verbalizes/displays adequate comfort level or baseline comfort level  Description  Interventions:  - Encourage patient to monitor pain and request assistance  - Assess pain using appropriate pain scale  - Administer analgesics based on type and severity of pain and evaluate response  - Implement non-pharmacological measures as appropriate and evaluate response  - Consider cultural and social influences on pain and pain management  - Notify physician/advanced practitioner if interventions unsuccessful or patient reports new pain  Outcome: Progressing     Problem: INFECTION - ADULT  Goal: Absence or prevention of progression during hospitalization  Description  INTERVENTIONS:  - Assess and monitor for signs and symptoms of infection  - Monitor lab/diagnostic results  - Monitor all insertion sites, i e  indwelling lines, tubes, and drains  - Monitor endotracheal (as able) and nasal secretions for changes in amount and color  - [place] appropriate cooling/warming therapies per order  - Administer medications as ordered  - Instruct and encourage patient and family to use good hand hygiene technique  - Identify and instruct in appropriate isolation precautions for identified infection/condition  Outcome: Progressing  Goal: Absence of fever/infection during neutropenic period  Description  INTERVENTIONS:  - Monitor WBC  - Implement neutropenic guidelines  Outcome: Progressing     Problem: SAFETY ADULT  Goal: Patient will remain free of falls  Description  INTERVENTIONS:  - Assess patient frequently for physical needs  -  Identify cognitive and physical deficits and behaviors that affect risk of falls    -  [place] fall precautions as indicated by assessment   - Educate patient/family on patient safety including physical limitations  - Instruct patient to call for assistance with activity based on assessment  - Modify environment to reduce risk of injury  - Consider OT/PT consult to assist with strengthening/mobility  Outcome: Progressing  Goal: Maintain or return to baseline ADL function  Description  INTERVENTIONS:  -  Assess patient's ability to carry out ADLs; assess patient's baseline for ADL function and identify physical deficits which impact ability to perform ADLs (bathing, care of mouth/teeth, toileting, grooming, dressing, etc )  - Assess/evaluate cause of self-care deficits   - Assess range of motion  - Assess patient's mobility; develop plan if impaired  - Assess patient's need for assistive devices and provide as appropriate  - Encourage maximum independence but intervene and supervise when necessary  ¯ Involve family in performance of ADLs  ¯ Assess for home care needs following discharge   ¯ Request OT consult to assist with ADL evaluation and planning for discharge  ¯ Provide patient education as appropriate  Outcome: Progressing  Goal: Maintain or return mobility status to optimal level  Description  INTERVENTIONS:  - Assess patient's baseline mobility status (ambulation, transfers, stairs, etc )    - Identify cognitive and physical deficits and behaviors that affect mobility  - Identify mobility aids required to assist with transfers and/or ambulation (gait belt, sit-to-stand, lift, walker, cane, etc )  - [place] fall precautions as indicated by assessment  - Record patient progress and toleration of activity level on Mobility SBAR; progress patient to next Phase/Stage  - Instruct patient to call for assistance with activity based on assessment  - Request Rehabilitation consult to assist with strengthening/weightbearing, etc   Outcome: Progressing     Problem: DISCHARGE PLANNING  Goal: Discharge to home or other facility with appropriate resources  Description  INTERVENTIONS:  - Identify barriers to discharge w/patient and caregiver  - Arrange for needed discharge resources and transportation as appropriate  - Identify discharge learning needs (meds, wound care, etc )  - Arrange for interpretive services to assist at discharge as needed  - Refer to Case Management Department for coordinating discharge planning if the patient needs post-hospital services based on physician/advanced practitioner order or complex needs related to functional status, cognitive ability, or social support system  Outcome: Progressing     Problem: Knowledge Deficit  Goal: Patient/family/caregiver demonstrates understanding of disease process, treatment plan, medications, and discharge instructions  Description  Complete learning assessment and assess knowledge base    Interventions:  - Provide teaching at level of understanding  - Provide teaching via preferred learning methods  Outcome: Progressing

## 2019-07-01 NOTE — UTILIZATION REVIEW
Initial Clinical Review    Admission: Date/Time/Statement: 6/28/19 @ 1720  TRANSFERRED FROM [facility] TO [facility] FOR HIGHER LEVEL OF CARE - UROLOGIC CONSULT AND STENT INSERTION    Orders Placed This Encounter   Procedures    Inpatient Admission     Standing Status:   Standing     Number of Occurrences:   1     Order Specific Question:   Admitting Physician     Answer:   Dillon Mckeon     Order Specific Question:   Level of Care     Answer:   Med Surg [16]     Order Specific Question:   Estimated length of stay     Answer:   More than 2 Midnights     Order Specific Question:   Certification     Answer:   I certify that inpatient services are medically necessary for this patient for a duration of greater than two midnights  See H&P and MD Progress Notes for additional information about the patient's course of treatment  Assessment/Plan: MR JACKMAN PRESENTED TO THE ED AT [facility] WITH R FLANK PAIN AND WAS FOUND TO HAVE A 13 MM URETERAL CALCULUS WITH HYDROURTERTONEPHROSIS AND JS WITH CREAT 1 43  HE WAS TRANSFERRED TO [facility] FOR UROLOGY CONSULT  ABD PAIN PERSISTED AND PT FELT BLOATED WITH + CONSTIPATION  HE IS ADMITTED AS AN INPATIENT WITH URETERAL CALCULUS AND WAS TAKEN TO THE OR 6/29 FOR CYSTO WITH INSERTION OF URETERAL STENT  PT CONTINUES ON IV FLUIDS, PAIN CONTROL AND WILL START ON POTASSIUM CITRATE        ED Triage Vitals   Temperature Pulse Respirations Blood Pressure SpO2   06/28/19 1748 06/28/19 1748 06/28/19 1748 06/28/19 1748 06/28/19 1748   97 9 °F (36 6 °C) 87 18 145/91 96 %      Temp Source Heart Rate Source Patient Position - Orthostatic VS BP Location FiO2 (%)   06/28/19 1748 06/29/19 0733 06/28/19 1748 06/28/19 1748 --   Temporal Monitor Lying Left arm       Pain Score       06/28/19 1727       5          Wt Readings from Last 1 Encounters:   06/28/19 120 kg (264 lb 8 8 oz)     Additional Vital Signs:   06/29/19 1519  97 1 °F (36 2 °C)Abnormal   84  18  126/82  95 %  None (Room air)     06/29/19 0925    87  16  123/64  93 %   None (Room air)     06/29/19 0855  98 °F (36 7 °C)  98  20  147/85  94 %   Nasal cannula  WDL   06/29/19 0733    84  18  121/82  92 %  None (Room air)     06/28/19 2211  98 5 °F (36 9 °C)  95  20  131/81  96 %  None (Room air)     06/28/19 1855  99 3 °F (37 4 °C)                 Pertinent Labs/Diagnostic Test Results:     6/28 CT RENAL STONE STUDY ABD, PELVIS - Right ureteropelvic junction calculus measuring up to 13 mm and resulting in right-sided hydronephrosis and perinephric stranding  Nonobstructing granuloma in the lower pole left renal calculus       Results from last 7 days   Lab Units 06/29/19  0514 06/28/19  1159   WBC Thousand/uL 10 75* 10 46*   HEMOGLOBIN g/dL 14 4 15 0   HEMATOCRIT % 42 7 44 4   PLATELETS Thousands/uL 257 260   NEUTROS ABS Thousands/µL  --  8 21*     Results from last 7 days   Lab Units 06/30/19  0457 06/29/19  0514 06/28/19  1159   SODIUM mmol/L 141 139 138   POTASSIUM mmol/L 3 9 3 8 4 1   CHLORIDE mmol/L 106 103 104   CO2 mmol/L 27 22 25   ANION GAP mmol/L 8 14* 9   BUN mg/dL 20 22 25   CREATININE mg/dL 1 13 1 57* 1 43*   EGFR ml/min/1 73sq m 73 49 55   CALCIUM mg/dL 8 7 8 9 8 6     Results from last 7 days   Lab Units 06/29/19  0514 06/28/19  1159   AST U/L 17 20   ALT U/L 22 28   ALK PHOS U/L 79 83   TOTAL PROTEIN g/dL 7 7 7 7   ALBUMIN g/dL 3 5 3 7   TOTAL BILIRUBIN mg/dL 0 87 0 60     Results from last 7 days   Lab Units 06/30/19  0457 06/29/19  0514 06/28/19  1159   GLUCOSE RANDOM mg/dL 99 115 134     Results from last 7 days   Lab Units 06/28/19  1159   PROTIME seconds 12 6   INR  0 94   PTT seconds 27     Results from last 7 days   Lab Units 06/28/19  1159   LIPASE u/L 101     Results from last 7 days   Lab Units 06/28/19  1241   CLARITY UA  Clear   COLOR UA  Yellow   SPEC GRAV UA  >=1 030   PH UA  6 0   GLUCOSE UA mg/dl Negative   KETONES UA mg/dl Negative   BLOOD UA  Moderate*   PROTEIN UA mg/dl Negative NITRITE UA  Negative   BILIRUBIN UA  Negative   UROBILINOGEN UA E U /dl 0 2   LEUKOCYTES UA  Negative   WBC UA /hpf 0-1*   RBC UA /hpf 2-4*   BACTERIA UA /hpf None Seen   EPITHELIAL CELLS WET PREP /hpf None Seen       Past Medical History:   Diagnosis Date    Kidney stones      Present on Admission:   Ureteral calculus   Hydroureteronephrosis   Depression with anxiety   JS [facility]acute kidney injury) ([facility] [address] )   Constipation    Admitting Diagnosis: Kidney stone [N20 0]     Age/Sex: 53 y o  male     Admission Orders:    Current Facility-Administered Medications:  acetaminophen 650 mg Oral Q6H PRN  X3 SINCE ADMIT     DULoxetine 60 mg Oral Daily     heparin (porcine) 5,000 Units Subcutaneous Q8H [address]     MORPHINE  1 MG  IV  X1 6/29 STOPPED 6/29 @ 1158   ondansetron 4 mg Intravenous Q6H PRN  X1 6/29   oxyCODONE-acetaminophen 1 tablet Oral Q4H PRN     polyethylene glycol 17 g Oral Daily     [START ON 6/30/2019] potassium citrate 10 mEq Oral BID     senna-docusate sodium 1 tablet Oral BID     sodium chloride 100 mL/hr Intravenous Continuous Last Rate: 100 mL/hr (06/29/19 1435)    tamsulosin 0 4 mg Oral Daily With Dinner       SCDs  STRAIN ALL URINE   OOB AS TOLD   NPO PRE-OP, REGULAR DIET POST OP   IP CONSULT TO UROLOGY    Network Utilization Review Department  Phone: [phone]; Fax [phone]  [email or web address]  ATTENTION: Please call with any questions or concerns to [phone]  and carefully listen to the prompts so that you are directed to the right person  Send all requests for admission clinical reviews, approved or denied determinations and any other requests to fax [phone]   All voicemails are confidential

## 2019-07-02 ENCOUNTER — TRANSITIONAL CARE MANAGEMENT (OUTPATIENT)
Dept: FAMILY MEDICINE CLINIC | Facility: CLINIC | Age: 54
End: 2019-07-02

## 2019-07-03 LAB
BACTERIA BLD CULT: NORMAL
BACTERIA BLD CULT: NORMAL

## 2019-07-18 ENCOUNTER — HOSPITAL ENCOUNTER (OUTPATIENT)
Dept: RADIOLOGY | Facility: HOSPITAL | Age: 54
Discharge: HOME | End: 2019-07-18
Payer: COMMERCIAL

## 2019-07-18 ENCOUNTER — OFFICE VISIT (OUTPATIENT)
Dept: UROLOGY | Facility: CLINIC | Age: 54
End: 2019-07-18
Payer: COMMERCIAL

## 2019-07-18 VITALS
DIASTOLIC BLOOD PRESSURE: 80 MMHG | HEART RATE: 96 BPM | WEIGHT: 261 LBS | SYSTOLIC BLOOD PRESSURE: 122 MMHG | BODY MASS INDEX: 37.37 KG/M2 | HEIGHT: 70 IN

## 2019-07-18 DIAGNOSIS — N20.1 URETERAL CALCULUS: ICD-10-CM

## 2019-07-18 DIAGNOSIS — N20.1 RIGHT URETERAL CALCULUS: Primary | ICD-10-CM

## 2019-07-18 PROCEDURE — 74018 RADEX ABDOMEN 1 VIEW: CPT

## 2019-07-18 PROCEDURE — 99214 OFFICE O/P EST MOD 30 MIN: CPT | Performed by: PHYSICIAN ASSISTANT

## 2019-07-18 RX ORDER — CEFAZOLIN SODIUM 2 G/50ML
2000 SOLUTION INTRAVENOUS ONCE
Status: CANCELLED | OUTPATIENT
Start: 2019-08-29 | End: 2019-07-18

## 2019-07-18 NOTE — PROGRESS NOTES
7/18/2019      Chief Complaint   Patient presents with    Nephrolithiasis         Assessment and Plan    53 y o  male managed by Dr Brea Barrios    1  Ureterolithiasis  - status post right ureteral stenting acutely 06/29/2019 for 13 mm right UPJ obstructing stone  - longstanding history of uric acid urolithiasis, maintained on potassium citrate  - here today to discuss definitive ureteroscopy and lithotripsy  - preoperative physical exam completed today without significant findings    Follow-up for OR for cystoscopy, right ureteroscopy, laser lithotripsy and stent placement  Formal consent to be signed by performing surgeon day of procedure    Discussed risks benefits and techniques of procedure, patient would like to proceed, all questions answered  Informed patient our surgical coordinator will be in contact with him if any necessary by preop testing, and specifics related to scheduling location and time for surgery  History of Present Illness  Keyon Salazar is a 53 y o  male here for evaluation of hospital follow-up kidney stone  Acutely symptomatic beginning on 06/28/2019, admitted to the hospital underwent cystoscopy, retrograde pyelogram and right ureteral stent placement for a 13 mm right proximal stone with hydronephrosis and perinephric stranding  A month prior to this he had spontaneous passage of several stones  He has had ureteroscopy in the past, approximately five years ago  He has a known history of uric acid stones, is on potassium citrate 10 mEq 3 times a day  He is here today to discuss definitive ureteroscopy  He is having minimal discomfort related to the ureteral stent  He is having some intermittent hematuria  No dysuria, no fevers no flank pain  Patient reports in the past 1-2 weeks he also notes a bulge in the right lower quadrant, suspicious that he has a hernia  He does report he hernia repair with mesh he believes about 20 years ago    It is only bothersome if he sneezes coughs or bears down he does admit to constipation for several days after leaving the hospital         Review of Systems   Constitutional: Negative for activity change, appetite change, chills and fever  Respiratory: Negative for cough and shortness of breath  Cardiovascular: Negative for chest pain  Gastrointestinal: Positive for abdominal pain (rlq "bulge")  Negative for constipation, diarrhea, nausea and vomiting  Genitourinary: Positive for hematuria  Negative for decreased urine volume, difficulty urinating, dysuria, frequency, penile pain, penile swelling, scrotal swelling, testicular pain and urgency  Musculoskeletal: Negative for back pain  Skin: Negative for rash and wound  Hematological: Does not bruise/bleed easily                  Past Medical History  Past Medical History:   Diagnosis Date    Kidney stones      Past Social History  Past Surgical History:   Procedure Laterality Date    CARDIAC ELECTROPHYSIOLOGY STUDY AND ABLATION      FL RETROGRADE PYELOGRAM  6/29/2019    HERNIA REPAIR      KIDNEY STONE SURGERY      KNEE CARTILAGE SURGERY      IN CYSTOURETHROSCOPY,URETER CATHETER Right 6/29/2019    Procedure: CYSTOSCOPY RETROGRADE PYELOGRAM WITH INSERTION STENT URETERAL;  Surgeon: Cj Liao MD;  Location: [facility] OR;  Service: Urology     Social History     Tobacco Use   Smoking Status Never Smoker   Smokeless Tobacco Former User     Past Family History  Family History   Problem Relation Age of Onset    Cancer Mother     Cancer Father      Past Social history  Social History     Socioeconomic History    Marital status: /Civil Union     Spouse name: Not on file    Number of children: Not on file    Years of education: Not on file    Highest education level: Not on file   Occupational History    Not on file   Social Needs    Financial resource strain: Not on file    Food insecurity:     Worry: Not on file     Inability: Not on file   [employer] needs: Medical: Not on file     Non-medical: Not on file   Tobacco Use    Smoking status: Never Smoker    Smokeless tobacco: Former User   Substance and Sexual Activity    Alcohol use: Not Currently     Frequency: Monthly or less     Comment: occasionally    Drug use: No    Sexual activity: Not on file   Lifestyle    Physical activity:     Days per week: Not on file     Minutes per session: Not on file    Stress: Not on file   Relationships    Social connections:     Talks on phone: Not on file     Gets together: Not on file     Attends [employer] service: Not on file     Active member of club or organization: Not on file     Attends meetings of clubs or organizations: Not on file     Relationship status: Not on file    Intimate partner violence:     Fear of current or ex partner: Not on file     Emotionally abused: Not on file     Physically abused: Not on file     Forced sexual activity: Not on file   Other Topics Concern    Not on file   Social History Narrative    Not on file     Current Medications  Current Outpatient Medications   Medication Sig Dispense Refill    acetaminophen (TYLENOL) 500 mg tablet Take 2 tablets (1,000 mg total) by mouth every 6 (six) hours as needed (pain, fever) 30 tablet 0    albuterol (PROVENTIL HFA,VENTOLIN HFA) 90 mcg/act inhaler 2 puffs every 4 hours with spacer as needed for wheeze, cough, short of breath  1 Inhaler 0    DULoxetine (CYMBALTA) 60 mg delayed release capsule Take 1 capsule (60 mg total) by mouth daily 2 capsule 0    ondansetron (ZOFRAN) 4 mg tablet Take 1 tablet (4 mg total) by mouth every 8 (eight) hours as needed for nausea for up to 30 doses 30 tablet 0    potassium citrate (UROCIT-K) 10 mEq Take 1 tablet (10 mEq total) by mouth 2 (two) times a day 60 tablet 0    tamsulosin (FLOMAX) 0 4 mg Take 1 capsule (0 4 mg total) by mouth daily with dinner for 15 days 15 capsule 0     No current facility-administered medications for this visit  Allergies  Allergies   Allergen Reactions    Other Itching     Peanuts     Peanut-Containing Drug Products Itching    Sulfa Antibiotics Rash    Toradol [Ketorolac Tromethamine] Rash     Tolerates ibuprofen         The following portions of the patient's history were reviewed and updated as appropriate: allergies, current medications, past medical history, past social history, past surgical history and problem list       Vitals  Vitals:    07/18/19 1501   BP: 122/80   Pulse: 96   Weight: 118 kg (261 lb)   Height: 5' 10" (1 778 m)       Physical Exam   Constitutional: He is oriented to person, place, and time  He appears well-developed and well-nourished  HENT:   Head: Normocephalic and atraumatic  Cardiovascular: Normal rate, regular rhythm and normal heart sounds  No murmur heard  Pulmonary/Chest: Effort normal and breath sounds normal    Abdominal: Soft  Bowel sounds are normal    Small bulge in the right inguinal crease, without large or palpable hernia   Musculoskeletal: Normal range of motion  He exhibits no edema  Neurological: He is alert and oriented to person, place, and time  Skin: Skin is warm and dry  Capillary refill takes less than 2 seconds  No pallor  Nursing note and vitals reviewed  Results  No results found for this or any previous visit (from the past 1 hour(s)) ]  Lab Results   Component Value Date    PSA 0 4 11/16/2017    PSA 0 58 05/23/2014     Lab Results   Component Value Date    GLUCOSE 88 04/14/2015    CALCIUM 8 7 06/30/2019     04/14/2015    K 3 9 06/30/2019    CO2 27 06/30/2019     06/30/2019    BUN 20 06/30/2019    CREATININE 1 13 06/30/2019     Lab Results   Component Value Date    WBC 10 75 (H) 06/29/2019    HGB 14 4 06/29/2019    HCT 42 7 06/29/2019    MCV 92 06/29/2019     06/29/2019         Orders  No orders of the defined types were placed in this encounter

## 2019-07-19 PROBLEM — N20.1 RIGHT URETERAL CALCULUS: Status: ACTIVE | Noted: 2019-07-19

## 2019-07-23 ENCOUNTER — TELEPHONE (OUTPATIENT)
Dept: UROLOGY | Facility: CLINIC | Age: 54
End: 2019-07-23

## 2019-07-23 DIAGNOSIS — N20.1 URETERAL STONE: Primary | ICD-10-CM

## 2019-07-23 NOTE — TELEPHONE ENCOUNTER
Patient returned call, I reviewed his KUB results with him  Plan is to go for CT renal stone study in about a week time  Surgery scheduled for 8/28  Patient requests if office can make arrangements for him to have CT done at [place] early AM or evening any day and call him with time/date   Order is in thank you    -T

## 2019-07-23 NOTE — TELEPHONE ENCOUNTER
LMOM for patient to call back  Has right ureteral stent for UPJ stone, was seen for right groin pain and H&P for surgery last week  Updated KUB shows stent in appropriate position  Stone not visualized but h/o uric acid stones may not be able to see on plain film XRay  Would like him to have repeat CT renal stone study prior to surgery date 8/29/19

## 2019-07-31 ENCOUNTER — HOSPITAL ENCOUNTER (OUTPATIENT)
Dept: CT IMAGING | Facility: HOSPITAL | Age: 54
Discharge: HOME | End: 2019-07-31
Payer: COMMERCIAL

## 2019-07-31 DIAGNOSIS — N20.1 URETERAL STONE: ICD-10-CM

## 2019-07-31 PROCEDURE — 74176 CT ABD & PELVIS W/O CONTRAST: CPT

## 2019-08-06 ENCOUNTER — TELEPHONE (OUTPATIENT)
Dept: UROLOGY | Facility: CLINIC | Age: 54
End: 2019-08-06

## 2019-08-06 NOTE — TELEPHONE ENCOUNTER
I called and got the patients voicemail  I informed him to call back to get the results and message left from our AYAAN Bains

## 2019-08-06 NOTE — TELEPHONE ENCOUNTER
Please notify patient I reviewed CT scan with Dr Scott Moser, stone is back up in the kidney the stent is in good position  Given size of stone and persistent symptoms plan to proceed as scheduled for surgery 8/29

## 2019-08-08 NOTE — TELEPHONE ENCOUNTER
Contacted and spoke with patient in regards to his CT results  Informed patient the kidney stone is in the kidney, stent in good position and to proceed as planned for surgery on the 29th of August   Patient voiced understanding of instructions

## 2019-08-19 ENCOUNTER — TELEPHONE (OUTPATIENT)
Dept: UROLOGY | Facility: MEDICAL CENTER | Age: 54
End: 2019-08-19

## 2019-08-19 NOTE — TELEPHONE ENCOUNTER
Patient managed by Dr Fifi William at the [facility] office  Patient with history of nephrolithiasis  Patient s/p right stent insertion by Dr Davenport Pat 06/29/2019  Patient schedule for ureteroscopy with Dr Fifi William on 08/29/2019  Contacted and spoke with patient and he reports he had 2 episodes of left flank pain over the weekend and then passed a cluster of stones this morning  Patient denies any fever/chills  Discussed with patient will make Yarelis Srivastava aware and if there are any recommendations office will contact patient

## 2019-08-19 NOTE — TELEPHONE ENCOUNTER
The stone previously seen on CT this month on the left side was small 4mm, this might be what he passed over the weekend  The stone on right side is in the kidney above the stent and is too big to pass approx 13 mm  This is the target for surgery next week  Will proceed with surgery as planned

## 2019-08-22 ENCOUNTER — OFFICE VISIT (OUTPATIENT)
Dept: UROLOGY | Facility: CLINIC | Age: 54
End: 2019-08-22
Payer: COMMERCIAL

## 2019-08-22 VITALS
SYSTOLIC BLOOD PRESSURE: 160 MMHG | WEIGHT: 260 LBS | HEART RATE: 86 BPM | DIASTOLIC BLOOD PRESSURE: 90 MMHG | BODY MASS INDEX: 37.31 KG/M2

## 2019-08-22 DIAGNOSIS — N20.1 URETERAL CALCULUS: Primary | ICD-10-CM

## 2019-08-22 PROCEDURE — 99213 OFFICE O/P EST LOW 20 MIN: CPT | Performed by: PHYSICIAN ASSISTANT

## 2019-08-22 NOTE — H&P (VIEW-ONLY)
8/22/2019      Chief Complaint   Patient presents with    Nephrolithiasis    Pre-op Exam         Assessment and Plan    53 y o  male managed by Dr Vera Berumen    1  Right proximal ureteral stone 13 mm  - s/p RIGHT stent 6/29/19  - scheduled for upcoming RIGHT ureteroscopy laser lithotripsy 8/29/19        History of Present Illness  Harshal Tello is a 53 y o  male here for evaluation of preop visit H&P for CYSTOSCOPY RETROGRADE PYELOGRAM RIGHT URETEROSCOPY LASER LITHOTRIPSY STENT EXCHANGE scheduled for 8/29/19 with Dr Vera Berumen at [address]  Consent prepared- to be reviewed and signed with performing surgeon the day of procedure  No overall changes in health since last visit  He has had repeat imaging in the form of KUB which did not visualize stone however h/o uric acid stones  Underwent CT stone study large stone now in renal collecting system, hydronephrosis decompressed with stent  Patient does report having passed additional stone gravel over the past month  He had some left flank pain last week and 2 small stones passed  On prior imaging he did have nonobstructing left renal stone about 4 mm  His symptoms are resolved since  He continues with urinary frequency likely secondary to the stent, this was not present prior to stent insertion  He takes Flomax p r n  For the stent discomfort and frequency  No dysuria, minimal hematuria  No fevers or chills  History of uric acid nephrolithiasis "30 times" since age 47, maintained on potassium citrate 3 times a day, prior PCNL  He has pending nephrology evaluation and 24 hour urine which he has not done yet  Review of Systems   Constitutional: Negative  Negative for activity change, appetite change, chills, fever and unexpected weight change  HENT: Negative  Respiratory: Negative  Negative for shortness of breath  Cardiovascular: Negative  Negative for chest pain     Gastrointestinal: Negative for abdominal pain, diarrhea, nausea and vomiting  Endocrine: Negative  Genitourinary: Positive for flank pain and frequency  Negative for decreased urine volume, difficulty urinating, dysuria, hematuria and urgency  Musculoskeletal: Negative for back pain and gait problem  Skin: Negative  Allergic/Immunologic: Negative  Neurological: Negative  Hematological: Negative for adenopathy  Does not bruise/bleed easily                  Past Medical History  Past Medical History:   Diagnosis Date    Kidney stones      Past Social History  Past Surgical History:   Procedure Laterality Date    CARDIAC ELECTROPHYSIOLOGY STUDY AND ABLATION      FL RETROGRADE PYELOGRAM  6/29/2019    HERNIA REPAIR      KIDNEY STONE SURGERY      KNEE CARTILAGE SURGERY      MI CYSTOURETHROSCOPY,URETER CATHETER Right 6/29/2019    Procedure: CYSTOSCOPY RETROGRADE PYELOGRAM WITH INSERTION STENT URETERAL;  Surgeon: Nataly Dorantes MD;  Location: [facility];  Service: Urology     Social History     Tobacco Use   Smoking Status Never Smoker   Smokeless Tobacco Former User     Past Family History  Family History   Problem Relation Age of Onset    Cancer Mother     Cancer Father      Past Social history  Social History     Socioeconomic History    Marital status: /Civil Union     Spouse name: Not on file    Number of children: Not on file    Years of education: Not on file    Highest education level: Not on file   Occupational History    Not on file   Social Needs    Financial resource strain: Not on file    Food insecurity:     Worry: Not on file     Inability: Not on file    Transportation needs:     Medical: Not on file     Non-medical: Not on file   Tobacco Use    Smoking status: Never Smoker    Smokeless tobacco: Former User   Substance and Sexual Activity    Alcohol use: Yes     Frequency: Monthly or less     Comment: occasionally    Drug use: No    Sexual activity: Not on file   Lifestyle    Physical activity:     Days per week: Not on file     Minutes per session: Not on file    Stress: Not on file   Relationships    Social connections:     Talks on phone: Not on file     Gets together: Not on file     Attends [employer] service: Not on file     Active member of club or organization: Not on file     Attends meetings of clubs or organizations: Not on file     Relationship status: Not on file    Intimate partner violence:     Fear of current or ex partner: Not on file     Emotionally abused: Not on file     Physically abused: Not on file     Forced sexual activity: Not on file   Other Topics Concern    Not on file   Social History Narrative    Not on file     Current Medications  Current Outpatient Medications   Medication Sig Dispense Refill    acetaminophen (TYLENOL) 500 mg tablet Take 2 tablets (1,000 mg total) by mouth every 6 (six) hours as needed (pain, fever) 30 tablet 0    albuterol (PROVENTIL HFA,VENTOLIN HFA) 90 mcg/act inhaler 2 puffs every 4 hours with spacer as needed for wheeze, cough, short of breath  1 Inhaler 0    DULoxetine (CYMBALTA) 60 mg delayed release capsule Take 1 capsule (60 mg total) by mouth daily 2 capsule 0    ondansetron (ZOFRAN) 4 mg tablet Take 1 tablet (4 mg total) by mouth every 8 (eight) hours as needed for nausea for up to 30 doses 30 tablet 0    potassium citrate (UROCIT-K) 10 mEq Take 1 tablet (10 mEq total) by mouth 2 (two) times a day 60 tablet 0    tamsulosin (FLOMAX) 0 4 mg Take 1 capsule (0 4 mg total) by mouth daily with dinner for 15 days 15 capsule 0     No current facility-administered medications for this visit        Allergies  Allergies   Allergen Reactions    Other Itching     Peanuts     Peanut-Containing Drug Products Itching    Sulfa Antibiotics Rash    Toradol [Ketorolac Tromethamine] Rash     Tolerates ibuprofen         The following portions of the patient's history were reviewed and updated as appropriate: allergies, current medications, past medical history, past social history, past surgical history and problem list       Vitals  Vitals:    08/22/19 1138   BP: 160/90   Pulse: 86   Weight: 118 kg (260 lb)       General: Well appearing, no distress, appears stated age  HEENT:  Normocephalic, atraumatic  Conjunctiva clear, PERRL  Oropharynx clear  Moist mucous membranes  Neck supple without palpable lymhadenopathy  Cardiovascular: Heart regular rate and rhythm  Radial pulse +2 bilaterally  No extremity edema  Respiratory: Lungs are clear to auscultation bilaterally without wheeze or rhonchi, respirations are non-labored and symmetrical   Abdomen:  Soft nontender without tympany without hernia  No suprapubic or CVA tenderness  Musculoskeletal:  FROM x 4 extremities with good equal strength  Neuro: No gross neurologic defect or abnormality  Steady unassisted gait  Speech and affect normal   Dermatologic: skin warm, dry; no rash erythema or ecchymosis      Results  No results found for this or any previous visit (from the past 1 hour(s)) ]  Lab Results   Component Value Date    PSA 0 4 11/16/2017    PSA 0 58 05/23/2014     Lab Results   Component Value Date    GLUCOSE 88 04/14/2015    CALCIUM 8 7 06/30/2019     04/14/2015    K 3 9 06/30/2019    CO2 27 06/30/2019     06/30/2019    BUN 20 06/30/2019    CREATININE 1 13 06/30/2019     Lab Results   Component Value Date    WBC 10 75 (H) 06/29/2019    HGB 14 4 06/29/2019    HCT 42 7 06/29/2019    MCV 92 06/29/2019     06/29/2019         Orders  No orders of the defined types were placed in this encounter

## 2019-08-22 NOTE — PROGRESS NOTES
8/22/2019      Chief Complaint   Patient presents with    Nephrolithiasis    Pre-op Exam         Assessment and Plan    53 y o  male managed by Dr Marvel Eldridge    1  Right proximal ureteral stone 13 mm  - s/p RIGHT stent 6/29/19  - scheduled for upcoming RIGHT ureteroscopy laser lithotripsy 8/29/19        History of Present Illness  Mark Anthony Schultz is a 53 y o  male here for evaluation of preop visit H&P for CYSTOSCOPY RETROGRADE PYELOGRAM RIGHT URETEROSCOPY LASER LITHOTRIPSY STENT EXCHANGE scheduled for 8/29/19 with Dr Marvel Eldridge at [address]  Consent prepared- to be reviewed and signed with performing surgeon the day of procedure  No overall changes in health since last visit  He has had repeat imaging in the form of KUB which did not visualize stone however h/o uric acid stones  Underwent CT stone study large stone now in renal collecting system, hydronephrosis decompressed with stent  Patient does report having passed additional stone gravel over the past month  He had some left flank pain last week and 2 small stones passed  On prior imaging he did have nonobstructing left renal stone about 4 mm  His symptoms are resolved since  He continues with urinary frequency likely secondary to the stent, this was not present prior to stent insertion  He takes Flomax p r n  For the stent discomfort and frequency  No dysuria, minimal hematuria  No fevers or chills  History of uric acid nephrolithiasis "30 times" since age 47, maintained on potassium citrate 3 times a day, prior PCNL  He has pending nephrology evaluation and 24 hour urine which he has not done yet  Review of Systems   Constitutional: Negative  Negative for activity change, appetite change, chills, fever and unexpected weight change  HENT: Negative  Respiratory: Negative  Negative for shortness of breath  Cardiovascular: Negative  Negative for chest pain     Gastrointestinal: Negative for abdominal pain, diarrhea, nausea and vomiting  Endocrine: Negative  Genitourinary: Positive for flank pain and frequency  Negative for decreased urine volume, difficulty urinating, dysuria, hematuria and urgency  Musculoskeletal: Negative for back pain and gait problem  Skin: Negative  Allergic/Immunologic: Negative  Neurological: Negative  Hematological: Negative for adenopathy  Does not bruise/bleed easily                  Past Medical History  Past Medical History:   Diagnosis Date    Kidney stones      Past Social History  Past Surgical History:   Procedure Laterality Date    CARDIAC ELECTROPHYSIOLOGY STUDY AND ABLATION      FL RETROGRADE PYELOGRAM  6/29/2019    HERNIA REPAIR      KIDNEY STONE SURGERY      KNEE CARTILAGE SURGERY      DE CYSTOURETHROSCOPY,URETER CATHETER Right 6/29/2019    Procedure: CYSTOSCOPY RETROGRADE PYELOGRAM WITH INSERTION STENT URETERAL;  Surgeon: Bud Scanlon MD;  Location: [facility] OR;  Service: Urology     Social History     Tobacco Use   Smoking Status Never Smoker   Smokeless Tobacco Former User     Past Family History  Family History   Problem Relation Age of Onset    Cancer Mother     Cancer Father      Past Social history  Social History     Socioeconomic History    Marital status: /Civil Union     Spouse name: Not on file    Number of children: Not on file    Years of education: Not on file    Highest education level: Not on file   Occupational History    Not on file   Social Needs    Financial resource strain: Not on file    Food insecurity:     Worry: Not on file     Inability: Not on file    Transportation needs:     Medical: Not on file     Non-medical: Not on file   Tobacco Use    Smoking status: Never Smoker    Smokeless tobacco: Former User   Substance and Sexual Activity    Alcohol use: Yes     Frequency: Monthly or less     Comment: occasionally    Drug use: No    Sexual activity: Not on file   Lifestyle    Physical activity:     Days per week: Not on file     Minutes per session: Not on file    Stress: Not on file   Relationships    Social connections:     Talks on phone: Not on file     Gets together: Not on file     Attends Gnosticist service: Not on file     Active member of club or organization: Not on file     Attends meetings of clubs or organizations: Not on file     Relationship status: Not on file    Intimate partner violence:     Fear of current or ex partner: Not on file     Emotionally abused: Not on file     Physically abused: Not on file     Forced sexual activity: Not on file   Other Topics Concern    Not on file   Social History Narrative    Not on file     Current Medications  Current Outpatient Medications   Medication Sig Dispense Refill    acetaminophen (TYLENOL) 500 mg tablet Take 2 tablets (1,000 mg total) by mouth every 6 (six) hours as needed (pain, fever) 30 tablet 0    albuterol (PROVENTIL HFA,VENTOLIN HFA) 90 mcg/act inhaler 2 puffs every 4 hours with spacer as needed for wheeze, cough, short of breath  1 Inhaler 0    DULoxetine (CYMBALTA) 60 mg delayed release capsule Take 1 capsule (60 mg total) by mouth daily 2 capsule 0    ondansetron (ZOFRAN) 4 mg tablet Take 1 tablet (4 mg total) by mouth every 8 (eight) hours as needed for nausea for up to 30 doses 30 tablet 0    potassium citrate (UROCIT-K) 10 mEq Take 1 tablet (10 mEq total) by mouth 2 (two) times a day 60 tablet 0    tamsulosin (FLOMAX) 0 4 mg Take 1 capsule (0 4 mg total) by mouth daily with dinner for 15 days 15 capsule 0     No current facility-administered medications for this visit        Allergies  Allergies   Allergen Reactions    Other Itching     Peanuts     Peanut-Containing Drug Products Itching    Sulfa Antibiotics Rash    Toradol [Ketorolac Tromethamine] Rash     Tolerates ibuprofen         The following portions of the patient's history were reviewed and updated as appropriate: allergies, current medications, past medical history, past social history, past surgical history and problem list       Vitals  Vitals:    08/22/19 1138   BP: 160/90   Pulse: 86   Weight: 118 kg (260 lb)       General: Well appearing, no distress, appears stated age  HEENT:  Normocephalic, atraumatic  Conjunctiva clear, PERRL  Oropharynx clear  Moist mucous membranes  Neck supple without palpable lymhadenopathy  Cardiovascular: Heart regular rate and rhythm  Radial pulse +2 bilaterally  No extremity edema  Respiratory: Lungs are clear to auscultation bilaterally without wheeze or rhonchi, respirations are non-labored and symmetrical   Abdomen:  Soft nontender without tympany without hernia  No suprapubic or CVA tenderness  Musculoskeletal:  FROM x 4 extremities with good equal strength  Neuro: No gross neurologic defect or abnormality  Steady unassisted gait  Speech and affect normal   Dermatologic: skin warm, dry; no rash erythema or ecchymosis      Results  No results found for this or any previous visit (from the past 1 hour(s)) ]  Lab Results   Component Value Date    PSA 0 4 11/16/2017    PSA 0 58 05/23/2014     Lab Results   Component Value Date    GLUCOSE 88 04/14/2015    CALCIUM 8 7 06/30/2019     04/14/2015    K 3 9 06/30/2019    CO2 27 06/30/2019     06/30/2019    BUN 20 06/30/2019    CREATININE 1 13 06/30/2019     Lab Results   Component Value Date    WBC 10 75 (H) 06/29/2019    HGB 14 4 06/29/2019    HCT 42 7 06/29/2019    MCV 92 06/29/2019     06/29/2019         Orders  No orders of the defined types were placed in this encounter

## 2019-08-26 ENCOUNTER — APPOINTMENT (OUTPATIENT)
Dept: LAB | Facility: HOSPITAL | Age: 54
End: 2019-08-26
Attending: UROLOGY
Payer: COMMERCIAL

## 2019-08-26 DIAGNOSIS — N20.1 RIGHT URETERAL CALCULUS: ICD-10-CM

## 2019-08-26 PROCEDURE — 87086 URINE CULTURE/COLONY COUNT: CPT

## 2019-08-27 LAB — BACTERIA UR CULT: NORMAL

## 2019-08-28 ENCOUNTER — ANESTHESIA EVENT (OUTPATIENT)
Dept: PERIOP | Facility: HOSPITAL | Age: 54
End: 2019-08-28
Payer: COMMERCIAL

## 2019-08-28 ENCOUNTER — HOSPITAL ENCOUNTER (OUTPATIENT)
Dept: NON INVASIVE DIAGNOSTICS | Facility: HOSPITAL | Age: 54
Discharge: HOME | End: 2019-08-28
Attending: UROLOGY
Payer: COMMERCIAL

## 2019-08-28 DIAGNOSIS — N20.1 RIGHT URETERAL CALCULUS: ICD-10-CM

## 2019-08-28 PROCEDURE — 93005 ELECTROCARDIOGRAM TRACING: CPT

## 2019-08-29 ENCOUNTER — HOSPITAL ENCOUNTER (OUTPATIENT)
Facility: HOSPITAL | Age: 54
Setting detail: OBSERVATION
Discharge: HOME | End: 2019-08-30
Attending: UROLOGY | Admitting: UROLOGY
Payer: COMMERCIAL

## 2019-08-29 ENCOUNTER — APPOINTMENT (OUTPATIENT)
Dept: RADIOLOGY | Facility: HOSPITAL | Age: 54
End: 2019-08-29
Payer: COMMERCIAL

## 2019-08-29 ENCOUNTER — TELEPHONE (OUTPATIENT)
Dept: UROLOGY | Facility: MEDICAL CENTER | Age: 54
End: 2019-08-29

## 2019-08-29 ENCOUNTER — PREP FOR PROCEDURE (OUTPATIENT)
Dept: UROLOGY | Facility: CLINIC | Age: 54
End: 2019-08-29

## 2019-08-29 ENCOUNTER — ANESTHESIA (OUTPATIENT)
Dept: PERIOP | Facility: HOSPITAL | Age: 54
End: 2019-08-29
Payer: COMMERCIAL

## 2019-08-29 DIAGNOSIS — Z96.0 RETAINED URETERAL STENT: Primary | ICD-10-CM

## 2019-08-29 DIAGNOSIS — N20.1 RIGHT URETERAL CALCULUS: Primary | ICD-10-CM

## 2019-08-29 PROCEDURE — C1769 GUIDE WIRE: HCPCS | Performed by: UROLOGY

## 2019-08-29 PROCEDURE — 94664 DEMO&/EVAL PT USE INHALER: CPT

## 2019-08-29 PROCEDURE — 52353 CYSTOURETERO W/LITHOTRIPSY: CPT | Performed by: UROLOGY

## 2019-08-29 PROCEDURE — 74420 UROGRAPHY RTRGR +-KUB: CPT

## 2019-08-29 PROCEDURE — C1758 CATHETER, URETERAL: HCPCS | Performed by: UROLOGY

## 2019-08-29 RX ORDER — DOCUSATE SODIUM 100 MG/1
100 CAPSULE, LIQUID FILLED ORAL 2 TIMES DAILY
Status: DISCONTINUED | OUTPATIENT
Start: 2019-08-29 | End: 2019-08-30 | Stop reason: HOSPADM

## 2019-08-29 RX ORDER — ALBUTEROL SULFATE 2.5 MG/3ML
2.5 SOLUTION RESPIRATORY (INHALATION) EVERY 6 HOURS PRN
Status: DISCONTINUED | OUTPATIENT
Start: 2019-08-29 | End: 2019-08-30 | Stop reason: HOSPADM

## 2019-08-29 RX ORDER — HYDROCODONE BITARTRATE AND ACETAMINOPHEN 5; 325 MG/1; MG/1
2 TABLET ORAL EVERY 4 HOURS PRN
Status: DISCONTINUED | OUTPATIENT
Start: 2019-08-29 | End: 2019-08-29

## 2019-08-29 RX ORDER — ONDANSETRON 4 MG/1
4 TABLET, ORALLY DISINTEGRATING ORAL EVERY 6 HOURS PRN
Status: DISCONTINUED | OUTPATIENT
Start: 2019-08-29 | End: 2019-08-30 | Stop reason: HOSPADM

## 2019-08-29 RX ORDER — FENTANYL CITRATE/PF 50 MCG/ML
50 SYRINGE (ML) INJECTION
Status: DISCONTINUED | OUTPATIENT
Start: 2019-08-29 | End: 2019-08-29 | Stop reason: HOSPADM

## 2019-08-29 RX ORDER — SUCCINYLCHOLINE/SOD CL,ISO/PF 100 MG/5ML
SYRINGE (ML) INTRAVENOUS AS NEEDED
Status: DISCONTINUED | OUTPATIENT
Start: 2019-08-29 | End: 2019-08-29 | Stop reason: SURG

## 2019-08-29 RX ORDER — PROPOFOL 10 MG/ML
INJECTION, EMULSION INTRAVENOUS AS NEEDED
Status: DISCONTINUED | OUTPATIENT
Start: 2019-08-29 | End: 2019-08-29 | Stop reason: SURG

## 2019-08-29 RX ORDER — SODIUM CHLORIDE, SODIUM LACTATE, POTASSIUM CHLORIDE, CALCIUM CHLORIDE 600; 310; 30; 20 MG/100ML; MG/100ML; MG/100ML; MG/100ML
125 INJECTION, SOLUTION INTRAVENOUS CONTINUOUS
Status: DISPENSED | OUTPATIENT
Start: 2019-08-29 | End: 2019-08-29

## 2019-08-29 RX ORDER — HYDROCODONE BITARTRATE AND ACETAMINOPHEN 5; 325 MG/1; MG/1
1 TABLET ORAL EVERY 4 HOURS PRN
Status: DISCONTINUED | OUTPATIENT
Start: 2019-08-29 | End: 2019-08-29

## 2019-08-29 RX ORDER — SODIUM CHLORIDE, SODIUM LACTATE, POTASSIUM CHLORIDE, CALCIUM CHLORIDE 600; 310; 30; 20 MG/100ML; MG/100ML; MG/100ML; MG/100ML
125 INJECTION, SOLUTION INTRAVENOUS CONTINUOUS
Status: DISCONTINUED | OUTPATIENT
Start: 2019-08-29 | End: 2019-08-30 | Stop reason: HOSPADM

## 2019-08-29 RX ORDER — LEVOFLOXACIN 5 MG/ML
750 INJECTION, SOLUTION INTRAVENOUS ONCE
Status: CANCELLED | OUTPATIENT
Start: 2019-08-29 | End: 2019-08-29

## 2019-08-29 RX ORDER — CEFAZOLIN SODIUM 2 G/50ML
2000 SOLUTION INTRAVENOUS ONCE
Status: COMPLETED | OUTPATIENT
Start: 2019-08-29 | End: 2019-08-29

## 2019-08-29 RX ORDER — MIDAZOLAM HYDROCHLORIDE 1 MG/ML
INJECTION INTRAMUSCULAR; INTRAVENOUS AS NEEDED
Status: DISCONTINUED | OUTPATIENT
Start: 2019-08-29 | End: 2019-08-29 | Stop reason: SURG

## 2019-08-29 RX ORDER — ONDANSETRON 2 MG/ML
4 INJECTION INTRAMUSCULAR; INTRAVENOUS ONCE AS NEEDED
Status: DISCONTINUED | OUTPATIENT
Start: 2019-08-29 | End: 2019-08-29 | Stop reason: HOSPADM

## 2019-08-29 RX ORDER — LEVOFLOXACIN 500 MG/1
500 TABLET, FILM COATED ORAL DAILY
Status: COMPLETED | OUTPATIENT
Start: 2019-08-29 | End: 2019-08-29

## 2019-08-29 RX ORDER — FENTANYL CITRATE 50 UG/ML
INJECTION, SOLUTION INTRAMUSCULAR; INTRAVENOUS AS NEEDED
Status: DISCONTINUED | OUTPATIENT
Start: 2019-08-29 | End: 2019-08-29 | Stop reason: SURG

## 2019-08-29 RX ORDER — LIDOCAINE HYDROCHLORIDE 10 MG/ML
INJECTION, SOLUTION INFILTRATION; PERINEURAL AS NEEDED
Status: DISCONTINUED | OUTPATIENT
Start: 2019-08-29 | End: 2019-08-29 | Stop reason: SURG

## 2019-08-29 RX ORDER — LIDOCAINE HYDROCHLORIDE 20 MG/ML
JELLY TOPICAL AS NEEDED
Status: DISCONTINUED | OUTPATIENT
Start: 2019-08-29 | End: 2019-08-29 | Stop reason: HOSPADM

## 2019-08-29 RX ORDER — ATROPA BELLADONNA AND OPIUM 16.2; 3 MG/1; MG/1
SUPPOSITORY RECTAL AS NEEDED
Status: DISCONTINUED | OUTPATIENT
Start: 2019-08-29 | End: 2019-08-29 | Stop reason: HOSPADM

## 2019-08-29 RX ORDER — HYDROMORPHONE HCL/PF 1 MG/ML
0.5 SYRINGE (ML) INJECTION EVERY 2 HOUR PRN
Status: DISCONTINUED | OUTPATIENT
Start: 2019-08-29 | End: 2019-08-29

## 2019-08-29 RX ORDER — MAGNESIUM HYDROXIDE 1200 MG/15ML
LIQUID ORAL AS NEEDED
Status: DISCONTINUED | OUTPATIENT
Start: 2019-08-29 | End: 2019-08-29 | Stop reason: HOSPADM

## 2019-08-29 RX ORDER — ONDANSETRON 2 MG/ML
INJECTION INTRAMUSCULAR; INTRAVENOUS AS NEEDED
Status: DISCONTINUED | OUTPATIENT
Start: 2019-08-29 | End: 2019-08-29 | Stop reason: SURG

## 2019-08-29 RX ADMIN — FENTANYL CITRATE 100 MCG: 50 INJECTION INTRAMUSCULAR; INTRAVENOUS at 09:13

## 2019-08-29 RX ADMIN — LIDOCAINE HYDROCHLORIDE 50 MG: 10 INJECTION, SOLUTION INFILTRATION; PERINEURAL at 08:43

## 2019-08-29 RX ADMIN — SODIUM CHLORIDE, SODIUM LACTATE, POTASSIUM CHLORIDE, AND CALCIUM CHLORIDE 125 ML/HR: .6; .31; .03; .02 INJECTION, SOLUTION INTRAVENOUS at 19:42

## 2019-08-29 RX ADMIN — DOCUSATE SODIUM 100 MG: 100 CAPSULE, LIQUID FILLED ORAL at 13:57

## 2019-08-29 RX ADMIN — SODIUM CHLORIDE, SODIUM LACTATE, POTASSIUM CHLORIDE, AND CALCIUM CHLORIDE 125 ML/HR: .6; .31; .03; .02 INJECTION, SOLUTION INTRAVENOUS at 08:08

## 2019-08-29 RX ADMIN — FENTANYL CITRATE 50 MCG: 50 INJECTION, SOLUTION INTRAMUSCULAR; INTRAVENOUS at 11:08

## 2019-08-29 RX ADMIN — MIDAZOLAM HYDROCHLORIDE 2 MG: 1 INJECTION, SOLUTION INTRAMUSCULAR; INTRAVENOUS at 08:43

## 2019-08-29 RX ADMIN — FENTANYL CITRATE 100 MCG: 50 INJECTION INTRAMUSCULAR; INTRAVENOUS at 09:44

## 2019-08-29 RX ADMIN — LEVOFLOXACIN 500 MG: 500 TABLET, FILM COATED ORAL at 13:57

## 2019-08-29 RX ADMIN — SODIUM CHLORIDE, SODIUM LACTATE, POTASSIUM CHLORIDE, AND CALCIUM CHLORIDE 125 ML/HR: .6; .31; .03; .02 INJECTION, SOLUTION INTRAVENOUS at 11:42

## 2019-08-29 RX ADMIN — HYDROMORPHONE HYDROCHLORIDE 0.5 MG: 1 INJECTION, SOLUTION INTRAMUSCULAR; INTRAVENOUS; SUBCUTANEOUS at 12:14

## 2019-08-29 RX ADMIN — FENTANYL CITRATE 100 MCG: 50 INJECTION INTRAMUSCULAR; INTRAVENOUS at 08:59

## 2019-08-29 RX ADMIN — ONDANSETRON HYDROCHLORIDE 4 MG: 2 INJECTION, SOLUTION INTRAVENOUS at 08:43

## 2019-08-29 RX ADMIN — FENTANYL CITRATE 50 MCG: 50 INJECTION, SOLUTION INTRAMUSCULAR; INTRAVENOUS at 10:47

## 2019-08-29 RX ADMIN — Medication 100 MG: at 08:45

## 2019-08-29 RX ADMIN — Medication: at 13:45

## 2019-08-29 RX ADMIN — PROPOFOL 200 MG: 10 INJECTION, EMULSION INTRAVENOUS at 08:45

## 2019-08-29 RX ADMIN — HYDROCODONE BITARTRATE AND ACETAMINOPHEN 2 TABLET: 5; 325 TABLET ORAL at 11:39

## 2019-08-29 RX ADMIN — CEFAZOLIN SODIUM 2000 MG: 2 SOLUTION INTRAVENOUS at 08:40

## 2019-08-29 NOTE — PLAN OF CARE
Problem: Prexisting or High Potential for Compromised Skin Integrity  Goal: Skin integrity is maintained or improved  Description  INTERVENTIONS:  - Identify patients at risk for skin breakdown  - Assess and monitor skin integrity  - Assess and monitor nutrition and hydration status  - Monitor labs   - Turn and reposition patient as needed  - Assist with mobility/ambulation, CGA with ambulation  - Relieve pressure over bony prominences  - Avoid friction and shearing  - Provide appropriate hygiene as needed including keeping skin clean and dry  - Evaluate need for skin moisturizer/barrier cream  - Collaborate with interdisciplinary team   - Patient/family teaching  - Consider wound care consult    Outcome: Progressing     Problem: PAIN - ADULT  Goal: Verbalizes/displays adequate comfort level or baseline comfort level  Description  Interventions:  - Encourage patient to monitor pain and request assistance  - Assess pain using appropriate pain scale; 0-10 pain scale  - Administer analgesics based on type and severity of pain and evaluate response  - Implement non-pharmacological measures as appropriate and evaluate response  - Consider cultural and social influences on pain and pain management  - Notify physician/advanced practitioner if interventions unsuccessful or patient reports new pain   Outcome: Progressing     Problem: INFECTION - ADULT  Goal: Absence or prevention of progression during hospitalization  Description  INTERVENTIONS:  - Assess and monitor for signs and symptoms of infection  - Monitor lab/diagnostic results  - Monitor all insertion sites, IV, urinary catheter  - [place] appropriate cooling/warming therapies per order  - Administer medications as ordered  - Instruct and encourage patient and family to use good hand hygiene technique   Outcome: Progressing     Problem: SAFETY ADULT  Goal: Patient will remain free of falls  Description  INTERVENTIONS:  - Assess patient frequently for physical needs  -  Identify cognitive and physical deficits and behaviors that affect risk of falls  Pain, pain medication, urinary catheter  -  [place] fall precautions as indicated by assessment  Low fall risk   - Educate patient/family on patient safety including physical limitations  - Instruct patient to call for assistance with activity based on assessment  - Modify environment to reduce risk of injury  - Consider OT/PT consult to assist with strengthening/mobility    Outcome: Progressing  Goal: Maintain or return to baseline ADL function  Description  INTERVENTIONS:  -  Assess patient's ability to carry out ADLs; Independent after set-up  - Assess/evaluate cause of self-care deficits   - Assess range of motion  - Assess patient's mobility; CGA  - Encourage maximum independence but intervene and supervise when necessary  - Involve family in performance of ADLs  - Assess for home care needs following discharge   - Consider OT consult to assist with ADL evaluation and planning for discharge  - Provide patient education as appropriate   Outcome: Progressing  Goal: Maintain or return mobility status to optimal level  Description  INTERVENTIONS:  - Assess patient's baseline mobility status -independent  - Identify cognitive and physical deficits and behaviors that affect mobility  Pain, pain medication, urinary catheter  - [place] fall precautions as indicated by assessment   Low fall risk   - Record patient progress and toleration of activity level on Mobility SBAR; progress patient to next Phase/Stage  - Instruct patient to call for assistance with activity based on assessment  - Consider rehabilitation consult to assist with strengthening/weightbearing, etc    Outcome: Progressing     Problem: DISCHARGE PLANNING  Goal: Discharge to home or other facility with appropriate resources  Description  INTERVENTIONS:  - Identify barriers to discharge w/patient and caregiver  - Arrange for needed discharge resources and transportation as appropriate  - Identify discharge learning needs (meds, wound care, etc )  - Refer to Case Management Department for coordinating discharge planning if the patient needs post-hospital services based on physician/advanced practitioner order or complex needs related to functional status, cognitive ability, or social support system   Outcome: Progressing     Problem: Knowledge Deficit  Goal: Patient/family/caregiver demonstrates understanding of disease process, treatment plan, medications, and discharge instructions  Description  Complete learning assessment and assess knowledge base    Interventions:  - Provide teaching at level of understanding  - Provide teaching via preferred learning methods  Outcome: Progressing     Problem: GENITOURINARY - ADULT  Goal: Urinary catheter remains patent  Description  INTERVENTIONS:  - Assess patency of urinary catheter  - Follow guidelines for intermittent irrigation of non-functioning urinary catheter   Outcome: Progressing     Problem: METABOLIC, FLUID AND ELECTROLYTES - ADULT  Goal: Fluid balance maintained  Description  INTERVENTIONS:  - Monitor labs   - Monitor I/O and WT  - Instruct patient on fluid and nutrition as appropriate  - Assess for signs & symptoms of volume excess or deficit  Outcome: Progressing

## 2019-08-29 NOTE — TELEPHONE ENCOUNTER
Rella Bamberger from [facility] calling to get someone from clinical to call  Paged [facility]

## 2019-08-29 NOTE — ANESTHESIA PREPROCEDURE EVALUATION
Review of Systems/Medical History  Patient summary reviewed  Chart reviewed  No history of anesthetic complications     Cardiovascular  Exercise tolerance (METS): >4,  Dysrhythmias (hx SVT ablation) ,    Pulmonary  Not a smoker ,   Comment: Pt denies hx NAVIN     GI/Hepatic  Negative GI/hepatic ROS          Kidney stones,        Endo/Other    Comment: Hx alcohol abuse Obesity (BMI 37)    GYN       Hematology  Negative hematology ROS      Musculoskeletal  Negative musculoskeletal ROS        Neurology  Negative neurology ROS      Psychology   Anxiety, Depression ,            Physical Exam    Airway    Mallampati score: II  TM Distance: >3 FB  Neck ROM: full     Dental   Comment: Pt reports upper left molar causes pain - evaluated by dentist and just watching - is not actually loose,     Cardiovascular  Rhythm: regular, Rate: normal,     Pulmonary      Other Findings      Lab Results   Component Value Date    WBC 10 75 (H) 06/29/2019    HGB 14 4 06/29/2019     06/29/2019     Lab Results   Component Value Date    SODIUM 141 06/30/2019    K 3 9 06/30/2019    BUN 20 06/30/2019    CREATININE 1 13 06/30/2019    GLUCOSE 88 04/14/2015     Anesthesia Plan  ASA Score- 2     Anesthesia Type- general with ASA Monitors  Additional Monitors:   Airway Plan: LMA  Comment: Toradol allergy but is able to take other NSAIDs  Plan Factors-    Induction- intravenous  Postoperative Plan-     Informed Consent- Anesthetic plan and risks discussed with patient and spouse  I personally reviewed this patient with the CRNA  Discussed and agreed on the Anesthesia Plan with the CRNA  Georgette Ormond

## 2019-08-29 NOTE — INTERVAL H&P NOTE
H&P reviewed  After examining the patient I find no changes in the patients condition since the H&P had been written  Refiewed plan for cystoscopy, [RIGHT] retrograde pyelogram, ureteroscopy with possible laser lithotripsy and basket extraction of stone, stent placement  Risks and benefits discussed, informed consent signed        Vitals:    08/29/19 0749   BP: 132/81   Pulse: 74   Resp: 18   Temp: (!) 97 4 °F (36 3 °C)   SpO2: 96%

## 2019-08-29 NOTE — ANESTHESIA POSTPROCEDURE EVALUATION
Post-Op Assessment Note    CV Status:  Stable  Pain Score: 1    Pain management: adequate     Mental Status:  Alert   Hydration Status:  Stable   PONV Controlled:  Controlled   Airway Patency:  Patent   Post Op Vitals Reviewed: Yes      Staff: CRNA           BP      Temp  97 9   Pulse     Resp      SpO2

## 2019-08-30 ENCOUNTER — TELEPHONE (OUTPATIENT)
Dept: UROLOGY | Facility: CLINIC | Age: 54
End: 2019-08-30

## 2019-08-30 ENCOUNTER — APPOINTMENT (OUTPATIENT)
Dept: RADIOLOGY | Facility: HOSPITAL | Age: 54
End: 2019-08-30
Attending: UROLOGY
Payer: COMMERCIAL

## 2019-08-30 VITALS
OXYGEN SATURATION: 95 % | WEIGHT: 260 LBS | SYSTOLIC BLOOD PRESSURE: 126 MMHG | HEART RATE: 76 BPM | BODY MASS INDEX: 37.22 KG/M2 | DIASTOLIC BLOOD PRESSURE: 82 MMHG | RESPIRATION RATE: 20 BRPM | HEIGHT: 70 IN | TEMPERATURE: 98.1 F

## 2019-08-30 PROBLEM — Z96.0 RETAINED URETERAL STENT: Status: ACTIVE | Noted: 2019-08-30

## 2019-08-30 LAB
APTT PPP: 28 SECONDS (ref 23–37)
ATRIAL RATE: 78 BPM
INR PPP: 1.07 (ref 0.84–1.19)
P AXIS: 10 DEGREES
PR INTERVAL: 164 MS
PROTHROMBIN TIME: 13.9 SECONDS (ref 11.6–14.5)
QRS AXIS: 97 DEGREES
QRSD INTERVAL: 72 MS
QT INTERVAL: 376 MS
QTC INTERVAL: 428 MS
T WAVE AXIS: 64 DEGREES
VENTRICULAR RATE: 78 BPM

## 2019-08-30 PROCEDURE — C1729 CATH, DRAINAGE: HCPCS

## 2019-08-30 PROCEDURE — 93010 ELECTROCARDIOGRAM REPORT: CPT | Performed by: INTERNAL MEDICINE

## 2019-08-30 PROCEDURE — 50432 PLMT NEPHROSTOMY CATHETER: CPT | Performed by: RADIOLOGY

## 2019-08-30 PROCEDURE — NC001 PR NO CHARGE: Performed by: RADIOLOGY

## 2019-08-30 PROCEDURE — 75901 REMOVE CVA DEVICE OBSTRUCT: CPT

## 2019-08-30 PROCEDURE — C1894 INTRO/SHEATH, NON-LASER: HCPCS

## 2019-08-30 PROCEDURE — 50432 PLMT NEPHROSTOMY CATHETER: CPT

## 2019-08-30 PROCEDURE — 99152 MOD SED SAME PHYS/QHP 5/>YRS: CPT | Performed by: RADIOLOGY

## 2019-08-30 PROCEDURE — 76000 FLUOROSCOPY <1 HR PHYS/QHP: CPT

## 2019-08-30 PROCEDURE — C1769 GUIDE WIRE: HCPCS

## 2019-08-30 PROCEDURE — C1773 RET DEV, INSERTABLE: HCPCS

## 2019-08-30 PROCEDURE — 99225 PR SBSQ OBSERVATION CARE/DAY 25 MINUTES: CPT | Performed by: PHYSICIAN ASSISTANT

## 2019-08-30 PROCEDURE — 85610 PROTHROMBIN TIME: CPT | Performed by: UROLOGY

## 2019-08-30 PROCEDURE — 50384 REMOVE URETER STENT PERCUT: CPT | Performed by: RADIOLOGY

## 2019-08-30 PROCEDURE — 85730 THROMBOPLASTIN TIME PARTIAL: CPT | Performed by: UROLOGY

## 2019-08-30 PROCEDURE — 99217 PR OBSERVATION CARE DISCHARGE MANAGEMENT: CPT | Performed by: PHYSICIAN ASSISTANT

## 2019-08-30 RX ORDER — DOCUSATE SODIUM 100 MG/1
100 CAPSULE, LIQUID FILLED ORAL 2 TIMES DAILY
Qty: 10 CAPSULE | Refills: 0 | Status: SHIPPED | OUTPATIENT
Start: 2019-08-30 | End: 2019-09-26 | Stop reason: HOSPADM

## 2019-08-30 RX ORDER — FENTANYL CITRATE 50 UG/ML
INJECTION, SOLUTION INTRAMUSCULAR; INTRAVENOUS CODE/TRAUMA/SEDATION MEDICATION
Status: COMPLETED | OUTPATIENT
Start: 2019-08-30 | End: 2019-08-30

## 2019-08-30 RX ORDER — HYDROCODONE BITARTRATE AND ACETAMINOPHEN 5; 325 MG/1; MG/1
1 TABLET ORAL EVERY 6 HOURS PRN
Qty: 10 TABLET | Refills: 0 | Status: SHIPPED | OUTPATIENT
Start: 2019-08-30 | End: 2019-09-09

## 2019-08-30 RX ORDER — LIDOCAINE HYDROCHLORIDE 10 MG/ML
INJECTION, SOLUTION INFILTRATION; PERINEURAL CODE/TRAUMA/SEDATION MEDICATION
Status: COMPLETED | OUTPATIENT
Start: 2019-08-30 | End: 2019-08-30

## 2019-08-30 RX ORDER — OXYCODONE HYDROCHLORIDE AND ACETAMINOPHEN 5; 325 MG/1; MG/1
1 TABLET ORAL EVERY 4 HOURS PRN
Qty: 10 TABLET | Refills: 0 | Status: SHIPPED | OUTPATIENT
Start: 2019-08-30 | End: 2019-09-09

## 2019-08-30 RX ORDER — ONDANSETRON 4 MG/1
4 TABLET, ORALLY DISINTEGRATING ORAL EVERY 6 HOURS PRN
Qty: 20 TABLET | Refills: 0 | Status: SHIPPED | OUTPATIENT
Start: 2019-08-30 | End: 2019-09-05 | Stop reason: SDUPTHER

## 2019-08-30 RX ORDER — OXYCODONE HYDROCHLORIDE AND ACETAMINOPHEN 5; 325 MG/1; MG/1
1 TABLET ORAL EVERY 4 HOURS PRN
Status: DISCONTINUED | OUTPATIENT
Start: 2019-08-30 | End: 2019-08-30 | Stop reason: HOSPADM

## 2019-08-30 RX ORDER — MIDAZOLAM HYDROCHLORIDE 1 MG/ML
INJECTION INTRAMUSCULAR; INTRAVENOUS CODE/TRAUMA/SEDATION MEDICATION
Status: COMPLETED | OUTPATIENT
Start: 2019-08-30 | End: 2019-08-30

## 2019-08-30 RX ORDER — ONDANSETRON 2 MG/ML
INJECTION INTRAMUSCULAR; INTRAVENOUS CODE/TRAUMA/SEDATION MEDICATION
Status: COMPLETED | OUTPATIENT
Start: 2019-08-30 | End: 2019-08-30

## 2019-08-30 RX ORDER — OXYCODONE HYDROCHLORIDE AND ACETAMINOPHEN 5; 325 MG/1; MG/1
1 TABLET ORAL EVERY 4 HOURS PRN
Qty: 10 TABLET | Refills: 0 | Status: SHIPPED | OUTPATIENT
Start: 2019-08-30 | End: 2019-09-05 | Stop reason: SDUPTHER

## 2019-08-30 RX ADMIN — FENTANYL CITRATE 50 MCG: 50 INJECTION, SOLUTION INTRAMUSCULAR; INTRAVENOUS at 07:32

## 2019-08-30 RX ADMIN — LIDOCAINE HYDROCHLORIDE 10 ML: 10 INJECTION, SOLUTION INFILTRATION; PERINEURAL at 07:33

## 2019-08-30 RX ADMIN — SODIUM CHLORIDE, SODIUM LACTATE, POTASSIUM CHLORIDE, AND CALCIUM CHLORIDE 125 ML/HR: .6; .31; .03; .02 INJECTION, SOLUTION INTRAVENOUS at 08:25

## 2019-08-30 RX ADMIN — FENTANYL CITRATE 50 MCG: 50 INJECTION, SOLUTION INTRAMUSCULAR; INTRAVENOUS at 07:54

## 2019-08-30 RX ADMIN — FENTANYL CITRATE 25 MCG: 50 INJECTION, SOLUTION INTRAMUSCULAR; INTRAVENOUS at 08:16

## 2019-08-30 RX ADMIN — MIDAZOLAM HYDROCHLORIDE 0.5 MG: 1 INJECTION, SOLUTION INTRAMUSCULAR; INTRAVENOUS at 08:16

## 2019-08-30 RX ADMIN — FENTANYL CITRATE 25 MCG: 50 INJECTION, SOLUTION INTRAMUSCULAR; INTRAVENOUS at 07:34

## 2019-08-30 RX ADMIN — IOHEXOL 20 ML: 300 INJECTION, SOLUTION INTRAVENOUS at 07:42

## 2019-08-30 RX ADMIN — FENTANYL CITRATE 25 MCG: 50 INJECTION, SOLUTION INTRAMUSCULAR; INTRAVENOUS at 07:45

## 2019-08-30 RX ADMIN — FENTANYL CITRATE 25 MCG: 50 INJECTION, SOLUTION INTRAMUSCULAR; INTRAVENOUS at 08:01

## 2019-08-30 RX ADMIN — ONDANSETRON HYDROCHLORIDE 4 MG: 2 INJECTION, SOLUTION INTRAVENOUS at 07:11

## 2019-08-30 RX ADMIN — MIDAZOLAM HYDROCHLORIDE 1 MG: 1 INJECTION, SOLUTION INTRAMUSCULAR; INTRAVENOUS at 07:32

## 2019-08-30 RX ADMIN — MIDAZOLAM HYDROCHLORIDE 1 MG: 1 INJECTION, SOLUTION INTRAMUSCULAR; INTRAVENOUS at 07:28

## 2019-08-30 RX ADMIN — MIDAZOLAM HYDROCHLORIDE 0.5 MG: 1 INJECTION, SOLUTION INTRAMUSCULAR; INTRAVENOUS at 07:50

## 2019-08-30 RX ADMIN — MIDAZOLAM HYDROCHLORIDE 0.5 MG: 1 INJECTION, SOLUTION INTRAMUSCULAR; INTRAVENOUS at 07:54

## 2019-08-30 RX ADMIN — MIDAZOLAM HYDROCHLORIDE 0.5 MG: 1 INJECTION, SOLUTION INTRAMUSCULAR; INTRAVENOUS at 07:39

## 2019-08-30 RX ADMIN — DOCUSATE SODIUM 100 MG: 100 CAPSULE, LIQUID FILLED ORAL at 09:52

## 2019-08-30 RX ADMIN — FENTANYL CITRATE 50 MCG: 50 INJECTION, SOLUTION INTRAMUSCULAR; INTRAVENOUS at 08:25

## 2019-08-30 RX ADMIN — OXYCODONE HYDROCHLORIDE AND ACETAMINOPHEN 1 TABLET: 5; 325 TABLET ORAL at 13:12

## 2019-08-30 RX ADMIN — FENTANYL CITRATE 50 MCG: 50 INJECTION, SOLUTION INTRAMUSCULAR; INTRAVENOUS at 07:50

## 2019-08-30 RX ADMIN — SODIUM CHLORIDE, SODIUM LACTATE, POTASSIUM CHLORIDE, AND CALCIUM CHLORIDE 125 ML/HR: .6; .31; .03; .02 INJECTION, SOLUTION INTRAVENOUS at 03:36

## 2019-08-30 RX ADMIN — FENTANYL CITRATE 50 MCG: 50 INJECTION, SOLUTION INTRAMUSCULAR; INTRAVENOUS at 07:28

## 2019-08-30 NOTE — RESPIRATORY THERAPY NOTE
RT Protocol Note  Alejandro Sher 53 y o  male MRN: [id number]  Unit/Bed#: 401-01 Encounter: [id number]    Assessment    Principal Problem:    Right ureteral calculus      Home Pulmonary Medications:  none       Past Medical History:   Diagnosis Date    Kidney stones      Social History     Socioeconomic History    Marital status: /Civil Union     Spouse name: None    Number of children: None    Years of education: None    Highest education level: None   Occupational History    None   Social Needs    Financial resource strain: None    Food insecurity:     Worry: None     Inability: None    Transportation needs:     Medical: None     Non-medical: None   Tobacco Use    Smoking status: Never Smoker    Smokeless tobacco: Former User   Substance and Sexual Activity    Alcohol use: Yes     Alcohol/week: 0 0 standard drinks     Frequency: Monthly or less     Comment: occasionally    Drug use: No    Sexual activity: None   Lifestyle    Physical activity:     Days per week: None     Minutes per session: None    Stress: None   Relationships    Social connections:     Talks on phone: None     Gets together: None     Attends Sikhism service: None     Active member of club or organization: None     Attends meetings of clubs or organizations: None     Relationship status: None    Intimate partner violence:     Fear of current or ex partner: None     Emotionally abused: None     Physically abused: None     Forced sexual activity: None   Other Topics Concern    None   Social History Narrative    None       Subjective         Objective    Physical Exam:   Assessment Type: Assess only  General Appearance: Awake  Respiratory Pattern: Normal  Chest Assessment: Chest expansion symmetrical  Bilateral Breath Sounds: Clear  O2 Device: 2 l/m    Vitals:  Blood pressure 120/76, pulse 86, temperature 98 2 °F (36 8 °C), resp  rate 14, height 5' 10" (1 778 m), weight 118 kg (260 lb), SpO2 95 %            Imaging and other studies: I have personally reviewed pertinent reports        O2 Device: 2 l/m     Plan    Bronchodilator therapy ALbuterol 0 083% Q6 prn, oxygen with titration to room air

## 2019-08-30 NOTE — SOCIAL WORK
Pt was discharged prior to doing Case Management admission assessment  Pt's wife will give the patient a ride home  Follow up appts reviewed with patient with good understanding

## 2019-08-30 NOTE — H&P (VIEW-ONLY)
Progress Note - Urology  Rocio Field 1965, 53 y o  male MRN: [id number]    Unit/Bed#: 548-62 Encounter: [id number]    * Right ureteral calculus  Assessment & Plan  - ureteral stone right upj stented 2 months ago  - current stent encrusted, unable to retrieve despite ureteroscopy and laser lithotripsy  - moderate to severe hydronephrosis of renal pelvis  - intraoperative finding of distal urethral stricture disease, passable with 22Fr cystoscope  - NPO for IR placement of right percutaneous nephrostomy tube today  - following IR procedure, plan for Enriquez removal, discontinuation PCA analgesia, and discharge to home this afternoon  - future stone plan- PCNL vs ESWL- to be discussed and arranged as outpatient    Bedside rounds performed with Pricilla Qauch  Discussed with Dr Abdullahi Ma and Dr Woody Nunes        Subjective:   HPI:  Patient seen and evaluated in the IR suite prior to sedation  He has some discomfort in the penis related to the catheter and moving onto the table  He is preparing for right percutaneous nephrostomy tube placement for decompression of hydronephrosis right renal pelvis, following unsuccessful attempted ureteroscopy yesterday due to significant stent encrustation over the past two months  Patient aware of need for additional procedure to address the retained stent and stone  PCNL and ESWL options were discussed with patient previously, to be further discussed and arranged as outpatient  I will be in contact with patient's primary RN via telephone prior to discharge today  Review of Systems   Constitutional: Negative  Respiratory: Negative  Cardiovascular: Negative  Genitourinary: Positive for hematuria and penile pain (spasms)  Negative for decreased urine volume, difficulty urinating, dysuria, flank pain, frequency and urgency  Musculoskeletal: Negative          Objective:  Nursing Rounds: NPO preparing for IR pcn placement now    Vitals: Blood pressure 154/78, pulse 87, temperature 98 6 °F (37 °C), resp  rate 16, height 5' 10" (1 778 m), weight 118 kg (260 lb), SpO2 98 %  ,Body mass index is 37 31 kg/m²  Intake/Output Summary (Last 24 hours) at 8/30/2019 0857  Last data filed at 8/30/2019 4347  Gross per 24 hour   Intake 2550 69 ml   Output 1600 ml   Net 950 69 ml     Invasive Devices     Peripheral Intravenous Line            Peripheral IV 08/29/19 Left Hand 1 day          Drain            Ureteral Drain/Stent Right ureter 6 Fr  62 days    Nephrostomy Right 1 10 2 Fr  less than 1 day    Urethral Catheter Coude 22 Fr  less than 1 day                Physical Exam   Constitutional: He is oriented to person, place, and time  He appears well-developed and well-nourished  HENT:   Head: Normocephalic and atraumatic  Cardiovascular: Normal rate, regular rhythm and normal heart sounds  No murmur heard  Pulmonary/Chest: Effort normal and breath sounds normal    Abdominal: Soft  Bowel sounds are normal  He exhibits no distension  There is no tenderness  Genitourinary:   Genitourinary Comments: Enriquez catheter per penile urethra draining pink tinge urine   Musculoskeletal: Normal range of motion  He exhibits no edema  Neurological: He is alert and oriented to person, place, and time  Skin: Skin is warm and dry  Capillary refill takes less than 2 seconds  No pallor  Nursing note and vitals reviewed        History:    Past Medical History:   Diagnosis Date    Kidney stones      Past Surgical History:   Procedure Laterality Date    CARDIAC ELECTROPHYSIOLOGY STUDY AND ABLATION      CARDIAC ELECTROPHYSIOLOGY STUDY AND ABLATION      FL RETROGRADE PYELOGRAM  6/29/2019    HERNIA REPAIR      KIDNEY STONE SURGERY      KNEE ARTHROSCOPY      KNEE CARTILAGE SURGERY      [facility] CYSTOURETHROSCOPY,URETER CATHETER Right 6/29/2019    Procedure: CYSTOSCOPY RETROGRADE PYELOGRAM WITH INSERTION STENT URETERAL;  Surgeon: Steven Patrick MD;  Location: [facility];  Service: Urology     Family History   Problem Relation Age of Onset    Cancer Mother     Cancer Father      Social History     Socioeconomic History    Marital status: /Civil Union     Spouse name: None    Number of children: None    Years of education: None    Highest education level: None   Occupational History    None   Social Needs    Financial resource strain: None    Food insecurity:     Worry: None     Inability: None    Transportation needs:     Medical: None     Non-medical: None   Tobacco Use    Smoking status: Never Smoker    Smokeless tobacco: Former User   Substance and Sexual Activity    Alcohol use: Yes     Alcohol/week: 0 0 standard drinks     Frequency: Monthly or less     Comment: occasionally    Drug use: No    Sexual activity: None   Lifestyle    Physical activity:     Days per week: None     Minutes per session: None    Stress: None   Relationships    Social connections:     Talks on phone: None     Gets together: None     Attends Religion service: None     Active member of club or organization: None     Attends meetings of clubs or organizations: None     Relationship status: None    Intimate partner violence:     Fear of current or ex partner: None     Emotionally abused: None     Physically abused: None     Forced sexual activity: None   Other Topics Concern    None   Social History Narrative    None         Labs:  No results for input(s): WBC in the last 72 hours  No results for input(s): HGB in the last 72 hours  No results for input(s): HCT in the last 72 hours  No results for input(s): CREATININE in the last 72 hours        James Hess PA-C  Date: 8/30/2019 Time: 8:57 AM

## 2019-08-30 NOTE — PROGRESS NOTES
Progress Note - Urology  Purnima Catalan 1965, 53 y o  male MRN: [id number]    Unit/Bed#: 928-08 Encounter: [id number]    * Right ureteral calculus  Assessment & Plan  - ureteral stone right upj stented 2 months ago  - current stent encrusted, unable to retrieve despite ureteroscopy and laser lithotripsy  - moderate to severe hydronephrosis of renal pelvis  - intraoperative finding of distal urethral stricture disease, passable with 22Fr cystoscope  - NPO for IR placement of right percutaneous nephrostomy tube today  - following IR procedure, plan for Enriquez removal, discontinuation PCA analgesia, and discharge to home this afternoon  - future stone plan- PCNL vs ESWL- to be discussed and arranged as outpatient    Bedside rounds performed with Beni Calhoun  Discussed with Dr Bill Melara and Dr Lindy Tompkins        Subjective:   HPI:  Patient seen and evaluated in the IR suite prior to sedation  He has some discomfort in the penis related to the catheter and moving onto the table  He is preparing for right percutaneous nephrostomy tube placement for decompression of hydronephrosis right renal pelvis, following unsuccessful attempted ureteroscopy yesterday due to significant stent encrustation over the past two months  Patient aware of need for additional procedure to address the retained stent and stone  PCNL and ESWL options were discussed with patient previously, to be further discussed and arranged as outpatient  I will be in contact with patient's primary RN via telephone prior to discharge today  Review of Systems   Constitutional: Negative  Respiratory: Negative  Cardiovascular: Negative  Genitourinary: Positive for hematuria and penile pain (spasms)  Negative for decreased urine volume, difficulty urinating, dysuria, flank pain, frequency and urgency  Musculoskeletal: Negative          Objective:  Nursing Rounds: NPO preparing for IR pcn placement now    Vitals: Blood pressure 154/78, pulse 87, temperature 98 6 °F (37 °C), resp  rate 16, height 5' 10" (1 778 m), weight 118 kg (260 lb), SpO2 98 %  ,Body mass index is 37 31 kg/m²  Intake/Output Summary (Last 24 hours) at 8/30/2019 0857  Last data filed at 8/30/2019 5758  Gross per 24 hour   Intake 2550 69 ml   Output 1600 ml   Net 950 69 ml     Invasive Devices     Peripheral Intravenous Line            Peripheral IV 08/29/19 Left Hand 1 day          Drain            Ureteral Drain/Stent Right ureter 6 Fr  62 days    Nephrostomy Right 1 10 2 Fr  less than 1 day    Urethral Catheter Coude 22 Fr  less than 1 day                Physical Exam   Constitutional: He is oriented to person, place, and time  He appears well-developed and well-nourished  HENT:   Head: Normocephalic and atraumatic  Cardiovascular: Normal rate, regular rhythm and normal heart sounds  No murmur heard  Pulmonary/Chest: Effort normal and breath sounds normal    Abdominal: Soft  Bowel sounds are normal  He exhibits no distension  There is no tenderness  Genitourinary:   Genitourinary Comments: Enriquez catheter per penile urethra draining pink tinge urine   Musculoskeletal: Normal range of motion  He exhibits no edema  Neurological: He is alert and oriented to person, place, and time  Skin: Skin is warm and dry  Capillary refill takes less than 2 seconds  No pallor  Nursing note and vitals reviewed        History:    Past Medical History:   Diagnosis Date    Kidney stones      Past Surgical History:   Procedure Laterality Date    CARDIAC ELECTROPHYSIOLOGY STUDY AND ABLATION      CARDIAC ELECTROPHYSIOLOGY STUDY AND ABLATION      FL RETROGRADE PYELOGRAM  6/29/2019    HERNIA REPAIR      KIDNEY STONE SURGERY      KNEE ARTHROSCOPY      KNEE CARTILAGE SURGERY      MS CYSTOURETHROSCOPY,URETER CATHETER Right 6/29/2019    Procedure: CYSTOSCOPY RETROGRADE PYELOGRAM WITH INSERTION STENT URETERAL;  Surgeon: Mathieu Small MD;  Location: [facility];  Service: Urology     Family History   Problem Relation Age of Onset    Cancer Mother     Cancer Father      Social History     Socioeconomic History    Marital status: /Civil Union     Spouse name: None    Number of children: None    Years of education: None    Highest education level: None   Occupational History    None   Social Needs    Financial resource strain: None    Food insecurity:     Worry: None     Inability: None    Transportation needs:     Medical: None     Non-medical: None   Tobacco Use    Smoking status: Never Smoker    Smokeless tobacco: Former User   Substance and Sexual Activity    Alcohol use: Yes     Alcohol/week: 0 0 standard drinks     Frequency: Monthly or less     Comment: occasionally    Drug use: No    Sexual activity: None   Lifestyle    Physical activity:     Days per week: None     Minutes per session: None    Stress: None   Relationships    Social connections:     Talks on phone: None     Gets together: None     Attends [employer] service: None     Active member of club or organization: None     Attends meetings of clubs or organizations: None     Relationship status: None    Intimate partner violence:     Fear of current or ex partner: None     Emotionally abused: None     Physically abused: None     Forced sexual activity: None   Other Topics Concern    None   Social History Narrative    None         Labs:  No results for input(s): WBC in the last 72 hours  No results for input(s): HGB in the last 72 hours  No results for input(s): HCT in the last 72 hours  No results for input(s): CREATININE in the last 72 hours        Brianda Pineda PA-C  Date: 8/30/2019 Time: 8:57 AM

## 2019-08-30 NOTE — TREATMENT PLAN
Percocet submitted directly to [facility] in exchange for prior [employer] which patient states cannot tolerate  I called spoke with [facility] pharmacist to only fill percocet, discard norco rx

## 2019-08-30 NOTE — UTILIZATION REVIEW
Status changed to OBS  As requested          8/30  Dc home -  obs status remaining appropriate       08/29/19 1528  Place in Observation Once     Transfer Service: Urology       Question Answer Comment   Admitting Physician Lydia Minor    Level of Care Med Surg        08/29/19 1527   08/29/19 1013  Outpatient No Charge Bed Once     Transfer Service: Urology       Question: Admitting Physician Answer: Lydia Minor

## 2019-08-30 NOTE — PROCEDURES
Procedures   Interventional Radiology Procedure Note    PATIENT NAME: Cristy Mclaughlin  : 1965  MRN: [id number]     Pre-op Diagnosis: No diagnosis found  2     Encrusted stent    Post-op Diagnosis: No diagnosis found  2    Same    Procedure: nephrostomy    Surgeon:   Luca Elkins MD  Assistants:     No qualified resident was available, Resident is only observing    Estimated Blood Loss: Minimal     Findings: stent is down the ureter  Can't snare it with the devices on the shelf here  Specimens: None     Complications:  PCN placed  Snare failed      Anesthesia: Conscious sedation and Local    Luca Elkins MD     Date: 2019  Time: 8:39 AM

## 2019-08-30 NOTE — DISCHARGE INSTRUCTIONS
Nephrostomy Tube Care   WHAT YOU NEED TO KNOW:   A nephrostomy tube is a catheter (thin plastic tube) that is inserted through your skin and into your kidney  The nephrostomy tube drains urine from your kidney into a collecting bag outside your body  You may need a nephrostomy tube when something is blocking the normal flow of urine  A nephrostomy tube may be used for a short or a long period of time  The nephrostomy tube comes out of your back, so you will need someone to help care for your nephrostomy tube  DISCHARGE INSTRUCTIONS:   Medicines:   · Antibiotics  may be given to prevent or treat an infection caused by bacteria  You may need to take antibiotics for 5 to 10 days after the tube is placed  · Take your medicine as directed  Contact your healthcare provider if you think your medicine is not helping or you have side effects  Tell him if you are allergic to any medicine  Keep a list of the medicines, vitamins, and herbs you take  Include the amounts, and when and why you take them  Bring the list or the pill bottles to follow-up visits  Carry your medicine list with you in case of an emergency  Follow up with your healthcare provider as directed: You may need a test called a nephrostogram to make sure your nephrostomy tube is in the correct place  Write down your questions so you remember to ask them during your visits  How to clean the skin around the nephrostomy tube and change the bandage:  Since the nephrostomy tube comes out of your back, you will not be able to care for it by yourself  Ask someone to follow the general directions listed below to care for your nephrostomy tube  Ask your healthcare provider how your skin should be cleaned and what skin barriers and attachment devices to use  · Gather the items you will need        ¨ Disposable (single use) under-pad, and a clean washcloth    ¨ Plain soap, warm water, and new medical gloves    ¨ Sterile gauze bandages    ¨ Clear adhesive dressing or medical tape    ¨ Skin barrier    ¨ Tube attachment device     ¨ Protective skin film    ¨ Hydrogen peroxide and saline solution (if ordered by a healthcare provider)    [facility] for your skin (if ordered by a healthcare provider)     ¨ Trash bag    · Check the tube entry site  ¨ Place the under-pad where it will catch drainage as you are working with the nephrostomy tube  ¨ Wash your hands with soap and water  Put on new medical gloves  ¨ Gently remove the old bandage and skin barrier  Do this by holding the skin beside the tube with one hand  With the other hand, gently remove sticky tape and the skin barrier by pulling in the same direction as hair growth  Do not touch the side of the bandage that is placed over or around the tube  Throw the bandage and skin barrier away in a trash bag  ¨ Look for signs of infection, such as skin redness and swelling  Report any skin changes to healthcare providers  ¨ There may be a black lorraine on the tube to lorraine the place where the tube enters the skin  Check to see that the black lorraine is next to the skin  If it is further down the tube, the tube has moved  A healthcare provider needs to put it back in  · Clean the catheter site  ¨ Hold the tube in place to keep it from being pulled out while you are cleaning around it  ¨ You will need to clean the area twice  For the first cleaning, wet a new gauze bandage with soap and water  You may be directed to use hydrogen peroxide instead  Begin at the entry site of the tube  Wipe the skin in circles, moving away from the entry site  Remove blood and any other material with the gauze  Do this as often as needed  Use a new gauze bandage each time you clean the area, moving away from the entry site  ¨ For the second cleaning, wet a new gauze bandage with water  You may be directed to use saline solution instead  Begin at the entry site of the tube   Wipe the skin in circles, moving away from the entry site  Use a new gauze bandage each time you clean the area, moving away from the entry site  ¨ Gently pat the skin with a clean washcloth to dry it  Put medicine on the skin if directed by a healthcare provider  · Apply the skin barrier and bandages  ¨ Cut an opening in the center of the skin barrier large enough to fit around the tube  Cut a slit from the outside edge of the skin barrier to the center opening so that you can fit it around the nephrostomy tube  Place the skin barrier around the nephrostomy tube  Make sure the skin barrier is not touching stitches that may be holding the tube in place  Put a protective skin film over the skin barrier if directed by a healthcare provider  ¨ Roll up a bandage to make it thick, and wrap it around the place where the tube enters the skin  Place it to support the tube, and stop it from kinking or bending  Tape the bandage in place, and apply more bandages if directed by a healthcare provider  ¨ An attachment device may be placed over the bandages to help keep the nephrostomy tube in place  ¨ Bring the tubing forward to the front and tape it to the skin  Do not stretch the tube tight, because this may pull the nephrostomy tube out  How often to change the bandage, skin barrier, and tube attachment device:  Change the bandage around the tube, the bolsters, skin barriers, and tube attachments at least every 7 days  If your bandages, barriers, or devices get dirty or wet, change them right away, and as often as needed  If your nephrostomy tube is to be used for a long period of time, the tube needs to be changed every 2 to 3 months  Healthcare providers will tell you when you need to make an appointment to have your tube changed  How to care for the urine drainage bag: You may use a reusable or a single-use (disposable) urine bag  If you are using a disposable urine bag, use it only once, and then throw it away   If you have a reusable urine drainage bag, ask when and how to clean it  The following are general directions for cleaning a reusable urine drainage bag:  · Ask if you need to measure and write down how much urine is in the bag before you empty it  Drain urine out of the drainage bag when it is ½ to ? full  Open the spout at the bottom of the bag to empty the urine into the toilet  · You may need to detach the drainage bag from the nephrostomy tube to clean it  If so, attach a new drainage bag tightly to the nephrostomy tube  · You may need to use a solution such as phosphoric acid to clean the bag  Ask what kind of cleaning solution to use  Use a plastic syringe (without a needle) to gently force the cleaning solution into the drainage bag as you clean it  · Ask if you should leave the cleaning solution in the bag for a time before you drain it out  When it is time to drain the bag, drain the cleaning solution out through the spout at the bottom  · Ask what to use to rinse the urine drainage bag  After you rinse the bag, empty it and hang it up to air dry before using it again  Throw reusable bags away after you use them for 1 week  How to prevent problems with your nephrostomy tube:   · Change bandages, skin barriers, and attachment devices as directed  This helps to prevent infection  Throw away or clean your drainage bag as directed by your healthcare provider  · Wipe the connecting ends of the drainage bag with alcohol or iodine before reconnecting the bag to the tube  This helps prevent infection  · Keep the tube taped to your skin and connected to a drainage bag placed below the level of your kidneys  This helps prevent urine from backing up into your kidneys  You may wear a small drainage bag strapped to your leg to let you move around more easily  · Use a larger drainage bag at night and when you take naps during the day    This will help prevent urine from leaking out from the place where the tube enters your skin  · Check the catheter to be sure it is in place after you change your clothes or do other activities  Do not wear tight clothing over the tube  Place the tubing over your thigh rather than under it when you are sitting down  Be sure that nothing is pulling on the nephrostomy tube when you move around  · Change positions if you see little or no urine in your drainage bag  Check to see if the urine tube is twisted or bent  Be sure that you are not sitting or lying on the tube  If there are no kinks and there is little or no urine in the drainage bag, tell your healthcare provider  · Flush out the tube as directed  Do this if you think the tube is blocked  Other things to know:   · Drink 2 to 3 liters of liquid each day  unless you were told to limit liquids because of another condition  This amount is equal to about 8 to 12 (eight-ounce) cups of liquid  There should be 30 to 60 milliliters of urine draining into the bag each hour  A large amount of urine that drains over a shorter period of time should be reported  For example, 2,000 milliliters (2 liters) of urine draining out over 8 hours could be a sign of problems  · Keep the site covered while you shower  Tape a piece of clear adhesive plastic over the dressing to keep it dry while you shower  Do not take tub baths  Contact your healthcare provider if:   · The skin around the nephrostomy tube is red, swollen, itches, or has a rash  · You have a fever  · You have lower back or hip pain  · There are changes in how your urine looks or smells  · You have large amounts of urine draining into the drainage bag over a short period of time  · You have little or no urine draining from the nephrostomy tube  · You have nausea and are vomiting  · The black lorraine on your tube has moved, or the tube is longer than when it was put in      · You have questions or concerns about your condition or care    Return to the emergency department if:   · The nephrostomy tube comes out completely  · There is blood, pus, or a bad smell coming from the place where the tube enters your skin  · Urine is leaking around the tube 10 days after the tube was placed  © 2017 [address] [employer] Information is for End User's use only and may not be sold, redistributed or otherwise used for commercial purposes  All illustrations and images included in CareNotes® are the copyrighted property of A D [employer] M [employer]  or Leonel Hanley  The above information is an  only  It is not intended as medical advice for individual conditions or treatments  Talk to your doctor, nurse or pharmacist before following any medical regimen to see if it is safe and effective for you  Ureteral Stents  WHAT YOU NEED TO KNOW:   Ureteral stent placement is a procedure to open a blocked or narrow ureter  The ureter is the tube that carries urine from your kidney into your bladder  A stent is a thin hollow plastic tube used to hold your ureter open and allow urine to flow  The stent may stay in for several weeks  DISCHARGE INSTRUCTIONS:   Medicines:   · Pain medicine  may be given to take away or decrease pain  Do not wait until the pain is severe before you take your medicine  · Antibiotics  help prevent infections  Your healthcare provider may prescribe these for you while your stent remains in  · Take your medicine as directed  Contact your healthcare provider if you think your medicine is not helping or if you have side effects  Tell him or her if you are allergic to any medicine  Keep a list of the medicines, vitamins, and herbs you take  Include the amounts, and when and why you take them  Bring the list or the pill bottles to follow-up visits  Carry your medicine list with you in case of an emergency  Follow up with your urologist as directed:   You will need regular follow-up visits with your urologist as long as the stent remains in  He will check to make sure the stent is working properly  He may do urine cultures to check for infection  Write down your questions so you remember to ask them during your visits  Self-care:   · Drink liquids  as directed  Ask your healthcare provider how much liquid to drink each day and which liquids are best for you  Fluids such as cranberry or apple juice may be especially helpful to prevent urinary infections  · Return to normal activities  the day after your stent placement or as directed by your healthcare provider  · You may take a shower  the day after your stent placement if your healthcare provider says it is okay  Contact your healthcare provider or urologist if:   · You have a fever or chills  · You feel like you need to urinate often  · You have pain when you urinate or pain around your bladder or kidney  · You see blood in your urine or it looks cloudy  · You have questions or concerns about your condition or care  Seek care immediately or call 911 if:   · You urinate little or not at all  · You have severe pain in your abdomen  © 2017 [facility] Information is for End User's use only and may not be sold, redistributed or otherwise used for commercial purposes  All illustrations and images included in CareNotes® are the copyrighted property of A D A M , Inc  or Leonel Hanley  The above information is an  only  It is not intended as medical advice for individual conditions or treatments  Talk to your doctor, nurse or pharmacist before following any medical regimen to see if it is safe and effective for you

## 2019-08-30 NOTE — ASSESSMENT & PLAN NOTE
- ureteral stone right upj stented 2 months ago  - current stent encrusted, unable to retrieve despite ureteroscopy and laser lithotripsy  - moderate to severe hydronephrosis of renal pelvis  - intraoperative finding of distal urethral stricture disease, passable with 22Fr cystoscope  - NPO for IR placement of right percutaneous nephrostomy tube today  - following IR procedure, plan for Enriquez removal, discontinuation PCA analgesia, and discharge to home this afternoon  - future stone plan- PCNL vs ESWL- to be discussed and arranged as outpatient

## 2019-08-30 NOTE — NURSING NOTE
AVS reviewed with patient and spouse  Questions encouraged and answered as needed  Verbalizes understanding of instructions as ordered  Discharged in no acute distress

## 2019-08-30 NOTE — TELEPHONE ENCOUNTER
Contacted and spoke with patient informing him Deborrjessica Avendaño is the surgery scheduler and she will contact patient on Tuesday

## 2019-08-30 NOTE — TELEPHONE ENCOUNTER
Jerry Springer called stating pt refusing medication Hydrocodone as he does not like the side effects ,is there a recommendation?

## 2019-08-30 NOTE — DISCHARGE SUMMARY
Discharge Summary - Nabil Dumont 53 y o  male MRN: [id number]    Unit/Bed#: 401-01 Encounter: [id number]    Admission Date: 8/29/2019     Discharge Date: 08/30/19    HPI: Nabil Dumont is a 53 y o  male who presented for cystoscopy right ureteroscopy laser lithotripsy stent exchange retrograde pyelogram for right ureteral stone     Procedure(s):  CYSTOSCOPY URETEROSCOPY WITH LITHOTRIPSY HOLMIUM LASER, RETROGRADE PYELOGRAM  Surgeon(s):  Yeison Pickett MD  8/29/2019    Hospital Course: 53year old male admitted overnight to observation following operative course yesterday with unsuccessful retrieval of encrusted right ureteral stent  See operative note for details  Patient's stent was significantly encrusted after only 2 months of placement  The patient is a significant stone former  This morning, patient underwent placement of right percutaneous nephrostomy tube for drainage of his severely hydronephrotic kidney and to allow access for future percutaneous surgery  His Enriquez catheter was removed after he recovered from his anesthesia this morning and he is stable for discharge to home to continue his recovery there  On the day of discharge patient was ambulatory, tolerating full diet and PO pain medicine with satisfactory pain control  He is scheduled tentatively for right percutaneous surgery to fragment calcifications at the proximal curl of the stent as well as fragment 1 3cm stone on 9/25/19 with Dr Mayelin Armenta  Opportunities for second opinions have been made available by Dr Mayelin Armenta for future procedures  Discharge Diagnosis: Right ureteral calculus    Condition at Discharge: good     Discharge Medications:  See after visit summary for reconciled discharge medications provided to patient and family  Patient was discharged home on home medications with the addition of percocet, colace, zofran      Discharge instructions/Information to patient and family:   See after visit summary for information provided to patient and family  Provisions for Follow-Up Care:  See after visit summary for information related to follow-up care and any pertinent home health orders  Disposition: Home    Planned Readmission: No    Discharge Statement   I spent 20 minutes discharging the patient  This time was spent on the day of discharge  I had direct contact with the patient on the day of discharge  Additional documentation is required if more than 30 minutes were spent on discharge  Signature:    Martin Flores PA-C  Date: 8/30/2019 Time: 10:00 PM

## 2019-09-03 ENCOUNTER — TRANSITIONAL CARE MANAGEMENT (OUTPATIENT)
Dept: FAMILY MEDICINE CLINIC | Facility: CLINIC | Age: 54
End: 2019-09-03

## 2019-09-04 NOTE — DISCHARGE SUMMARY
Discharge Summary - Cornel Wilson 53 y o  male MRN: [id number]     Unit/Bed#: 401-01 Encounter: [id number]     Admission Date: 8/29/2019      Discharge Date: 08/30/19     HPI: Cornel Wilson is a 53 y o  male who presented for cystoscopy right ureteroscopy laser lithotripsy stent exchange retrograde pyelogram for right ureteral stone     Procedure(s):  CYSTOSCOPY URETEROSCOPY WITH LITHOTRIPSY HOLMIUM LASER, RETROGRADE PYELOGRAM  Surgeon(s):  Eden Essex, MD  8/29/2019     Hospital Course: 53year old male admitted overnight to observation following operative course yesterday with unsuccessful retrieval of encrusted right ureteral stent  See operative note for details  Patient's stent was significantly encrusted after only 2 months of placement   The patient is a significant stone former  This morning, patient underwent placement of right percutaneous nephrostomy tube for drainage of his severely hydronephrotic kidney and to allow access for future percutaneous surgery  His Enriquez catheter was removed after he recovered from his anesthesia this morning and he is stable for discharge to home to continue his recovery there  On the day of discharge patient was ambulatory, tolerating full diet and PO pain medicine with satisfactory pain control  He is scheduled tentatively for right percutaneous surgery to fragment calcifications at the proximal curl of the stent as well as fragment 1 3cm stone on 9/25/19 with Dr Shaun Goff  Opportunities for second opinions have been made available by Dr Shaun Goff for future procedures      Discharge Diagnosis: Right ureteral calculus     Condition at Discharge: good      Discharge Medications:  See after visit summary for reconciled discharge medications provided to patient and family    Patient was discharged home on home medications with the addition of percocet, colace, zofran      Discharge instructions/Information to patient and family:   See after visit summary for information provided to patient and family        Provisions for Follow-Up Care:  See after visit summary for information related to follow-up care and any pertinent home health orders        Disposition: Home     Planned Readmission: No     Discharge Statement   I spent 20 minutes discharging the patient  This time was spent on the day of discharge  I had direct contact with the patient on the day of discharge  Additional documentation is required if more than 30 minutes were spent on discharge       Signature:    Luis Olmos PA-C  Date: 8/30/2019 Time: 10:00 PM

## 2019-09-05 ENCOUNTER — OFFICE VISIT (OUTPATIENT)
Dept: FAMILY MEDICINE CLINIC | Facility: CLINIC | Age: 54
End: 2019-09-05
Payer: COMMERCIAL

## 2019-09-05 VITALS
WEIGHT: 257 LBS | DIASTOLIC BLOOD PRESSURE: 80 MMHG | BODY MASS INDEX: 36.79 KG/M2 | SYSTOLIC BLOOD PRESSURE: 132 MMHG | HEIGHT: 70 IN

## 2019-09-05 DIAGNOSIS — N20.1 RIGHT URETERAL CALCULUS: Primary | ICD-10-CM

## 2019-09-05 DIAGNOSIS — K40.91 UNILATERAL RECURRENT INGUINAL HERNIA WITHOUT OBSTRUCTION OR GANGRENE: ICD-10-CM

## 2019-09-05 PROCEDURE — 99496 TRANSJ CARE MGMT HIGH F2F 7D: CPT | Performed by: FAMILY MEDICINE

## 2019-09-05 NOTE — PROGRESS NOTES
Transition of Care  Follow-up After Hospitalization    Dahlia Child 53 y o  male   Date:  9/5/2019    TCM Call (since 8/5/2019)     Date and time call was made  9/3/2019  2:55 PM    Patient was hospitialized at  [address]    Date of Admission  08/29/19    Date of discharge  08/30/19    Diagnosis  RIGHT URETERAL CALCULUS    Disposition  Home    Current Symptoms  None      TCM Call (since 8/5/2019)     Post hospital issues  None    Should patient be enrolled in anticoag monitoring? No    Scheduled for follow up? Yes <img src='C:FILES (X86)    Patients specialists  Urologist    Urologist name  PT TO SCHEDULE APPT ASAP WITH [facility]    Did you obtain your prescribed medications  No    Why were you unable to obtain your medications  PATIENT DID NOT NEED DUE TO HE HAS RX AT HOUSE    Do you need help managing your prescriptions or medications  No    Is transportation to your appointment needed  No    I have advised the patient to call PCP with any new or worsening symptoms  [address] [place] [address] or [facility] other    Support System  Spouse    The type of support provided  Physical; Emotional    Do you have social support  Yes, as much as I need    Are you recieving any outpatient services  No    Are you recieving home care services  No    Are you using any community resources  No    Current waiver services  No    Have you fallen in the last 12 months  Yes    How many times  DUE TO [address] [facility]    Interperter language line needed  No    Counseling  Patient        Hospital records were reviewed  Medications upon discharge reviewed/updated  Discharge Disposition:    Follow up visits with other specialists:       Assessment and Plan: For his right groin pain, we will refer him to surgery for evaluation for hernia  Right nephrostomy in place, no sign of infection  Follow-up with Urology, either for 2nd opinion or for surgery on 09/25  Follow-up here p r n  Edmond Min was seen today for transition of care management  Diagnoses and all orders for this visit:    Right ureteral calculus    Unilateral recurrent inguinal hernia without obstruction or gangrene  -     Ambulatory referral to General Surgery; Future            HPI:  RUTHY  Patient here today status post nephrostomy tube placement after failing to retrieve a right ureteral stent  Patient denies any fever chills  Patient states since having coughing fit couple months ago, but getting right groin pain  He had a hernia in that location before  Patient is seeking a 2nd opinion for his right ureteral stent      ROS: Review of Systems   Constitutional: Negative  Respiratory: Negative  Cardiovascular: Negative  Gastrointestinal: Negative      Genitourinary:        As per HPI       Past Medical History:   Diagnosis Date    Kidney stones        Past Surgical History:   Procedure Laterality Date    CARDIAC ELECTROPHYSIOLOGY STUDY AND ABLATION      CARDIAC ELECTROPHYSIOLOGY STUDY AND ABLATION      FL RETROGRADE PYELOGRAM  6/29/2019    FL RETROGRADE PYELOGRAM  8/29/2019    HERNIA REPAIR      IR TUBE PLACEMENT NEPHROSTOMY  8/30/2019    KIDNEY STONE SURGERY      KNEE ARTHROSCOPY      KNEE CARTILAGE SURGERY      NE CYSTO/URETERO W/LITHOTRIPSY &INDWELL STENT INSRT Right 8/29/2019    Procedure: CYSTOSCOPY URETEROSCOPY WITH LITHOTRIPSY HOLMIUM LASER, RETROGRADE PYELOGRAM;  Surgeon: Skyla Zamora MD;  Location: [facility];  Service: Urology    NE CYSTOURETHROSCOPY,URETER CATHETER Right 6/29/2019    Procedure: CYSTOSCOPY RETROGRADE PYELOGRAM WITH INSERTION STENT URETERAL;  Surgeon: Eve Estevez MD;  Location: [facility];  Service: Urology       Social History     Socioeconomic History    Marital status: /Civil Union     Spouse name: None    Number of children: None    Years of education: None    Highest education level: None   Occupational History    None   Social Needs    Financial resource strain: None    Food insecurity:     Worry: None     Inability: None    Transportation needs:     Medical: None     Non-medical: None   Tobacco Use    Smoking status: Never Smoker    Smokeless tobacco: Former User   Substance and Sexual Activity    Alcohol use:  Yes     Alcohol/week: 0 0 standard drinks     Frequency: Monthly or less     Comment: occasionally    Drug use: No    Sexual activity: None   Lifestyle    Physical activity:     Days per week: None     Minutes per session: None    Stress: None   Relationships    Social connections:     Talks on phone: None     Gets together: None     Attends Congregational service: None     Active member of club or organization: None     Attends meetings of clubs or organizations: None     Relationship status: None    Intimate partner violence:     Fear of current or ex partner: None     Emotionally abused: None     Physically abused: None     Forced sexual activity: None   Other Topics Concern    None   Social History Narrative    None       Family History   Problem Relation Age of Onset    Cancer Mother     Cancer Father        Allergies   Allergen Reactions    Ancef [Cefazolin] Nausea Only     Preoperative admin-nausea and dry heaving    Other Itching     Peanuts     Peanut-Containing Drug Products Itching    Sulfa Antibiotics Rash    Toradol [Ketorolac Tromethamine] Rash     Tolerates ibuprofen         Current Outpatient Medications:     acetaminophen (TYLENOL) 500 mg tablet, Take 2 tablets (1,000 mg total) by mouth every 6 (six) hours as needed (pain, fever), Disp: 30 tablet, Rfl: 0    albuterol (PROVENTIL HFA,VENTOLIN HFA) 90 mcg/act inhaler, 2 puffs every 4 hours with spacer as needed for wheeze, cough, short of breath , Disp: 1 Inhaler, Rfl: 0    docusate sodium (COLACE) 100 mg capsule, Take 1 capsule (100 mg total) by mouth 2 (two) times a day, Disp: 10 capsule, Rfl: 0    DULoxetine (CYMBALTA) 60 mg delayed release capsule, Take 1 capsule (60 mg total) by mouth daily, Disp: 2 capsule, Rfl: 0    potassium citrate (UROCIT-K) 10 mEq, Take 1 tablet (10 mEq total) by mouth 2 (two) times a day, Disp: 60 tablet, Rfl: 0    tamsulosin (FLOMAX) 0 4 mg, Take 1 capsule (0 4 mg total) by mouth daily with dinner for 15 days (Patient taking differently: Take 0 4 mg by mouth daily with dinner As needed), Disp: 15 capsule, Rfl: 0    HYDROcodone-acetaminophen (NORCO) 5-325 mg per tablet, Take 1 tablet by mouth every 6 (six) hours as needed for pain for up to 10 daysMax Daily Amount: 4 tablets (Patient not taking: Reported on 9/5/2019), Disp: 10 tablet, Rfl: 0    ondansetron (ZOFRAN) 4 mg tablet, Take 1 tablet (4 mg total) by mouth every 8 (eight) hours as needed for nausea for up to 30 doses (Patient not taking: Reported on 9/5/2019), Disp: 30 tablet, Rfl: 0    oxyCODONE-acetaminophen (PERCOCET) 5-325 mg per tablet, Take 1 tablet by mouth every 4 (four) hours as needed for moderate pain or severe pain for up to 10 daysMax Daily Amount: 6 tablets (Patient not taking: Reported on 9/5/2019), Disp: 10 tablet, Rfl: 0      Physical Exam:  /80 (BP Location: Left arm, Patient Position: Sitting, Cuff Size: Standard)   Ht 5' 10" (1 778 m)   Wt 117 kg (257 lb)   BMI 36 88 kg/m²     Physical Exam   Constitutional: He is oriented to person, place, and time  He appears well-developed and well-nourished  No distress  HENT:   Head: Normocephalic and atraumatic  Eyes: Conjunctivae are normal    Cardiovascular: Normal rate, regular rhythm and normal heart sounds  Exam reveals no gallop and no friction rub  No murmur heard  Pulmonary/Chest: Effort normal and breath sounds normal  No respiratory distress  He has no wheezes  He has no rales  Abdominal: There is tenderness (Right inguinal area)  Genitourinary:   Genitourinary Comments: Right nephrostomy to in place, no sign of infection    Draining clear urine   Musculoskeletal: He exhibits no edema  Neurological: He is alert and oriented to person, place, and time  Skin: He is not diaphoretic  Psychiatric: He has a normal mood and affect  His behavior is normal  Judgment and thought content normal    Vitals reviewed            Labs:  Lab Results   Component Value Date    WBC 10 75 (H) 06/29/2019    HGB 14 4 06/29/2019    HCT 42 7 06/29/2019    MCV 92 06/29/2019     06/29/2019     Lab Results   Component Value Date     04/14/2015    K 3 9 06/30/2019     06/30/2019    CO2 27 06/30/2019    ANIONGAP 12 04/14/2015    BUN 20 06/30/2019    CREATININE 1 13 06/30/2019    GLUCOSE 88 04/14/2015    GLUF 98 11/16/2017    CALCIUM 8 7 06/30/2019    AST 17 06/29/2019    ALT 22 06/29/2019    ALKPHOS 79 06/29/2019    PROT 7 0 04/14/2015    BILITOT 0 6 04/14/2015    EGFR 73 06/30/2019

## 2019-09-09 NOTE — PRE-PROCEDURE INSTRUCTIONS
Pre-Surgery Instructions:   Medication Instructions    acetaminophen (TYLENOL) 500 mg tablet Instructed patient per Anesthesia Guidelines   albuterol (PROVENTIL HFA,VENTOLIN HFA) 90 mcg/act inhaler Instructed patient per Anesthesia Guidelines   docusate sodium (COLACE) 100 mg capsule Instructed patient per Anesthesia Guidelines   DULoxetine (CYMBALTA) 60 mg delayed release capsule Instructed patient per Anesthesia Guidelines   potassium citrate (UROCIT-K) 10 mEq Instructed patient per Anesthesia Guidelines   tamsulosin (FLOMAX) 0 4 mg Instructed patient per Anesthesia Guidelines  Pre op and bathing instructions reviewed

## 2019-09-10 ENCOUNTER — TELEPHONE (OUTPATIENT)
Dept: FAMILY MEDICINE CLINIC | Facility: CLINIC | Age: 54
End: 2019-09-10

## 2019-09-10 DIAGNOSIS — N20.0 KIDNEY STONE: Primary | ICD-10-CM

## 2019-09-10 DIAGNOSIS — N13.30 HYDROURETERONEPHROSIS: ICD-10-CM

## 2019-09-10 DIAGNOSIS — N20.9 URINARY TRACT STONES: ICD-10-CM

## 2019-09-10 NOTE — TELEPHONE ENCOUNTER
WOULD LIKE REFERRAL TO [facility] UROLOGIST  DX: [facility]  [address] [phone]

## 2019-09-12 ENCOUNTER — APPOINTMENT (OUTPATIENT)
Dept: LAB | Facility: HOSPITAL | Age: 54
End: 2019-09-12
Attending: UROLOGY
Payer: COMMERCIAL

## 2019-09-12 ENCOUNTER — TRANSCRIBE ORDERS (OUTPATIENT)
Dept: ADMINISTRATIVE | Facility: HOSPITAL | Age: 54
End: 2019-09-12

## 2019-09-12 ENCOUNTER — TELEPHONE (OUTPATIENT)
Dept: UROLOGY | Facility: CLINIC | Age: 54
End: 2019-09-12

## 2019-09-12 DIAGNOSIS — Z96.0 URETERAL STENT RETAINED: Primary | ICD-10-CM

## 2019-09-12 DIAGNOSIS — Z96.0 RETAINED URETERAL STENT: ICD-10-CM

## 2019-09-12 DIAGNOSIS — Z96.0 URETERAL STENT RETAINED: ICD-10-CM

## 2019-09-12 LAB
ANION GAP SERPL CALCULATED.3IONS-SCNC: 7 MMOL/L (ref 4–13)
APTT PPP: 31 SECONDS (ref 23–37)
BASOPHILS # BLD AUTO: 0.03 THOUSANDS/ΜL (ref 0–0.1)
BASOPHILS NFR BLD AUTO: 0 % (ref 0–1)
BUN SERPL-MCNC: 19 MG/DL (ref 5–25)
CALCIUM SERPL-MCNC: 9.4 MG/DL (ref 8.3–10.1)
CHLORIDE SERPL-SCNC: 103 MMOL/L (ref 100–108)
CO2 SERPL-SCNC: 28 MMOL/L (ref 21–32)
CREAT SERPL-MCNC: 0.94 MG/DL (ref 0.6–1.3)
EOSINOPHIL # BLD AUTO: 0.16 THOUSAND/ΜL (ref 0–0.61)
EOSINOPHIL NFR BLD AUTO: 2 % (ref 0–6)
ERYTHROCYTE [DISTWIDTH] IN BLOOD BY AUTOMATED COUNT: 13.4 % (ref 11.6–15.1)
GFR SERPL CREATININE-BSD FRML MDRD: 91 ML/MIN/1.73SQ M
GLUCOSE SERPL-MCNC: 95 MG/DL (ref 65–140)
HCT VFR BLD AUTO: 39.5 % (ref 36.5–49.3)
HGB BLD-MCNC: 13.2 G/DL (ref 12–17)
IMM GRANULOCYTES # BLD AUTO: 0.04 THOUSAND/UL (ref 0–0.2)
IMM GRANULOCYTES NFR BLD AUTO: 1 % (ref 0–2)
INR PPP: 0.98 (ref 0.84–1.19)
LYMPHOCYTES # BLD AUTO: 1.28 THOUSANDS/ΜL (ref 0.6–4.47)
LYMPHOCYTES NFR BLD AUTO: 15 % (ref 14–44)
MCH RBC QN AUTO: 30.5 PG (ref 26.8–34.3)
MCHC RBC AUTO-ENTMCNC: 33.4 G/DL (ref 31.4–37.4)
MCV RBC AUTO: 91 FL (ref 82–98)
MONOCYTES # BLD AUTO: 0.6 THOUSAND/ΜL (ref 0.17–1.22)
MONOCYTES NFR BLD AUTO: 7 % (ref 4–12)
NEUTROPHILS # BLD AUTO: 6.73 THOUSANDS/ΜL (ref 1.85–7.62)
NEUTS SEG NFR BLD AUTO: 75 % (ref 43–75)
NRBC BLD AUTO-RTO: 0 /100 WBCS
PLATELET # BLD AUTO: 325 THOUSANDS/UL (ref 149–390)
PMV BLD AUTO: 10 FL (ref 8.9–12.7)
POTASSIUM SERPL-SCNC: 4 MMOL/L (ref 3.5–5.3)
PROTHROMBIN TIME: 13 SECONDS (ref 11.6–14.5)
RBC # BLD AUTO: 4.33 MILLION/UL (ref 3.88–5.62)
SODIUM SERPL-SCNC: 138 MMOL/L (ref 136–145)
WBC # BLD AUTO: 8.84 THOUSAND/UL (ref 4.31–10.16)

## 2019-09-12 PROCEDURE — 85025 COMPLETE CBC W/AUTO DIFF WBC: CPT

## 2019-09-12 PROCEDURE — 86901 BLOOD TYPING SEROLOGIC RH(D): CPT

## 2019-09-12 PROCEDURE — 85730 THROMBOPLASTIN TIME PARTIAL: CPT

## 2019-09-12 PROCEDURE — 87077 CULTURE AEROBIC IDENTIFY: CPT

## 2019-09-12 PROCEDURE — 87086 URINE CULTURE/COLONY COUNT: CPT

## 2019-09-12 PROCEDURE — 86850 RBC ANTIBODY SCREEN: CPT

## 2019-09-12 PROCEDURE — 80048 BASIC METABOLIC PNL TOTAL CA: CPT

## 2019-09-12 PROCEDURE — 36415 COLL VENOUS BLD VENIPUNCTURE: CPT

## 2019-09-12 PROCEDURE — 85610 PROTHROMBIN TIME: CPT

## 2019-09-12 PROCEDURE — 86900 BLOOD TYPING SEROLOGIC ABO: CPT

## 2019-09-12 PROCEDURE — 87186 SC STD MICRODIL/AGAR DIL: CPT

## 2019-09-12 NOTE — TELEPHONE ENCOUNTER
Contacted and spoke with patient to discuss his request for a second opinion  Patient needs assistance obtaining his records  Discussed with patient will have Liberty Kumar our  copy his office records and then fax to [facility]  Patient does not have fax number available   Plan to have Liberty Kumar contact patient tomorrow morning once records and obtain the fax number at that time

## 2019-09-13 LAB
ABO GROUP BLD: NORMAL
BLD GP AB SCN SERPL QL: NEGATIVE
RH BLD: POSITIVE
SPECIMEN EXPIRATION DATE: NORMAL

## 2019-09-14 LAB — BACTERIA UR CULT: ABNORMAL

## 2019-09-16 DIAGNOSIS — Z91.89 AT RISK FOR OBSTRUCTIVE SLEEP APNEA: Primary | ICD-10-CM

## 2019-09-17 ENCOUNTER — TELEPHONE (OUTPATIENT)
Dept: FAMILY MEDICINE CLINIC | Facility: CLINIC | Age: 54
End: 2019-09-17

## 2019-09-17 ENCOUNTER — TELEPHONE (OUTPATIENT)
Dept: UROLOGY | Facility: CLINIC | Age: 54
End: 2019-09-17

## 2019-09-17 DIAGNOSIS — N20.1 URETERAL CALCULUS: ICD-10-CM

## 2019-09-17 DIAGNOSIS — N17.9 AKI (ACUTE KIDNEY INJURY) (HCC): ICD-10-CM

## 2019-09-17 DIAGNOSIS — N13.30 HYDROURETERONEPHROSIS: ICD-10-CM

## 2019-09-17 DIAGNOSIS — N20.1 RIGHT URETERAL CALCULUS: Primary | ICD-10-CM

## 2019-09-17 RX ORDER — LEVOFLOXACIN 500 MG/1
500 TABLET, FILM COATED ORAL EVERY 24 HOURS
Qty: 6 TABLET | Refills: 0 | Status: SHIPPED | OUTPATIENT
Start: 2019-09-17 | End: 2019-09-23

## 2019-09-17 NOTE — TELEPHONE ENCOUNTER
Patient needed a stat referral to [facility]  I put in    Please let Lucy Torres know so she can take care of this

## 2019-09-20 DIAGNOSIS — N20.9 URINARY TRACT STONES: ICD-10-CM

## 2019-09-20 DIAGNOSIS — N20.0 KIDNEY STONE: Primary | ICD-10-CM

## 2019-09-20 DIAGNOSIS — N20.0 KIDNEY STONES: ICD-10-CM

## 2019-09-20 DIAGNOSIS — N20.1 URETERAL CALCULUS: ICD-10-CM

## 2019-09-20 DIAGNOSIS — N17.9 AKI (ACUTE KIDNEY INJURY) (HCC): ICD-10-CM

## 2019-09-24 ENCOUNTER — ANESTHESIA EVENT (OUTPATIENT)
Dept: PERIOP | Facility: HOSPITAL | Age: 54
End: 2019-09-24
Payer: COMMERCIAL

## 2019-09-25 ENCOUNTER — ANESTHESIA (OUTPATIENT)
Dept: PERIOP | Facility: HOSPITAL | Age: 54
End: 2019-09-25
Payer: COMMERCIAL

## 2019-09-25 ENCOUNTER — HOSPITAL ENCOUNTER (OUTPATIENT)
Facility: HOSPITAL | Age: 54
Setting detail: OUTPATIENT SURGERY
Discharge: HOME | End: 2019-09-26
Attending: UROLOGY | Admitting: UROLOGY
Payer: COMMERCIAL

## 2019-09-25 ENCOUNTER — APPOINTMENT (OUTPATIENT)
Dept: RADIOLOGY | Facility: HOSPITAL | Age: 54
End: 2019-09-25
Payer: COMMERCIAL

## 2019-09-25 DIAGNOSIS — Z96.0 RETAINED URETERAL STENT: ICD-10-CM

## 2019-09-25 DIAGNOSIS — N17.9 AKI (ACUTE KIDNEY INJURY) (HCC): Primary | ICD-10-CM

## 2019-09-25 DIAGNOSIS — N13.30 HYDROURETERONEPHROSIS: ICD-10-CM

## 2019-09-25 DIAGNOSIS — N20.1 RIGHT URETERAL CALCULUS: ICD-10-CM

## 2019-09-25 PROCEDURE — 50081 PERQ NL/PL LITHOTRP CPLX>2CM: CPT | Performed by: UROLOGY

## 2019-09-25 PROCEDURE — 88300 SURGICAL PATH GROSS: CPT | Performed by: PATHOLOGY

## 2019-09-25 PROCEDURE — C1769 GUIDE WIRE: HCPCS | Performed by: UROLOGY

## 2019-09-25 PROCEDURE — NC001 PR NO CHARGE: Performed by: UROLOGY

## 2019-09-25 PROCEDURE — C1758 CATHETER, URETERAL: HCPCS | Performed by: UROLOGY

## 2019-09-25 PROCEDURE — C1726 CATH, BAL DIL, NON-VASCULAR: HCPCS | Performed by: UROLOGY

## 2019-09-25 PROCEDURE — C1729 CATH, DRAINAGE: HCPCS | Performed by: UROLOGY

## 2019-09-25 PROCEDURE — 74420 UROGRAPHY RTRGR +-KUB: CPT

## 2019-09-25 PROCEDURE — 82360 CALCULUS ASSAY QUANT: CPT | Performed by: UROLOGY

## 2019-09-25 PROCEDURE — 94664 DEMO&/EVAL PT USE INHALER: CPT

## 2019-09-25 RX ORDER — OXYCODONE HYDROCHLORIDE 5 MG/1
5 TABLET ORAL EVERY 4 HOURS PRN
Status: DISCONTINUED | OUTPATIENT
Start: 2019-09-25 | End: 2019-09-26 | Stop reason: HOSPADM

## 2019-09-25 RX ORDER — ALBUTEROL SULFATE 90 UG/1
2 AEROSOL, METERED RESPIRATORY (INHALATION) EVERY 4 HOURS PRN
Status: DISCONTINUED | OUTPATIENT
Start: 2019-09-25 | End: 2019-09-26 | Stop reason: HOSPADM

## 2019-09-25 RX ORDER — HYDROMORPHONE HCL/PF 1 MG/ML
1 SYRINGE (ML) INJECTION
Status: DISCONTINUED | OUTPATIENT
Start: 2019-09-25 | End: 2019-09-26 | Stop reason: HOSPADM

## 2019-09-25 RX ORDER — FENTANYL CITRATE 50 UG/ML
INJECTION, SOLUTION INTRAMUSCULAR; INTRAVENOUS AS NEEDED
Status: DISCONTINUED | OUTPATIENT
Start: 2019-09-25 | End: 2019-09-25 | Stop reason: SURG

## 2019-09-25 RX ORDER — LIDOCAINE HYDROCHLORIDE 10 MG/ML
INJECTION, SOLUTION INFILTRATION; PERINEURAL AS NEEDED
Status: DISCONTINUED | OUTPATIENT
Start: 2019-09-25 | End: 2019-09-25 | Stop reason: SURG

## 2019-09-25 RX ORDER — FENTANYL CITRATE/PF 50 MCG/ML
25 SYRINGE (ML) INJECTION
Status: COMPLETED | OUTPATIENT
Start: 2019-09-25 | End: 2019-09-25

## 2019-09-25 RX ORDER — DOCUSATE SODIUM 100 MG/1
100 CAPSULE, LIQUID FILLED ORAL 2 TIMES DAILY
Status: DISCONTINUED | OUTPATIENT
Start: 2019-09-25 | End: 2019-09-25

## 2019-09-25 RX ORDER — OXYCODONE HYDROCHLORIDE 5 MG/1
5 TABLET ORAL EVERY 4 HOURS PRN
Qty: 20 TABLET | Refills: 0 | Status: SHIPPED | OUTPATIENT
Start: 2019-09-25 | End: 2019-10-15

## 2019-09-25 RX ORDER — OXYCODONE HYDROCHLORIDE 5 MG/1
10 TABLET ORAL EVERY 4 HOURS PRN
Status: DISCONTINUED | OUTPATIENT
Start: 2019-09-25 | End: 2019-09-26 | Stop reason: HOSPADM

## 2019-09-25 RX ORDER — ROCURONIUM BROMIDE 10 MG/ML
INJECTION, SOLUTION INTRAVENOUS AS NEEDED
Status: DISCONTINUED | OUTPATIENT
Start: 2019-09-25 | End: 2019-09-25 | Stop reason: SURG

## 2019-09-25 RX ORDER — HYDROMORPHONE HCL/PF 1 MG/ML
1 SYRINGE (ML) INJECTION
Status: DISCONTINUED | OUTPATIENT
Start: 2019-09-25 | End: 2019-09-25

## 2019-09-25 RX ORDER — OXYBUTYNIN CHLORIDE 5 MG/1
5 TABLET ORAL 3 TIMES DAILY
Status: DISCONTINUED | OUTPATIENT
Start: 2019-09-25 | End: 2019-09-26 | Stop reason: HOSPADM

## 2019-09-25 RX ORDER — SIMETHICONE 80 MG
80 TABLET,CHEWABLE ORAL 4 TIMES DAILY PRN
Status: DISCONTINUED | OUTPATIENT
Start: 2019-09-25 | End: 2019-09-26 | Stop reason: HOSPADM

## 2019-09-25 RX ORDER — LIDOCAINE HYDROCHLORIDE 10 MG/ML
0.5 INJECTION, SOLUTION EPIDURAL; INFILTRATION; INTRACAUDAL; PERINEURAL ONCE AS NEEDED
Status: DISCONTINUED | OUTPATIENT
Start: 2019-09-25 | End: 2019-09-25 | Stop reason: HOSPADM

## 2019-09-25 RX ORDER — SCOLOPAMINE TRANSDERMAL SYSTEM 1 MG/1
1 PATCH, EXTENDED RELEASE TRANSDERMAL
Status: DISCONTINUED | OUTPATIENT
Start: 2019-09-25 | End: 2019-09-25 | Stop reason: HOSPADM

## 2019-09-25 RX ORDER — LEVOFLOXACIN 500 MG/1
500 TABLET, FILM COATED ORAL EVERY 24 HOURS
Qty: 3 TABLET | Refills: 0 | Status: SHIPPED | OUTPATIENT
Start: 2019-09-25 | End: 2019-09-28

## 2019-09-25 RX ORDER — ACETAMINOPHEN 325 MG/1
975 TABLET ORAL EVERY 6 HOURS PRN
Status: DISCONTINUED | OUTPATIENT
Start: 2019-09-25 | End: 2019-09-26 | Stop reason: HOSPADM

## 2019-09-25 RX ORDER — NEOSTIGMINE METHYLSULFATE 1 MG/ML
INJECTION INTRAVENOUS AS NEEDED
Status: DISCONTINUED | OUTPATIENT
Start: 2019-09-25 | End: 2019-09-25 | Stop reason: SURG

## 2019-09-25 RX ORDER — HYDROMORPHONE HCL/PF 1 MG/ML
1 SYRINGE (ML) INJECTION ONCE
Status: COMPLETED | OUTPATIENT
Start: 2019-09-25 | End: 2019-09-25

## 2019-09-25 RX ORDER — HYDROMORPHONE HCL/PF 1 MG/ML
SYRINGE (ML) INJECTION AS NEEDED
Status: DISCONTINUED | OUTPATIENT
Start: 2019-09-25 | End: 2019-09-25 | Stop reason: SURG

## 2019-09-25 RX ORDER — TAMSULOSIN HYDROCHLORIDE 0.4 MG/1
0.4 CAPSULE ORAL
Status: DISCONTINUED | OUTPATIENT
Start: 2019-09-25 | End: 2019-09-26 | Stop reason: HOSPADM

## 2019-09-25 RX ORDER — LEVOFLOXACIN 500 MG/1
500 TABLET, FILM COATED ORAL DAILY
Status: COMPLETED | OUTPATIENT
Start: 2019-09-26 | End: 2019-09-26

## 2019-09-25 RX ORDER — POTASSIUM CITRATE 10 MEQ/1
10 TABLET, EXTENDED RELEASE ORAL 2 TIMES DAILY
Status: DISCONTINUED | OUTPATIENT
Start: 2019-09-25 | End: 2019-09-26 | Stop reason: HOSPADM

## 2019-09-25 RX ORDER — MIDAZOLAM HYDROCHLORIDE 1 MG/ML
INJECTION INTRAMUSCULAR; INTRAVENOUS AS NEEDED
Status: DISCONTINUED | OUTPATIENT
Start: 2019-09-25 | End: 2019-09-25 | Stop reason: SURG

## 2019-09-25 RX ORDER — ONDANSETRON 2 MG/ML
4 INJECTION INTRAMUSCULAR; INTRAVENOUS EVERY 6 HOURS PRN
Status: DISCONTINUED | OUTPATIENT
Start: 2019-09-25 | End: 2019-09-26 | Stop reason: HOSPADM

## 2019-09-25 RX ORDER — DOCUSATE SODIUM 100 MG/1
100 CAPSULE, LIQUID FILLED ORAL 2 TIMES DAILY
Status: DISCONTINUED | OUTPATIENT
Start: 2019-09-25 | End: 2019-09-26 | Stop reason: HOSPADM

## 2019-09-25 RX ORDER — DOCUSATE SODIUM 100 MG/1
100 CAPSULE, LIQUID FILLED ORAL 2 TIMES DAILY
Qty: 60 CAPSULE | Refills: 0 | Status: SHIPPED | OUTPATIENT
Start: 2019-09-25 | End: 2019-09-26

## 2019-09-25 RX ORDER — MAGNESIUM HYDROXIDE 1200 MG/15ML
LIQUID ORAL AS NEEDED
Status: DISCONTINUED | OUTPATIENT
Start: 2019-09-25 | End: 2019-09-25 | Stop reason: HOSPADM

## 2019-09-25 RX ORDER — ALBUTEROL SULFATE 2.5 MG/3ML
2.5 SOLUTION RESPIRATORY (INHALATION) ONCE AS NEEDED
Status: DISCONTINUED | OUTPATIENT
Start: 2019-09-25 | End: 2019-09-25 | Stop reason: HOSPADM

## 2019-09-25 RX ORDER — SODIUM CHLORIDE, SODIUM LACTATE, POTASSIUM CHLORIDE, CALCIUM CHLORIDE 600; 310; 30; 20 MG/100ML; MG/100ML; MG/100ML; MG/100ML
125 INJECTION, SOLUTION INTRAVENOUS CONTINUOUS
Status: DISCONTINUED | OUTPATIENT
Start: 2019-09-25 | End: 2019-09-25

## 2019-09-25 RX ORDER — DIPHENHYDRAMINE HYDROCHLORIDE 50 MG/ML
INJECTION INTRAMUSCULAR; INTRAVENOUS AS NEEDED
Status: DISCONTINUED | OUTPATIENT
Start: 2019-09-25 | End: 2019-09-25 | Stop reason: SURG

## 2019-09-25 RX ORDER — ACETAMINOPHEN 500 MG
1000 TABLET ORAL EVERY 6 HOURS PRN
Status: DISCONTINUED | OUTPATIENT
Start: 2019-09-25 | End: 2019-09-25

## 2019-09-25 RX ORDER — PROPOFOL 10 MG/ML
INJECTION, EMULSION INTRAVENOUS AS NEEDED
Status: DISCONTINUED | OUTPATIENT
Start: 2019-09-25 | End: 2019-09-25 | Stop reason: SURG

## 2019-09-25 RX ORDER — PROMETHAZINE HYDROCHLORIDE 25 MG/ML
12.5 INJECTION, SOLUTION INTRAMUSCULAR; INTRAVENOUS ONCE AS NEEDED
Status: DISCONTINUED | OUTPATIENT
Start: 2019-09-25 | End: 2019-09-25 | Stop reason: HOSPADM

## 2019-09-25 RX ORDER — ONDANSETRON 2 MG/ML
4 INJECTION INTRAMUSCULAR; INTRAVENOUS ONCE AS NEEDED
Status: DISCONTINUED | OUTPATIENT
Start: 2019-09-25 | End: 2019-09-25 | Stop reason: HOSPADM

## 2019-09-25 RX ORDER — DULOXETIN HYDROCHLORIDE 60 MG/1
60 CAPSULE, DELAYED RELEASE ORAL DAILY
Status: DISCONTINUED | OUTPATIENT
Start: 2019-09-26 | End: 2019-09-26 | Stop reason: HOSPADM

## 2019-09-25 RX ORDER — GLYCOPYRROLATE 0.2 MG/ML
INJECTION INTRAMUSCULAR; INTRAVENOUS AS NEEDED
Status: DISCONTINUED | OUTPATIENT
Start: 2019-09-25 | End: 2019-09-25 | Stop reason: SURG

## 2019-09-25 RX ORDER — MEPERIDINE HYDROCHLORIDE 25 MG/ML
12.5 INJECTION INTRAMUSCULAR; INTRAVENOUS; SUBCUTANEOUS AS NEEDED
Status: DISCONTINUED | OUTPATIENT
Start: 2019-09-25 | End: 2019-09-25 | Stop reason: HOSPADM

## 2019-09-25 RX ORDER — DEXAMETHASONE SODIUM PHOSPHATE 10 MG/ML
INJECTION, SOLUTION INTRAMUSCULAR; INTRAVENOUS AS NEEDED
Status: DISCONTINUED | OUTPATIENT
Start: 2019-09-25 | End: 2019-09-25 | Stop reason: SURG

## 2019-09-25 RX ORDER — HYDROMORPHONE HCL/PF 1 MG/ML
0.5 SYRINGE (ML) INJECTION
Status: DISCONTINUED | OUTPATIENT
Start: 2019-09-25 | End: 2019-09-25 | Stop reason: HOSPADM

## 2019-09-25 RX ORDER — SODIUM CHLORIDE, SODIUM LACTATE, POTASSIUM CHLORIDE, CALCIUM CHLORIDE 600; 310; 30; 20 MG/100ML; MG/100ML; MG/100ML; MG/100ML
125 INJECTION, SOLUTION INTRAVENOUS CONTINUOUS
Status: DISCONTINUED | OUTPATIENT
Start: 2019-09-25 | End: 2019-09-26 | Stop reason: HOSPADM

## 2019-09-25 RX ORDER — LEVOFLOXACIN 5 MG/ML
750 INJECTION, SOLUTION INTRAVENOUS ONCE
Status: DISCONTINUED | OUTPATIENT
Start: 2019-09-25 | End: 2019-09-25 | Stop reason: HOSPADM

## 2019-09-25 RX ORDER — LABETALOL 20 MG/4 ML (5 MG/ML) INTRAVENOUS SYRINGE
5
Status: DISCONTINUED | OUTPATIENT
Start: 2019-09-25 | End: 2019-09-25 | Stop reason: HOSPADM

## 2019-09-25 RX ORDER — ONDANSETRON 2 MG/ML
INJECTION INTRAMUSCULAR; INTRAVENOUS AS NEEDED
Status: DISCONTINUED | OUTPATIENT
Start: 2019-09-25 | End: 2019-09-25 | Stop reason: SURG

## 2019-09-25 RX ADMIN — ROCURONIUM BROMIDE 50 MG: 10 INJECTION INTRAVENOUS at 14:32

## 2019-09-25 RX ADMIN — FENTANYL CITRATE 25 MCG: 50 INJECTION INTRAMUSCULAR; INTRAVENOUS at 17:21

## 2019-09-25 RX ADMIN — HYDROMORPHONE HYDROCHLORIDE 0.5 MG: 1 INJECTION, SOLUTION INTRAMUSCULAR; INTRAVENOUS; SUBCUTANEOUS at 16:24

## 2019-09-25 RX ADMIN — MIDAZOLAM HYDROCHLORIDE 2 MG: 1 INJECTION, SOLUTION INTRAMUSCULAR; INTRAVENOUS at 14:26

## 2019-09-25 RX ADMIN — ONDANSETRON 4 MG: 2 INJECTION INTRAMUSCULAR; INTRAVENOUS at 16:10

## 2019-09-25 RX ADMIN — DEXAMETHASONE SODIUM PHOSPHATE 4 MG: 10 INJECTION, SOLUTION INTRAMUSCULAR; INTRAVENOUS at 14:45

## 2019-09-25 RX ADMIN — LEVOFLOXACIN 750 MG: 5 INJECTION, SOLUTION INTRAVENOUS at 14:30

## 2019-09-25 RX ADMIN — FENTANYL CITRATE 25 MCG: 50 INJECTION INTRAMUSCULAR; INTRAVENOUS at 17:12

## 2019-09-25 RX ADMIN — PROPOFOL 200 MG: 10 INJECTION, EMULSION INTRAVENOUS at 14:31

## 2019-09-25 RX ADMIN — OXYCODONE HYDROCHLORIDE 10 MG: 5 TABLET ORAL at 18:43

## 2019-09-25 RX ADMIN — DOCUSATE SODIUM 100 MG: 100 CAPSULE, LIQUID FILLED ORAL at 18:43

## 2019-09-25 RX ADMIN — PHENYLEPHRINE HYDROCHLORIDE 50 MCG: 10 INJECTION INTRAVENOUS at 15:46

## 2019-09-25 RX ADMIN — GLYCOPYRROLATE 0.6 MG: 0.2 INJECTION, SOLUTION INTRAMUSCULAR; INTRAVENOUS at 16:16

## 2019-09-25 RX ADMIN — FENTANYL CITRATE 25 MCG: 50 INJECTION INTRAMUSCULAR; INTRAVENOUS at 16:55

## 2019-09-25 RX ADMIN — NEOSTIGMINE METHYLSULFATE 3 MG: 1 INJECTION INTRAVENOUS at 16:16

## 2019-09-25 RX ADMIN — FENTANYL CITRATE 50 MCG: 50 INJECTION INTRAMUSCULAR; INTRAVENOUS at 16:24

## 2019-09-25 RX ADMIN — DIPHENHYDRAMINE HYDROCHLORIDE 12.5 MG: 50 INJECTION, SOLUTION INTRAMUSCULAR; INTRAVENOUS at 14:45

## 2019-09-25 RX ADMIN — FENTANYL CITRATE 50 MCG: 50 INJECTION INTRAMUSCULAR; INTRAVENOUS at 15:13

## 2019-09-25 RX ADMIN — LIDOCAINE HYDROCHLORIDE 50 MG: 10 INJECTION, SOLUTION INFILTRATION; PERINEURAL at 14:31

## 2019-09-25 RX ADMIN — HYDROMORPHONE HYDROCHLORIDE 1 MG: 1 INJECTION, SOLUTION INTRAMUSCULAR; INTRAVENOUS; SUBCUTANEOUS at 20:01

## 2019-09-25 RX ADMIN — FENTANYL CITRATE 25 MCG: 50 INJECTION INTRAMUSCULAR; INTRAVENOUS at 17:30

## 2019-09-25 RX ADMIN — TAMSULOSIN HYDROCHLORIDE 0.4 MG: 0.4 CAPSULE ORAL at 18:43

## 2019-09-25 RX ADMIN — SODIUM CHLORIDE, SODIUM LACTATE, POTASSIUM CHLORIDE, AND CALCIUM CHLORIDE 125 ML/HR: .6; .31; .03; .02 INJECTION, SOLUTION INTRAVENOUS at 18:44

## 2019-09-25 RX ADMIN — SCOPALAMINE 1 PATCH: 1 PATCH, EXTENDED RELEASE TRANSDERMAL at 14:21

## 2019-09-25 RX ADMIN — FENTANYL CITRATE 100 MCG: 50 INJECTION INTRAMUSCULAR; INTRAVENOUS at 14:31

## 2019-09-25 RX ADMIN — OXYBUTYNIN CHLORIDE 5 MG: 5 TABLET ORAL at 22:34

## 2019-09-25 RX ADMIN — HYDROMORPHONE HYDROCHLORIDE 1 MG: 1 INJECTION, SOLUTION INTRAMUSCULAR; INTRAVENOUS; SUBCUTANEOUS at 22:34

## 2019-09-25 RX ADMIN — POTASSIUM CITRATE 10 MEQ: 10 TABLET, EXTENDED RELEASE ORAL at 20:00

## 2019-09-25 NOTE — ANESTHESIA POSTPROCEDURE EVALUATION
Post-Op Assessment Note    CV Status:  Stable  Pain Score: 0    Pain management: adequate     Mental Status:  Alert and awake   Hydration Status:  Euvolemic   PONV Controlled:  Controlled   Airway Patency:  Patent   Post Op Vitals Reviewed: Yes      Staff: CRNA           BP  133/70   Temp 98 3   Pulse  83   Resp 16   SpO2 97

## 2019-09-25 NOTE — PROGRESS NOTES
I discussed with the patient and his wife the findings from surgery  I inquired as to whether not they would like me to call any additional family members  Their daughter Kyle Zambrano works for [employer] and they gave me her phone number:  [phone]  I did call and leave a voicemail with call back number if there are any questions or concerns about the findings from today's procedure

## 2019-09-25 NOTE — PERIOPERATIVE NURSING NOTE
Pt meeting PACU discharge criteria  Report called to med/surg floor RN  Will transfer pt to room 405

## 2019-09-25 NOTE — DISCHARGE INSTRUCTIONS
Percutaneous Nephrolithotomy   WHAT YOU NEED TO KNOW:   Percutaneous nephrolithotomy is surgery to remove kidney stones using a scope  DISCHARGE INSTRUCTIONS:   Medicines:   · Medicines  may be given to help decrease pain and prevent or treat an infection  You may also be given medicine to help pass stone pieces or prevent more kidney stones from forming  · Take your medicine as directed  Contact your healthcare provider if you think your medicine is not helping or if you have side effects  Tell him if you are allergic to any medicine  Keep a list of the medicines, vitamins, and herbs you take  Include the amounts, and when and why you take them  Bring the list or the pill bottles to follow-up visits  Carry your medicine list with you in case of an emergency  Follow up with your healthcare provider or urologist as directed:  Write down your questions so you remember to ask them during your visits  Wound care:  Care for your wound as directed  You may need to carefully wash the wound with soap and water  Dry the area and put on new, clean bandages as directed  Change your bandages when they get wet or dirty  You may have thin strips of surgical tape on your incision  Keep them clean and dry  They will fall off by themselves after several days  Do not pull them off  Self-care:   · Rest   You may feel like resting more after your surgery  Slowly start to do more each day  Rest when you feel it is needed  · Drink plenty of liquids  This helps flush any remaining small pieces of stone  Fluids can also help prevent stones from forming again  Limit the amount of caffeine you drink  Caffeine may be found in coffee, tea, soda, and sports drinks  Ask how much liquid to drink each day and which liquids are best for you  · Apply heat  on your lower back for 20 to 30 minutes every 2 hours for as many days as directed  Heat helps decrease pain and soreness       · Strain your urine every time you go to the bathroom  Urinate through a strainer or a piece of thin cloth to catch the stone pieces  Take the pieces to your healthcare providers so they can be sent to the lab for tests  This will help your healthcare provider or urologist plan the best treatment for you  · Ask if you need to make changes in the foods you eat  You may need to limit certain foods  You may need to limit nuts, chocolate, coffee, and certain green leafy vegetables  You may also need to limit meat and salt  Contact your healthcare provider or urologist if:   · You have a fever  · You cannot urinate  · You have questions or concerns about your condition or care  Seek care immediately or call 911 if:   · You feel lightheaded, short of breath, and have chest pain  · You cough up blood  · Your arm or leg feels warm, tender, and painful  It may look swollen and red  · You urinate bright red blood  · You have trouble thinking clearly  · You have severe vomiting  © 2017 [address] Information is for End User's use only and may not be sold, redistributed or otherwise used for commercial purposes  All illustrations and images included in CareNotes® are the copyrighted property of [employer]  or Leonel Hanley  The above information is an  only  It is not intended as medical advice for individual conditions or treatments  Talk to your doctor, nurse or pharmacist before following any medical regimen to see if it is safe and effective for you

## 2019-09-25 NOTE — INTERVAL H&P NOTE
H&P reviewed  After examining the patient I find no changes in the patients condition since the H&P had been written  Will proceed with RIGHT percutaneous nephrolithotomy surgery to remove the proximal encrustation that were not reachable with prior ureteroscopy  Will also approach the RIGHT 13mm stone that patient presented with  Plan for procedure, risks and benefits including bleeding, damage to surrounding structures, urinary leak around the kidney, arterial venous fistula within the kidney or pseudoaneurysm requiring secondary procedures and need for additional stone treatment all discussed  Patient has signed informed consent and his right side was marked  Has been on levaquin PO for culture positivity       Vitals:    09/25/19 1318   BP: 137/77   Pulse: 61   Resp: 18   Temp: (!) 97 3 °F (36 3 °C)   SpO2: 97%

## 2019-09-25 NOTE — ANESTHESIA PREPROCEDURE EVALUATION
Review of Systems/Medical History  Patient summary reviewed  Chart reviewed  No history of anesthetic complications     Cardiovascular  Exercise tolerance (METS): >4,  Dysrhythmias (hx SVT ablation) ,    Pulmonary  Not a smoker ,   Comment: Pt denies hx NAVIN     GI/Hepatic  Negative GI/hepatic ROS          Kidney stones,        Endo/Other    Comment: Hx alcohol abuse Obesity (BMI 37)    GYN       Hematology  Negative hematology ROS      Musculoskeletal  Negative musculoskeletal ROS        Neurology  Negative neurology ROS      Psychology   Anxiety, Depression ,            Physical Exam    Airway    Mallampati score: II  TM Distance: >3 FB  Neck ROM: full     Dental   Comment: Pt reports upper left molar causes pain - evaluated by dentist and just watching - is not actually loose,     Cardiovascular  Rhythm: regular, Rate: normal,     Pulmonary      Other Findings      Lab Results   Component Value Date    WBC 8 84 09/12/2019    HGB 13 2 09/12/2019     09/12/2019     Lab Results   Component Value Date    SODIUM 138 09/12/2019    K 4 0 09/12/2019    BUN 19 09/12/2019    CREATININE 0 94 09/12/2019    GLUCOSE 88 04/14/2015     Anesthesia Plan  ASA Score- 2     Anesthesia Type- general with ASA Monitors  Additional Monitors:   Airway Plan: ETT  Comment: Patient seen and examined  History reviewed  Patient to be done under general anesthesia with ETT and routine monitors  Risks discussed with the patient  Consent obtained        Plan Factors-    Induction- intravenous  Postoperative Plan- Plan for postoperative opioid use  Planned trial extubation    Informed Consent- Anesthetic plan and risks discussed with patient and spouse  I personally reviewed this patient with the CRNA  Discussed and agreed on the Anesthesia Plan with the CRNA  Carlos Alberto Willingham

## 2019-09-26 ENCOUNTER — TELEPHONE (OUTPATIENT)
Dept: UROLOGY | Facility: CLINIC | Age: 54
End: 2019-09-26

## 2019-09-26 VITALS
SYSTOLIC BLOOD PRESSURE: 119 MMHG | WEIGHT: 264.99 LBS | HEIGHT: 70 IN | BODY MASS INDEX: 37.94 KG/M2 | TEMPERATURE: 99.1 F | RESPIRATION RATE: 16 BRPM | DIASTOLIC BLOOD PRESSURE: 71 MMHG | OXYGEN SATURATION: 96 % | HEART RATE: 94 BPM

## 2019-09-26 DIAGNOSIS — N20.0 KIDNEY STONE: Primary | ICD-10-CM

## 2019-09-26 DIAGNOSIS — Z96.0 RETAINED URETERAL STENT: ICD-10-CM

## 2019-09-26 LAB
ANION GAP SERPL CALCULATED.3IONS-SCNC: 8 MMOL/L (ref 4–13)
BUN SERPL-MCNC: 17 MG/DL (ref 5–25)
CALCIUM SERPL-MCNC: 8.4 MG/DL (ref 8.3–10.1)
CHLORIDE SERPL-SCNC: 100 MMOL/L (ref 100–108)
CO2 SERPL-SCNC: 28 MMOL/L (ref 21–32)
CREAT SERPL-MCNC: 1.22 MG/DL (ref 0.6–1.3)
ERYTHROCYTE [DISTWIDTH] IN BLOOD BY AUTOMATED COUNT: 14.1 % (ref 11.6–15.1)
GFR SERPL CREATININE-BSD FRML MDRD: 66 ML/MIN/1.73SQ M
GLUCOSE SERPL-MCNC: 120 MG/DL (ref 65–140)
HCT VFR BLD AUTO: 38.5 % (ref 36.5–49.3)
HGB BLD-MCNC: 12.8 G/DL (ref 12–17)
MCH RBC QN AUTO: 30.7 PG (ref 26.8–34.3)
MCHC RBC AUTO-ENTMCNC: 33.2 G/DL (ref 31.4–37.4)
MCV RBC AUTO: 92 FL (ref 82–98)
PLATELET # BLD AUTO: 271 THOUSANDS/UL (ref 149–390)
PMV BLD AUTO: 9.8 FL (ref 8.9–12.7)
POTASSIUM SERPL-SCNC: 4.3 MMOL/L (ref 3.5–5.3)
RBC # BLD AUTO: 4.17 MILLION/UL (ref 3.88–5.62)
SODIUM SERPL-SCNC: 136 MMOL/L (ref 136–145)
WBC # BLD AUTO: 15.7 THOUSAND/UL (ref 4.31–10.16)

## 2019-09-26 PROCEDURE — 85027 COMPLETE CBC AUTOMATED: CPT | Performed by: UROLOGY

## 2019-09-26 PROCEDURE — 80048 BASIC METABOLIC PNL TOTAL CA: CPT | Performed by: UROLOGY

## 2019-09-26 PROCEDURE — 99024 POSTOP FOLLOW-UP VISIT: CPT | Performed by: NURSE PRACTITIONER

## 2019-09-26 RX ORDER — OXYBUTYNIN CHLORIDE 10 MG/1
10 TABLET, EXTENDED RELEASE ORAL
Qty: 10 TABLET | Refills: 0 | Status: SHIPPED | OUTPATIENT
Start: 2019-09-26 | End: 2019-10-08

## 2019-09-26 RX ORDER — PHENAZOPYRIDINE HYDROCHLORIDE 100 MG/1
100 TABLET, FILM COATED ORAL 3 TIMES DAILY PRN
Qty: 6 TABLET | Refills: 0 | Status: SHIPPED | OUTPATIENT
Start: 2019-09-26 | End: 2019-09-28

## 2019-09-26 RX ORDER — DOCUSATE SODIUM 100 MG/1
100 CAPSULE, LIQUID FILLED ORAL 2 TIMES DAILY
Qty: 10 CAPSULE | Refills: 0 | Status: SHIPPED | OUTPATIENT
Start: 2019-09-26 | End: 2021-05-05

## 2019-09-26 RX ADMIN — ACETAMINOPHEN 975 MG: 325 TABLET, FILM COATED ORAL at 10:25

## 2019-09-26 RX ADMIN — OXYCODONE HYDROCHLORIDE 10 MG: 5 TABLET ORAL at 07:23

## 2019-09-26 RX ADMIN — HYDROMORPHONE HYDROCHLORIDE 1 MG: 1 INJECTION, SOLUTION INTRAMUSCULAR; INTRAVENOUS; SUBCUTANEOUS at 03:47

## 2019-09-26 RX ADMIN — DULOXETINE 60 MG: 60 CAPSULE, DELAYED RELEASE PELLETS ORAL at 09:24

## 2019-09-26 RX ADMIN — POTASSIUM CITRATE 10 MEQ: 10 TABLET, EXTENDED RELEASE ORAL at 09:24

## 2019-09-26 RX ADMIN — DOCUSATE SODIUM 100 MG: 100 CAPSULE, LIQUID FILLED ORAL at 09:24

## 2019-09-26 RX ADMIN — ENOXAPARIN SODIUM 40 MG: 40 INJECTION SUBCUTANEOUS at 09:24

## 2019-09-26 RX ADMIN — OXYBUTYNIN CHLORIDE 5 MG: 5 TABLET ORAL at 09:24

## 2019-09-26 RX ADMIN — LEVOFLOXACIN 500 MG: 500 TABLET, FILM COATED ORAL at 09:24

## 2019-09-26 RX ADMIN — OXYCODONE HYDROCHLORIDE 10 MG: 5 TABLET ORAL at 00:44

## 2019-09-26 NOTE — PLAN OF CARE
Problem: PAIN - ADULT  Goal: Verbalizes/displays adequate comfort level or baseline comfort level  Description  Interventions:  - Encourage patient to monitor pain and request assistance  - Assess pain using appropriate pain scale  - Administer analgesics based on type and severity of pain and evaluate response  - Implement non-pharmacological measures as appropriate and evaluate response  - Consider cultural and social influences on pain and pain management  - Notify physician/advanced practitioner if interventions unsuccessful or patient reports new pain  Outcome: Progressing     Problem: INFECTION - ADULT  Goal: Absence or prevention of progression during hospitalization  Description  INTERVENTIONS:  - Assess and monitor for signs and symptoms of infection  - Monitor lab/diagnostic results  - Monitor all insertion sites, i e  indwelling lines, tubes, and drains  - Monitor endotracheal if appropriate and nasal secretions for changes in amount and color  - [place] appropriate cooling/warming therapies per order  - Administer medications as ordered  - Instruct and encourage patient and family to use good hand hygiene technique  - Identify and instruct in appropriate isolation precautions for identified infection/condition  Outcome: Progressing  Goal: Absence of fever/infection during neutropenic period  Description  INTERVENTIONS:  - Monitor WBC    Outcome: Progressing     Problem: SAFETY ADULT  Goal: Patient will remain free of falls  Description  INTERVENTIONS:  - Assess patient frequently for physical needs  -  Identify cognitive and physical deficits and behaviors that affect risk of falls    -  [place] fall precautions as indicated by assessment   - Educate patient/family on patient safety including physical limitations  - Instruct patient to call for assistance with activity based on assessment  - Modify environment to reduce risk of injury  - Consider OT/PT consult to assist with strengthening/mobility  Outcome: Progressing  Goal: Maintain or return to baseline ADL function  Description  INTERVENTIONS:  -  Assess patient's ability to carry out ADLs; assess patient's baseline for ADL function and identify physical deficits which impact ability to perform ADLs (bathing, care of mouth/teeth, toileting, grooming, dressing, etc )  - Assess/evaluate cause of self-care deficits   - Assess range of motion  - Assess patient's mobility; develop plan if impaired  - Assess patient's need for assistive devices and provide as appropriate  - Encourage maximum independence but intervene and supervise when necessary  - Involve family in performance of ADLs  - Assess for home care needs following discharge   - Consider OT consult to assist with ADL evaluation and planning for discharge  - Provide patient education as appropriate  Outcome: Progressing  Goal: Maintain or return mobility status to optimal level  Description  INTERVENTIONS:  - Assess patient's baseline mobility status (ambulation, transfers, stairs, etc )    - Identify cognitive and physical deficits and behaviors that affect mobility  - Identify mobility aids required to assist with transfers and/or ambulation (gait belt, sit-to-stand, lift, walker, cane, etc )  - [place] fall precautions as indicated by assessment  - Record patient progress and toleration of activity level on Mobility SBAR; progress patient to next Phase/Stage  - Instruct patient to call for assistance with activity based on assessment  - Consider rehabilitation consult to assist with strengthening/weightbearing, etc   Outcome: Progressing     Problem: DISCHARGE PLANNING  Goal: Discharge to home or other facility with appropriate resources  Description  INTERVENTIONS:  - Identify barriers to discharge w/patient and caregiver  - Arrange for needed discharge resources and transportation as appropriate  - Identify discharge learning needs (meds, wound care, etc )  - Arrange for interpretive services to assist at discharge as needed  - Refer to Case Management Department for coordinating discharge planning if the patient needs post-hospital services based on physician/advanced practitioner order or complex needs related to functional status, cognitive ability, or social support system  Outcome: Progressing     Problem: Knowledge Deficit  Goal: Patient/family/caregiver demonstrates understanding of disease process, treatment plan, medications, and discharge instructions  Description  Complete learning assessment and assess knowledge base    Interventions:  - Provide teaching at level of understanding  - Provide teaching via preferred learning methods  Outcome: Progressing

## 2019-09-26 NOTE — RESPIRATORY THERAPY NOTE
RT Protocol Note  Andres Kincaid 53 y o  male MRN: [id number]  Unit/Bed#: -01 Encounter: [id number]    Assessment    Principal Problem:    Retained ureteral stent      Home Pulmonary Medications:  NONE       Past Medical History:   Diagnosis Date    Anxiety     Kidney stones      Objective    Physical Exam:   Assessment Type: Assess only  General Appearance: Alert, Awake  Respiratory Pattern: Normal  Chest Assessment: Chest expansion symmetrical  Bilateral Breath Sounds: Clear    Vitals:  Blood pressure 142/74, pulse 80, temperature 97 7 °F (36 5 °C), temperature source Oral, resp  rate 16, height 5' 10" (1 778 m), weight 120 kg (264 lb 15 9 oz), SpO2 95 %  Imaging and other studies: I have personally reviewed pertinent reports  Plan    Respiratory Plan: Home Bronchodilator Patient pathway        Resp Comments: Pt assessed for Respiratory Protoclol  BS Clear, SPO2 95% on 2L  Will continue with PRN Albuteral MDI as ordered, although pt stated that he does not take at home as listed; It was a previious order from a yahir ewhen he was sick

## 2019-09-26 NOTE — UTILIZATION REVIEW
Initial Clinical Review    Elective outpatient  surgical procedure    Age/Sex: 53 y o  male     Surgery Date: 9/25/2019    Procedure: NEPHROLITHOTOMY  PERCUTANEOUS ([facility])  MEATAL DILATION; ANTEGRADE URETEROSCOPY AND STENT REMOVAL (Right)    Anesthesia: general     Operative Findings: 13 mm right renal pelvis stone, easily retrieved with nephroscope  2  Retained right ureteral stent, proximal coil dislodged into the proximal ureter, retrieved with flexible ureteral scope, a flexible cystoscope, nephroscope  3  After removal of stent, dual lumen catheter passed into the mid ureter and antegrade pyelogram performed, ureter in good continuity with passage of contrast into the bladder  4  Antegrade ureteroscopy into the right ureter demonstrated some small stones that were retrieved with basket extraction and no large obstructing ureteral stones    5  Sixteen [facility] tip catheter placed into the right flank as nephrostomy tube    Admission Orders: Date/Time/Statement: 9/25/2019  1645 OUTPATIENT NO CHARGE BED  Start   Ordered   09/25/19 1645  Outpatient No Charge Bed Once     Transfer Service: Urology       Question: Admitting Physician Answer: Hanane Carney    09/25/19 1644       Vital Signs: /71 (BP Location: Right arm)   Pulse 93   Temp 98 3 °F (36 8 °C) (Oral)   Resp 16   Ht 5' 10" (1 778 m)   Wt 120 kg (264 lb 15 9 oz)   SpO2 95%   BMI 38 02 kg/m²      Diet: Regular     Mobility: up with assistance    DVT Prophylaxis: lovenox 40 mg sq daily    Medications/Pain Control:   Current Facility-Administered Medications:  acetaminophen 975 mg Oral Q6H PRN    albuterol 2 puff Inhalation Q4H PRN    docusate sodium 100 mg Oral BID    DULoxetine 60 mg Oral Daily    enoxaparin 40 mg Subcutaneous Daily    HYDROmorphone - used x 2  1 mg Intravenous Q3H PRN 2001 0347   lactated ringers 125 mL/hr Intravenous Continuous Last Rate: 125 mL/hr (09/25/19 1844)   levofloxacin 500 mg Oral Daily    ondansetron 4 mg Intravenous Q6H PRN    oxybutynin 5 mg Oral TID    oxyCODONE - used x 2  10 mg Oral Q4H PRN    oxyCODONE 5 mg Oral Q4H PRN    potassium citrate 10 mEq Oral BID    simethicone 80 mg Oral 4x Daily PRN    tamsulosin 0 4 mg Oral Daily With [address] [facility] Utilization Review Department  Phone: [phone]; Fax [phone]  [email or web address]  ATTENTION: Please call with any questions or concerns to [phone]  and carefully listen to the prompts so that you are directed to the right person  Send all requests for admission clinical reviews, approved or denied determinations and any other requests to fax [phone]   All voicemails are confidential

## 2019-09-26 NOTE — TELEPHONE ENCOUNTER
Can we please have the patient undergo a CT of the abdomen pelvis without contrast in about a week and follow with me in [place] after for nephrostomy removal

## 2019-09-26 NOTE — DISCHARGE SUMMARY
DISCHARGE SUMMARY    Patient Name: Kyaw Jones  Patient MRN: [id number]  Admitting Provider: Cesar Arreguin MD  Discharging Provider: Cesar Arreguin MD  Primary Care Physician at Discharge: Fannie Perez, [place] [phone]   Admission Date: 9/25/2019       Discharge Date: 09/26/19      Admission Diagnosis   Retained ureteral stent [Z96 0]    Discharge Diagnoses  Patient Active Problem List   Diagnosis    Adjustment disorder with mixed anxiety and depressed mood    Alcohol abuse    Anorgasmia of male    Bilateral hand numbness    Chronic musculoskeletal pain    Depression with anxiety    Generalized obesity    Kidney stone    Tobacco use    Tremor    Urinary tract stones    Vitamin D deficiency    Ureteral calculus    Hydroureteronephrosis    Kidney stones    JS [facility]acute kidney injury) ([facility] [address] )    Constipation    Right ureteral calculus    Retained ureteral stent    Recurrent unilateral inguinal hernia         Hospital Course  Patient reported to ER with chief complaint flank pain  CT scan confirmed +Nephrolithiasis  After an explanation of risk, benefits and potential complications  Patient was agreeable and furnished informed consent for percutaneous nephrolithotomy, PCN and ureteral stent placement  Please refer to operative  report dictated  This procedure went well without adverse events or complications  Patient tolerated well  Patient was afebrile, eating, ambulating, voiding and pain adequately controlled at time of discharge home  Patient verbalized and expressed understanding of all discharge instructions including:  Diet, medications, activity restrictions, care of PCN/dressing, follow-up appointment and signs and symptoms to report  Patient will have repeat imaging prior to determine removal of PCN and/or stent  Office will contact patient with hospital follow-up appointment date and time      Medications  Current Discharge Medication List      CONTINUE these medications which have NOT CHANGED    Details   acetaminophen (TYLENOL) 500 mg tablet Take 2 tablets (1,000 mg total) by mouth every 6 (six) hours as needed (pain, fever)  Qty: 30 tablet, Refills: 0    Associated Diagnoses: Ureteral colic      DULoxetine (CYMBALTA) 60 mg delayed release capsule Take 1 capsule (60 mg total) by mouth daily  Qty: 2 capsule, Refills: 0    Associated Diagnoses: Anxiety with depression      potassium citrate (UROCIT-K) 10 mEq Take 1 tablet (10 mEq total) by mouth 2 (two) times a day  Qty: 60 tablet, Refills: 0    Associated Diagnoses: Ureteral calculus      tamsulosin (FLOMAX) 0 4 mg Take 1 capsule (0 4 mg total) by mouth daily with dinner for 15 days  Qty: 15 capsule, Refills: 0    Associated Diagnoses: Kidney stone      albuterol (PROVENTIL HFA,VENTOLIN HFA) 90 mcg/act inhaler 2 puffs every 4 hours with spacer as needed for wheeze, cough, short of breath  Qty: 1 Inhaler, Refills: 0    Associated Diagnoses: Acute bronchitis, unspecified organism           Current Discharge Medication List      START taking these medications    Details   levofloxacin (LEVAQUIN) 500 mg tablet Take 1 tablet (500 mg total) by mouth every 24 hours for 3 days  Qty: 3 tablet, Refills: 0    Associated Diagnoses: Retained ureteral stent      oxyCODONE (ROXICODONE) 5 mg immediate release tablet Take 1 tablet (5 mg total) by mouth every 4 (four) hours as needed for moderate pain for up to 20 daysMax Daily Amount: 30 mg  Qty: 20 tablet, Refills: 0    Associated Diagnoses: Retained ureteral stent               Allergies  Allergies   Allergen Reactions    Ancef [Cefazolin] Nausea Only     Preoperative admin-nausea and dry heaving    Other Itching     Peanuts     Peanut-Containing Drug Products Itching    Sulfa Antibiotics Rash    Toradol [Ketorolac Tromethamine] Rash     Tolerates ibuprofen       Outpatient Follow-Up  No future appointments  For stent removal in office      Active Issues Requiring Follow-Up  PCN/stent removal    Test Results Pending at Discharge   Order Current Status    Stone analysis In process            Discharge Disposition  home    Treatments   IV hydration and antibiotics: Levaquin    Consults   none    Procedures   Not applicable    Operative Procedures Performed  Procedure(s):  NEPHROLITHOTOMY  PERCUTANEOUS ([facility]NL)  MEATAL DILATION; ANTEGRADE URETEROSCOPY AND STENT REMOVAL    Pertinent Test Results   Lab Results   Component Value Date    WBC 15 70 (H) 09/26/2019    HGB 12 8 09/26/2019    HCT 38 5 09/26/2019    MCV 92 09/26/2019     09/26/2019     Lab Results   Component Value Date    SODIUM 136 09/26/2019    K 4 3 09/26/2019     09/26/2019    CO2 28 09/26/2019    BUN 17 09/26/2019    CREATININE 1 22 09/26/2019    GLUC 120 09/26/2019    CALCIUM 8 4 09/26/2019         Physical Exam at Discharge  Condition of Patient on Discharge: stable  /71 (BP Location: Left arm)   Pulse 94   Temp 99 1 °F (37 3 °C) (Oral)   Resp 16   Ht 5' 10" (1 778 m)   Wt 120 kg (264 lb 15 9 oz)   SpO2 96%   BMI 38 02 kg/m²   General appearance: alert and oriented, in no acute distress, appears stated age, cooperative and no distress  Head: Normocephalic, without obvious abnormality, atraumatic  Neck: no adenopathy, no carotid bruit, no JVD, supple, symmetrical, trachea midline and thyroid not enlarged, symmetric, no tenderness/mass/nodules  Lungs: clear to auscultation bilaterally  Heart: regular rate and rhythm, S1, S2 normal, no murmur, click, rub or gallop  Abdomen: soft, non-tender; bowel sounds normal; no masses,  no organomegaly  Extremities: extremities normal, warm and well-perfused; no cyanosis, clubbing, or edema  Pulses: 2+ and symmetric  Neurologic: Grossly normal  Right PCN--patent for clear blush urine without clots  Drains secure  I/O last 3 completed shifts: In: 200 [I V :200]  Out: 880 [Urine:880]  I/O this shift:   In: 480 [P O :480]  Out: -           Total time spent with patient 20 minutes, >50% spent counseling and/or coordination of care         CAROL Vargas

## 2019-09-26 NOTE — TELEPHONE ENCOUNTER
Patient s/p Nephrolithotomy Percutaneous , antegrade ureteroscopy and stent removal today  Contacted and spoke with patient who is still in the hospital  Informed patient Dr Skylar Cuevas wishes CT in one week followed by office visit at [facility] office  Patient asking if office can call him tomorrow when he is at home  Please schedule CT scan at [place] next Thursday or Friday  Dr Skylar Cuevas has one opening on 10/08/2019 and 10/11/2019 for office visit    Please contact patient tomorrow with dates and times  Thanks

## 2019-09-26 NOTE — PLAN OF CARE
Problem: PAIN - ADULT  Goal: Verbalizes/displays adequate comfort level or baseline comfort level  Description  Interventions:  - Encourage patient to monitor pain and request assistance  - Assess pain using appropriate pain scale  - Administer analgesics based on type and severity of pain and evaluate response  - Implement non-pharmacological measures as appropriate and evaluate response  - Consider cultural and social influences on pain and pain management  - Notify physician/advanced practitioner if interventions unsuccessful or patient reports new pain  Outcome: Progressing     Problem: INFECTION - ADULT  Goal: Absence or prevention of progression during hospitalization  Description  INTERVENTIONS:  - Assess and monitor for signs and symptoms of infection  - Monitor lab/diagnostic results  - Monitor all insertion sites, i e  indwelling lines, tubes, and drains  - Monitor endotracheal if appropriate and nasal secretions for changes in amount and color  - [place] appropriate cooling/warming therapies per order  - Administer medications as ordered  - Instruct and encourage patient and family to use good hand hygiene technique  - Identify and instruct in appropriate isolation precautions for identified infection/condition  Outcome: Progressing  Goal: Absence of fever/infection during neutropenic period  Description  INTERVENTIONS:  - Monitor WBC    Outcome: Progressing     Problem: SAFETY ADULT  Goal: Patient will remain free of falls  Description  INTERVENTIONS:  - Assess patient frequently for physical needs  -  Identify cognitive and physical deficits and behaviors that affect risk of falls    -  [place] fall precautions as indicated by assessment   - Educate patient/family on patient safety including physical limitations  - Instruct patient to call for assistance with activity based on assessment  - Modify environment to reduce risk of injury  - Consider OT/PT consult to assist with strengthening/mobility  Outcome: Progressing  Goal: Maintain or return to baseline ADL function  Description  INTERVENTIONS:  -  Assess patient's ability to carry out ADLs; assess patient's baseline for ADL function and identify physical deficits which impact ability to perform ADLs (bathing, care of mouth/teeth, toileting, grooming, dressing, etc )  - Assess/evaluate cause of self-care deficits   - Assess range of motion  - Assess patient's mobility; develop plan if impaired  - Assess patient's need for assistive devices and provide as appropriate  - Encourage maximum independence but intervene and supervise when necessary  - Involve family in performance of ADLs  - Assess for home care needs following discharge   - Consider OT consult to assist with ADL evaluation and planning for discharge  - Provide patient education as appropriate  Outcome: Progressing  Goal: Maintain or return mobility status to optimal level  Description  INTERVENTIONS:  - Assess patient's baseline mobility status (ambulation, transfers, stairs, etc )    - Identify cognitive and physical deficits and behaviors that affect mobility  - Identify mobility aids required to assist with transfers and/or ambulation (gait belt, sit-to-stand, lift, walker, cane, etc )  - [place] fall precautions as indicated by assessment  - Record patient progress and toleration of activity level on Mobility SBAR; progress patient to next Phase/Stage  - Instruct patient to call for assistance with activity based on assessment  - Consider rehabilitation consult to assist with strengthening/weightbearing, etc   Outcome: Progressing     Problem: Knowledge Deficit  Goal: Patient/family/caregiver demonstrates understanding of disease process, treatment plan, medications, and discharge instructions  Description  Complete learning assessment and assess knowledge base    Interventions:  - Provide teaching at level of understanding  - Provide teaching via preferred learning methods  Outcome: Progressing

## 2019-09-30 NOTE — TELEPHONE ENCOUNTER
Patient scheduled for CT at [facility] on Wednesday, Oct  2, at 2 pm and follow up appointment in the [facility] office on October 8 at 10:30am

## 2019-10-02 ENCOUNTER — HOSPITAL ENCOUNTER (OUTPATIENT)
Dept: CT IMAGING | Facility: HOSPITAL | Age: 54
Discharge: HOME | End: 2019-10-02
Attending: UROLOGY
Payer: COMMERCIAL

## 2019-10-02 DIAGNOSIS — N20.0 KIDNEY STONE: ICD-10-CM

## 2019-10-02 DIAGNOSIS — Z96.0 RETAINED URETERAL STENT: ICD-10-CM

## 2019-10-02 LAB
COLOR STONE: NORMAL
COMMENT-STONE3: NORMAL
COMPOSITION: NORMAL
LABORATORY COMMENT REPORT: NORMAL
NIDUS STONE QL: NORMAL
PHOTO: NORMAL
SIZE STONE: 20 MM
STONE ANALYSIS-IMP: NORMAL
URATE MFR STONE: 100 %
WT STONE: 1088.6 MG

## 2019-10-02 PROCEDURE — 74176 CT ABD & PELVIS W/O CONTRAST: CPT

## 2019-10-08 ENCOUNTER — TELEPHONE (OUTPATIENT)
Dept: UROLOGY | Facility: CLINIC | Age: 54
End: 2019-10-08

## 2019-10-08 ENCOUNTER — OFFICE VISIT (OUTPATIENT)
Dept: UROLOGY | Facility: CLINIC | Age: 54
End: 2019-10-08

## 2019-10-08 VITALS
HEIGHT: 70 IN | WEIGHT: 255.29 LBS | BODY MASS INDEX: 36.55 KG/M2 | RESPIRATION RATE: 20 BRPM | HEART RATE: 80 BPM | DIASTOLIC BLOOD PRESSURE: 70 MMHG | SYSTOLIC BLOOD PRESSURE: 130 MMHG

## 2019-10-08 DIAGNOSIS — N20.0 KIDNEY STONE: Primary | ICD-10-CM

## 2019-10-08 DIAGNOSIS — N20.1 URETERAL CALCULUS: ICD-10-CM

## 2019-10-08 PROCEDURE — 99024 POSTOP FOLLOW-UP VISIT: CPT | Performed by: UROLOGY

## 2019-10-08 RX ORDER — LEVOFLOXACIN 250 MG/1
250 TABLET ORAL DAILY
Qty: 3 TABLET | Refills: 0 | Status: SHIPPED | OUTPATIENT
Start: 2019-10-08 | End: 2019-10-11

## 2019-10-08 RX ORDER — POTASSIUM CITRATE 10 MEQ/1
10 TABLET, EXTENDED RELEASE ORAL
Qty: 60 TABLET | Refills: 0 | Status: SHIPPED | OUTPATIENT
Start: 2019-10-08 | End: 2020-03-02 | Stop reason: SDUPTHER

## 2019-10-08 NOTE — TELEPHONE ENCOUNTER
Contacted and spoke with patient to inform him Dr Castro [place] prescribed Levaquin one a day for 3 days

## 2019-10-08 NOTE — TELEPHONE ENCOUNTER
Can we please let the patient know I would like him to take 3 days of low-dose Levaquin after nephrostomy removal?

## 2019-10-08 NOTE — PROGRESS NOTES
UROLOGY ROUTINE FOLLOW UP NOTE     CHIEF COMPLAINT   Tracey Bear is a 53 y o  male with a complaint of   Chief Complaint   Patient presents with    Nephrolithiasis     History of Present Illness:     53 y o  male with significant known stone disease  Patient underwent stent placement at the end of June for a 13 mm stone  He was seen back in the office and given a history of uric acid stones, requested repeat imaging prior to scheduling surgery to see if the stone would "dissolve"  Because of persistence of the stone, the patient was scheduled for ureteroscopy  He underwent attempted stent removal and ureteroscopy on 8/29/2019  The stent had become significantly encrusted within 2 months despite ureteroscopic attempts to remove the stent, we were unable to uncoil the proximal extent  The patient was admitted and a percutaneous nephrostomy tube was placed in plans for a percutaneous nephrolithotomy surgery  The patient underwent this procedure on 9/25/2019  No antegrade stent was placed given the concern for rapid encrustation  Patient returns today        Past Medical History:     Past Medical History:   Diagnosis Date    Anxiety     Kidney stones        PAST SURGICAL HISTORY:     Past Surgical History:   Procedure Laterality Date    CARDIAC ELECTROPHYSIOLOGY STUDY AND ABLATION      CARDIAC ELECTROPHYSIOLOGY STUDY AND ABLATION      CARDIAC SURGERY  2014    ablation    COLONOSCOPY      FL RETROGRADE PYELOGRAM  6/29/2019    FL RETROGRADE PYELOGRAM  8/29/2019    FL RETROGRADE PYELOGRAM  9/25/2019    HERNIA REPAIR Right     ing     IR TUBE PLACEMENT NEPHROSTOMY  8/30/2019    KIDNEY STONE SURGERY      KNEE ARTHROSCOPY Left     KNEE CARTILAGE SURGERY      KY CYSTO/URETERO W/LITHOTRIPSY &INDWELL STENT INSRT Right 8/29/2019    Procedure: CYSTOSCOPY URETEROSCOPY WITH LITHOTRIPSY HOLMIUM LASER, RETROGRADE PYELOGRAM;  Surgeon: Rodolfo Torres MD;  Location: [facility];  Service: Urology  DE CYSTOURETHROSCOPY,URETER CATHETER Right 6/29/2019    Procedure: CYSTOSCOPY RETROGRADE PYELOGRAM WITH INSERTION STENT URETERAL;  Surgeon: Michelle Granados MD;  Location: [facility];  Service: Urology    DE JAE Goff CM Right 9/25/2019    Procedure: NEPHROLITHOTOMY  PERCUTANEOUS (PCNL)  MEATAL DILATION; ANTEGRADE URETEROSCOPY AND STENT REMOVAL;  Surgeon: Ritchie Gonzalez MD;  [facility];  Service: Urology       CURRENT MEDICATIONS:     Current Outpatient Medications   Medication Sig Dispense Refill    DULoxetine (CYMBALTA) 60 mg delayed release capsule Take 1 capsule (60 mg total) by mouth daily 2 capsule 0    potassium citrate (UROCIT-K) 10 mEq Take 1 tablet (10 mEq total) by mouth 2 (two) times a day 60 tablet 0    albuterol (PROVENTIL HFA,VENTOLIN HFA) 90 mcg/act inhaler 2 puffs every 4 hours with spacer as needed for wheeze, cough, short of breath  (Patient not taking: Reported on 10/8/2019) 1 Inhaler 0    docusate sodium (COLACE) 100 mg capsule Take 1 capsule (100 mg total) by mouth 2 (two) times a day for 5 days 10 capsule 0    oxybutynin (DITROPAN-XL) 10 MG 24 hr tablet Take 1 tablet (10 mg total) by mouth daily at bedtime for 10 days 10 tablet 0    oxyCODONE (ROXICODONE) 5 mg immediate release tablet Take 1 tablet (5 mg total) by mouth every 4 (four) hours as needed for moderate pain for up to 20 daysMax Daily Amount: 30 mg (Patient not taking: Reported on 10/8/2019) 20 tablet 0     No current facility-administered medications for this visit          ALLERGIES:     Allergies   Allergen Reactions    Ancef [Cefazolin] Nausea Only     Preoperative admin-nausea and dry heaving    Other Itching     Peanuts     Peanut-Containing Drug Products Itching    Sulfa Antibiotics Rash    Toradol [Ketorolac Tromethamine] Rash     Tolerates ibuprofen       SOCIAL HISTORY:     Social History     Socioeconomic History    Marital status: /Civil Union     Spouse name: None  Number of children: None    Years of education: None    Highest education level: None   Occupational History    None   Social Needs    Financial resource strain: None    Food insecurity:     Worry: None     Inability: None    Transportation needs:     Medical: None     Non-medical: None   Tobacco Use    Smoking status: Never Smoker    Smokeless tobacco: Former User    Tobacco comment: former chewing tobacco-quit 2014   Substance and Sexual Activity    Alcohol use: Yes     Frequency: Monthly or less     Drinks per session: 1 or 2     Comment: occasionally    Drug use: No    Sexual activity: None   Lifestyle    Physical activity:     Days per week: None     Minutes per session: None    Stress: None   Relationships    Social connections:     Talks on phone: None     Gets together: None     Attends Oriental orthodox service: None     Active member of club or organization: None     Attends meetings of clubs or organizations: None     Relationship status: None    Intimate partner violence:     Fear of current or ex partner: None     Emotionally abused: None     Physically abused: None     Forced sexual activity: None   Other Topics Concern    None   Social History Narrative    None       SOCIAL HISTORY:     Family History   Problem Relation Age of Onset    Cancer Mother     Cancer Father        REVIEW OF SYSTEMS:     Review of Systems   Constitutional: Negative  Respiratory: Negative  Cardiovascular: Negative  Gastrointestinal: Negative  Genitourinary: Positive for flank pain  Skin: Negative  Psychiatric/Behavioral: Negative  PHYSICAL EXAM:     /70   Pulse 80   Resp 20   Ht 5' 10" (1 778 m)   Wt 116 kg (255 lb 4 7 oz)   BMI 36 63 kg/m²     General:  Healthy appearing male in no acute distress  They have a normal affect  There is not appear to be any gross neurologic defects or abnormalities  HEENT:  Normocephalic, atraumatic    Neck is supple without any palpable lymphadenopathy  Cardiovascular:  Patient has normal palpable distal radial pulses  There is no significant peripheral edema  No JVD is noted  Respiratory:  Patient has unlabored respirations  There is no audible wheeze or rhonchi  Abdomen:  Abdomen is without surgical scars  Abdomen is soft and nontender  There is no tympany  Inguinal and umbilical hernia are not appreciated  Right flank nephrostomy in place    Musculoskeletal:  Patient does not have significant CVA tenderness in the  flank with palpation or percussion  They full range of motion in all 4 extremities  Strength in all 4 extremities appears congruent  Patient is able to ambulate without assistance or difficulty  Dermatologic:  Patient has no skin abnormalities or rashes        LABS:     CBC:   Lab Results   Component Value Date    WBC 15 70 (H) 09/26/2019    HGB 12 8 09/26/2019    HCT 38 5 09/26/2019    MCV 92 09/26/2019     09/26/2019       BMP:   Lab Results   Component Value Date    GLUCOSE 88 04/14/2015    CALCIUM 8 4 09/26/2019     04/14/2015    K 4 3 09/26/2019    CO2 28 09/26/2019     09/26/2019    BUN 17 09/26/2019    CREATININE 1 22 09/26/2019     Citrobacter freundii       ISIDRO     Amoxicillin + Clavulanate >16/8 ug/ml Resistant     Ampicillin ($$) >16 00 ug/ml Resistant     Ampicillin + Sulbactam ($) 16/8 ug/ml Resistant     Aztreonam ($$$)  <=4 ug/ml Susceptible     Cefazolin ($) >16 00 ug/ml Resistant     Cefotaxime ($) <=2 00 ug/ml Susceptible     Ceftazidime ($$) <=1 ug/ml Susceptible     Ceftriaxone ($$) <=1 00 ug/ml Susceptible     Cefuroxime ($$) <=4 ug/ml Resistant     Ciprofloxacin ($)  <=1 00 ug/ml Susceptible     Ertapenem ($$$) <=0 5 ug/ml Susceptible     Gentamicin ($$) <=1 ug/ml Susceptible     Levofloxacin ($) 0 50 ug/ml Susceptible     Nitrofurantoin <=32 ug/ml Susceptible     Piperacillin + Tazobactam ($$$) <=4 ug/ml Susceptible     Tetracycline <=4 ug/ml Susceptible     Tobramycin ($) 2 ug/ml Susceptible     Trimethoprim + Sulfamethoxazole ($$$) <=2/38 ug/ml Susceptible     ZID Performed Yes                   Specimen Collected: 09/12/19 10:34 AM            IMAGING:     10/2/19  CT ABDOMEN AND PELVIS WITHOUT IV CONTRAST - LOW DOSE RENAL STONE      INDICATION: History of renal calculi with prior IR/urologic interventions, most recently nephrolithotomy, percutaneous antegrade ureteroscopy with nephroureteral stent removal placement of percutaneous drain on 9/25/2019      COMPARISON:  CT abdomen and pelvis 7/31/2019  Fluoroscopic retrograde pyelogram 9/25/2019      TECHNIQUE:  Low dose thin section CT examination of the abdomen and pelvis was performed without intravenous or oral contrast according to a protocol specifically designed to evaluate for urinary tract calculus  Axial, sagittal, and coronal 2D   reformatted images were created from the source data and submitted for interpretation  Evaluation for pathology in the abdomen and pelvis that is unrelated to urinary tract calculi is limited       Radiation dose length product (DLP) for this visit:  794 3 mGy-cm   This examination, like all CT scans performed in the [facility], was performed utilizing techniques to minimize radiation dose exposure, including the use of iterative   reconstruction and automated exposure control       FINDINGS:     RIGHT KIDNEY AND URETER:  Status post nephrolithotomy, removal of a nephroureteral stent and placement of a percutaneous nephrostomy with the distal tip at the ureteropelvic junction with resolution of hydronephrosis, noting several small foci of gas within the collecting system,   expected finding in the presence of a percutaneous drain  No urinary calculi are identified   There is mild diffuse reactive thickening of the right ureter      LEFT KIDNEY AND URETER:  Previously described 4 mm nonobstructing calculus in the left renal lower pole is less conspicuous on today's exam but appears unchanged in location (series 2, image 72 and 601, image 96)  No hydronephrosis or hydroureter      URINARY BLADDER:   Unremarkable      No significant abnormality in the visualized lung bases      Limited low radiation dose noncontrast CT evaluation demonstrates no clinically significant abnormality of liver, spleen, pancreas, or adrenal glands  Small volume of biliary sludge within the gallbladder neck without findings of acute cholecystitis  No ascites or bulky lymphadenopathy on this limited noncontrast study      The small bowel is grossly unremarkable  There are scattered colon diverticula without findings of acute diverticulitis  Limited evaluation demonstrates no evidence to suggest acute appendicitis  No acute fracture or destructive osseous lesion is identified         IMPRESSION:     1  Status post nephrolithotomy, removal of a nephroureteral stent and placement of a percutaneous nephrostomy drain in functional position, with resolution of hydronephrosis  No residual right urinary calculi identified  Mild diffuse reactive thickening   of the right ureter is noted      2  Previously described 4 mm nonobstructing calculus in the left renal lower pole is less conspicuous on today's exam but appears unchanged in location      3  Incidental findings of colonic diverticulosis and biliary sludge  STONE ANALYSIS:      9/25/19  4:13 PM    COLOR  Orange    Size mm 20    Stone Weight mg 1088  6    Composition  Comment    Comment: Percentage (Represents the % composition)   URIC ACID % 100    Nidus  Comment    Comment: Nidus composed of Uric acid  PROCEDURE:     Right nephrostomy removed    ASSESSMENT:     53 y o  male with uric acid stone disease s/p PCNL of encrusted stent and renal stone    PLAN:     Patient's nephrostomy removed today  CT scan findings reviewed  We discussed his uric acid stones in the patient is already taking his potassium citrate 3 times a day with meals    We discussed the option of going up on the dosing  Patient will be seeing Nephrology and we will defer this management to them  We discussed dietary recommendations and patient was given the [employer] urologic stone handout  We have offered him follow up in our office in 3 months with x-ray and ultrasound  Patient may be transferring all of his care to [facility] will let us know if he is not going to keep this appointment

## 2019-10-23 DIAGNOSIS — G89.29 CHRONIC MUSCULOSKELETAL PAIN: ICD-10-CM

## 2019-10-23 DIAGNOSIS — M79.18 CHRONIC MUSCULOSKELETAL PAIN: ICD-10-CM

## 2019-11-04 ENCOUNTER — TELEPHONE (OUTPATIENT)
Dept: INTERVENTIONAL RADIOLOGY/VASCULAR | Facility: HOSPITAL | Age: 54
End: 2019-11-04

## 2019-11-05 ENCOUNTER — TELEPHONE (OUTPATIENT)
Dept: INTERVENTIONAL RADIOLOGY/VASCULAR | Facility: HOSPITAL | Age: 54
End: 2019-11-05

## 2019-11-08 DIAGNOSIS — F41.8 ANXIETY WITH DEPRESSION: ICD-10-CM

## 2019-11-08 RX ORDER — DULOXETIN HYDROCHLORIDE 60 MG/1
60 CAPSULE, DELAYED RELEASE ORAL DAILY
Qty: 30 CAPSULE | Refills: 3 | Status: SHIPPED | OUTPATIENT
Start: 2019-11-08 | End: 2020-03-24 | Stop reason: SDUPTHER

## 2020-03-02 DIAGNOSIS — N20.1 URETERAL CALCULUS: ICD-10-CM

## 2020-03-02 RX ORDER — POTASSIUM CITRATE 10 MEQ/1
10 TABLET, EXTENDED RELEASE ORAL
Qty: 30 TABLET | Refills: 6 | Status: SHIPPED | OUTPATIENT
Start: 2020-03-02 | End: 2020-11-23 | Stop reason: SDUPTHER

## 2020-03-02 NOTE — TELEPHONE ENCOUNTER
An Auto-fax Refill Request for Potassium Citrate 10 mg was received from  [facility]  Patient was last seen on 10/08/19 by  Dr Miller Running in [address] [place] [address]  location; continuation of the medication was approved at that time    Script for same, 30  day supply with 6 refills was queued and forwarded to the Advanced Practitioner covering the [address] [address] location for approval

## 2020-03-24 DIAGNOSIS — F41.8 ANXIETY WITH DEPRESSION: ICD-10-CM

## 2020-03-24 RX ORDER — DULOXETIN HYDROCHLORIDE 60 MG/1
60 CAPSULE, DELAYED RELEASE ORAL DAILY
Qty: 30 CAPSULE | Refills: 3 | Status: SHIPPED | OUTPATIENT
Start: 2020-03-24 | End: 2020-03-26

## 2020-03-26 DIAGNOSIS — F41.8 ANXIETY WITH DEPRESSION: ICD-10-CM

## 2020-03-26 RX ORDER — DULOXETIN HYDROCHLORIDE 60 MG/1
CAPSULE, DELAYED RELEASE ORAL
Qty: 30 CAPSULE | Refills: 3 | Status: SHIPPED | OUTPATIENT
Start: 2020-03-26 | End: 2020-12-11 | Stop reason: SDUPTHER

## 2020-05-06 ENCOUNTER — TELEPHONE (OUTPATIENT)
Dept: FAMILY MEDICINE CLINIC | Facility: CLINIC | Age: 55
End: 2020-05-06

## 2020-05-06 DIAGNOSIS — L23.7 POISON IVY DERMATITIS: Primary | ICD-10-CM

## 2020-05-06 RX ORDER — PREDNISONE 10 MG/1
TABLET ORAL
Qty: 30 TABLET | Refills: 0 | Status: SHIPPED | OUTPATIENT
Start: 2020-05-06 | End: 2020-05-11 | Stop reason: SDUPTHER

## 2020-05-11 ENCOUNTER — TELEPHONE (OUTPATIENT)
Dept: FAMILY MEDICINE CLINIC | Facility: CLINIC | Age: 55
End: 2020-05-11

## 2020-05-11 DIAGNOSIS — L23.7 POISON IVY DERMATITIS: ICD-10-CM

## 2020-05-11 RX ORDER — PREDNISONE 10 MG/1
TABLET ORAL
Qty: 30 TABLET | Refills: 0 | Status: SHIPPED | OUTPATIENT
Start: 2020-05-11 | End: 2020-05-26 | Stop reason: ALTCHOICE

## 2020-05-26 ENCOUNTER — OFFICE VISIT (OUTPATIENT)
Dept: FAMILY MEDICINE CLINIC | Facility: CLINIC | Age: 55
End: 2020-05-26
Payer: COMMERCIAL

## 2020-05-26 VITALS — WEIGHT: 267.6 LBS | BODY MASS INDEX: 38.31 KG/M2 | HEIGHT: 70 IN

## 2020-05-26 DIAGNOSIS — L23.7 POISON IVY DERMATITIS: Primary | ICD-10-CM

## 2020-05-26 DIAGNOSIS — L29.9 EAR ITCHING: ICD-10-CM

## 2020-05-26 DIAGNOSIS — Z12.11 SCREENING FOR COLON CANCER: ICD-10-CM

## 2020-05-26 PROCEDURE — 99213 OFFICE O/P EST LOW 20 MIN: CPT | Performed by: FAMILY MEDICINE

## 2020-05-26 PROCEDURE — 1036F TOBACCO NON-USER: CPT | Performed by: FAMILY MEDICINE

## 2020-05-26 PROCEDURE — 3008F BODY MASS INDEX DOCD: CPT | Performed by: FAMILY MEDICINE

## 2020-05-26 RX ORDER — PREDNISONE 10 MG/1
TABLET ORAL
Qty: 30 TABLET | Refills: 0 | Status: SHIPPED | OUTPATIENT
Start: 2020-05-26 | End: 2021-05-05

## 2020-05-29 ENCOUNTER — OFFICE VISIT (OUTPATIENT)
Dept: OTOLARYNGOLOGY | Facility: CLINIC | Age: 55
End: 2020-05-29
Payer: COMMERCIAL

## 2020-05-29 VITALS
WEIGHT: 267 LBS | HEART RATE: 88 BPM | DIASTOLIC BLOOD PRESSURE: 88 MMHG | BODY MASS INDEX: 38.22 KG/M2 | TEMPERATURE: 96.9 F | HEIGHT: 70 IN | RESPIRATION RATE: 16 BRPM | SYSTOLIC BLOOD PRESSURE: 136 MMHG

## 2020-05-29 DIAGNOSIS — L29.9 EAR ITCHING: ICD-10-CM

## 2020-05-29 PROCEDURE — 3008F BODY MASS INDEX DOCD: CPT | Performed by: OTOLARYNGOLOGY

## 2020-05-29 PROCEDURE — 99242 OFF/OP CONSLTJ NEW/EST SF 20: CPT | Performed by: OTOLARYNGOLOGY

## 2020-05-29 RX ORDER — ALLOPURINOL 100 MG/1
100 TABLET ORAL DAILY
COMMUNITY
Start: 2020-05-15 | End: 2021-04-27 | Stop reason: SDUPTHER

## 2020-06-29 ENCOUNTER — TELEPHONE (OUTPATIENT)
Dept: GASTROENTEROLOGY | Facility: CLINIC | Age: 55
End: 2020-06-29

## 2020-07-02 NOTE — OP NOTE
No ultrasound needed - thanks for asking OPERATIVE REPORT  PATIENT NAME: Selam Gaming    :  1965  MRN: [id number]  Pt Location: [facility] RO[facility] 04    SURGERY DATE: 2019    Surgeon(s) and Role:     * Dora Willingham MD - Primary    Preop Diagnosis:  Retained ureteral stent [Z96 0]    Post-Op Diagnosis Codes:     * Retained ureteral stent [Z96 0]    Procedure(s) (LRB):  NEPHROLITHOTOMY  PERCUTANEOUS (PCNL)  MEATAL DILATION; ANTEGRADE URETEROSCOPY AND STENT REMOVAL (Right)    Specimen(s):  ID Type Source Tests Collected by Time Destination   1 :  Calculus Kidney, Right STONE ANALYSIS, TISSUE EXAM Dora Willingham MD 2019 [id number]        Estimated Blood Loss:   Minimal    Drains:  Urethral Catheter Latex 16 Fr  (Active)   Number of days: 0       Urethral Catheter Latex 16 Fr  (Active)   Number of days: 0       Anesthesia Type:   General    Operative Indications:  Retained ureteral stent [Z96 0]      Operative Findings:  1  13 mm right renal pelvis stone, easily retrieved with nephroscope  2  Retained right ureteral stent, proximal coil dislodged into the proximal ureter, retrieved with flexible ureteral scope, a flexible cystoscope, nephroscope  3  After removal of stent, dual lumen catheter passed into the mid ureter and antegrade pyelogram performed, ureter in good continuity with passage of contrast into the bladder  4  Antegrade ureteroscopy into the right ureter demonstrated some small stones that were retrieved with basket extraction and no large obstructing ureteral stones  5  [facility]teen [facility] tip catheter placed into the right flank as nephrostomy tube    Complications:   None    Procedure and Technique:  Selam Gaming is a 53 y o  male with large 13 mm stone status post prior stent  After 2 months, patient proceeded to the operating room for ureteroscopy    At the time of surgery, distal and proximal incrustation of the stent prevented stent retrieval   Patient was admitted and percutaneous nephrostomy tube was placed  Percutaneous nephrolithotomy for the large stone and antegrade retrieval of the stent was planned       The patient was counseled regarding their options and ultimately opted to proceed with percutaneous nephrolithotomy and retrieval of stent in an antegrade fashion  Risks and benefits of the procedure were described and the patient signed an informed consent  On 9/25/2019, the patient proceeded to the operating room  They were laid supine on the operating room table and a pre-procedure time out was performed with all parties present and in agreement of the procedure planned and laterality  Patient received intravenous antibiotics in the form of Levaquin and sequential compression devices were placed on bilateral lower extremities  They were then induced with a general anesthetic  We attempted to place a catheter in the patient's penis had but he was noted to have some meatal stenosis  This required gentle dilation for passage of a 16 Swiss latex Enriquez  Patient was placed in the prone position on the bed with care to protect his airway and pad all pressure points  Arms were placed overhead in a padded position  Patient was secured to the table with tape  His right percutaneous nephrostomy tube was capped  His flank was prepped with ChloraPrep solution and draped with a craniotomy drape   fluoroscopy demonstrated the tube in good position  The stent had coiled in the proximal ureter  Antegrade nephrostogram was performed with good filling of the collecting system  The nephrostomy tube was cut and a Bard solo +wire was advanced  We were unable to navigate this wire into the ureter  The nephrostomy tube was removed and a dual-lumen catheter was advanced over top  A 2nd angled tip wire was able to navigate into the ureter and down to the bladder  And Amplatz superstiff wire was then advanced into the dual-lumen catheter      Number 11 Blade was used to incise the skin subcutaneous tissue and fascia  Dilating balloon was then placed with radiopaque tip in the renal pelvis  Balloon was inflated to 16 mm of mercury  After the balloon inflation for approximately 5 minutes, the nephroscope sheath was advanced over the inflated balloon  Once was in the renal pelvis, the balloon was deflated  The Amplatz superstiff wire was left in place as a working wire  Nephroscope was advanced we immediately encounter the large stone  Tri-tip basket was used to remove the stone intact  We then began the attempts at retrieval of the stent  Nephroscope was not able to navigate into the proximal ureter  Flexible cystoscope was then used but this could not be passed into the proximal ureter either  Ultimately we were able to navigate a flexible ureteral scope into the proximal ureter and grasped the stent with a gem and I basket  Unfortunate, the tip was able to be dislodged back into the renal pelvis but were not able to fully remove the stent  We again attempted the flexible cystoscope but were unable to completely remove the stent  Nephroscope was replaced with a rigid grasper, the proximal tip of the stent was passed out of the sheath  Fluoroscopy demonstrated there was some initial difficulty uncoiling the distal coil of the stent within the bladder with gentle pressure and advancement of a wire through the existing stent, ultimately the stent uncoiled and was able to be retrieved intact  Flexible ureteral scope was placed into the UPJ and a wire was advanced down to the bladder  We attempted to ureteral scope the entire length of the ureter but could not pass into the distal portion  Any large stones were basketed and removed  Ultimately a dual-lumen catheter was passed over a wire in the right ureter and antegrade ureteral study was performed  Ureter was in good continuity and there was good contrast passage easily down into the bladder    Because of the patient's rapid incrustation, we opted not to leave an antegrade stent  Through the sheath, 16 [facility] tip catheter was placed  The renal pelvis was filled with contrast and approximately 3-4 cc of contrast was used to inflate the balloon  This secured the nephrostomy tube in the patient's kidney  The sheath was then removed  Prior to skin closure, the existing Bard solo +safety wire was also removed  The nephrostomy tube was secured to the patient's skin with a 2 0 nylon suture  A drain stitch was placed  At the completion of the procedure, the patient was replaced in the supine position, extubated and transferred to PACU in good condition  Plan-patient will be admitted to the hospital   Catheter will be removed tomorrow and the patient will be discharged with his nephrostomy tube in place until CT scan can confirm no significant obstructing ureteral calculi       I was present for the entire procedure    Patient Disposition:  PACU     SIGNATURE: Jose Hollis MD  DATE: September 25, 2019  TIME: 4:36 PM

## 2020-11-09 ENCOUNTER — OFFICE VISIT (OUTPATIENT)
Dept: OBGYN CLINIC | Facility: CLINIC | Age: 55
End: 2020-11-09
Payer: COMMERCIAL

## 2020-11-09 ENCOUNTER — APPOINTMENT (OUTPATIENT)
Dept: RADIOLOGY | Facility: CLINIC | Age: 55
End: 2020-11-09
Payer: COMMERCIAL

## 2020-11-09 VITALS
BODY MASS INDEX: 39.65 KG/M2 | HEART RATE: 90 BPM | HEIGHT: 70 IN | WEIGHT: 277 LBS | DIASTOLIC BLOOD PRESSURE: 85 MMHG | TEMPERATURE: 97.4 F | SYSTOLIC BLOOD PRESSURE: 129 MMHG

## 2020-11-09 DIAGNOSIS — M17.12 PRIMARY OSTEOARTHRITIS OF LEFT KNEE: Primary | ICD-10-CM

## 2020-11-09 DIAGNOSIS — M25.562 ACUTE PAIN OF LEFT KNEE: ICD-10-CM

## 2020-11-09 PROCEDURE — 1036F TOBACCO NON-USER: CPT | Performed by: FAMILY MEDICINE

## 2020-11-09 PROCEDURE — 73562 X-RAY EXAM OF KNEE 3: CPT

## 2020-11-09 PROCEDURE — 20610 DRAIN/INJ JOINT/BURSA W/O US: CPT | Performed by: FAMILY MEDICINE

## 2020-11-09 PROCEDURE — 99204 OFFICE O/P NEW MOD 45 MIN: CPT | Performed by: FAMILY MEDICINE

## 2020-11-09 RX ORDER — LIDOCAINE HYDROCHLORIDE 10 MG/ML
4 INJECTION, SOLUTION INFILTRATION; PERINEURAL
Status: COMPLETED | OUTPATIENT
Start: 2020-11-09 | End: 2020-11-09

## 2020-11-09 RX ORDER — METHYLPREDNISOLONE ACETATE 40 MG/ML
1 INJECTION, SUSPENSION INTRA-ARTICULAR; INTRALESIONAL; INTRAMUSCULAR; SOFT TISSUE
Status: COMPLETED | OUTPATIENT
Start: 2020-11-09 | End: 2020-11-09

## 2020-11-09 RX ADMIN — LIDOCAINE HYDROCHLORIDE 4 ML: 10 INJECTION, SOLUTION INFILTRATION; PERINEURAL at 10:45

## 2020-11-09 RX ADMIN — METHYLPREDNISOLONE ACETATE 1 ML: 40 INJECTION, SUSPENSION INTRA-ARTICULAR; INTRALESIONAL; INTRAMUSCULAR; SOFT TISSUE at 10:45

## 2020-11-23 ENCOUNTER — OFFICE VISIT (OUTPATIENT)
Dept: OBGYN CLINIC | Facility: CLINIC | Age: 55
End: 2020-11-23
Payer: COMMERCIAL

## 2020-11-23 VITALS
BODY MASS INDEX: 39.51 KG/M2 | HEIGHT: 70 IN | WEIGHT: 276 LBS | HEART RATE: 78 BPM | SYSTOLIC BLOOD PRESSURE: 134 MMHG | DIASTOLIC BLOOD PRESSURE: 84 MMHG

## 2020-11-23 DIAGNOSIS — M17.12 PRIMARY OSTEOARTHRITIS OF LEFT KNEE: Primary | ICD-10-CM

## 2020-11-23 DIAGNOSIS — N20.1 URETERAL CALCULUS: ICD-10-CM

## 2020-11-23 PROCEDURE — 3008F BODY MASS INDEX DOCD: CPT | Performed by: FAMILY MEDICINE

## 2020-11-23 PROCEDURE — 1036F TOBACCO NON-USER: CPT | Performed by: FAMILY MEDICINE

## 2020-11-23 PROCEDURE — 99214 OFFICE O/P EST MOD 30 MIN: CPT | Performed by: FAMILY MEDICINE

## 2020-11-23 RX ORDER — POTASSIUM CITRATE 10 MEQ/1
10 TABLET, EXTENDED RELEASE ORAL
Qty: 30 TABLET | Refills: 5 | Status: SHIPPED | OUTPATIENT
Start: 2020-11-23 | End: 2021-07-12 | Stop reason: SDUPTHER

## 2020-12-08 ENCOUNTER — DOCUMENTATION (OUTPATIENT)
Dept: OBGYN CLINIC | Facility: HOSPITAL | Age: 55
End: 2020-12-08

## 2020-12-11 DIAGNOSIS — F41.8 ANXIETY WITH DEPRESSION: ICD-10-CM

## 2020-12-11 RX ORDER — DULOXETIN HYDROCHLORIDE 60 MG/1
60 CAPSULE, DELAYED RELEASE ORAL DAILY
Qty: 30 CAPSULE | Refills: 3 | Status: SHIPPED | OUTPATIENT
Start: 2020-12-11 | End: 2021-04-25 | Stop reason: SDUPTHER

## 2020-12-14 ENCOUNTER — OFFICE VISIT (OUTPATIENT)
Dept: OBGYN CLINIC | Facility: CLINIC | Age: 55
End: 2020-12-14
Payer: COMMERCIAL

## 2020-12-14 VITALS — SYSTOLIC BLOOD PRESSURE: 142 MMHG | HEART RATE: 93 BPM | DIASTOLIC BLOOD PRESSURE: 86 MMHG

## 2020-12-14 DIAGNOSIS — M17.12 PRIMARY OSTEOARTHRITIS OF LEFT KNEE: Primary | ICD-10-CM

## 2020-12-14 PROCEDURE — 20610 DRAIN/INJ JOINT/BURSA W/O US: CPT | Performed by: FAMILY MEDICINE

## 2021-04-20 ENCOUNTER — OFFICE VISIT (OUTPATIENT)
Dept: OBGYN CLINIC | Facility: CLINIC | Age: 56
End: 2021-04-20
Payer: COMMERCIAL

## 2021-04-20 VITALS
BODY MASS INDEX: 39.51 KG/M2 | HEIGHT: 70 IN | HEART RATE: 89 BPM | WEIGHT: 276 LBS | DIASTOLIC BLOOD PRESSURE: 91 MMHG | SYSTOLIC BLOOD PRESSURE: 142 MMHG

## 2021-04-20 DIAGNOSIS — M17.12 PRIMARY OSTEOARTHRITIS OF LEFT KNEE: Primary | ICD-10-CM

## 2021-04-20 DIAGNOSIS — M25.462 EFFUSION OF LEFT KNEE: ICD-10-CM

## 2021-04-20 PROCEDURE — 1036F TOBACCO NON-USER: CPT | Performed by: FAMILY MEDICINE

## 2021-04-20 PROCEDURE — 20610 DRAIN/INJ JOINT/BURSA W/O US: CPT | Performed by: FAMILY MEDICINE

## 2021-04-20 PROCEDURE — 99214 OFFICE O/P EST MOD 30 MIN: CPT | Performed by: FAMILY MEDICINE

## 2021-04-20 PROCEDURE — 3008F BODY MASS INDEX DOCD: CPT | Performed by: FAMILY MEDICINE

## 2021-04-20 RX ORDER — METHYLPREDNISOLONE ACETATE 40 MG/ML
1 INJECTION, SUSPENSION INTRA-ARTICULAR; INTRALESIONAL; INTRAMUSCULAR; SOFT TISSUE
Status: COMPLETED | OUTPATIENT
Start: 2021-04-20 | End: 2021-04-20

## 2021-04-20 RX ORDER — LIDOCAINE HYDROCHLORIDE 10 MG/ML
4 INJECTION, SOLUTION INFILTRATION; PERINEURAL
Status: COMPLETED | OUTPATIENT
Start: 2021-04-20 | End: 2021-04-20

## 2021-04-20 RX ADMIN — LIDOCAINE HYDROCHLORIDE 4 ML: 10 INJECTION, SOLUTION INFILTRATION; PERINEURAL at 10:00

## 2021-04-20 RX ADMIN — METHYLPREDNISOLONE ACETATE 1 ML: 40 INJECTION, SUSPENSION INTRA-ARTICULAR; INTRALESIONAL; INTRAMUSCULAR; SOFT TISSUE at 10:00

## 2021-04-20 NOTE — PATIENT INSTRUCTIONS
F/u 4 wks  Begin physical therapy  Icing/OTC pain meds as needed  Consider surgery referral if no improvement  Home exercises  F/u with PCP    Knee Exercises   AMBULATORY CARE:   What you need to know about knee exercises:  Knee exercises help strengthen the muscles around your knee  Strong muscles can help reduce pain and decrease your risk of future injury  Knee exercises also help you heal after an injury or surgery  · Start slow  These are beginning exercises  Ask your healthcare provider if you need to see a physical therapist for more advanced exercises  As you get stronger, you may be able to do more sets of each exercise or add weights  · Stop if you feel pain  It is normal to feel some discomfort at first  Regular exercise will help decrease your discomfort over time  · Do the exercises on both legs  Do this so both knees remain strong  · Warm up before you do knee exercises  Walk or ride a stationary bike for 5 or 10 minutes to warm your muscles  How to perform knee stretches safely:  Always stretch before you do strengthening exercises  Do these stretching exercises again after you do the strengthening exercises  Do these stretches 4 or 5 days a week, or as directed  · Standing calf stretch: Face a wall and place both palms flat on the wall, or hold the back of a chair for balance  Keep a slight bend in your knees  Take a big step backward with one leg  Keep your other leg directly under you  Keep both heels flat and press your hips forward  Hold the stretch for 30 seconds, and then relax for 30 seconds  Switch legs  Repeat 2 or 3 times on each leg  · Standing quadriceps stretch:  Stand and place one hand against a wall or hold the back of a chair for balance  With your weight on one leg, bend your other leg and grab your ankle  Bring your heel toward your buttocks  Hold the stretch for 30 to 60 seconds  Switch legs  Repeat 2 or 3 times on each leg           · Sitting hamstring stretch:  Sit with both legs straight in front of you  Do not point or flex your toes  Place your palms on the floor and slide your hands forward until you feel the stretch  Do not round your back  Hold the stretch for 30 seconds  Repeat 2 or 3 times  How to perform knee strengthening exercises safely:  Do these exercises 4 or 5 days a week, or as directed  · Standing half squats:  Stand with your feet shoulder-width apart  Lean your back against a wall or hold the back of a chair for balance, if needed  Slowly sit down about 10 inches, as if you are going to sit in a chair  Your body weight should be mostly over your heels  Hold the squat for 5 seconds, then rise to a standing position  Do 3 sets of 10 squats to strengthen your buttocks and thighs  · Standing hamstring curls: Face a wall and place both palms flat on the wall, or hold the back of a chair for balance  With your weight on one leg, lift your other foot as close to your buttocks as you can  Hold for 5 seconds and then lower your leg  Do 2 sets of 10 curls on each leg  This exercise strengthens the muscles in the back of your thigh  · Standing calf raises:  Face a wall and place both palms flat on the wall, or hold the back of a chair for balance  Stand up straight, and do not lean  Place all your weight on one leg by lifting the other foot off the floor  Raise the heel of the foot that is on the floor as high as you can and then lower it  Do 2 sets of 10 calf raises on each leg to strengthen your calf muscles  · Straight leg lifts:  Lie on your stomach with straight legs  Fold your arms in front of you and rest your head in your arms  Tighten your leg muscles and raise one leg as high as you can  Hold for 5 seconds, then lower your leg  Do 2 sets of 10 lifts on each leg to strengthen your buttocks  · Sitting leg lifts:  Sit in a chair  Slowly straighten and raise one leg   Squeeze your thigh muscles and hold for 5 seconds  Relax and return your foot to the floor  Do 2 sets of 10 lifts on each leg  This helps strengthen the muscles in the front of your thigh  Contact your healthcare provider if:   · You have new pain or your pain becomes worse  · You have questions or concerns about your condition or care  © Copyright [address] Information is for End User's use only and may not be sold, redistributed or otherwise used for commercial purposes  All illustrations and images included in CareNotes® are the copyrighted property of [employer]  or [address] [place], [place] [address]william Corrales   The above information is an  only  It is not intended as medical advice for individual conditions or treatments  Talk to your doctor, nurse or pharmacist before following any medical regimen to see if it is safe and effective for you

## 2021-04-20 NOTE — PROGRESS NOTES
[facility]  [address]  [facility] [id number] [phone]      Chief Complaint:  Chief Complaint   Patient presents with    Left Knee - Follow-up       Vitals:  /91   Pulse 89   Ht 5' 10" (1 778 m)   Wt 125 kg (276 lb)   BMI 39 60 kg/m²     The following portions of the patient's history were reviewed and updated as appropriate: allergies, current medications, past family history, past medical history, past social history, past surgical history, and problem list       Subjective:   Patient ID: Alissa Baeza is a 61 y o  male  Here for for f/u L knee pain  He was doing well until about 6 wks ago when he returned to work  Pain have been increaseing  Worse with sitting/getting up  [place] which helps- 2 per day, takes the edge off  Knee feels tight  Sharp/dull pain  Constant pain         Review of Systems   Constitutional: Negative for fatigue and fever  Respiratory: Negative for shortness of breath  Cardiovascular: Negative for chest pain  Gastrointestinal: Negative for abdominal pain and nausea  Musculoskeletal: Positive for arthralgias, gait problem and joint swelling  Skin: Negative for rash and wound  Neurological: Negative for weakness and headaches  Objective:  Left Knee Exam     Tenderness   The patient is experiencing tenderness in the lateral joint line  Range of Motion   Extension: normal   Flexion: normal     Other   Swelling: moderate  Effusion: effusion present          Observations   Left Knee   Positive for effusion  Physical Exam  Constitutional:       Appearance: Normal appearance  He is normal weight  Eyes:      Extraocular Movements: Extraocular movements intact  Neck:      Musculoskeletal: Normal range of motion  Pulmonary:      Effort: Pulmonary effort is normal    Musculoskeletal:         General: Tenderness present  Left knee: He exhibits effusion     Skin:     General: Skin is warm and dry  Neurological:      General: No focal deficit present  Mental Status: He is alert and oriented to person, place, and time  Mental status is at baseline  Psychiatric:         Mood and Affect: Mood normal          Behavior: Behavior normal          Thought Content: Thought content normal          Judgment: Judgment normal        Large joint arthrocentesis: L knee  Universal Protocol:  Consent: Verbal consent obtained  Risks and benefits: risks, benefits and alternatives were discussed  Consent given by: patient  Time out: Immediately prior to procedure a "time out" was called to verify the correct patient, procedure, equipment, support staff and site/side marked as required  Timeout called at: 4/20/2021 9:54 AM   Site marked: the operative site was marked  Supporting Documentation  Indications: pain   Procedure Details  Location: knee - L knee  Preparation: Patient was prepped and draped in the usual sterile fashion  Needle size: 25 G  Ultrasound guidance: no  Approach: anterolateral  Medications administered: 4 mL lidocaine 1 %; 1 mL methylPREDNISolone acetate 40 mg/mL    Aspirate amount: 30 mL  Aspirate: clear and blood-tinged    Patient tolerance: patient tolerated the procedure well with no immediate complications  Dressing:  Sterile dressing applied            Assessment/Plan:  Assessment/Plan   Diagnoses and all orders for this visit:    Primary osteoarthritis of left knee  -     Ambulatory referral to Physical Therapy; Future    Effusion of left knee    Other orders  -     Large joint arthrocentesis        No follow-ups on file       Delores Acevedo MD

## 2021-04-24 DIAGNOSIS — F41.8 ANXIETY WITH DEPRESSION: ICD-10-CM

## 2021-04-25 RX ORDER — DULOXETIN HYDROCHLORIDE 60 MG/1
CAPSULE, DELAYED RELEASE ORAL
Qty: 30 CAPSULE | Refills: 3 | Status: SHIPPED | OUTPATIENT
Start: 2021-04-25 | End: 2021-04-27 | Stop reason: SDUPTHER

## 2021-04-27 DIAGNOSIS — E79.0 HYPERURICEMIA: Primary | ICD-10-CM

## 2021-04-27 DIAGNOSIS — F41.8 ANXIETY WITH DEPRESSION: ICD-10-CM

## 2021-04-27 RX ORDER — DULOXETIN HYDROCHLORIDE 60 MG/1
60 CAPSULE, DELAYED RELEASE ORAL DAILY
Qty: 30 CAPSULE | Refills: 3 | Status: SHIPPED | OUTPATIENT
Start: 2021-04-27 | End: 2021-08-30

## 2021-04-27 NOTE — PROGRESS NOTES
PT Evaluation     Today's date: 2021  Patient name: Marina Joy  : 1965  MRN: [id number]  Referring provider: Zeenat Dillard MD  Dx:   Encounter Diagnosis     ICD-10-CM    1  Primary osteoarthritis of left knee  M17 12                   Assessment  Assessment details: The patient is a 62 y/o male who presents to PT with diagnosis of L knee OA  He has complaints of intermittent pain with most pain being deeper in the joint and more time with pain than without pain  He also demonstrates deficits with decreased ROM and strength, decreased flexibility, antalgic gait, difficulty with stair negotiation and pain with completing his ADLs and tasks at home  He remains I with all his ADLs though he has pain with completing them  More pain noted with increased time on his feet or when bending his knee  He ambulates without AD with slow radha and with decreased weight shift to LLE in stance phase  He can typically go up the steps with reciprocal gait pattern but will go down the steps with non-reciprocal gait pattern  TTP is noted along the lateral joint line  The patient would benefit from continued PT to address deficits and improve function  Tx to include ROM, stretching, strengthening, modalities, HEP, pt education, postural ed, lifting/body mechanics, neuro re-ed, balance/proprioception Te, MT and equipment  At this time secondary to work he is going to continue with HEP until his next doctor appointment  Issued patient HEP for strengthening and he verbalized understanding  If he is going to return to PT he was instructed to phone clinic  If not heard from in 4 weeks will D/C to HEP  Hold PT        Impairments: abnormal gait, abnormal or restricted ROM, activity intolerance, impaired physical strength, lacks appropriate home exercise program and pain with function  Other impairment: decreased flexibility  Functional limitations: difficulty with stair negotiationUnderstanding of Dx/Px/POC: good   Prognosis: good    Goals  STGs:  1  Initiate and complete HEP with verbal cues  2   Decrease L knee pain by > 25% in 4 weeks  3   Improve L knee ROM by 5-10 degrees in 4 weeks  4   Improve LLE strength by 1/2 grade in 4 weeks  LTGs:  1  Patient to be I with HEP in 12 weeks  2  Improve L knee ROM to 0-125 degrees in 12 weeks to improve function  3  Improve LLE strength to 5/5 t/o in 12 weeks to improve function  4  Decrease L knee pain to < or = to 3-4/10 with activity in 12 weeks to improve function  5   Patient to ambulate with normalized gait pattern in 12 weeks  6   Stair negotiation is improved to PLOF in 12 weeks  7   Recreational performance is improved to PLOF in 12 weeks  8   ADL performance is improved to PLOF in 12 weeks  9   Work performance is improved to maximal level of function in 12 weeks  Plan  Plan details: Modalities and therapy interventions prn  At this time secondary to work he is going to continue with HEP until his next doctor appointment  Issued patient HEP for strengthening and he verbalized understanding  If he is going to return to PT he was instructed to phone clinic  If not heard from in 4 weeks will D/C to HEP  Hold PT        Patient would benefit from: skilled physical therapy  Planned modality interventions: cryotherapy and thermotherapy: hydrocollator packs  Planned therapy interventions: manual therapy, balance, balance/weight bearing training, neuromuscular re-education, patient education, postural training, self care, strengthening, stretching, therapeutic activities, therapeutic exercise, flexibility, gait training and home exercise program  Frequency: 2x week  Duration in weeks: 6  Plan of Care beginning date: 4/28/2021  Plan of Care expiration date: 6/9/2021  Treatment plan discussed with: patient        Subjective Evaluation    History of Present Illness  Mechanism of injury: The patient states that he has had L knee pain for > 6 months  He notes that this pain started with no known cause  He does report h/o L knee scope to repair the meniscus  He does have history of prior cortisone shots and chicken shots  He had been off work and his knee had been feeling better while he was off  When he returned to work he had increased pain in his knee  He had seen the doctor most recently last week  He had his knee drained and he was given another cortisone shot  He notes he had relief for about one day then pain returned  He did have an x-ray completed, per patient it showed arthritis  He was referred to OPPT and he now presents for his evaluation  He will be going back to see the doctor on 21 for his next appointment  Quality of life: good    Pain  At best pain ratin  At worst pain rating: 10  Location: L Knee - deeper in joint   Quality: throbbing, burning and dull ache  Relieving factors: medications and ice  Aggravating factors: stair climbing    Social Support  Steps to enter house: yes  Stairs in house: yes   Lives in: multiple-level home    Employment status: working (Full time construction )    Diagnostic Tests  X-ray: abnormal (Per pt, Arthritis )  Treatments  Previous treatment: injection treatment  Patient Goals  Patient goals for therapy: increased motion, decreased pain and increased strength  Patient goal: "To help strengthen my leg "          Objective     Tenderness   Left Knee   Tenderness in the lateral joint line  No tenderness in the lateral patella, medial joint line, medial patella and popliteal fossa       Neurological Testing     Sensation     Knee   Left Knee   Intact: light touch    Right Knee   Intact: light touch     Active Range of Motion   Left Knee   Flexion: 110 degrees with pain  Extension: 0 degrees with pain    Right Knee   Flexion: 125 degrees   Extension: 0 degrees     Additional Active Range of Motion Details  L SLR: 45  R SLR:  55    Mobility   Patellar Mobility:   Left Knee   WFL: medial and lateral      Right Knee   WFL: medial and lateral    Strength/Myotome Testing     Left Knee   Flexion: 4-  Extension: 4  Quadriceps contraction: fair    Right Knee   Quadriceps contraction: good    Ambulation   Weight-Bearing Status   Assistive device used: none    Ambulation: Level Surfaces   Ambulation without assistive device: independent    Ambulation: Stairs   Ascend stairs: independent  Pattern: reciprocal  Descend stairs: independent  Pattern: non-reciprocal    Additional Stairs Ambulation Details  Going down harder than going up steps  Observational Gait   Decreased walking speed and left stance time  Precautions: None      Manuals 4/28       LLE                                Neuro Re-Ed        BOSU Lunges        SLS        Tandem Stance                                        Ther Ex        NuStep        HR/TR        Squats        SLR x 3        Leg Press        Calf Press        LAQ        Hams Curl w/TBand        QSets        SAQ        SLR        Bridges        Heel Slides                                Ther Activity        Stepups F/L        Stepdown        Gait Training                        Modalities        HP/CP prn                    Issued and reviewed HEP with patient for Stand SLR x 3 ways, squats, HR, LAQ, supine SLR and bridges  He verbalized understanding  Also spoke with patient about knee anatomy and pathology

## 2021-04-28 ENCOUNTER — EVALUATION (OUTPATIENT)
Dept: PHYSICAL THERAPY | Facility: CLINIC | Age: 56
End: 2021-04-28
Payer: COMMERCIAL

## 2021-04-28 DIAGNOSIS — M17.12 PRIMARY OSTEOARTHRITIS OF LEFT KNEE: Primary | ICD-10-CM

## 2021-04-28 PROBLEM — E79.0 HYPERURICEMIA: Status: ACTIVE | Noted: 2021-04-28

## 2021-04-28 PROCEDURE — 97161 PT EVAL LOW COMPLEX 20 MIN: CPT | Performed by: PHYSICAL THERAPIST

## 2021-04-28 PROCEDURE — 97110 THERAPEUTIC EXERCISES: CPT | Performed by: PHYSICAL THERAPIST

## 2021-04-28 RX ORDER — ALLOPURINOL 100 MG/1
100 TABLET ORAL DAILY
Qty: 30 TABLET | Refills: 0 | Status: SHIPPED | OUTPATIENT
Start: 2021-04-28 | End: 2021-07-12 | Stop reason: SDUPTHER

## 2021-04-29 ENCOUNTER — RA CDI HCC (OUTPATIENT)
Dept: OTHER | Facility: HOSPITAL | Age: 56
End: 2021-04-29

## 2021-04-29 NOTE — PROGRESS NOTES
[facility]  coding opportunities          Chart reviewed, no opportunity found: CHART REVIEWED, NO OPPORTUNITY FOUND              Patients insurance company: [employer] (Medicare Advantage and Commercial)

## 2021-05-05 ENCOUNTER — OFFICE VISIT (OUTPATIENT)
Dept: FAMILY MEDICINE CLINIC | Facility: CLINIC | Age: 56
End: 2021-05-05
Payer: COMMERCIAL

## 2021-05-05 VITALS
BODY MASS INDEX: 38.85 KG/M2 | WEIGHT: 271.4 LBS | TEMPERATURE: 97.8 F | SYSTOLIC BLOOD PRESSURE: 138 MMHG | HEIGHT: 70 IN | DIASTOLIC BLOOD PRESSURE: 90 MMHG

## 2021-05-05 DIAGNOSIS — M79.18 CHRONIC MUSCULOSKELETAL PAIN: ICD-10-CM

## 2021-05-05 DIAGNOSIS — G89.29 CHRONIC MUSCULOSKELETAL PAIN: ICD-10-CM

## 2021-05-05 DIAGNOSIS — E55.9 VITAMIN D DEFICIENCY: ICD-10-CM

## 2021-05-05 DIAGNOSIS — E79.0 HYPERURICEMIA: ICD-10-CM

## 2021-05-05 DIAGNOSIS — Z13.6 SCREENING FOR CARDIOVASCULAR CONDITION: ICD-10-CM

## 2021-05-05 DIAGNOSIS — M25.512 CHRONIC PAIN OF BOTH SHOULDERS: ICD-10-CM

## 2021-05-05 DIAGNOSIS — M25.511 CHRONIC PAIN OF BOTH SHOULDERS: ICD-10-CM

## 2021-05-05 DIAGNOSIS — R20.0 BILATERAL HAND NUMBNESS: ICD-10-CM

## 2021-05-05 DIAGNOSIS — F43.23 ADJUSTMENT DISORDER WITH MIXED ANXIETY AND DEPRESSED MOOD: Primary | ICD-10-CM

## 2021-05-05 DIAGNOSIS — G89.29 CHRONIC PAIN OF BOTH SHOULDERS: ICD-10-CM

## 2021-05-05 DIAGNOSIS — Z12.5 PROSTATE CANCER SCREENING: ICD-10-CM

## 2021-05-05 DIAGNOSIS — G89.29 CHRONIC PAIN OF LEFT KNEE: ICD-10-CM

## 2021-05-05 DIAGNOSIS — M25.562 CHRONIC PAIN OF LEFT KNEE: ICD-10-CM

## 2021-05-05 PROBLEM — F41.8 DEPRESSION WITH ANXIETY: Status: RESOLVED | Noted: 2017-11-15 | Resolved: 2021-05-05

## 2021-05-05 PROCEDURE — 99214 OFFICE O/P EST MOD 30 MIN: CPT | Performed by: FAMILY MEDICINE

## 2021-05-05 PROCEDURE — 1036F TOBACCO NON-USER: CPT | Performed by: FAMILY MEDICINE

## 2021-05-05 PROCEDURE — 3008F BODY MASS INDEX DOCD: CPT | Performed by: FAMILY MEDICINE

## 2021-05-05 NOTE — PATIENT INSTRUCTIONS

## 2021-05-05 NOTE — PROGRESS NOTES
Assessment/Plan: We will refer him to [address] for further evaluation of all his arthritic problems  Blood work ordered on him  We will see him back in the next couple months or p r n     We will call  Him with results of his blood work and make further  Recommendations at that time  Problem List Items Addressed This Visit        Other    Adjustment disorder with mixed anxiety and depressed mood - Primary    Bilateral hand numbness    Relevant Orders    CBC and differential    Ambulatory referral to Orthopedic Surgery    Chronic musculoskeletal pain    Relevant Orders    Ambulatory referral to Orthopedic Surgery    Vitamin D deficiency    Relevant Orders    CBC and differential    Vitamin D 25 hydroxy    Hyperuricemia    Relevant Orders    CBC and differential    Uric acid    Chronic pain of both shoulders    Relevant Orders    Ambulatory referral to Orthopedic Surgery    Chronic pain of left knee    Relevant Orders    Ambulatory referral to Orthopedic Surgery      Other Visit Diagnoses     Prostate cancer screening        Relevant Orders    PSA, Total Screen    BMI 38 0-38 9,adult        Relevant Orders    TSH, 3rd generation with Free T4 reflex    Screening for cardiovascular condition        Relevant Orders    Comprehensive metabolic panel    Lipid Panel with Direct LDL reflex           Diagnoses and all orders for this visit:    Adjustment disorder with mixed anxiety and depressed mood    Hyperuricemia  -     CBC and differential  -     Uric acid    Vitamin D deficiency  -     CBC and differential  -     Vitamin D 25 hydroxy    Prostate cancer screening  -     PSA, Total Screen    BMI 38 0-38 9,adult  -     TSH, 3rd generation with Free T4 reflex    Bilateral hand numbness  -     CBC and differential  -     Ambulatory referral to Orthopedic Surgery; Future    Chronic musculoskeletal pain  -     Ambulatory referral to Orthopedic Surgery;  Future    Chronic pain of both shoulders  - Ambulatory referral to Orthopedic Surgery; Future    Chronic pain of left knee  -     Ambulatory referral to Orthopedic Surgery; Future    Screening for cardiovascular condition  -     Comprehensive metabolic panel  -     Lipid Panel with Direct LDL reflex        No problem-specific Assessment & Plan notes found for this encounter  Subjective:      Patient ID: Kristal Correa is a 61 y o  male  Patient here today for follow-up  Patient has frustrated, because he is finding it hard to work because of his arthritic problems  He has been having chronic left knee pain and swelling  Patient would like to see another orthopedic doctor  Patient has bilateral shoulder pain and bilateral elbow pain  He gets numbness in his hands bilaterally that wakes him up at night  Patient is still taking the Cymbalta, he denies any SI or HI  He denies any chest pain or shortness of breath  Arthritis  Presents for follow-up visit  He complains of pain, stiffness and joint swelling ( left knee)  The symptoms have been worsening  Affected locations include the right shoulder, left shoulder, right wrist, left wrist, left knee, right elbow and left elbow  Associated symptoms include pain at night and pain while resting  The following portions of the patient's history were reviewed and updated as appropriate:   He has a past medical history of Anxiety and Kidney stones  ,  does not have any pertinent problems on file  ,   has a past surgical history that includes Knee cartilage surgery; Kidney stone surgery; Cardiac electrophysiology study and ablation; FL retrograde pyelogram (6/29/2019); pr cystourethroscopy,ureter catheter (Right, 6/29/2019); Cardiac electrophysiology study and ablation; pr cysto/uretero w/lithotripsy &indwell stent insrt (Right, 8/29/2019); IR nephrostomy tube placement (8/30/2019); FL retrograde pyelogram (8/29/2019); Knee arthroscopy (Left);  Hernia repair (Right); Cardiac surgery (2014); Colonoscopy; pr percut remv kid stone,2+ cm (Right, 9/25/2019); and FL retrograde pyelogram (9/25/2019)  ,  family history includes Cancer in his father and mother  ,   reports that he has never smoked  He has quit using smokeless tobacco  He reports current alcohol use  He reports that he does not use drugs  ,  is allergic to ancef [cefazolin]; other; peanut-containing drug products - food allergy; sulfa antibiotics; and toradol [ketorolac tromethamine]     Current Outpatient Medications   Medication Sig Dispense Refill    allopurinol (ZYLOPRIM) 100 mg tablet Take 1 tablet (100 mg total) by mouth daily 30 tablet 0    DULoxetine (CYMBALTA) 60 mg delayed release capsule Take 1 capsule (60 mg total) by mouth daily 30 capsule 3    potassium citrate (UROCIT-K) 10 mEq Take 1 tablet (10 mEq total) by mouth 3 (three) times a day with meals 30 tablet 5    albuterol (PROVENTIL HFA,VENTOLIN HFA) 90 mcg/act inhaler 2 puffs every 4 hours with spacer as needed for wheeze, cough, short of breath  (Patient not taking: Reported on 4/20/2021) 1 Inhaler 0     No current facility-administered medications for this visit  Review of Systems   Respiratory: Negative  Cardiovascular: Negative  Genitourinary: Negative  Musculoskeletal: Positive for arthralgias, arthritis, joint swelling ( left knee) and stiffness  Psychiatric/Behavioral: Positive for dysphoric mood  Negative for suicidal ideas  Objective:  Vitals:    05/05/21 1709   BP: 138/90   Temp: 97 8 °F (36 6 °C)   Weight: 123 kg (271 lb 6 4 oz)   Height: 5' 10" (1 778 m)     Body mass index is 38 94 kg/m²  Physical Exam  Vitals signs reviewed  Constitutional:       General: He is not in acute distress  Appearance: Normal appearance  He is well-developed  He is not diaphoretic  HENT:      Head: Normocephalic and atraumatic  Eyes:      Conjunctiva/sclera: Conjunctivae normal    Cardiovascular:      Rate and Rhythm: Normal rate and regular rhythm  Heart sounds: Normal heart sounds  No murmur  No friction rub  No gallop  Pulmonary:      Effort: Pulmonary effort is normal  No respiratory distress  Breath sounds: Normal breath sounds  No wheezing or rales  Musculoskeletal:         General: Tenderness ( left knee,  bilateral elbows hand wrists, bilateral shoulders) present  Right lower leg: No edema  Left lower leg: No edema  Neurological:      Mental Status: He is alert and oriented to person, place, and time  Psychiatric:         Mood and Affect: Mood normal          Behavior: Behavior normal          Thought Content: Thought content normal          Judgment: Judgment normal          BMI Counseling: Body mass index is 38 94 kg/m²  The BMI is above normal  Nutrition recommendations include reducing portion sizes, decreasing overall calorie intake, 3-5 servings of fruits/vegetables daily, reducing fast food intake, consuming healthier snacks, decreasing soda and/or juice intake, moderation in carbohydrate intake, increasing intake of lean protein, reducing intake of saturated fat and trans fat and reducing intake of cholesterol

## 2021-07-12 DIAGNOSIS — E79.0 HYPERURICEMIA: ICD-10-CM

## 2021-07-12 DIAGNOSIS — N20.1 URETERAL CALCULUS: ICD-10-CM

## 2021-07-12 RX ORDER — ALLOPURINOL 100 MG/1
100 TABLET ORAL DAILY
Qty: 30 TABLET | Refills: 5 | Status: SHIPPED | OUTPATIENT
Start: 2021-07-12

## 2021-07-12 RX ORDER — POTASSIUM CITRATE 10 MEQ/1
10 TABLET, EXTENDED RELEASE ORAL
Qty: 30 TABLET | Refills: 5 | Status: SHIPPED | OUTPATIENT
Start: 2021-07-12 | End: 2022-05-05 | Stop reason: HOSPADM

## 2021-07-22 ENCOUNTER — RA CDI HCC (OUTPATIENT)
Dept: OTHER | Facility: HOSPITAL | Age: 56
End: 2021-07-22

## 2021-07-22 NOTE — PROGRESS NOTES
[facility]  coding opportunities             Chart reviewed, (number of) suggestions sent to provider: 1            Number of suggestions actually used: 0      Number of suggestions NOT actually used: 1     Patients insurance company: [employer] (Medicare Advantage and Commercial)     Visit status: Patient arrived for their scheduled appointment   dx sent is not on the bill  Provider never responded to [facility] [employer]  coding request     [facility] [employer]  coding opportunities             Chart reviewed, (number of) suggestions sent to provider: 1   DX:  E66 01-Morbid (severe) obesity due to excess [id number] with osteoarthritis/effusion left knee    Unsure have enough to ask for depression specificity codes per rx and documentation and outpt guidelines LM               Patients insurance company: [employer] ([employer])

## 2021-07-28 ENCOUNTER — OFFICE VISIT (OUTPATIENT)
Dept: FAMILY MEDICINE CLINIC | Facility: CLINIC | Age: 56
End: 2021-07-28
Payer: COMMERCIAL

## 2021-07-28 VITALS
DIASTOLIC BLOOD PRESSURE: 82 MMHG | WEIGHT: 272.2 LBS | TEMPERATURE: 96.5 F | HEIGHT: 70 IN | SYSTOLIC BLOOD PRESSURE: 124 MMHG | BODY MASS INDEX: 38.97 KG/M2

## 2021-07-28 DIAGNOSIS — Z12.11 SCREENING FOR COLON CANCER: ICD-10-CM

## 2021-07-28 DIAGNOSIS — F43.23 ADJUSTMENT DISORDER WITH MIXED ANXIETY AND DEPRESSED MOOD: Primary | ICD-10-CM

## 2021-07-28 PROBLEM — M17.10 OSTEOARTHRITIS OF KNEE: Status: ACTIVE | Noted: 2021-06-16

## 2021-07-28 PROBLEM — G56.20 ULNAR NERVE ENTRAPMENT AT ELBOW: Status: ACTIVE | Noted: 2021-06-21

## 2021-07-28 PROBLEM — M75.40 IMPINGEMENT SYNDROME OF SHOULDER REGION: Status: ACTIVE | Noted: 2021-05-21

## 2021-07-28 PROBLEM — M17.9 OSTEOARTHRITIS OF KNEE: Status: ACTIVE | Noted: 2021-06-16

## 2021-07-28 PROBLEM — G56.03 BILATERAL CARPAL TUNNEL SYNDROME: Status: ACTIVE | Noted: 2021-06-21

## 2021-07-28 PROCEDURE — 1036F TOBACCO NON-USER: CPT | Performed by: FAMILY MEDICINE

## 2021-07-28 PROCEDURE — 3008F BODY MASS INDEX DOCD: CPT | Performed by: FAMILY MEDICINE

## 2021-07-28 PROCEDURE — 99213 OFFICE O/P EST LOW 20 MIN: CPT | Performed by: FAMILY MEDICINE

## 2021-07-28 NOTE — PROGRESS NOTES
Assessment/Plan:   patient will get his blood work done  Follow up with his specialists as scheduled  Will call us if his hip continues to bother him or he thinks he does have a kidney stone  He will also call Urology if that is the case  We will see him back in 4 months or p r n  Problem List Items Addressed This Visit        Other    Adjustment disorder with mixed anxiety and depressed mood - Primary      Other Visit Diagnoses     Screening for colon cancer        Relevant Orders    Ambulatory referral to Gastroenterology           Diagnoses and all orders for this visit:    Adjustment disorder with mixed anxiety and depressed mood    Screening for colon cancer  -     Ambulatory referral to Gastroenterology; Future        No problem-specific Assessment & Plan notes found for this encounter  Subjective:      Patient ID: Marcos Freeman is a 61 y o  male  Patient here today for follow-up  Patient denies any chest pain or shortness of breath  Patient is status post carpal tunnel and ulnar nerve surgery on his right arm  Doing well  Patient got to get his blood work done  Patient states yesterday started with some left anterior hip discomfort, her ERCP is lying on his side  He thinks it might be a kidney stone, it is better today  The pain is not excruciating at all, like it normally is with a kidney stone for him  Patient still feels the Cymbalta is helping him, no SI or HI      The following portions of the patient's history were reviewed and updated as appropriate:   He has a past medical history of Anxiety and Kidney stones  ,  does not have any pertinent problems on file  ,   has a past surgical history that includes Knee cartilage surgery; Kidney stone surgery; Cardiac electrophysiology study and ablation; FL retrograde pyelogram (6/29/2019); pr cystourethroscopy,ureter catheter (Right, 6/29/2019);  Cardiac electrophysiology study and ablation; pr cysto/uretero w/lithotripsy &indwell stent insrt (Right, 8/29/2019); IR nephrostomy tube placement (8/30/2019); FL retrograde pyelogram (8/29/2019); Knee arthroscopy (Left); Hernia repair (Right); Cardiac surgery (2014); Colonoscopy; pr percut remv kid stone,2+ cm (Right, 9/25/2019); and FL retrograde pyelogram (9/25/2019)  ,  family history includes Cancer in his father and mother  ,   reports that he has never smoked  He has quit using smokeless tobacco  He reports current alcohol use  He reports that he does not use drugs  ,  is allergic to ancef [cefazolin], other, peanut-containing drug products - food allergy, sulfa antibiotics, and toradol [ketorolac tromethamine]     Current Outpatient Medications   Medication Sig Dispense Refill    allopurinol (ZYLOPRIM) 100 mg tablet Take 1 tablet (100 mg total) by mouth daily 30 tablet 5    DULoxetine (CYMBALTA) 60 mg delayed release capsule Take 1 capsule (60 mg total) by mouth daily 30 capsule 3    potassium citrate (UROCIT-K) 10 mEq Take 1 tablet (10 mEq total) by mouth 3 (three) times a day with meals 30 tablet 5    albuterol (PROVENTIL HFA,VENTOLIN HFA) 90 mcg/act inhaler 2 puffs every 4 hours with spacer as needed for wheeze, cough, short of breath  (Patient not taking: Reported on 4/20/2021) 1 Inhaler 0     No current facility-administered medications for this visit  Review of Systems   Constitutional: Negative  Respiratory: Negative  Cardiovascular: Negative  Gastrointestinal: Negative  Genitourinary: Negative  Objective:  Vitals:    07/28/21 1625   BP: 124/82   Temp: (!) 96 5 °F (35 8 °C)   Weight: 123 kg (272 lb 3 2 oz)   Height: 5' 10" (1 778 m)     Body mass index is 39 06 kg/m²  Physical Exam  Vitals reviewed  Constitutional:       General: He is not in acute distress  Appearance: Normal appearance  He is well-developed  He is not diaphoretic  HENT:      Head: Normocephalic and atraumatic     Eyes:      Conjunctiva/sclera: Conjunctivae normal    Cardiovascular: Rate and Rhythm: Normal rate and regular rhythm  Heart sounds: Normal heart sounds  No murmur heard  No friction rub  No gallop  Pulmonary:      Effort: Pulmonary effort is normal  No respiratory distress  Breath sounds: Normal breath sounds  No wheezing or rales  Musculoskeletal:      Right lower leg: No edema  Left lower leg: No edema  Skin:     Comments:  Incision sites on right arm healing well   Neurological:      Mental Status: He is alert and oriented to person, place, and time  Psychiatric:         Mood and Affect: Mood normal          Behavior: Behavior normal          Thought Content:  Thought content normal          Judgment: Judgment normal

## 2021-08-02 ENCOUNTER — TELEPHONE (OUTPATIENT)
Dept: FAMILY MEDICINE CLINIC | Facility: CLINIC | Age: 56
End: 2021-08-02

## 2021-08-02 DIAGNOSIS — N20.0 KIDNEY STONES: Primary | ICD-10-CM

## 2021-08-02 NOTE — TELEPHONE ENCOUNTER
Rx put in for a KUB x-ray  I will also put in for urinalysis and lab work for him to get done  Needs to call his urologist for follow-up  Have him call us if he is not able to get in to see them

## 2021-08-02 NOTE — TELEPHONE ENCOUNTER
Patient is having left lower abd pain and recently passed 5 kidney stones  He said that he discussed with you and was to let you know  He is requesting Ultra sound or xray to verify    Please let him know if you can order test

## 2021-08-03 ENCOUNTER — TELEPHONE (OUTPATIENT)
Dept: FAMILY MEDICINE CLINIC | Facility: CLINIC | Age: 56
End: 2021-08-03

## 2021-08-03 ENCOUNTER — APPOINTMENT (OUTPATIENT)
Dept: RADIOLOGY | Facility: MEDICAL CENTER | Age: 56
End: 2021-08-03
Payer: COMMERCIAL

## 2021-08-03 ENCOUNTER — APPOINTMENT (OUTPATIENT)
Dept: LAB | Facility: MEDICAL CENTER | Age: 56
End: 2021-08-03
Payer: COMMERCIAL

## 2021-08-03 DIAGNOSIS — N20.0 KIDNEY STONES: Primary | ICD-10-CM

## 2021-08-03 DIAGNOSIS — N20.0 KIDNEY STONES: ICD-10-CM

## 2021-08-03 LAB
25(OH)D3 SERPL-MCNC: 24.8 NG/ML (ref 30–100)
ALBUMIN SERPL BCP-MCNC: 3.6 G/DL (ref 3.5–5)
ALP SERPL-CCNC: 87 U/L (ref 46–116)
ALT SERPL W P-5'-P-CCNC: 25 U/L (ref 12–78)
ANION GAP SERPL CALCULATED.3IONS-SCNC: 4 MMOL/L (ref 4–13)
AST SERPL W P-5'-P-CCNC: 13 U/L (ref 5–45)
BACTERIA UR QL AUTO: NORMAL /HPF
BASOPHILS # BLD AUTO: 0.04 THOUSANDS/ΜL (ref 0–0.1)
BASOPHILS NFR BLD AUTO: 1 % (ref 0–1)
BILIRUB SERPL-MCNC: 0.6 MG/DL (ref 0.2–1)
BILIRUB UR QL STRIP: NEGATIVE
BUN SERPL-MCNC: 19 MG/DL (ref 5–25)
CALCIUM SERPL-MCNC: 9.4 MG/DL (ref 8.3–10.1)
CHLORIDE SERPL-SCNC: 106 MMOL/L (ref 100–108)
CHOLEST SERPL-MCNC: 259 MG/DL (ref 50–200)
CLARITY UR: CLEAR
CO2 SERPL-SCNC: 25 MMOL/L (ref 21–32)
COLOR UR: YELLOW
CREAT SERPL-MCNC: 0.87 MG/DL (ref 0.6–1.3)
EOSINOPHIL # BLD AUTO: 0.11 THOUSAND/ΜL (ref 0–0.61)
EOSINOPHIL NFR BLD AUTO: 2 % (ref 0–6)
ERYTHROCYTE [DISTWIDTH] IN BLOOD BY AUTOMATED COUNT: 13.8 % (ref 11.6–15.1)
GFR SERPL CREATININE-BSD FRML MDRD: 96 ML/MIN/1.73SQ M
GLUCOSE P FAST SERPL-MCNC: 94 MG/DL (ref 65–99)
GLUCOSE UR STRIP-MCNC: NEGATIVE MG/DL
HCT VFR BLD AUTO: 46.1 % (ref 36.5–49.3)
HDLC SERPL-MCNC: 38 MG/DL
HGB BLD-MCNC: 15.9 G/DL (ref 12–17)
HGB UR QL STRIP.AUTO: ABNORMAL
HYALINE CASTS #/AREA URNS LPF: NORMAL /LPF
IMM GRANULOCYTES # BLD AUTO: 0.04 THOUSAND/UL (ref 0–0.2)
IMM GRANULOCYTES NFR BLD AUTO: 1 % (ref 0–2)
KETONES UR STRIP-MCNC: NEGATIVE MG/DL
LDLC SERPL CALC-MCNC: 175 MG/DL (ref 0–100)
LEUKOCYTE ESTERASE UR QL STRIP: NEGATIVE
LYMPHOCYTES # BLD AUTO: 1.67 THOUSANDS/ΜL (ref 0.6–4.47)
LYMPHOCYTES NFR BLD AUTO: 24 % (ref 14–44)
MCH RBC QN AUTO: 31.5 PG (ref 26.8–34.3)
MCHC RBC AUTO-ENTMCNC: 34.5 G/DL (ref 31.4–37.4)
MCV RBC AUTO: 91 FL (ref 82–98)
MONOCYTES # BLD AUTO: 0.46 THOUSAND/ΜL (ref 0.17–1.22)
MONOCYTES NFR BLD AUTO: 7 % (ref 4–12)
NEUTROPHILS # BLD AUTO: 4.76 THOUSANDS/ΜL (ref 1.85–7.62)
NEUTS SEG NFR BLD AUTO: 65 % (ref 43–75)
NITRITE UR QL STRIP: NEGATIVE
NON-SQ EPI CELLS URNS QL MICRO: NORMAL /HPF
NRBC BLD AUTO-RTO: 0 /100 WBCS
PH UR STRIP.AUTO: 5.5 [PH]
PLATELET # BLD AUTO: 311 THOUSANDS/UL (ref 149–390)
PMV BLD AUTO: 10.7 FL (ref 8.9–12.7)
POTASSIUM SERPL-SCNC: 4.3 MMOL/L (ref 3.5–5.3)
PROT SERPL-MCNC: 7.9 G/DL (ref 6.4–8.2)
PROT UR STRIP-MCNC: NEGATIVE MG/DL
PSA SERPL-MCNC: 0.6 NG/ML (ref 0–4)
RBC # BLD AUTO: 5.05 MILLION/UL (ref 3.88–5.62)
RBC #/AREA URNS AUTO: NORMAL /HPF
SODIUM SERPL-SCNC: 135 MMOL/L (ref 136–145)
SP GR UR STRIP.AUTO: 1.02 (ref 1–1.03)
TRIGL SERPL-MCNC: 232 MG/DL
TSH SERPL DL<=0.05 MIU/L-ACNC: 1.98 UIU/ML (ref 0.36–3.74)
URATE SERPL-MCNC: 5.6 MG/DL (ref 4.2–8)
UROBILINOGEN UR QL STRIP.AUTO: 0.2 E.U./DL
WBC # BLD AUTO: 7.08 THOUSAND/UL (ref 4.31–10.16)
WBC #/AREA URNS AUTO: NORMAL /HPF

## 2021-08-03 PROCEDURE — 84550 ASSAY OF BLOOD/URIC ACID: CPT | Performed by: FAMILY MEDICINE

## 2021-08-03 PROCEDURE — 87086 URINE CULTURE/COLONY COUNT: CPT | Performed by: FAMILY MEDICINE

## 2021-08-03 PROCEDURE — 81001 URINALYSIS AUTO W/SCOPE: CPT | Performed by: FAMILY MEDICINE

## 2021-08-03 PROCEDURE — G0103 PSA SCREENING: HCPCS | Performed by: FAMILY MEDICINE

## 2021-08-03 PROCEDURE — 82306 VITAMIN D 25 HYDROXY: CPT | Performed by: FAMILY MEDICINE

## 2021-08-03 PROCEDURE — 85025 COMPLETE CBC W/AUTO DIFF WBC: CPT | Performed by: FAMILY MEDICINE

## 2021-08-03 PROCEDURE — 36415 COLL VENOUS BLD VENIPUNCTURE: CPT | Performed by: FAMILY MEDICINE

## 2021-08-03 PROCEDURE — 74018 RADEX ABDOMEN 1 VIEW: CPT

## 2021-08-03 PROCEDURE — 80053 COMPREHEN METABOLIC PANEL: CPT | Performed by: FAMILY MEDICINE

## 2021-08-03 PROCEDURE — 80061 LIPID PANEL: CPT | Performed by: FAMILY MEDICINE

## 2021-08-03 PROCEDURE — 84443 ASSAY THYROID STIM HORMONE: CPT | Performed by: FAMILY MEDICINE

## 2021-08-03 RX ORDER — TAMSULOSIN HYDROCHLORIDE 0.4 MG/1
0.4 CAPSULE ORAL
Qty: 30 CAPSULE | Refills: 0 | Status: SHIPPED | OUTPATIENT
Start: 2021-08-03 | End: 2021-11-30

## 2021-08-03 NOTE — TELEPHONE ENCOUNTER
I put in for the Flomax  Please make sure he follows up with his urologist   If he needs help scheduling, let me know  Have him call us if symptoms continue or increase

## 2021-08-03 NOTE — TELEPHONE ENCOUNTER
Patient stopped in states he is having issues with kidney stones he asked if you could prescribe tamsulosin (Flomax) he has used it in the past

## 2021-08-04 ENCOUNTER — TELEPHONE (OUTPATIENT)
Dept: GASTROENTEROLOGY | Facility: CLINIC | Age: 56
End: 2021-08-04

## 2021-08-04 ENCOUNTER — APPOINTMENT (EMERGENCY)
Dept: CT IMAGING | Facility: HOSPITAL | Age: 56
End: 2021-08-04
Payer: COMMERCIAL

## 2021-08-04 ENCOUNTER — HOSPITAL ENCOUNTER (EMERGENCY)
Facility: HOSPITAL | Age: 56
Discharge: HOME | End: 2021-08-04
Attending: EMERGENCY MEDICINE | Admitting: EMERGENCY MEDICINE
Payer: COMMERCIAL

## 2021-08-04 VITALS
HEIGHT: 71 IN | SYSTOLIC BLOOD PRESSURE: 135 MMHG | OXYGEN SATURATION: 98 % | TEMPERATURE: 96.5 F | WEIGHT: 273.59 LBS | DIASTOLIC BLOOD PRESSURE: 78 MMHG | RESPIRATION RATE: 18 BRPM | HEART RATE: 87 BPM | BODY MASS INDEX: 38.3 KG/M2

## 2021-08-04 DIAGNOSIS — R10.9 ABDOMINAL PAIN: Primary | ICD-10-CM

## 2021-08-04 DIAGNOSIS — R11.0 NAUSEA: ICD-10-CM

## 2021-08-04 PROBLEM — E78.5 DYSLIPIDEMIA: Status: ACTIVE | Noted: 2021-08-04

## 2021-08-04 LAB
ALBUMIN SERPL BCP-MCNC: 3.8 G/DL (ref 3.5–5)
ALP SERPL-CCNC: 93 U/L (ref 46–116)
ALT SERPL W P-5'-P-CCNC: 23 U/L (ref 12–78)
ANION GAP SERPL CALCULATED.3IONS-SCNC: 12 MMOL/L (ref 4–13)
APTT PPP: 28 SECONDS (ref 23–37)
AST SERPL W P-5'-P-CCNC: 13 U/L (ref 5–45)
BACTERIA UR CULT: ABNORMAL
BASOPHILS # BLD AUTO: 0.04 THOUSANDS/ΜL (ref 0–0.1)
BASOPHILS NFR BLD AUTO: 0 % (ref 0–1)
BILIRUB SERPL-MCNC: 0.33 MG/DL (ref 0.2–1)
BUN SERPL-MCNC: 25 MG/DL (ref 5–25)
CALCIUM SERPL-MCNC: 9.3 MG/DL (ref 8.3–10.1)
CHLORIDE SERPL-SCNC: 104 MMOL/L (ref 100–108)
CO2 SERPL-SCNC: 24 MMOL/L (ref 21–32)
CREAT SERPL-MCNC: 1.04 MG/DL (ref 0.6–1.3)
EOSINOPHIL # BLD AUTO: 0.15 THOUSAND/ΜL (ref 0–0.61)
EOSINOPHIL NFR BLD AUTO: 2 % (ref 0–6)
ERYTHROCYTE [DISTWIDTH] IN BLOOD BY AUTOMATED COUNT: 13.8 % (ref 11.6–15.1)
GFR SERPL CREATININE-BSD FRML MDRD: 79 ML/MIN/1.73SQ M
GLUCOSE SERPL-MCNC: 119 MG/DL (ref 65–140)
HCT VFR BLD AUTO: 45.9 % (ref 36.5–49.3)
HGB BLD-MCNC: 15.4 G/DL (ref 12–17)
IMM GRANULOCYTES # BLD AUTO: 0.05 THOUSAND/UL (ref 0–0.2)
IMM GRANULOCYTES NFR BLD AUTO: 1 % (ref 0–2)
INR PPP: 0.96 (ref 0.84–1.19)
LACTATE SERPL-SCNC: 2.2 MMOL/L (ref 0.5–2)
LYMPHOCYTES # BLD AUTO: 2.37 THOUSANDS/ΜL (ref 0.6–4.47)
LYMPHOCYTES NFR BLD AUTO: 26 % (ref 14–44)
MCH RBC QN AUTO: 31 PG (ref 26.8–34.3)
MCHC RBC AUTO-ENTMCNC: 33.6 G/DL (ref 31.4–37.4)
MCV RBC AUTO: 93 FL (ref 82–98)
MONOCYTES # BLD AUTO: 0.87 THOUSAND/ΜL (ref 0.17–1.22)
MONOCYTES NFR BLD AUTO: 10 % (ref 4–12)
NEUTROPHILS # BLD AUTO: 5.67 THOUSANDS/ΜL (ref 1.85–7.62)
NEUTS SEG NFR BLD AUTO: 61 % (ref 43–75)
NRBC BLD AUTO-RTO: 0 /100 WBCS
PLATELET # BLD AUTO: 311 THOUSANDS/UL (ref 149–390)
PMV BLD AUTO: 9.7 FL (ref 8.9–12.7)
POTASSIUM SERPL-SCNC: 3.8 MMOL/L (ref 3.5–5.3)
PROCALCITONIN SERPL-MCNC: <0.05 NG/ML
PROT SERPL-MCNC: 7.9 G/DL (ref 6.4–8.2)
PROTHROMBIN TIME: 12.6 SECONDS (ref 11.6–14.5)
RBC # BLD AUTO: 4.96 MILLION/UL (ref 3.88–5.62)
SODIUM SERPL-SCNC: 140 MMOL/L (ref 136–145)
WBC # BLD AUTO: 9.15 THOUSAND/UL (ref 4.31–10.16)

## 2021-08-04 PROCEDURE — 96375 TX/PRO/DX INJ NEW DRUG ADDON: CPT

## 2021-08-04 PROCEDURE — 36415 COLL VENOUS BLD VENIPUNCTURE: CPT | Performed by: EMERGENCY MEDICINE

## 2021-08-04 PROCEDURE — 85610 PROTHROMBIN TIME: CPT | Performed by: EMERGENCY MEDICINE

## 2021-08-04 PROCEDURE — 85025 COMPLETE CBC W/AUTO DIFF WBC: CPT | Performed by: EMERGENCY MEDICINE

## 2021-08-04 PROCEDURE — 85730 THROMBOPLASTIN TIME PARTIAL: CPT | Performed by: EMERGENCY MEDICINE

## 2021-08-04 PROCEDURE — 96365 THER/PROPH/DIAG IV INF INIT: CPT

## 2021-08-04 PROCEDURE — 99284 EMERGENCY DEPT VISIT MOD MDM: CPT

## 2021-08-04 PROCEDURE — 83605 ASSAY OF LACTIC ACID: CPT | Performed by: EMERGENCY MEDICINE

## 2021-08-04 PROCEDURE — 99285 EMERGENCY DEPT VISIT HI MDM: CPT | Performed by: EMERGENCY MEDICINE

## 2021-08-04 PROCEDURE — 84145 PROCALCITONIN (PCT): CPT | Performed by: EMERGENCY MEDICINE

## 2021-08-04 PROCEDURE — 96376 TX/PRO/DX INJ SAME DRUG ADON: CPT

## 2021-08-04 PROCEDURE — 87040 BLOOD CULTURE FOR BACTERIA: CPT | Performed by: EMERGENCY MEDICINE

## 2021-08-04 PROCEDURE — 80053 COMPREHEN METABOLIC PANEL: CPT | Performed by: EMERGENCY MEDICINE

## 2021-08-04 PROCEDURE — 74177 CT ABD & PELVIS W/CONTRAST: CPT

## 2021-08-04 RX ORDER — OMEPRAZOLE 20 MG/1
20 CAPSULE, DELAYED RELEASE ORAL DAILY
Qty: 14 CAPSULE | Refills: 0 | Status: SHIPPED | OUTPATIENT
Start: 2021-08-04 | End: 2021-09-10

## 2021-08-04 RX ORDER — MAGNESIUM HYDROXIDE/ALUMINUM HYDROXICE/SIMETHICONE 120; 1200; 1200 MG/30ML; MG/30ML; MG/30ML
30 SUSPENSION ORAL ONCE
Status: COMPLETED | OUTPATIENT
Start: 2021-08-04 | End: 2021-08-04

## 2021-08-04 RX ORDER — OXYCODONE AND ACETAMINOPHEN 10; 325 MG/1; MG/1
1 TABLET ORAL EVERY 4 HOURS PRN
COMMUNITY
End: 2021-11-30

## 2021-08-04 RX ORDER — ONDANSETRON 4 MG/1
4 TABLET, FILM COATED ORAL EVERY 6 HOURS
Qty: 12 TABLET | Refills: 0 | Status: SHIPPED | OUTPATIENT
Start: 2021-08-04 | End: 2021-11-30

## 2021-08-04 RX ORDER — ONDANSETRON 2 MG/ML
4 INJECTION INTRAMUSCULAR; INTRAVENOUS ONCE
Status: COMPLETED | OUTPATIENT
Start: 2021-08-04 | End: 2021-08-04

## 2021-08-04 RX ORDER — HYDROMORPHONE HCL/PF 1 MG/ML
1 SYRINGE (ML) INJECTION ONCE
Status: COMPLETED | OUTPATIENT
Start: 2021-08-04 | End: 2021-08-04

## 2021-08-04 RX ORDER — DICYCLOMINE HCL 20 MG
20 TABLET ORAL 2 TIMES DAILY
Qty: 20 TABLET | Refills: 0 | Status: SHIPPED | OUTPATIENT
Start: 2021-08-04 | End: 2022-05-05 | Stop reason: HOSPADM

## 2021-08-04 RX ORDER — HYDROMORPHONE HCL/PF 1 MG/ML
0.5 SYRINGE (ML) INJECTION ONCE
Status: COMPLETED | OUTPATIENT
Start: 2021-08-04 | End: 2021-08-04

## 2021-08-04 RX ADMIN — HYDROMORPHONE HYDROCHLORIDE 0.5 MG: 1 INJECTION, SOLUTION INTRAMUSCULAR; INTRAVENOUS; SUBCUTANEOUS at 08:35

## 2021-08-04 RX ADMIN — HYDROMORPHONE HYDROCHLORIDE 1 MG: 1 INJECTION, SOLUTION INTRAMUSCULAR; INTRAVENOUS; SUBCUTANEOUS at 07:24

## 2021-08-04 RX ADMIN — ALUMINA, MAGNESIA, AND SIMETHICONE ORAL SUSPENSION REGULAR STRENGTH 30 ML: 1200; 1200; 120 SUSPENSION ORAL at 07:24

## 2021-08-04 RX ADMIN — IOHEXOL 100 ML: 350 INJECTION, SOLUTION INTRAVENOUS at 08:31

## 2021-08-04 RX ADMIN — FAMOTIDINE 20 MG: 10 INJECTION INTRAVENOUS at 07:24

## 2021-08-04 RX ADMIN — SODIUM CHLORIDE, SODIUM LACTATE, POTASSIUM CHLORIDE, AND CALCIUM CHLORIDE 1000 ML: .6; .31; .03; .02 INJECTION, SOLUTION INTRAVENOUS at 07:25

## 2021-08-04 RX ADMIN — ONDANSETRON 4 MG: 2 INJECTION INTRAMUSCULAR; INTRAVENOUS at 07:24

## 2021-08-04 NOTE — ED NOTES
Patient transported to [facility], [address] [place] [address]  08/04/21 2744

## 2021-08-04 NOTE — ED NOTES
Patient returning from CT at this time  Patient requesting pain medication       Toni Johnson [facility]  08/04/21 7741

## 2021-08-04 NOTE — ED NOTES
Patient calling for ride home due to receiving pain meds     Ciro Cheadle, RN  08/04/21 5941 Subjective:       Patient ID: Cristopher Hollins is a 74 y.o. male.    Chief Complaint: Hypertension (labs,refills)    Hypertension   This is a chronic problem. The current episode started more than 1 year ago. The problem is unchanged. The problem is controlled. Pertinent negatives include no chest pain, peripheral edema or shortness of breath. There are no associated agents to hypertension. Risk factors for coronary artery disease include dyslipidemia and male gender. Past treatments include angiotensin blockers and calcium channel blockers. The current treatment provides significant improvement.     Has chronic COPD, [facility].  Had recent lung CT, says he was told that it was normal, they will re-image in 6 months. He stopped smoking 2 years ago and has not restarted.     Has chronic neck pain, saw pain management, does not want any surgery, tried injections without relief.     Has dupuytren's contracture of the left hand. Is going to see hand surgeon at [facility] for same.   Review of Systems   Respiratory: Negative for shortness of breath.    Cardiovascular: Negative for chest pain.       Objective:          Lab Results   Component Value Date    WBC 8.84 02/06/2020    HGB 15.2 02/06/2020    HCT 46.8 02/06/2020    MCV 95 02/06/2020     02/06/2020       CMP  Sodium   Date Value Ref Range Status   02/06/2020 140 136 - 145 mmol/L Final     Potassium   Date Value Ref Range Status   02/06/2020 3.9 3.5 - 5.1 mmol/L Final     Chloride   Date Value Ref Range Status   02/06/2020 104 95 - 110 mmol/L Final     CO2   Date Value Ref Range Status   02/06/2020 25 23 - 29 mmol/L Final     Glucose   Date Value Ref Range Status   02/06/2020 91 70 - 110 mg/dL Final     BUN, Bld   Date Value Ref Range Status   02/06/2020 9 8 - 23 mg/dL Final     Creatinine   Date Value Ref Range Status   02/06/2020 0.9 0.5 - 1.4 mg/dL Final     Calcium   Date Value Ref Range Status   02/06/2020 9.5 8.7 - 10.5 mg/dL Final      Total Protein   Date Value Ref Range Status   02/06/2020 7.6 6.0 - 8.4 g/dL Final     Albumin   Date Value Ref Range Status   02/06/2020 4.3 3.5 - 5.2 g/dL Final     Total Bilirubin   Date Value Ref Range Status   02/06/2020 0.8 0.1 - 1.0 mg/dL Final     Comment:     For infants and newborns, interpretation of results should be based  on gestational age, weight and in agreement with clinical  observations.  Premature Infant recommended reference ranges:  Up to 24 hours.............<8.0 mg/dL  Up to 48 hours............<12.0 mg/dL  3-5 days..................<15.0 mg/dL  6-29 days.................<15.0 mg/dL       Alkaline Phosphatase   Date Value Ref Range Status   02/06/2020 70 55 - 135 U/L Final     AST   Date Value Ref Range Status   02/06/2020 23 10 - 40 U/L Final     ALT   Date Value Ref Range Status   02/06/2020 18 10 - 44 U/L Final     Anion Gap   Date Value Ref Range Status   02/06/2020 11 8 - 16 mmol/L Final     eGFR if    Date Value Ref Range Status   02/06/2020 >60 >60 mL/min/1.73 m^2 Final     eGFR if non    Date Value Ref Range Status   02/06/2020 >60 >60 mL/min/1.73 m^2 Final     Comment:     Calculation used to obtain the estimated glomerular filtration  rate (eGFR) is the CKD-EPI equation.        Lab Results   Component Value Date    TSH 1.242 02/06/2020       Physical Exam   Constitutional: He is oriented to person, place, and time. He appears well-developed and well-nourished. No distress.   HENT:   Head: Normocephalic and atraumatic.   Eyes: Conjunctivae are normal. Right eye exhibits no discharge. Left eye exhibits no discharge. No scleral icterus.   Cardiovascular: Normal rate, regular rhythm and normal heart sounds. Exam reveals no gallop and no friction rub.   No murmur heard.  Pulmonary/Chest: Effort normal and breath sounds normal. No stridor. No respiratory distress. He has no wheezes. He has no rales.   Musculoskeletal: He exhibits no edema.    Neurological: He is alert and oriented to person, place, and time.   Skin: Skin is warm and dry. No rash noted. He is not diaphoretic. No pallor.   Psychiatric: He has a normal mood and affect. His behavior is normal.   Nursing note and vitals reviewed.      Assessment:     This office visit cannot be billed incident to as it does not meet the needed criteria.     1. HTN (hypertension), benign    2. Inflammatory spondylopathy, unspecified spinal region    3. Chronic obstructive pulmonary disease, unspecified COPD type    4. Hyperlipidemia, unspecified hyperlipidemia type        Plan:       HTN (hypertension), benign  -     amLODIPine (NORVASC) 5 MG tablet; Take 1 tablet (5 mg total) by mouth once daily.  Dispense: 90 tablet; Refill: 3  -     olmesartan (BENICAR) 40 MG tablet; Take 1 tablet (40 mg total) by mouth once daily.  Dispense: 90 tablet; Refill: 3    Inflammatory spondylopathy, unspecified spinal region  Comments:  Stable. Will continue to monitor.     Chronic obstructive pulmonary disease, unspecified COPD type  Comments:  Stable. Will continue to monitor.     Hyperlipidemia, unspecified hyperlipidemia type  -     atorvastatin (LIPITOR) 40 MG tablet; Take 1 tablet (40 mg total) by mouth every evening.  Dispense: 90 tablet; Refill: 3

## 2021-08-04 NOTE — ED PROVIDER NOTES
Final Diagnosis:  1  Abdominal pain    2  Nausea        Chief Complaint   Patient presents with    Abdominal Pain     patient c/o mid/upper abdominal pain beginning around 5 hours ago  Patient also c/o nausea and vomiting  pt denies CP, SOB, and diarrhea at this time  HPI  Patient presents for evaluation of abdominal pain  Patient states that approximately 5-6 hours ago he had the general onset of epigastric abdominal pain which is now severe in nature  He denies any red radiations, exacerbating or relieving factors  He attempted to take an oxycodone approximately 1 hour prior to arrival with mild improvement of his symptoms  He endorses dry heaving during this time without any episodes of emesis  States that his last bowel movement was yesterday and was normal   Denies any abdominal surgery  Patient has extensive history of kidney stones secondary to hyperuricemia  He has been in contact with his primary care physician for these concerns over the past couple of days  He states that his current abdominal pain is different than the pain that he usually gets with his kidney stones  Denies any dysuria or hematuria  Unless otherwise specified:  - No language barrier    - History obtained from patient  - There are no limitations to the history obtained  - Previous charting was reviewed    PMH:   has a past medical history of Anxiety and Kidney stones  PSH:   has a past surgical history that includes Knee cartilage surgery; Kidney stone surgery; Cardiac electrophysiology study and ablation; FL retrograde pyelogram (6/29/2019); pr cystourethroscopy,ureter catheter (Right, 6/29/2019); Cardiac electrophysiology study and ablation; pr cysto/uretero w/lithotripsy &indwell stent insrt (Right, 8/29/2019); IR nephrostomy tube placement (8/30/2019); FL retrograde pyelogram (8/29/2019); Knee arthroscopy (Left); Hernia repair (Right); Cardiac surgery (2014);  Colonoscopy; pr percut remv kid stone,2+ cm (Right, 9/25/2019); FL retrograde pyelogram (9/25/2019); Wrist surgery (Right); and Elbow surgery (Right)  ROS:  Review of Systems   -   - 13 point ROS was performed and all are normal unless stated in the history above  - Nursing note reviewed  Vitals reviewed  - Orders placed by myself and/or advanced practitioner / resident  PE:   Vitals:    08/04/21 0738 08/04/21 0800 08/04/21 0830 08/04/21 0915   BP:  152/92 144/86 135/78   BP Location:  Left arm Left arm Left arm   Pulse:  82 88 87   Resp:  16 16 18   Temp: (!) 96 5 °F (35 8 °C)      TempSrc: Oral      SpO2:  96% 98% 98%   Weight:       Height:         Vitals reviewed by me  General: VS reviewed  Appears in NAD  awake, alert  Speaking normally in full sentences  Appears uncomfortable  Head: Normocephalic, atraumatic  Eyes: EOM-I  No exudate  ENT: Atraumatic external nose and ears  No malocclusion  No stridor  Normal phonation  No drooling  Normal swallowing  Neck: No JVD  CV: No pallor noted  Lungs:   No tachypnea  No respiratory distress  Abdomen:  Soft, large abdomen  Patient indicates pain is epigastric area  No obvious pain with palpation  No CVA tenderness  MSK:   FROM spontaneously  Skin: Dry, intact  Neuro: Awake, alert, GCS15, CN II-XII grossly intact  Motor grossly intact  Psychiatric/Behavioral: Appropriate mood and affect   Exam: deferred    Physical Exam     A:  - Nursing note reviewed  CT abdomen pelvis with contrast   Final Result      1  No acute abnormality within the abdomen or pelvis  2   Cholelithiasis without pericholecystic inflammation              Workstation performed: [id number]           Orders Placed This Encounter   Procedures    Blood culture #1    Blood culture #2    CT abdomen pelvis with contrast    CBC and differential    Comprehensive metabolic panel    Lactic acid    Procalcitonin with AM Reflex    Protime-INR    APTT    UA w Reflex to Microscopic w Reflex to Culture    Lactic acid 2 Hours     Labs Reviewed   LACTIC ACID, PLASMA - Abnormal       Result Value Ref Range Status    LACTIC ACID 2 2 (*) 0 5 - 2 0 mmol/L Final    Narrative:     Result may be elevated if tourniquet was used during collection     PROTIME-INR - Normal    Protime 12 6  11 6 - 14 5 seconds Final    INR 0 96  0 84 - 1 19 Final   APTT - Normal    PTT 28  23 - 37 seconds Final    Comment: Therapeutic Heparin Range =  60-90 seconds   BLOOD CULTURE   BLOOD CULTURE   CBC AND DIFFERENTIAL    WBC 9 15  4 31 - 10 16 Thousand/uL Final    RBC 4 96  3 88 - 5 62 Million/uL Final    Hemoglobin 15 4  12 0 - 17 0 g/dL Final    Hematocrit 45 9  36 5 - 49 3 % Final    MCV 93  82 - 98 fL Final    MCH 31 0  26 8 - 34 3 pg Final    MCHC 33 6  31 4 - 37 4 g/dL Final    RDW 13 8  11 6 - 15 1 % Final    MPV 9 7  8 9 - 12 7 fL Final    Platelets 946  874 - 390 Thousands/uL Final    nRBC 0  /100 WBCs Final    Neutrophils Relative 61  43 - 75 % Final    Immat GRANS % 1  0 - 2 % Final    Lymphocytes Relative 26  14 - 44 % Final    Monocytes Relative 10  4 - 12 % Final    Eosinophils Relative 2  0 - 6 % Final    Basophils Relative 0  0 - 1 % Final    Neutrophils Absolute 5 67  1 85 - 7 62 Thousands/µL Final    Immature Grans Absolute 0 05  0 00 - 0 20 Thousand/uL Final    Lymphocytes Absolute 2 37  0 60 - 4 47 Thousands/µL Final    Monocytes Absolute 0 87  0 17 - 1 22 Thousand/µL Final    Eosinophils Absolute 0 15  0 00 - 0 61 Thousand/µL Final    Basophils Absolute 0 04  0 00 - 0 10 Thousands/µL Final   COMPREHENSIVE METABOLIC PANEL    Sodium 423  136 - 145 mmol/L Final    Potassium 3 8  3 5 - 5 3 mmol/L Final    Chloride 104  100 - 108 mmol/L Final    CO2 24  21 - 32 mmol/L Final    ANION GAP 12  4 - 13 mmol/L Final    BUN 25  5 - 25 mg/dL Final    Creatinine 1 04  0 60 - 1 30 mg/dL Final    Comment: Standardized to IDMS reference method    Glucose 119  65 - 140 mg/dL Final    Comment: If the patient is fasting, the ADA then defines impaired fasting glucose as > 100 mg/dL and diabetes as > or equal to 123 mg/dL  Specimen collection should occur prior to Sulfasalazine administration due to the potential for falsely depressed results  Specimen collection should occur prior to Sulfapyridine administration due to the potential for falsely elevated results  Calcium 9 3  8 3 - 10 1 mg/dL Final    AST 13  5 - 45 U/L Final    Comment: Specimen collection should occur prior to Sulfasalazine administration due to the potential for falsely depressed results  ALT 23  12 - 78 U/L Final    Comment: Specimen collection should occur prior to Sulfasalazine administration due to the potential for falsely depressed results  Alkaline Phosphatase 93  46 - 116 U/L Final    Total Protein 7 9  6 4 - 8 2 g/dL Final    Albumin 3 8  3 5 - 5 0 g/dL Final    Total Bilirubin 0 33  0 20 - 1 00 mg/dL Final    Comment: Use of this assay is not recommended for patients undergoing treatment with eltrombopag due to the potential for falsely elevated results  eGFR 79  ml/min/1 73sq m Final    Narrative:     [place] guidelines for Chronic Kidney Disease (CKD):     Stage 1 with normal or high GFR (GFR > 90 mL/min/1 73 square meters)    Stage 2 Mild CKD (GFR = 60-89 mL/min/1 73 square meters)    Stage 3A Moderate CKD (GFR = 45-59 mL/min/1 73 square meters)    Stage 3B Moderate CKD (GFR = 30-44 mL/min/1 73 square meters)    Stage 4 Severe CKD (GFR = 15-29 mL/min/1 73 square meters)    Stage 5 End Stage CKD (GFR <15 mL/min/1 73 square meters)  Note: GFR calculation is accurate only with a steady state creatinine   PROCALCITONIN TEST   UA W REFLEX TO MICROSCOPIC WITH REFLEX TO CULTURE   LACTIC ACID 2 HOUR         Final Diagnosis:  1  Abdominal pain    2  Nausea        P:  - I discussed the patient's results with him  Laboratory analysis was overall within normal limits    CT scan abdomen and pelvis without acute pathology  I discussed this with patient and offered him admission to the hospital   At this time he preferred to go home as there was no acute underlying pathology  Provided with a prescription for Zofran as well as Prilosec for GERD  Return precautions discussed  Medications   lactated ringers bolus 1,000 mL (0 mL Intravenous Stopped 8/4/21 0825)   HYDROmorphone (DILAUDID) injection 1 mg (1 mg Intravenous Given 8/4/21 0724)   famotidine (PEPCID) injection 20 mg (20 mg Intravenous Given 8/4/21 0724)   ondansetron (ZOFRAN) injection 4 mg (4 mg Intravenous Given 8/4/21 0724)   aluminum-magnesium hydroxide-simethicone (MYLANTA) oral suspension 30 mL (30 mL Oral Given 8/4/21 0724)   iohexol (OMNIPAQUE) 350 MG/ML injection (SINGLE-DOSE) 100 mL (100 mL Intravenous Given 8/4/21 0831)   HYDROmorphone (DILAUDID) injection 0 5 mg (0 5 mg Intravenous Given 8/4/21 0835)     Time reflects when diagnosis was documented in both MDM as applicable and the Disposition within this note     Time User Action Codes Description Comment    8/4/2021  9:02 AM Yousif Vogel Add [R10 9] Abdominal pain     8/4/2021  9:02 AM Jenna Osborn Add [R11 0] Nausea       ED Disposition     ED Disposition Condition Date/Time Comment    Discharge Stable Wed Aug 4, 2021  9:02 AM Dada Child discharge to home/self care              Follow-up Information     Follow up With Specialties Details Why [facility], [facility] [address]  [address] [phone]          Discharge Medication List as of 8/4/2021  9:04 AM      START taking these medications    Details   dicyclomine (BENTYL) 20 mg tablet Take 1 tablet (20 mg total) by mouth 2 (two) times a day, Starting Wed 8/4/2021, Normal      omeprazole (PriLOSEC) 20 mg delayed release capsule Take 1 capsule (20 mg total) by mouth daily for 14 days, Starting Wed 8/4/2021, Until Wed 8/18/2021, Normal      ondansetron (ZOFRAN) 4 mg tablet Take 1 tablet (4 mg total) by mouth every 6 (six) hours, Starting 2021, Normal         CONTINUE these medications which have NOT CHANGED    Details   DULoxetine (CYMBALTA) 60 mg delayed release capsule Take 1 capsule (60 mg total) by mouth daily, Starting 2021, Normal      oxyCODONE-acetaminophen (PERCOCET)  mg per tablet Take 1 tablet by mouth every 4 (four) hours as needed for moderate pain, Historical Med      tamsulosin (FLOMAX) 0 4 mg Take 1 capsule (0 4 mg total) by mouth daily with dinner, Starting Tue 8/3/2021, Normal      albuterol (PROVENTIL HFA,VENTOLIN HFA) 90 mcg/act inhaler 2 puffs every 4 hours with spacer as needed for wheeze, cough, short of breath , Print      allopurinol (ZYLOPRIM) 100 mg tablet Take 1 tablet (100 mg total) by mouth daily, Starting 2021, Normal      potassium citrate (UROCIT-K) 10 mEq Take 1 tablet (10 mEq total) by mouth 3 (three) times a day with meals, Starting 2021, Normal           No discharge procedures on file  Prior to Admission Medications   Prescriptions Last Dose Informant Patient Reported? Taking? DULoxetine (CYMBALTA) 60 mg delayed release capsule 2021 at Unknown time  No Yes   Sig: Take 1 capsule (60 mg total) by mouth daily   albuterol (PROVENTIL HFA,VENTOLIN HFA) 90 mcg/act inhaler Not Taking at Unknown time Self No No   Si puffs every 4 hours with spacer as needed for wheeze, cough, short of breath     Patient not taking: Reported on 2021   allopurinol (ZYLOPRIM) 100 mg tablet   No No   Sig: Take 1 tablet (100 mg total) by mouth daily   oxyCODONE-acetaminophen (PERCOCET)  mg per tablet 2021 at Unknown time  Yes Yes   Sig: Take 1 tablet by mouth every 4 (four) hours as needed for moderate pain   potassium citrate (UROCIT-K) 10 mEq   No No   Sig: Take 1 tablet (10 mEq total) by mouth 3 (three) times a day with meals   tamsulosin (FLOMAX) 0 4 mg 2021 at Unknown time  No Yes   Sig: Take 1 capsule (0 4 mg total) by mouth daily with dinner      Facility-Administered Medications: None       Portions of the record may have been created with voice recognition software  Occasional wrong word or "sound a like" substitutions may have occurred due to the inherent limitations of voice recognition software  Read the chart carefully and recognize, using context, where substitutions have occurred      Electronically signed by:  MD Anika Garcia MD  08/04/21 1016

## 2021-08-04 NOTE — ED NOTES
Patient made aware of urine specimen needed   Urinal at bedside     Alena Barboza [facility]  08/04/21 4911

## 2021-08-04 NOTE — Clinical Note
Gorgee Severe was seen and treated in our emergency department on 8/4/2021  Diagnosis:     Chris Aldridge  may return to work on return date  He may return on this date: 08/06/2021         If you have any questions or concerns, please don't hesitate to call        Vern Bloch, MD    ______________________________           _______________          _______________  Hospital Representative                              Date                                Time

## 2021-08-09 ENCOUNTER — TELEPHONE (OUTPATIENT)
Dept: FAMILY MEDICINE CLINIC | Facility: CLINIC | Age: 56
End: 2021-08-09

## 2021-08-09 DIAGNOSIS — R10.30 LOWER ABDOMINAL PAIN: Primary | ICD-10-CM

## 2021-08-09 LAB — BACTERIA BLD CULT: NORMAL

## 2021-08-09 NOTE — TELEPHONE ENCOUNTER
I read message from the ER  We will get repeat blood work and urinalysis on him  If still having abdominal discomfort, I can refer him to GI

## 2021-08-09 NOTE — TELEPHONE ENCOUNTER
----- Message from Yuan Simmons PA-C sent at 8/9/2021 10:13 AM EDT -----  Prelim blood culture 1/2 gram positive rods; was seen in ED on 8/4/21 for abd pain, nausea with no infectious etiology found  I called and spoke to pt today 8/9/21 at 10:10 am   Pt reports he is feeling better since ED visit  No fevers reported  Still states abdomen feels sore and has been tired but overall improved  Recommended re-evaluation for repeat labs  Will follow up on finalization of culture results  Pt asked that copy be sent to his PCP and forwarded result to Dr Mark Rice  Strict return precautions given  Pt voiced understanding and had no further questions

## 2021-08-09 NOTE — TELEPHONE ENCOUNTER
He is just sore, he is away right now  He will get the tests done when he gets back  Does not want referral that this time

## 2021-08-14 LAB
BACTERIA BLD CULT: ABNORMAL
GRAM STN SPEC: ABNORMAL

## 2021-08-30 DIAGNOSIS — F41.8 ANXIETY WITH DEPRESSION: ICD-10-CM

## 2021-08-30 RX ORDER — DULOXETIN HYDROCHLORIDE 60 MG/1
CAPSULE, DELAYED RELEASE ORAL
Qty: 30 CAPSULE | Refills: 3 | Status: SHIPPED | OUTPATIENT
Start: 2021-08-30 | End: 2021-11-30

## 2021-09-10 ENCOUNTER — OFFICE VISIT (OUTPATIENT)
Dept: FAMILY MEDICINE CLINIC | Facility: CLINIC | Age: 56
End: 2021-09-10
Payer: COMMERCIAL

## 2021-09-10 ENCOUNTER — TELEPHONE (OUTPATIENT)
Dept: GASTROENTEROLOGY | Facility: CLINIC | Age: 56
End: 2021-09-10

## 2021-09-10 ENCOUNTER — TELEPHONE (OUTPATIENT)
Dept: FAMILY MEDICINE CLINIC | Facility: CLINIC | Age: 56
End: 2021-09-10

## 2021-09-10 VITALS
WEIGHT: 270.6 LBS | HEIGHT: 71 IN | TEMPERATURE: 98.2 F | SYSTOLIC BLOOD PRESSURE: 140 MMHG | DIASTOLIC BLOOD PRESSURE: 84 MMHG | BODY MASS INDEX: 37.88 KG/M2

## 2021-09-10 DIAGNOSIS — R10.13 EPIGASTRIC PAIN: Primary | ICD-10-CM

## 2021-09-10 DIAGNOSIS — R10.9 ABDOMINAL PAIN: ICD-10-CM

## 2021-09-10 PROCEDURE — 99214 OFFICE O/P EST MOD 30 MIN: CPT | Performed by: FAMILY MEDICINE

## 2021-09-10 PROCEDURE — 3008F BODY MASS INDEX DOCD: CPT | Performed by: FAMILY MEDICINE

## 2021-09-10 PROCEDURE — 1036F TOBACCO NON-USER: CPT | Performed by: FAMILY MEDICINE

## 2021-09-10 RX ORDER — OMEPRAZOLE 40 MG/1
40 CAPSULE, DELAYED RELEASE ORAL DAILY
Qty: 30 CAPSULE | Refills: 1 | Status: SHIPPED | OUTPATIENT
Start: 2021-09-10 | End: 2021-12-29 | Stop reason: SDUPTHER

## 2021-09-10 RX ORDER — SUCRALFATE ORAL 1 G/10ML
1 SUSPENSION ORAL 4 TIMES DAILY
Qty: 420 ML | Refills: 1 | Status: SHIPPED | OUTPATIENT
Start: 2021-09-10 | End: 2022-05-05 | Stop reason: HOSPADM

## 2021-09-10 NOTE — PROGRESS NOTES
Assessment/Plan:    We did an EKG on the patient which did not show any acute changes compared to his EKG done 09/02/2019  Rx put in for omeprazole 40 mg daily along with Carafate 4 times a day  If his symptoms continue or increase, he will call or go to the ER  Also put in a referral to GI asap  We will see him back in the next couple weeks or p r n     Problem List Items Addressed This Visit     None      Visit Diagnoses     Epigastric pain    -  Primary    Relevant Medications    sucralfate (CARAFATE) 1 g/10 mL suspension    omeprazole (PriLOSEC) 40 MG capsule    Other Relevant Orders    POCT ECG    Ambulatory referral to Gastroenterology    Abdominal pain        Relevant Medications    sucralfate (CARAFATE) 1 g/10 mL suspension    omeprazole (PriLOSEC) 40 MG capsule    Other Relevant Orders    POCT ECG    Ambulatory referral to Gastroenterology    Ambulatory referral to Gastroenterology           Diagnoses and all orders for this visit:    Epigastric pain  -     POCT ECG  -     sucralfate (CARAFATE) 1 g/10 mL suspension; Take 10 mL (1 g total) by mouth 4 (four) times a day  -     omeprazole (PriLOSEC) 40 MG capsule; Take 1 capsule (40 mg total) by mouth daily for 14 days  -     Ambulatory referral to Gastroenterology; Future    Abdominal pain  -     sucralfate (CARAFATE) 1 g/10 mL suspension; Take 10 mL (1 g total) by mouth 4 (four) times a day  -     omeprazole (PriLOSEC) 40 MG capsule; Take 1 capsule (40 mg total) by mouth daily for 14 days  -     Ambulatory referral to Gastroenterology; Future        No problem-specific Assessment & Plan notes found for this encounter  Subjective:      Patient ID: Dannie Mims is a 61 y o  male  Patient here today stating for the last 3 days has had intermittent epigastric pain, burning in nature  Today it has been at its worst   He had an episode last month, he went to the ER for this  Workup was essentially negative    They give him omeprazole 20 mg a day   Patient states it did help a little bit  Patient states he did have spaghetti yesterday  Denies any nausea  No fever chills  No change in bowels  Heartburn  He complains of abdominal pain and heartburn  This is a recurrent problem  The current episode started in the past 7 days  The problem occurs frequently  The problem has been gradually worsening  Heartburn location:   Epigastric  The heartburn is of severe intensity  The symptoms are aggravated by certain foods  There are no known risk factors  He has tried a PPI for the symptoms  The treatment provided moderate relief  The following portions of the patient's history were reviewed and updated as appropriate:   He has a past medical history of Anxiety and Kidney stones  ,  does not have any pertinent problems on file  ,   has a past surgical history that includes Knee cartilage surgery; Kidney stone surgery; Cardiac electrophysiology study and ablation; FL retrograde pyelogram (6/29/2019); pr cystourethroscopy,ureter catheter (Right, 6/29/2019); Cardiac electrophysiology study and ablation; pr cysto/uretero w/lithotripsy &indwell stent insrt (Right, 8/29/2019); IR nephrostomy tube placement (8/30/2019); FL retrograde pyelogram (8/29/2019); Knee arthroscopy (Left); Hernia repair (Right); Cardiac surgery (2014); Colonoscopy; pr percut remv kid stone,2+ cm (Right, 9/25/2019); FL retrograde pyelogram (9/25/2019); Wrist surgery (Right); and Elbow surgery (Right)  ,  family history includes Cancer in his father and mother  ,   reports that he has never smoked  He has quit using smokeless tobacco  He reports previous alcohol use  He reports that he does not use drugs  ,  is allergic to ancef [cefazolin], other, peanut-containing drug products - food allergy, sulfa antibiotics, and toradol [ketorolac tromethamine]     Current Outpatient Medications   Medication Sig Dispense Refill    allopurinol (ZYLOPRIM) 100 mg tablet Take 1 tablet (100 mg total) by mouth daily 30 tablet 5    atorvastatin (LIPITOR) 20 mg tablet Take 1 tablet (20 mg total) by mouth daily 30 tablet 5    dicyclomine (BENTYL) 20 mg tablet Take 1 tablet (20 mg total) by mouth 2 (two) times a day 20 tablet 0    DULoxetine (CYMBALTA) 60 mg delayed release capsule take 1 capsule by mouth once daily 30 capsule 3    ondansetron (ZOFRAN) 4 mg tablet Take 1 tablet (4 mg total) by mouth every 6 (six) hours 12 tablet 0    potassium citrate (UROCIT-K) 10 mEq Take 1 tablet (10 mEq total) by mouth 3 (three) times a day with meals 30 tablet 5    tamsulosin (FLOMAX) 0 4 mg Take 1 capsule (0 4 mg total) by mouth daily with dinner 30 capsule 0    albuterol (PROVENTIL HFA,VENTOLIN HFA) 90 mcg/act inhaler 2 puffs every 4 hours with spacer as needed for wheeze, cough, short of breath  (Patient not taking: Reported on 4/20/2021) 1 Inhaler 0    omeprazole (PriLOSEC) 40 MG capsule Take 1 capsule (40 mg total) by mouth daily for 14 days 30 capsule 1    oxyCODONE-acetaminophen (PERCOCET)  mg per tablet Take 1 tablet by mouth every 4 (four) hours as needed for moderate pain (Patient not taking: Reported on 9/10/2021)      sucralfate (CARAFATE) 1 g/10 mL suspension Take 10 mL (1 g total) by mouth 4 (four) times a day 420 mL 1     No current facility-administered medications for this visit  Review of Systems   Constitutional: Negative  Respiratory: Negative  Cardiovascular: Negative  Gastrointestinal: Positive for abdominal pain and heartburn  Genitourinary: Negative  Objective:  Vitals:    09/10/21 1331   BP: 140/84   Temp: 98 2 °F (36 8 °C)   Weight: 123 kg (270 lb 9 6 oz)   Height: 5' 11" (1 803 m)     Body mass index is 37 74 kg/m²  Physical Exam  Vitals reviewed  Constitutional:       General: He is not in acute distress  Appearance: Normal appearance  He is well-developed  He is not ill-appearing, toxic-appearing or diaphoretic     HENT:      Head: Normocephalic and atraumatic  Eyes:      Conjunctiva/sclera: Conjunctivae normal    Cardiovascular:      Rate and Rhythm: Normal rate and regular rhythm  Heart sounds: Normal heart sounds  No murmur heard  No friction rub  No gallop  Pulmonary:      Effort: Pulmonary effort is normal  No respiratory distress  Breath sounds: Normal breath sounds  No wheezing, rhonchi or rales  Abdominal:      Tenderness: There is abdominal tenderness ( in the epigastric area)  Musculoskeletal:      Right lower leg: No edema  Left lower leg: No edema  Neurological:      General: No focal deficit present  Mental Status: He is alert and oriented to person, place, and time  Psychiatric:         Mood and Affect: Mood normal          Behavior: Behavior normal          Thought Content:  Thought content normal          Judgment: Judgment normal

## 2021-09-15 PROCEDURE — 93000 ELECTROCARDIOGRAM COMPLETE: CPT | Performed by: FAMILY MEDICINE

## 2021-09-17 ENCOUNTER — RA CDI HCC (OUTPATIENT)
Dept: OTHER | Facility: HOSPITAL | Age: 56
End: 2021-09-17

## 2021-09-17 NOTE — PROGRESS NOTES
[facility]  coding opportunities       Chart reviewed, no opportunity found: CHART REVIEWED, NO OPPORTUNITY FOUND                        Patients insurance company: [employer] (Medicare Advantage and Commercial)

## 2021-11-18 ENCOUNTER — RA CDI HCC (OUTPATIENT)
Dept: OTHER | Facility: HOSPITAL | Age: 56
End: 2021-11-18

## 2021-11-30 ENCOUNTER — OFFICE VISIT (OUTPATIENT)
Dept: FAMILY MEDICINE CLINIC | Facility: CLINIC | Age: 56
End: 2021-11-30
Payer: COMMERCIAL

## 2021-11-30 VITALS
TEMPERATURE: 98 F | SYSTOLIC BLOOD PRESSURE: 132 MMHG | DIASTOLIC BLOOD PRESSURE: 88 MMHG | HEIGHT: 71 IN | WEIGHT: 270.8 LBS | BODY MASS INDEX: 37.91 KG/M2 | HEART RATE: 92 BPM | OXYGEN SATURATION: 97 %

## 2021-11-30 DIAGNOSIS — F41.8 ANXIETY WITH DEPRESSION: ICD-10-CM

## 2021-11-30 DIAGNOSIS — G56.22 ENTRAPMENT OF LEFT ULNAR NERVE AT ELBOW: ICD-10-CM

## 2021-11-30 DIAGNOSIS — R10.13 EPIGASTRIC PAIN: Primary | ICD-10-CM

## 2021-11-30 DIAGNOSIS — Z12.11 SCREENING FOR COLON CANCER: ICD-10-CM

## 2021-11-30 PROCEDURE — 1036F TOBACCO NON-USER: CPT | Performed by: FAMILY MEDICINE

## 2021-11-30 PROCEDURE — 3008F BODY MASS INDEX DOCD: CPT | Performed by: FAMILY MEDICINE

## 2021-11-30 PROCEDURE — 99214 OFFICE O/P EST MOD 30 MIN: CPT | Performed by: FAMILY MEDICINE

## 2021-11-30 RX ORDER — DULOXETIN HYDROCHLORIDE 60 MG/1
60 CAPSULE, DELAYED RELEASE ORAL DAILY
Qty: 30 CAPSULE | Refills: 5 | Status: SHIPPED | OUTPATIENT
Start: 2021-11-30 | End: 2022-07-08 | Stop reason: SDUPTHER

## 2021-12-15 ENCOUNTER — HOSPITAL ENCOUNTER (EMERGENCY)
Facility: HOSPITAL | Age: 56
Discharge: HOME | End: 2021-12-15
Attending: EMERGENCY MEDICINE
Payer: COMMERCIAL

## 2021-12-15 ENCOUNTER — APPOINTMENT (EMERGENCY)
Dept: CT IMAGING | Facility: HOSPITAL | Age: 56
End: 2021-12-15
Payer: COMMERCIAL

## 2021-12-15 VITALS
WEIGHT: 270 LBS | OXYGEN SATURATION: 94 % | SYSTOLIC BLOOD PRESSURE: 145 MMHG | HEIGHT: 71 IN | RESPIRATION RATE: 18 BRPM | BODY MASS INDEX: 37.8 KG/M2 | TEMPERATURE: 97.9 F | DIASTOLIC BLOOD PRESSURE: 68 MMHG | HEART RATE: 99 BPM

## 2021-12-15 DIAGNOSIS — N13.30 HYDRONEPHROSIS: ICD-10-CM

## 2021-12-15 DIAGNOSIS — N13.4 HYDROURETER: ICD-10-CM

## 2021-12-15 DIAGNOSIS — N20.1 URETERAL CALCULI: Primary | ICD-10-CM

## 2021-12-15 LAB
ANION GAP SERPL CALCULATED.3IONS-SCNC: 10 MMOL/L (ref 4–13)
BACTERIA UR QL AUTO: ABNORMAL /HPF
BASOPHILS # BLD AUTO: 0.03 THOUSANDS/ΜL (ref 0–0.1)
BASOPHILS NFR BLD AUTO: 0 % (ref 0–1)
BILIRUB UR QL STRIP: NEGATIVE
BUN SERPL-MCNC: 18 MG/DL (ref 5–25)
CALCIUM SERPL-MCNC: 8.8 MG/DL (ref 8.3–10.1)
CAOX CRY URNS QL MICRO: ABNORMAL /HPF
CHLORIDE SERPL-SCNC: 102 MMOL/L (ref 100–108)
CLARITY UR: ABNORMAL
CO2 SERPL-SCNC: 25 MMOL/L (ref 21–32)
COLOR UR: YELLOW
CREAT SERPL-MCNC: 1.39 MG/DL (ref 0.6–1.3)
EOSINOPHIL # BLD AUTO: 0.09 THOUSAND/ΜL (ref 0–0.61)
EOSINOPHIL NFR BLD AUTO: 1 % (ref 0–6)
ERYTHROCYTE [DISTWIDTH] IN BLOOD BY AUTOMATED COUNT: 13.3 % (ref 11.6–15.1)
GFR SERPL CREATININE-BSD FRML MDRD: 55 ML/MIN/1.73SQ M
GLUCOSE SERPL-MCNC: 116 MG/DL (ref 65–140)
GLUCOSE UR STRIP-MCNC: NEGATIVE MG/DL
HCT VFR BLD AUTO: 44.3 % (ref 36.5–49.3)
HGB BLD-MCNC: 15.3 G/DL (ref 12–17)
HGB UR QL STRIP.AUTO: ABNORMAL
IMM GRANULOCYTES # BLD AUTO: 0.04 THOUSAND/UL (ref 0–0.2)
IMM GRANULOCYTES NFR BLD AUTO: 0 % (ref 0–2)
KETONES UR STRIP-MCNC: NEGATIVE MG/DL
LEUKOCYTE ESTERASE UR QL STRIP: NEGATIVE
LYMPHOCYTES # BLD AUTO: 1.25 THOUSANDS/ΜL (ref 0.6–4.47)
LYMPHOCYTES NFR BLD AUTO: 12 % (ref 14–44)
MCH RBC QN AUTO: 30.7 PG (ref 26.8–34.3)
MCHC RBC AUTO-ENTMCNC: 34.5 G/DL (ref 31.4–37.4)
MCV RBC AUTO: 89 FL (ref 82–98)
MONOCYTES # BLD AUTO: 0.75 THOUSAND/ΜL (ref 0.17–1.22)
MONOCYTES NFR BLD AUTO: 7 % (ref 4–12)
NEUTROPHILS # BLD AUTO: 8.15 THOUSANDS/ΜL (ref 1.85–7.62)
NEUTS SEG NFR BLD AUTO: 80 % (ref 43–75)
NITRITE UR QL STRIP: NEGATIVE
NON-SQ EPI CELLS URNS QL MICRO: ABNORMAL /HPF
NRBC BLD AUTO-RTO: 0 /100 WBCS
PH UR STRIP.AUTO: 5.5 [PH]
PLATELET # BLD AUTO: 258 THOUSANDS/UL (ref 149–390)
PMV BLD AUTO: 10.4 FL (ref 8.9–12.7)
POTASSIUM SERPL-SCNC: 4.1 MMOL/L (ref 3.5–5.3)
PROT UR STRIP-MCNC: NEGATIVE MG/DL
RBC # BLD AUTO: 4.99 MILLION/UL (ref 3.88–5.62)
RBC #/AREA URNS AUTO: ABNORMAL /HPF
SODIUM SERPL-SCNC: 137 MMOL/L (ref 136–145)
SP GR UR STRIP.AUTO: 1.01 (ref 1–1.03)
UROBILINOGEN UR QL STRIP.AUTO: 0.2 E.U./DL
WBC # BLD AUTO: 10.31 THOUSAND/UL (ref 4.31–10.16)
WBC #/AREA URNS AUTO: ABNORMAL /HPF

## 2021-12-15 PROCEDURE — 85025 COMPLETE CBC W/AUTO DIFF WBC: CPT | Performed by: PHYSICIAN ASSISTANT

## 2021-12-15 PROCEDURE — 81001 URINALYSIS AUTO W/SCOPE: CPT | Performed by: PHYSICIAN ASSISTANT

## 2021-12-15 PROCEDURE — 36415 COLL VENOUS BLD VENIPUNCTURE: CPT | Performed by: PHYSICIAN ASSISTANT

## 2021-12-15 PROCEDURE — G1004 CDSM NDSC: HCPCS

## 2021-12-15 PROCEDURE — 96361 HYDRATE IV INFUSION ADD-ON: CPT

## 2021-12-15 PROCEDURE — 96375 TX/PRO/DX INJ NEW DRUG ADDON: CPT

## 2021-12-15 PROCEDURE — 96374 THER/PROPH/DIAG INJ IV PUSH: CPT

## 2021-12-15 PROCEDURE — 99284 EMERGENCY DEPT VISIT MOD MDM: CPT

## 2021-12-15 PROCEDURE — 80048 BASIC METABOLIC PNL TOTAL CA: CPT | Performed by: PHYSICIAN ASSISTANT

## 2021-12-15 PROCEDURE — 74176 CT ABD & PELVIS W/O CONTRAST: CPT

## 2021-12-15 PROCEDURE — 99285 EMERGENCY DEPT VISIT HI MDM: CPT | Performed by: PHYSICIAN ASSISTANT

## 2021-12-15 RX ORDER — HYDROMORPHONE HCL/PF 1 MG/ML
1 SYRINGE (ML) INJECTION ONCE
Status: COMPLETED | OUTPATIENT
Start: 2021-12-15 | End: 2021-12-15

## 2021-12-15 RX ORDER — TAMSULOSIN HYDROCHLORIDE 0.4 MG/1
0.4 CAPSULE ORAL ONCE
Status: COMPLETED | OUTPATIENT
Start: 2021-12-15 | End: 2021-12-15

## 2021-12-15 RX ORDER — ONDANSETRON 2 MG/ML
4 INJECTION INTRAMUSCULAR; INTRAVENOUS ONCE
Status: COMPLETED | OUTPATIENT
Start: 2021-12-15 | End: 2021-12-15

## 2021-12-15 RX ADMIN — SODIUM CHLORIDE 1000 ML: 0.9 INJECTION, SOLUTION INTRAVENOUS at 15:31

## 2021-12-15 RX ADMIN — HYDROMORPHONE HYDROCHLORIDE 1 MG: 1 INJECTION, SOLUTION INTRAMUSCULAR; INTRAVENOUS; SUBCUTANEOUS at 15:30

## 2021-12-15 RX ADMIN — ONDANSETRON 4 MG: 2 INJECTION INTRAMUSCULAR; INTRAVENOUS at 13:38

## 2021-12-15 RX ADMIN — SODIUM CHLORIDE 1000 ML: 0.9 INJECTION, SOLUTION INTRAVENOUS at 13:36

## 2021-12-15 RX ADMIN — TAMSULOSIN HYDROCHLORIDE 0.4 MG: 0.4 CAPSULE ORAL at 13:35

## 2021-12-17 ENCOUNTER — OFFICE VISIT (OUTPATIENT)
Dept: GASTROENTEROLOGY | Facility: MEDICAL CENTER | Age: 56
End: 2021-12-17
Payer: COMMERCIAL

## 2021-12-17 VITALS
TEMPERATURE: 95.6 F | BODY MASS INDEX: 31.63 KG/M2 | SYSTOLIC BLOOD PRESSURE: 126 MMHG | WEIGHT: 226.8 LBS | HEART RATE: 110 BPM | DIASTOLIC BLOOD PRESSURE: 68 MMHG

## 2021-12-17 DIAGNOSIS — Z12.11 COLON CANCER SCREENING: Primary | ICD-10-CM

## 2021-12-17 DIAGNOSIS — R10.13 EPIGASTRIC PAIN: ICD-10-CM

## 2021-12-17 DIAGNOSIS — K21.9 GASTROESOPHAGEAL REFLUX DISEASE WITHOUT ESOPHAGITIS: ICD-10-CM

## 2021-12-17 PROCEDURE — 99203 OFFICE O/P NEW LOW 30 MIN: CPT | Performed by: STUDENT IN AN ORGANIZED HEALTH CARE EDUCATION/TRAINING PROGRAM

## 2021-12-17 PROCEDURE — 1036F TOBACCO NON-USER: CPT | Performed by: STUDENT IN AN ORGANIZED HEALTH CARE EDUCATION/TRAINING PROGRAM

## 2021-12-29 ENCOUNTER — TELEPHONE (OUTPATIENT)
Dept: FAMILY MEDICINE CLINIC | Facility: CLINIC | Age: 56
End: 2021-12-29

## 2021-12-29 DIAGNOSIS — R10.9 ABDOMINAL PAIN: ICD-10-CM

## 2021-12-29 DIAGNOSIS — R10.13 EPIGASTRIC PAIN: ICD-10-CM

## 2021-12-29 RX ORDER — OMEPRAZOLE 20 MG/1
20 CAPSULE, DELAYED RELEASE ORAL DAILY
Qty: 30 CAPSULE | Refills: 3 | Status: SHIPPED | OUTPATIENT
Start: 2021-12-29

## 2022-01-31 ENCOUNTER — HOSPITAL ENCOUNTER (OUTPATIENT)
Dept: GASTROENTEROLOGY | Facility: HOSPITAL | Age: 57
Setting detail: OUTPATIENT SURGERY
Discharge: HOME | End: 2022-01-31
Admitting: STUDENT IN AN ORGANIZED HEALTH CARE EDUCATION/TRAINING PROGRAM
Payer: COMMERCIAL

## 2022-01-31 ENCOUNTER — ANESTHESIA (OUTPATIENT)
Dept: GASTROENTEROLOGY | Facility: HOSPITAL | Age: 57
End: 2022-01-31

## 2022-01-31 ENCOUNTER — ANESTHESIA EVENT (OUTPATIENT)
Dept: GASTROENTEROLOGY | Facility: HOSPITAL | Age: 57
End: 2022-01-31

## 2022-01-31 VITALS
WEIGHT: 257 LBS | TEMPERATURE: 98 F | BODY MASS INDEX: 35.98 KG/M2 | HEART RATE: 81 BPM | RESPIRATION RATE: 16 BRPM | OXYGEN SATURATION: 96 % | HEIGHT: 71 IN | SYSTOLIC BLOOD PRESSURE: 119 MMHG | DIASTOLIC BLOOD PRESSURE: 71 MMHG

## 2022-01-31 DIAGNOSIS — K21.9 GASTROESOPHAGEAL REFLUX DISEASE WITHOUT ESOPHAGITIS: ICD-10-CM

## 2022-01-31 DIAGNOSIS — Z12.11 COLON CANCER SCREENING: ICD-10-CM

## 2022-01-31 DIAGNOSIS — R10.13 EPIGASTRIC PAIN: ICD-10-CM

## 2022-01-31 PROBLEM — N17.9 AKI (ACUTE KIDNEY INJURY) (HCC): Status: RESOLVED | Noted: 2019-06-28 | Resolved: 2022-01-31

## 2022-01-31 PROBLEM — N13.30 HYDROURETERONEPHROSIS: Status: RESOLVED | Noted: 2019-06-28 | Resolved: 2022-01-31

## 2022-01-31 PROCEDURE — 88305 TISSUE EXAM BY PATHOLOGIST: CPT | Performed by: PATHOLOGY

## 2022-01-31 PROCEDURE — 43239 EGD BIOPSY SINGLE/MULTIPLE: CPT | Performed by: STUDENT IN AN ORGANIZED HEALTH CARE EDUCATION/TRAINING PROGRAM

## 2022-01-31 PROCEDURE — 45385 COLONOSCOPY W/LESION REMOVAL: CPT | Performed by: STUDENT IN AN ORGANIZED HEALTH CARE EDUCATION/TRAINING PROGRAM

## 2022-01-31 RX ORDER — ACETAMINOPHEN 325 MG/1
975 TABLET ORAL ONCE
Status: DISCONTINUED | OUTPATIENT
Start: 2022-01-31 | End: 2022-02-04 | Stop reason: HOSPADM

## 2022-01-31 RX ORDER — PROPOFOL 10 MG/ML
INJECTION, EMULSION INTRAVENOUS AS NEEDED
Status: DISCONTINUED | OUTPATIENT
Start: 2022-01-31 | End: 2022-01-31

## 2022-01-31 RX ORDER — PROPOFOL 10 MG/ML
INJECTION, EMULSION INTRAVENOUS CONTINUOUS PRN
Status: DISCONTINUED | OUTPATIENT
Start: 2022-01-31 | End: 2022-01-31

## 2022-01-31 RX ORDER — LIDOCAINE HYDROCHLORIDE 20 MG/ML
INJECTION, SOLUTION EPIDURAL; INFILTRATION; INTRACAUDAL; PERINEURAL AS NEEDED
Status: DISCONTINUED | OUTPATIENT
Start: 2022-01-31 | End: 2022-01-31

## 2022-01-31 RX ORDER — SODIUM CHLORIDE, SODIUM LACTATE, POTASSIUM CHLORIDE, CALCIUM CHLORIDE 600; 310; 30; 20 MG/100ML; MG/100ML; MG/100ML; MG/100ML
50 INJECTION, SOLUTION INTRAVENOUS CONTINUOUS
Status: DISCONTINUED | OUTPATIENT
Start: 2022-01-31 | End: 2022-02-04 | Stop reason: HOSPADM

## 2022-01-31 RX ADMIN — PROPOFOL 100 MCG/KG/MIN: 10 INJECTION, EMULSION INTRAVENOUS at 12:11

## 2022-01-31 RX ADMIN — SODIUM CHLORIDE, SODIUM LACTATE, POTASSIUM CHLORIDE, AND CALCIUM CHLORIDE 50 ML/HR: .6; .31; .03; .02 INJECTION, SOLUTION INTRAVENOUS at 10:36

## 2022-01-31 RX ADMIN — PROPOFOL 50 MG: 10 INJECTION, EMULSION INTRAVENOUS at 12:03

## 2022-01-31 RX ADMIN — PROPOFOL 30 MG: 10 INJECTION, EMULSION INTRAVENOUS at 12:09

## 2022-01-31 RX ADMIN — PROPOFOL 170 MG: 10 INJECTION, EMULSION INTRAVENOUS at 12:01

## 2022-01-31 RX ADMIN — LIDOCAINE HYDROCHLORIDE 80 MG: 20 INJECTION, SOLUTION EPIDURAL; INFILTRATION; INTRACAUDAL; PERINEURAL at 12:01

## 2022-01-31 NOTE — ANESTHESIA POSTPROCEDURE EVALUATION
Post-Op Assessment Note    CV Status:  Stable  Pain Score: 0    Pain management: adequate     Mental Status:  Alert and awake   Hydration Status:  Euvolemic   PONV Controlled:  Controlled   Airway Patency:  Patent      Post Op Vitals Reviewed: Yes      Staff: CRNA, Anesthesiologist         No complications documented      BP   118/71   Temp      Pulse  80   Resp   16   SpO2   95

## 2022-01-31 NOTE — ANESTHESIA PREPROCEDURE EVALUATION
Procedure:  COLONOSCOPY  EGD    Relevant Problems   GI/HEPATIC   (+) GERD (gastroesophageal reflux disease)      /RENAL   (+) JS (acute kidney injury) (HCC) (Resolved)   (+) Hydroureteronephrosis (Resolved)   (+) Kidney stones      NEURO/PSYCH   (+) Anxiety      Other   (+) Adjustment disorder with mixed anxiety and depressed mood   (+) Dyslipidemia        Physical Exam    Airway    Mallampati score: II  TM Distance: >3 FB  Neck ROM: full     Dental       Cardiovascular      Pulmonary      Other Findings        Anesthesia Plan  ASA Score- 2     Anesthesia Type- IV sedation with anesthesia with ASA Monitors  Additional Monitors:   Airway Plan:     Comment: Recent labs personally reviewed:  Lab Results       Component                Value               Date                       WBC                      10 31 (H)           12/15/2021                 HGB                      15 3                12/15/2021                 PLT                      258                 12/15/2021            Lab Results       Component                Value               Date                       NA                       141                 04/14/2015                 K                        4 1                 12/15/2021                 BUN                      18                  12/15/2021                 CREATININE               1 39 (H)            12/15/2021                 GLUCOSE                  88                  04/14/2015            Lab Results       Component                Value               Date                       PTT                      28                  08/04/2021             Lab Results       Component                Value               Date                       INR                      0 96                08/04/2021              Blood type AB    I, Kacie Renee MD, have personally seen and evaluated the patient prior to anesthetic care    I have reviewed the pre-anesthetic record, medical history, allergies, medications and any other medical records if appropriate to the anesthetic care  If a CRNA is involved in the case, I have reviewed the CRNA assessment, if present, and agree  Patient consented for IV Sedation, general anesthesia as back up  Discussed risks of aspiration, IV infiltration, indications for conversion to general anesthesia  All questions and concerns addressed          Plan Factors-Exercise tolerance (METS): >4 METS  Chart reviewed  Existing labs reviewed  Patient summary reviewed  Patient is not a current smoker  Patient did not smoke on day of surgery  Obstructive sleep apnea risk education given perioperatively  Induction- intravenous  Postoperative Plan-     Informed Consent- Anesthetic plan and risks discussed with patient  I personally reviewed this patient with the CRNA  Discussed and agreed on the Anesthesia Plan with the CRNA Gerhardt Sep

## 2022-01-31 NOTE — H&P
History and Physical - [facility]  Tawnya Ellis 61 y o  male MRN: [id number]                  HPI: Tawnya Ellis is a 61y o  year old male who presents for epigastric pain and CRC screening      REVIEW OF SYSTEMS: Per the HPI, and otherwise unremarkable  Historical Information   Past Medical History:   Diagnosis Date    Anxiety     GERD (gastroesophageal reflux disease)     Hyperlipidemia     Kidney stones      Past Surgical History:   Procedure Laterality Date    CARDIAC ELECTROPHYSIOLOGY STUDY AND ABLATION      CARDIAC ELECTROPHYSIOLOGY STUDY AND ABLATION      CARDIAC SURGERY  2014    ablation    COLONOSCOPY      ELBOW SURGERY Right     FL RETROGRADE PYELOGRAM  6/29/2019    FL RETROGRADE PYELOGRAM  8/29/2019    FL RETROGRADE PYELOGRAM  9/25/2019    HERNIA REPAIR Right     ing     IR NEPHROSTOMY TUBE PLACEMENT  8/30/2019    KIDNEY STONE SURGERY      KNEE ARTHROSCOPY Left     KNEE CARTILAGE SURGERY      IL CYSTO/URETERO W/LITHOTRIPSY &INDWELL STENT INSRT Right 8/29/2019    Procedure: CYSTOSCOPY URETEROSCOPY WITH LITHOTRIPSY HOLMIUM LASER, RETROGRADE PYELOGRAM;  Surgeon: Whit Clemente MD;  Location: [facility];  Service: Urology    IL CYSTOURETHROSCOPY,URETER CATHETER Right 6/29/2019    Procedure: CYSTOSCOPY RETROGRADE PYELOGRAM WITH INSERTION STENT URETERAL;  Surgeon: Naomi Goode MD;  Location: [facility];  Service: Urology    IL PERCUT [place] CM Right 9/25/2019    Procedure: NEPHROLITHOTOMY  PERCUTANEOUS ([facility])   MEATAL DILATION; ANTEGRADE URETEROSCOPY AND STENT REMOVAL;  Surgeon: Whit Clemente MD;  [facility];  Service: Urology    WRIST SURGERY Right      Social History   Social History     Substance and Sexual Activity   Alcohol Use Not Currently    Comment: occasionally     Social History     Substance and Sexual Activity   Drug Use No     Social History     Tobacco Use   Smoking Status Never Smoker   Smokeless Tobacco Former User   Tobacco Comment    former chewing tobacco-quit 2014     Family History   Problem Relation Age of Onset    Cancer Mother     Cancer Father        Meds/Allergies       Current Outpatient Medications:     allopurinol (ZYLOPRIM) 100 mg tablet    atorvastatin (LIPITOR) 20 mg tablet    dicyclomine (BENTYL) 20 mg tablet    DULoxetine (CYMBALTA) 60 mg delayed release capsule    omeprazole (PriLOSEC) 20 mg delayed release capsule    potassium citrate (UROCIT-K) 10 mEq    sucralfate (CARAFATE) 1 g/10 mL suspension    Current Facility-Administered Medications:     lactated ringers infusion, 50 mL/hr, Intravenous, Continuous, Continue from Pre-op at 01/31/22 1043    Allergies   Allergen Reactions    Ancef [Cefazolin] Nausea Only     Preoperative admin-nausea and dry heaving    Other Itching     Peanuts     Peanut-Containing Drug Products - Food Allergy Itching    Sulfa Antibiotics Rash    Toradol [Ketorolac Tromethamine] Rash     Tolerates ibuprofen       Objective     /81   Pulse 92   Temp 97 9 °F (36 6 °C) (Temporal)   Resp 16   Ht 5' 11" (1 803 m)   Wt 117 kg (257 lb)   SpO2 95%   BMI 35 84 kg/m²       PHYSICAL EXAM    Gen: NAD  Head: NCAT  CV: RRR  CHEST: Clear  ABD: soft, NT/ND  EXT: no edema      ASSESSMENT/PLAN:  This is a 61y o  year old male here for EGD and colonoscopy, and he is stable and optimized for his procedure

## 2022-02-02 ENCOUNTER — CLINICAL SUPPORT (OUTPATIENT)
Dept: FAMILY MEDICINE CLINIC | Facility: CLINIC | Age: 57
End: 2022-02-02
Payer: COMMERCIAL

## 2022-02-02 DIAGNOSIS — Z23 NEED FOR TDAP VACCINATION: Primary | ICD-10-CM

## 2022-02-02 PROCEDURE — 90471 IMMUNIZATION ADMIN: CPT | Performed by: FAMILY MEDICINE

## 2022-02-02 PROCEDURE — 90715 TDAP VACCINE 7 YRS/> IM: CPT | Performed by: FAMILY MEDICINE

## 2022-03-21 ENCOUNTER — RA CDI HCC (OUTPATIENT)
Dept: OTHER | Facility: HOSPITAL | Age: 57
End: 2022-03-21

## 2022-03-21 NOTE — PROGRESS NOTES
[facility] 75  coding opportunities       Chart Reviewed number of suggestions sent to Provider: 1     Patients Insurance     Commercial Insurance: 81 Accountable on 3/28/22    Depression with Anxiety - can Depression be separately coded - please review if it can it be further classified with any one of the codes from F32 0 thru F32 5 [facility]) or F33 0 thru F33 9 [facility])    Note F41 8: Other specified anxiety disorders

## 2022-03-28 ENCOUNTER — OFFICE VISIT (OUTPATIENT)
Dept: FAMILY MEDICINE CLINIC | Facility: CLINIC | Age: 57
End: 2022-03-28
Payer: COMMERCIAL

## 2022-03-28 VITALS
SYSTOLIC BLOOD PRESSURE: 132 MMHG | OXYGEN SATURATION: 98 % | TEMPERATURE: 96.2 F | HEIGHT: 71 IN | BODY MASS INDEX: 37.38 KG/M2 | DIASTOLIC BLOOD PRESSURE: 90 MMHG | WEIGHT: 267 LBS | HEART RATE: 93 BPM

## 2022-03-28 DIAGNOSIS — G89.29 CHRONIC PAIN OF BOTH SHOULDERS: ICD-10-CM

## 2022-03-28 DIAGNOSIS — E78.5 DYSLIPIDEMIA: ICD-10-CM

## 2022-03-28 DIAGNOSIS — K21.9 GASTROESOPHAGEAL REFLUX DISEASE WITHOUT ESOPHAGITIS: Primary | ICD-10-CM

## 2022-03-28 DIAGNOSIS — F43.23 ADJUSTMENT DISORDER WITH MIXED ANXIETY AND DEPRESSED MOOD: ICD-10-CM

## 2022-03-28 DIAGNOSIS — E55.9 VITAMIN D DEFICIENCY: ICD-10-CM

## 2022-03-28 DIAGNOSIS — E79.0 HYPERURICEMIA: ICD-10-CM

## 2022-03-28 DIAGNOSIS — M25.511 CHRONIC PAIN OF BOTH SHOULDERS: ICD-10-CM

## 2022-03-28 DIAGNOSIS — M25.512 CHRONIC PAIN OF BOTH SHOULDERS: ICD-10-CM

## 2022-03-28 DIAGNOSIS — G56.22 ENTRAPMENT OF LEFT ULNAR NERVE AT ELBOW: ICD-10-CM

## 2022-03-28 PROCEDURE — 99214 OFFICE O/P EST MOD 30 MIN: CPT | Performed by: FAMILY MEDICINE

## 2022-03-28 PROCEDURE — 1036F TOBACCO NON-USER: CPT | Performed by: FAMILY MEDICINE

## 2022-03-28 PROCEDURE — 3008F BODY MASS INDEX DOCD: CPT | Performed by: FAMILY MEDICINE

## 2022-03-28 NOTE — PROGRESS NOTES
Assessment/Plan:  Will continue same medications  He will try to be more faithful taking his omeprazole daily  Follow up with other specialists as scheduled  Blood work ordered  Follow up in 3-4 months or p r n     Problem List Items Addressed This Visit        Digestive    GERD (gastroesophageal reflux disease) - Primary    Relevant Orders    CBC and differential       Nervous and Auditory    Ulnar nerve entrapment at elbow       Other    Adjustment disorder with mixed anxiety and depressed mood    Vitamin D deficiency    Relevant Orders    Vitamin D 25 hydroxy    Hyperuricemia    Relevant Orders    Uric acid    Chronic pain of both shoulders    Dyslipidemia    Relevant Orders    Comprehensive metabolic panel    Lipid Panel with Direct LDL reflex      Other Visit Diagnoses     BMI 37 0-37 9, adult               Diagnoses and all orders for this visit:    Gastroesophageal reflux disease without esophagitis  -     CBC and differential    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    Chronic pain of both shoulders    Vitamin D deficiency  -     Vitamin D 25 hydroxy    Dyslipidemia  -     Comprehensive metabolic panel  -     Lipid Panel with Direct LDL reflex    Hyperuricemia  -     Uric acid    Adjustment disorder with mixed anxiety and depressed mood    BMI 37 0-37 9, adult        No problem-specific Assessment & Plan notes found for this encounter  Subjective:      Patient ID: Ayah Stevens is a 61 y o  male  Patient here today for follow-up  Patient denies any chest pain or shortness of breath  Patient still does get reflux symptoms  He admits that he does not always take his omeprazole daily  When he takes the Carafate, it does help his symptoms  Patient still has trouble with his shoulders, especially the left  Patient still taking the Cymbalta  Denies any SI or HI          The following portions of the patient's history were reviewed and updated as appropriate:   He has a past medical history of Anxiety, GERD (gastroesophageal reflux disease), Hyperlipidemia, and Kidney stones  ,  does not have any pertinent problems on file  ,   has a past surgical history that includes Knee cartilage surgery; Kidney stone surgery; Cardiac electrophysiology study and ablation; FL retrograde pyelogram (6/29/2019); pr cystourethroscopy,ureter catheter (Right, 6/29/2019); Cardiac electrophysiology study and ablation; pr cysto/uretero w/lithotripsy &indwell stent insrt (Right, 8/29/2019); IR nephrostomy tube placement (8/30/2019); FL retrograde pyelogram (8/29/2019); Knee arthroscopy (Left); Hernia repair (Right); Cardiac surgery (2014); Colonoscopy; pr percut remv kid stone,2+ cm (Right, 9/25/2019); FL retrograde pyelogram (9/25/2019); Wrist surgery (Right); and Elbow surgery (Right)  ,  family history includes Cancer in his father and mother  ,   reports that he has never smoked  He has quit using smokeless tobacco  He reports previous alcohol use  He reports that he does not use drugs  ,  is allergic to ancef [cefazolin], other, peanut-containing drug products - food allergy, sulfa antibiotics, and toradol [ketorolac tromethamine]     Current Outpatient Medications   Medication Sig Dispense Refill    allopurinol (ZYLOPRIM) 100 mg tablet Take 1 tablet (100 mg total) by mouth daily 30 tablet 5    atorvastatin (LIPITOR) 20 mg tablet Take 1 tablet (20 mg total) by mouth daily 30 tablet 5    dicyclomine (BENTYL) 20 mg tablet Take 1 tablet (20 mg total) by mouth 2 (two) times a day 20 tablet 0    DULoxetine (CYMBALTA) 60 mg delayed release capsule Take 1 capsule (60 mg total) by mouth daily 30 capsule 5    omeprazole (PriLOSEC) 20 mg delayed release capsule Take 1 capsule (20 mg total) by mouth daily 30 capsule 3    potassium citrate (UROCIT-K) 10 mEq Take 1 tablet (10 mEq total) by mouth 3 (three) times a day with meals 30 tablet 5    sucralfate (CARAFATE) 1 g/10 mL suspension Take 10 mL (1 g total) by mouth 4 (four) times a day 420 mL 1     No current facility-administered medications for this visit  Review of Systems   Constitutional: Negative  Respiratory: Negative  Cardiovascular: Negative  Gastrointestinal: Negative  Genitourinary: Negative  Musculoskeletal: Positive for arthralgias  Objective:  Vitals:    03/28/22 0730   BP: 132/90   Pulse: 93   Temp: (!) 96 2 °F (35 7 °C)   SpO2: 98%   Weight: 121 kg (267 lb)   Height: 5' 11" (1 803 m)     Body mass index is 37 24 kg/m²  Physical Exam  Vitals reviewed  Constitutional:       General: He is not in acute distress  Appearance: Normal appearance  He is well-developed  He is not ill-appearing, toxic-appearing or diaphoretic  HENT:      Head: Normocephalic and atraumatic  Eyes:      Conjunctiva/sclera: Conjunctivae normal    Cardiovascular:      Rate and Rhythm: Normal rate and regular rhythm  Heart sounds: Normal heart sounds  No murmur heard  No friction rub  No gallop  Pulmonary:      Effort: Pulmonary effort is normal  No respiratory distress  Breath sounds: Normal breath sounds  No wheezing, rhonchi or rales  Musculoskeletal:      Right lower leg: No edema  Left lower leg: No edema  Neurological:      General: No focal deficit present  Mental Status: He is alert and oriented to person, place, and time  Psychiatric:         Mood and Affect: Mood normal          Behavior: Behavior normal          Thought Content: Thought content normal          Judgment: Judgment normal          BMI Counseling: Body mass index is 37 24 kg/m²   The BMI is above normal  Nutrition recommendations include reducing portion sizes, decreasing overall calorie intake, 3-5 servings of fruits/vegetables daily, reducing fast food intake, consuming healthier snacks, decreasing soda and/or juice intake, moderation in carbohydrate intake, increasing intake of lean protein, reducing intake of saturated fat and trans fat and reducing intake of cholesterol

## 2022-03-28 NOTE — PATIENT INSTRUCTIONS

## 2022-04-19 ENCOUNTER — TELEPHONE (OUTPATIENT)
Dept: FAMILY MEDICINE CLINIC | Facility: CLINIC | Age: 57
End: 2022-04-19

## 2022-05-02 ENCOUNTER — HOSPITAL ENCOUNTER (INPATIENT)
Facility: HOSPITAL | Age: 57
LOS: 3 days | Discharge: HOME | DRG: 291 | End: 2022-05-05
Attending: EMERGENCY MEDICINE | Admitting: INTERNAL MEDICINE
Payer: COMMERCIAL

## 2022-05-02 ENCOUNTER — APPOINTMENT (INPATIENT)
Dept: NON INVASIVE DIAGNOSTICS | Facility: HOSPITAL | Age: 57
DRG: 291 | End: 2022-05-02
Payer: COMMERCIAL

## 2022-05-02 ENCOUNTER — TELEPHONE (OUTPATIENT)
Dept: FAMILY MEDICINE CLINIC | Facility: CLINIC | Age: 57
End: 2022-05-02

## 2022-05-02 ENCOUNTER — APPOINTMENT (EMERGENCY)
Dept: CT IMAGING | Facility: HOSPITAL | Age: 57
DRG: 291 | End: 2022-05-02
Payer: COMMERCIAL

## 2022-05-02 ENCOUNTER — APPOINTMENT (EMERGENCY)
Dept: RADIOLOGY | Facility: HOSPITAL | Age: 57
DRG: 291 | End: 2022-05-02
Payer: COMMERCIAL

## 2022-05-02 DIAGNOSIS — E83.39 HYPERPHOSPHATEMIA: ICD-10-CM

## 2022-05-02 DIAGNOSIS — E78.5 DYSLIPIDEMIA: ICD-10-CM

## 2022-05-02 DIAGNOSIS — E78.00 HYPERCHOLESTEROLEMIA: ICD-10-CM

## 2022-05-02 DIAGNOSIS — I25.10 CORONARY ARTERY CALCIFICATION SEEN ON CT SCAN: ICD-10-CM

## 2022-05-02 DIAGNOSIS — R06.00 DYSPNEA: ICD-10-CM

## 2022-05-02 DIAGNOSIS — R79.89 ELEVATED PLATELET COUNT: ICD-10-CM

## 2022-05-02 DIAGNOSIS — I50.9 ACUTE CHF (CONGESTIVE HEART FAILURE) (HCC): Primary | ICD-10-CM

## 2022-05-02 DIAGNOSIS — E55.9 VITAMIN D INSUFFICIENCY: ICD-10-CM

## 2022-05-02 DIAGNOSIS — I50.21 ACUTE SYSTOLIC CONGESTIVE HEART FAILURE (HCC): ICD-10-CM

## 2022-05-02 LAB
2HR DELTA HS TROPONIN: 0 NG/L
4HR DELTA HS TROPONIN: 2 NG/L
ALBUMIN SERPL BCP-MCNC: 3.3 G/DL (ref 3.5–5)
ALP SERPL-CCNC: 92 U/L (ref 46–116)
ALT SERPL W P-5'-P-CCNC: 29 U/L (ref 12–78)
ANION GAP SERPL CALCULATED.3IONS-SCNC: 10 MMOL/L (ref 4–13)
AST SERPL W P-5'-P-CCNC: 13 U/L (ref 5–45)
BASOPHILS # BLD AUTO: 0.03 THOUSANDS/ΜL (ref 0–0.1)
BASOPHILS NFR BLD AUTO: 0 % (ref 0–1)
BILIRUB SERPL-MCNC: 0.61 MG/DL (ref 0.2–1)
BUN SERPL-MCNC: 14 MG/DL (ref 5–25)
CALCIUM ALBUM COR SERPL-MCNC: 9.3 MG/DL (ref 8.3–10.1)
CALCIUM SERPL-MCNC: 8.7 MG/DL (ref 8.3–10.1)
CARDIAC TROPONIN I PNL SERPL HS: 12 NG/L
CARDIAC TROPONIN I PNL SERPL HS: 12 NG/L
CARDIAC TROPONIN I PNL SERPL HS: 14 NG/L
CHLORIDE SERPL-SCNC: 107 MMOL/L (ref 100–108)
CO2 SERPL-SCNC: 23 MMOL/L (ref 21–32)
CREAT SERPL-MCNC: 1.02 MG/DL (ref 0.6–1.3)
D DIMER PPP FEU-MCNC: 1.79 UG/ML FEU
EOSINOPHIL # BLD AUTO: 0.09 THOUSAND/ΜL (ref 0–0.61)
EOSINOPHIL NFR BLD AUTO: 1 % (ref 0–6)
ERYTHROCYTE [DISTWIDTH] IN BLOOD BY AUTOMATED COUNT: 13.9 % (ref 11.6–15.1)
FLUAV RNA RESP QL NAA+PROBE: NEGATIVE
FLUBV RNA RESP QL NAA+PROBE: NEGATIVE
GFR SERPL CREATININE-BSD FRML MDRD: 80 ML/MIN/1.73SQ M
GLUCOSE SERPL-MCNC: 102 MG/DL (ref 65–140)
HCT VFR BLD AUTO: 43 % (ref 36.5–49.3)
HGB BLD-MCNC: 14.5 G/DL (ref 12–17)
IMM GRANULOCYTES # BLD AUTO: 0.05 THOUSAND/UL (ref 0–0.2)
IMM GRANULOCYTES NFR BLD AUTO: 1 % (ref 0–2)
LYMPHOCYTES # BLD AUTO: 1.89 THOUSANDS/ΜL (ref 0.6–4.47)
LYMPHOCYTES NFR BLD AUTO: 20 % (ref 14–44)
MCH RBC QN AUTO: 30.5 PG (ref 26.8–34.3)
MCHC RBC AUTO-ENTMCNC: 33.7 G/DL (ref 31.4–37.4)
MCV RBC AUTO: 90 FL (ref 82–98)
MONOCYTES # BLD AUTO: 0.53 THOUSAND/ΜL (ref 0.17–1.22)
MONOCYTES NFR BLD AUTO: 6 % (ref 4–12)
NEUTROPHILS # BLD AUTO: 6.66 THOUSANDS/ΜL (ref 1.85–7.62)
NEUTS SEG NFR BLD AUTO: 72 % (ref 43–75)
NRBC BLD AUTO-RTO: 0 /100 WBCS
NT-PROBNP SERPL-MCNC: 4055 PG/ML
PLATELET # BLD AUTO: 400 THOUSANDS/UL (ref 149–390)
PMV BLD AUTO: 10.3 FL (ref 8.9–12.7)
POTASSIUM SERPL-SCNC: 3.7 MMOL/L (ref 3.5–5.3)
PROT SERPL-MCNC: 7.5 G/DL (ref 6.4–8.2)
RBC # BLD AUTO: 4.76 MILLION/UL (ref 3.88–5.62)
RSV RNA RESP QL NAA+PROBE: NEGATIVE
SARS-COV-2 RNA RESP QL NAA+PROBE: NEGATIVE
SODIUM SERPL-SCNC: 140 MMOL/L (ref 136–145)
WBC # BLD AUTO: 9.25 THOUSAND/UL (ref 4.31–10.16)

## 2022-05-02 PROCEDURE — 71045 X-RAY EXAM CHEST 1 VIEW: CPT

## 2022-05-02 PROCEDURE — 93970 EXTREMITY STUDY: CPT

## 2022-05-02 PROCEDURE — 71275 CT ANGIOGRAPHY CHEST: CPT

## 2022-05-02 PROCEDURE — 99285 EMERGENCY DEPT VISIT HI MDM: CPT | Performed by: PHYSICIAN ASSISTANT

## 2022-05-02 PROCEDURE — 85379 FIBRIN DEGRADATION QUANT: CPT | Performed by: PHYSICIAN ASSISTANT

## 2022-05-02 PROCEDURE — 99285 EMERGENCY DEPT VISIT HI MDM: CPT

## 2022-05-02 PROCEDURE — 0241U HB NFCT DS VIR RESP RNA 4 TRGT: CPT | Performed by: PHYSICIAN ASSISTANT

## 2022-05-02 PROCEDURE — 99223 1ST HOSP IP/OBS HIGH 75: CPT | Performed by: INTERNAL MEDICINE

## 2022-05-02 PROCEDURE — 84484 ASSAY OF TROPONIN QUANT: CPT | Performed by: EMERGENCY MEDICINE

## 2022-05-02 PROCEDURE — 93005 ELECTROCARDIOGRAM TRACING: CPT

## 2022-05-02 PROCEDURE — 85025 COMPLETE CBC W/AUTO DIFF WBC: CPT | Performed by: EMERGENCY MEDICINE

## 2022-05-02 PROCEDURE — 84484 ASSAY OF TROPONIN QUANT: CPT | Performed by: INTERNAL MEDICINE

## 2022-05-02 PROCEDURE — 36415 COLL VENOUS BLD VENIPUNCTURE: CPT

## 2022-05-02 PROCEDURE — 93970 EXTREMITY STUDY: CPT | Performed by: SURGERY

## 2022-05-02 PROCEDURE — 83880 ASSAY OF NATRIURETIC PEPTIDE: CPT | Performed by: PHYSICIAN ASSISTANT

## 2022-05-02 PROCEDURE — G1004 CDSM NDSC: HCPCS

## 2022-05-02 PROCEDURE — 80053 COMPREHEN METABOLIC PANEL: CPT | Performed by: EMERGENCY MEDICINE

## 2022-05-02 RX ORDER — ACETAMINOPHEN 325 MG/1
325 TABLET ORAL ONCE
Status: COMPLETED | OUTPATIENT
Start: 2022-05-02 | End: 2022-05-02

## 2022-05-02 RX ORDER — ALLOPURINOL 100 MG/1
100 TABLET ORAL DAILY
Status: DISCONTINUED | OUTPATIENT
Start: 2022-05-03 | End: 2022-05-05 | Stop reason: HOSPADM

## 2022-05-02 RX ORDER — ENOXAPARIN SODIUM 100 MG/ML
40 INJECTION SUBCUTANEOUS EVERY 24 HOURS
Status: DISCONTINUED | OUTPATIENT
Start: 2022-05-02 | End: 2022-05-05 | Stop reason: HOSPADM

## 2022-05-02 RX ORDER — FUROSEMIDE 10 MG/ML
40 INJECTION INTRAMUSCULAR; INTRAVENOUS ONCE
Status: COMPLETED | OUTPATIENT
Start: 2022-05-02 | End: 2022-05-02

## 2022-05-02 RX ORDER — ATORVASTATIN CALCIUM 10 MG/1
20 TABLET, FILM COATED ORAL EVERY EVENING
Status: DISCONTINUED | OUTPATIENT
Start: 2022-05-02 | End: 2022-05-05 | Stop reason: HOSPADM

## 2022-05-02 RX ORDER — FUROSEMIDE 10 MG/ML
40 INJECTION INTRAMUSCULAR; INTRAVENOUS
Status: DISCONTINUED | OUTPATIENT
Start: 2022-05-03 | End: 2022-05-05 | Stop reason: HOSPADM

## 2022-05-02 RX ORDER — ACETAMINOPHEN 325 MG/1
650 TABLET ORAL EVERY 6 HOURS PRN
Status: DISCONTINUED | OUTPATIENT
Start: 2022-05-02 | End: 2022-05-03

## 2022-05-02 RX ORDER — DULOXETIN HYDROCHLORIDE 30 MG/1
60 CAPSULE, DELAYED RELEASE ORAL DAILY
Status: DISCONTINUED | OUTPATIENT
Start: 2022-05-03 | End: 2022-05-05 | Stop reason: HOSPADM

## 2022-05-02 RX ADMIN — ATORVASTATIN CALCIUM 20 MG: 40 TABLET, FILM COATED ORAL at 17:27

## 2022-05-02 RX ADMIN — ACETAMINOPHEN 650 MG: 325 TABLET ORAL at 19:30

## 2022-05-02 RX ADMIN — IOHEXOL 85 ML: 350 INJECTION, SOLUTION INTRAVENOUS at 14:55

## 2022-05-02 RX ADMIN — ENOXAPARIN SODIUM 40 MG: 40 INJECTION SUBCUTANEOUS at 17:27

## 2022-05-02 RX ADMIN — FUROSEMIDE 40 MG: 10 INJECTION, SOLUTION INTRAMUSCULAR; INTRAVENOUS at 16:28

## 2022-05-02 RX ADMIN — ACETAMINOPHEN 325 MG: 325 TABLET ORAL at 16:28

## 2022-05-02 NOTE — ED PROVIDER NOTES
History  Chief Complaint   Patient presents with    Shortness of Breath     pt had gallbladder removed 2 weeks ago  now started with sob headache and weakness for last 4 days     61year old male with PMH kidney stones, HLD, GERD presents for evaluation of shortness of breath  Pt reports he had his gallbladder removed at [address] about 2 weeks ago  He notes after surgery he has been doing great  He notes about 4 days ago he began having fatigue  He reports having headache as well  He feels his breathing has been heavier and faster than normal, especially when he lays down at night to sleep  Denies fever, chills  Denies runny nose or congestion  He reports he has a bit of a dry cough and has some throat irritation related to the intubation  Denies chest pain  Denies abdominal pain  Denies N/V/D  He reports his stomach seems fine  He is moving his bowels  Denies sick contacts  He questions if he could have covid infection  No reported aggravating or alleviating factors  No specific treatments tried  Denies prior cardiac history  No h/o DVT or PE  He is a non smoker  Denies h/o asthma or COPD  History provided by:  Patient   used: No    Shortness of Breath  Duration:  4 days  Timing:  Intermittent  Chronicity:  New  Context: not URI    Relieved by:  Nothing  Worsened by:  Nothing  Ineffective treatments:  None tried  Associated symptoms: cough and headaches    Associated symptoms: no abdominal pain, no chest pain, no ear pain, no fever, no hemoptysis, no neck pain, no rash, no sore throat, no sputum production, no syncope, no vomiting and no wheezing    Risk factors: recent surgery    Risk factors: no hx of cancer, no hx of PE/DVT and no tobacco use        Prior to Admission Medications   Prescriptions Last Dose Informant Patient Reported? Taking?    DULoxetine (CYMBALTA) 60 mg delayed release capsule   No Yes   Sig: Take 1 capsule (60 mg total) by mouth daily   allopurinol (ZYLOPRIM) 100 mg tablet   No Yes   Sig: Take 1 tablet (100 mg total) by mouth daily   atorvastatin (LIPITOR) 20 mg tablet   No Yes   Sig: Take 1 tablet (20 mg total) by mouth daily   dicyclomine (BENTYL) 20 mg tablet Not Taking at Unknown time  No No   Sig: Take 1 tablet (20 mg total) by mouth 2 (two) times a day   Patient not taking: Reported on 5/2/2022    omeprazole (PriLOSEC) 20 mg delayed release capsule   No Yes   Sig: Take 1 capsule (20 mg total) by mouth daily   potassium citrate (UROCIT-K) 10 mEq   No Yes   Sig: Take 1 tablet (10 mEq total) by mouth 3 (three) times a day with meals   sucralfate (CARAFATE) 1 g/10 mL suspension Not Taking at Unknown time  No No   Sig: Take 10 mL (1 g total) by mouth 4 (four) times a day   Patient not taking: Reported on 5/2/2022       Facility-Administered Medications: None       Past Medical History:   Diagnosis Date    Anxiety     GERD (gastroesophageal reflux disease)     Hyperlipidemia     Kidney stones        Past Surgical History:   Procedure Laterality Date    CARDIAC ELECTROPHYSIOLOGY STUDY AND ABLATION      CARDIAC ELECTROPHYSIOLOGY STUDY AND ABLATION      CARDIAC SURGERY  2014    ablation    CHOLECYSTECTOMY      COLONOSCOPY      ELBOW SURGERY Right     FL RETROGRADE PYELOGRAM  6/29/2019    FL RETROGRADE PYELOGRAM  8/29/2019    FL RETROGRADE PYELOGRAM  9/25/2019    HERNIA REPAIR Right     ing     IR NEPHROSTOMY TUBE PLACEMENT  8/30/2019    KIDNEY STONE SURGERY      KNEE ARTHROSCOPY Left     KNEE CARTILAGE SURGERY      HI CYSTO/URETERO W/LITHOTRIPSY &INDWELL STENT INSRT Right 8/29/2019    Procedure: CYSTOSCOPY URETEROSCOPY WITH LITHOTRIPSY HOLMIUM LASER, RETROGRADE PYELOGRAM;  Surgeon: Zayda Luna MD;  Location: [facility];  Service: Urology    HI CYSTOURETHROSCOPY,URETER CATHETER Right 6/29/2019    Procedure: CYSTOSCOPY RETROGRADE PYELOGRAM WITH INSERTION STENT URETERAL;  Surgeon: Marek Ferreira MD;  Location: [facility];  Service: Urology    [place] PERCUT [address] KID STONE,2+ CM Right 9/25/2019    Procedure: NEPHROLITHOTOMY  PERCUTANEOUS ([facility])  MEATAL DILATION; ANTEGRADE URETEROSCOPY AND STENT REMOVAL;  Surgeon: Peri Velazquez MD;  [facility];  Service: Urology    WRIST SURGERY Right        Family History   Problem Relation Age of Onset    Cancer Mother     Cancer Father      I have reviewed and agree with the history as documented  E-Cigarette/Vaping    E-Cigarette Use Never User      E-Cigarette/Vaping Substances     Social History     Tobacco Use    Smoking status: Never Smoker    Smokeless tobacco: Former User    Tobacco comment: former chewing tobacco-quit 2014   Vaping Use    Vaping Use: Never used   Substance Use Topics    Alcohol use: Not Currently     Comment: occasionally    Drug use: No       Review of Systems   Constitutional: Positive for fatigue  Negative for chills and fever  HENT: Negative  Negative for congestion, ear pain, rhinorrhea and sore throat  Eyes: Negative  Negative for visual disturbance  Respiratory: Positive for cough and shortness of breath  Negative for hemoptysis, sputum production and wheezing  Cardiovascular: Negative  Negative for chest pain, palpitations, leg swelling and syncope  Gastrointestinal: Negative  Negative for abdominal pain, diarrhea, nausea and vomiting  Genitourinary: Negative  Negative for dysuria, flank pain, frequency and hematuria  Musculoskeletal: Negative  Negative for back pain, myalgias and neck pain  Skin: Negative  Negative for rash  Neurological: Positive for headaches  Negative for dizziness, light-headedness and numbness  Psychiatric/Behavioral: Negative  Negative for confusion  All other systems reviewed and are negative  Physical Exam  Physical Exam  Vitals and nursing note reviewed  Constitutional:       General: He is not in acute distress  Appearance: Normal appearance   He is well-developed  He is obese  He is not toxic-appearing or diaphoretic  HENT:      Head: Normocephalic and atraumatic  Right Ear: Hearing and external ear normal       Left Ear: Hearing and external ear normal       Nose: Nose normal       Mouth/Throat:      Mouth: Mucous membranes are moist       Pharynx: Oropharynx is clear  Uvula midline  Eyes:      General: Lids are normal  No scleral icterus  Conjunctiva/sclera: Conjunctivae normal       Pupils: Pupils are equal, round, and reactive to light  Neck:      Trachea: Trachea and phonation normal  No tracheal deviation  Cardiovascular:      Rate and Rhythm: Normal rate and regular rhythm  Pulses: Normal pulses  Radial pulses are 2+ on the right side and 2+ on the left side  Dorsalis pedis pulses are 2+ on the right side and 2+ on the left side  Posterior tibial pulses are 2+ on the right side and 2+ on the left side  Heart sounds: Normal heart sounds, S1 normal and S2 normal  No murmur heard  Pulmonary:      Effort: Pulmonary effort is normal  No tachypnea, accessory muscle usage or respiratory distress  Breath sounds: Rales (b/l bases) present  No wheezing or rhonchi  Abdominal:      General: Bowel sounds are normal  There is no distension  Palpations: Abdomen is soft  Tenderness: There is no abdominal tenderness  There is no guarding or rebound  Comments: Laparoscopic incisions appear clean, dry, intact without evidence of infection  Musculoskeletal:         General: No tenderness  Cervical back: Normal range of motion and neck supple  Right lower leg: No tenderness  No edema  Left lower leg: No tenderness  No edema  Skin:     General: Skin is warm and dry  Capillary Refill: Capillary refill takes less than 2 seconds  Findings: No rash  Neurological:      General: No focal deficit present        Mental Status: He is alert and oriented to person, place, and time       GCS: GCS eye subscore is 4  GCS verbal subscore is 5  GCS motor subscore is 6  Cranial Nerves: No cranial nerve deficit  Sensory: No sensory deficit  Motor: No abnormal muscle tone  Psychiatric:         Mood and Affect: Mood normal          Speech: Speech normal          Behavior: Behavior normal          Vital Signs  ED Triage Vitals [05/02/22 1330]   Temperature Pulse Respirations Blood Pressure SpO2   (!) 96 8 °F (36 °C) 105 20 (!) 159/102 98 %      Temp Source Heart Rate Source Patient Position - Orthostatic VS BP Location FiO2 (%)   Temporal Monitor Sitting Right arm --      Pain Score       No Pain           Vitals:    05/02/22 1400 05/02/22 1430 05/02/22 1530 05/02/22 1600   BP: 147/81 159/78 145/84 152/87   Pulse: 94 97 97 95   Patient Position - Orthostatic VS: Sitting Sitting Sitting Sitting         Visual Acuity      ED Medications  Medications   iohexol (OMNIPAQUE) 350 MG/ML injection (SINGLE-DOSE) 85 mL (85 mL Intravenous Given 5/2/22 1455)   furosemide (LASIX) injection 40 mg (40 mg Intravenous Given 5/2/22 1628)   acetaminophen (TYLENOL) tablet 325 mg (325 mg Oral Given 5/2/22 1628)       Diagnostic Studies  Results Reviewed     Procedure Component Value Units Date/Time    HS Troponin I 2hr [[id number]]  (Normal) Collected: 05/02/22 1547    Lab Status: Final result Specimen: Blood from Arm, Left Updated: 05/02/22 1616     hs TnI 2hr 12 ng/L      Delta 2hr hsTnI 0 ng/L     HS Troponin I 4hr [[id number]]     Lab Status: No result Specimen: Blood     D-Dimer [[id number]]  (Abnormal) Collected: 05/02/22 1336    Lab Status: Final result Specimen: Blood from Arm, Left Updated: 05/02/22 1443     D-Dimer, Quant 1 79 ug/ml FEU     Narrative:       In the evaluation for possible pulmonary embolism, in the appropriate (Well's Score of 4 or less) patient, the age adjusted d-dimer cutoff for this patient can be calculated as:    Age x 0 01 (in ug/mL) for Age-adjusted D-dimer exclusion threshold for a patient over 50 years  NT-BNP PRO [[id number]]  (Abnormal) Collected: 05/02/22 1336    Lab Status: Final result Specimen: Blood from Arm, Left Updated: 05/02/22 1443     NT-proBNP 4,055 pg/mL     COVID/FLU/RSV - 2 hour TAT [[id number]]  (Normal) Collected: 05/02/22 1358    Lab Status: Final result Specimen: Nasopharyngeal Swab Updated: 05/02/22 1440     SARS-CoV-2 Negative     INFLUENZA A PCR Negative     INFLUENZA B PCR Negative     RSV PCR Negative    Narrative:      FOR PEDIATRIC PATIENTS - copy/paste COVID Guidelines URL to browser: [email or web address]  [email or web address]    SARS-CoV-2 assay is a Nucleic Acid Amplification assay intended for the  qualitative detection of nucleic acid from SARS-CoV-2 in nasopharyngeal  swabs  Results are for the presumptive identification of SARS-CoV-2 RNA  Positive results are indicative of infection with SARS-CoV-2, the virus  causing COVID-19, but do not rule out bacterial infection or co-infection  with other viruses  Laboratories within the [place] and its  territories are required to report all positive results to the appropriate  public health authorities  Negative results do not preclude SARS-CoV-2  infection and should not be used as the sole basis for treatment or other  patient management decisions  Negative results must be combined with  clinical observations, patient history, and epidemiological information  This test has not been FDA cleared or approved  This test has been authorized by FDA under an Emergency Use Authorization  (EUA)  This test is only authorized for the duration of time the  declaration that circumstances exist justifying the authorization of the  emergency use of an in vitro diagnostic tests for detection of SARS-CoV-2  virus and/or diagnosis of COVID-19 infection under section 564(b)(1) of  the Act, 21 U  S C  124FTX-0(B)(3), unless the authorization is terminated  or revoked sooner  The test has been validated but independent review by FDA  and CLIA is pending  Test performed using [employer]: This RT-PCR assay targets N2,  a region unique to SARS-CoV-2  A conserved region in the E-gene was chosen  for pan-Sarbecovirus detection which includes SARS-CoV-2      HS Troponin 0hr (reflex protocol) [[id number]]  (Normal) Collected: 05/02/22 1336    Lab Status: Final result Specimen: Blood from Arm, Left Updated: 05/02/22 1412     hs TnI 0hr 12 ng/L     Comprehensive metabolic panel [[id number]]  (Abnormal) Collected: 05/02/22 1336    Lab Status: Final result Specimen: Blood from Arm, Left Updated: 05/02/22 1410     Sodium 140 mmol/L      Potassium 3 7 mmol/L      Chloride 107 mmol/L      CO2 23 mmol/L      ANION GAP 10 mmol/L      BUN 14 mg/dL      Creatinine 1 02 mg/dL      Glucose 102 mg/dL      Calcium 8 7 mg/dL      Corrected Calcium 9 3 mg/dL      AST 13 U/L      ALT 29 U/L      Alkaline Phosphatase 92 U/L      Total Protein 7 5 g/dL      Albumin 3 3 g/dL      Total Bilirubin 0 61 mg/dL      eGFR 80 ml/min/1 73sq m     Narrative:      [place] guidelines for Chronic Kidney Disease (CKD):     Stage 1 with normal or high GFR (GFR > 90 mL/min/1 73 square meters)    Stage 2 Mild CKD (GFR = 60-89 mL/min/1 73 square meters)    Stage 3A Moderate CKD (GFR = 45-59 mL/min/1 73 square meters)    Stage 3B Moderate CKD (GFR = 30-44 mL/min/1 73 square meters)    Stage 4 Severe CKD (GFR = 15-29 mL/min/1 73 square meters)    Stage 5 End Stage CKD (GFR <15 mL/min/1 73 square meters)  Note: GFR calculation is accurate only with a steady state creatinine    CBC and differential [[id number]]  (Abnormal) Collected: 05/02/22 1336    Lab Status: Final result Specimen: Blood from Arm, Left Updated: 05/02/22 1342     WBC 9 25 Thousand/uL      RBC 4 76 Million/uL      Hemoglobin 14 5 g/dL      Hematocrit 43 0 %      MCV 90 fL      MCH 30 5 pg      MCHC 33 7 g/dL      RDW 13 9 % MPV 10 3 fL      Platelets 586 Thousands/uL      nRBC 0 /100 WBCs      Neutrophils Relative 72 %      Immat GRANS % 1 %      Lymphocytes Relative 20 %      Monocytes Relative 6 %      Eosinophils Relative 1 %      Basophils Relative 0 %      Neutrophils Absolute 6 66 Thousands/µL      Immature Grans Absolute 0 05 Thousand/uL      Lymphocytes Absolute 1 89 Thousands/µL      Monocytes Absolute 0 53 Thousand/µL      Eosinophils Absolute 0 09 Thousand/µL      Basophils Absolute 0 03 Thousands/µL                  CTA ED chest PE Study   Final Result by Luis Benoit MD (05/02 9413)      Mild pulmonary edema and trace pleural effusions likely on the basis of CHF  No pulmonary arterial embolism or pulmonary infiltrate/consolidation  Workstation performed: [id number]         XR chest 1 view portable   Final Result by Veronica Roman MD (05/02 7230)      1  Question subtle bandlike opacity at the right lung base which could represent atelectasis (favored) or pneumonia  However, recommend upright PA and lateral chest radiographs to confirm the finding or document resolution  2   Interval apparent enlargement of the heart, though potentially accentuated by portable technique  The above-recommended upright radiographs can also assess whether the finding is real          Note: I personally discussed this study with Jaspal Boles on 5/2/2022 at 2:48 PM   She reports clinical findings warranting chest CTA, which would obviate the need for the previously recommended chest radiographs                              Workstation performed: [id number]         VAS lower limb venous duplex study, complete bilateral    (Results Pending)              Procedures  ECG 12 Lead Documentation Only    Date/Time: 5/2/2022 1:37 PM  Performed by: Matt Vaughn PA-C  Authorized by: Matt Vaughn PA-C     Indications / Diagnosis:  Dyspnea  ECG reviewed by me, the ED Provider: yes    Patient location:  ED  Previous ECG:     Previous ECG:  Unavailable    Comparison to cardiac monitor: Yes    Interpretation:     Interpretation: abnormal    Rate:     ECG rate:  101    ECG rate assessment: tachycardic    Rhythm:     Rhythm: sinus tachycardia    Ectopy:     Ectopy: none    QRS:     QRS axis:  Normal    QRS intervals:  Normal  Conduction:     Conduction: normal    ST segments:     ST segments:  Normal  T waves:     T waves: non-specific    Comments:      , QRS 70, QT/QTc 308/399; no acute ischemic changes, low voltage QRS  ED Course  ED Course as of 05/02/22 1650   Mon May 02, 2022   1347 WBC: 9 25   1347 Hemoglobin: 14 5   1347 Platelet Count(!): 419   1355 XR chest 1 view portable  ? Infiltrate right lower lobe; pending official read  1411 Glucose, Random: 102   1411 Creatinine: 1 02   1411 BUN: 14   1411 Sodium: 140   1411 Potassium: 3 7   1411 Chloride: 107   1411 CO2: 23   1411 Anion Gap: 10   1411 CORRECTED CALCIUM: 9 3   1411 AST: 13   1411 ALT: 29   1411 Alkaline Phosphatase: 92   1411 Total Protein: 7 5   1411 Albumin(!): 3 3   1411 TOTAL BILIRUBIN: 0 61   1411 eGFR: 80   1418 hs TnI 0hr: 12  Will need 2 hr troponin   1443 SARS-COV-2: Negative   1444 INFLU A PCR: Negative   1444 INFLU B PCR: Negative   1444 RSV PCR: Negative   1444 NT-proBNP(!): 4,055   1444 D-Dimer, Quant(!): 1 79  Will pursue CTA chest to r/o PE   1513 CTA performed and pending interpretation  0 CTA ED chest PE Study  IMPRESSION:     Mild pulmonary edema and trace pleural effusions likely on the basis of CHF      No pulmonary arterial embolism or pulmonary infiltrate/consolidation  1540 Pt again reassessed  Requested single tylenol for his joint pain  Recommended admission for diuresis and further cardiac evaluation and he is agreeable as this appears to be new onset CHF  O2 normal on room air  HR in the 90s, BP mildly elevated 519A systolically  1545 TT to on call SLIM to discuss admission     [address] Jw Cueva accepts for inpatient admission  [address] hsTnI: 0     Pt admitted in stable condition  Pt appears to have new onset heart failure  IV lasix initiated  Pt hemodynamically stable  Will admit for further evaluation and monitoring  HEART Risk Score      Most Recent Value   Heart Score Risk Calculator    History 0 Filed at: 05/02/2022 1451   ECG 1 Filed at: 05/02/2022 1451   Age 1 Filed at: 05/02/2022 1451   Risk Factors 1 Filed at: 05/02/2022 1451   Troponin 0 Filed at: 05/02/2022 1451   HEART Score 3 Filed at: 05/02/2022 1451                        SBIRT 22yo+      Most Recent Value   SBIRT (23 yo +)    In order to provide better care to our patients, we are screening all of our patients for alcohol and drug use  Would it be okay to ask you these screening questions? Yes Filed at: 05/02/2022 1333   Initial Alcohol Screen: US AUDIT-C     1  How often do you have a drink containing alcohol? 0 Filed at: 05/02/2022 1333   2  How many drinks containing alcohol do you have on a typical day you are drinking? 0 Filed at: 05/02/2022 1333   3a  Male UNDER 65: How often do you have five or more drinks on one occasion? 0 Filed at: 05/02/2022 1333   3b  FEMALE Any Age, or MALE 65+: How often do you have 4 or more drinks on one occassion? 0 Filed at: 05/02/2022 1333   Audit-C Score 0 Filed at: 05/02/2022 1333   ROLANDO: How many times in the past year have you    Used an illegal drug or used a prescription medication for non-medical reasons?  Never Filed at: 05/02/2022 1333          Wells' Criteria for PE      Most Recent Value   Wells' Criteria for PE    Clinical signs and symptoms of DVT 0 Filed at: 05/02/2022 1445   PE is primary diagnosis or equally likely 3 Filed at: 05/02/2022 1445   HR >100 1 5 Filed at: 05/02/2022 1445   Immobilization at least 3 days or Surgery in the previous 4 weeks 1 5 Filed at: 05/02/2022 1445   Previous, objectively diagnosed PE or DVT 0 Filed at: 05/02/2022 1449 Hemoptysis 0 Filed at: 05/02/2022 1445   Malignancy with treatment within 6 months or palliative 0 Filed at: 05/02/2022 1445   Wells' Criteria Total 6 Filed at: 05/02/2022 1445                [facility]  Number of Diagnoses or Management Options  Acute CHF (congestive heart failure) ([place] [address] ): new and requires workup  Dyspnea: new and requires workup     Amount and/or Complexity of Data Reviewed  Clinical lab tests: ordered and reviewed  Tests in the radiology section of CPT®: ordered and reviewed  Decide to obtain previous medical records or to obtain history from someone other than the patient: yes  Obtain history from someone other than the patient: yes  Review and summarize past medical records: yes  Discuss the patient with other providers: yes (Attending, SLIM)  Independent visualization of images, tracings, or specimens: yes    Patient Progress  Patient progress: stable      Disposition  Final diagnoses:   Dyspnea   Acute CHF (congestive heart failure) ([place] [address] )     Time reflects when diagnosis was documented in both MDM as applicable and the Disposition within this note     Time User Action Codes Description Comment    5/2/2022  3:47 PM Michelle Yuly Add [R06 00] Dyspnea     5/2/2022  3:48 PM Michelle Yuly Add [I50 9] Acute CHF (congestive heart failure) ([place] [address] )     5/2/2022  3:54 PM Farshad Pascal Modify [R06 00] Dyspnea     5/2/2022  3:54 PM Arie Olivo Modify [I50 9] Acute CHF (congestive heart failure) [facility])       ED Disposition     ED Disposition Condition Date/Time Comment    Admit Stable Mon May 2, 2022  3:52 PM Case was discussed with Dr Khadijah Nunes and the patient's admission status was agreed to be Admission Status: inpatient status to the service of Dr Khadijah Nunes          Follow-up Information    None         Current Discharge Medication List      CONTINUE these medications which have NOT CHANGED    Details   allopurinol (ZYLOPRIM) 100 mg tablet Take 1 tablet (100 mg total) by mouth daily  Qty: 30 tablet, Refills: 5    Associated Diagnoses: Hyperuricemia      atorvastatin (LIPITOR) 20 mg tablet Take 1 tablet (20 mg total) by mouth daily  Qty: 30 tablet, Refills: 5    Associated Diagnoses: Dyslipidemia      DULoxetine (CYMBALTA) 60 mg delayed release capsule Take 1 capsule (60 mg total) by mouth daily  Qty: 30 capsule, Refills: 5    Associated Diagnoses: Anxiety with depression      omeprazole (PriLOSEC) 20 mg delayed release capsule Take 1 capsule (20 mg total) by mouth daily  Qty: 30 capsule, Refills: 3    Associated Diagnoses: Epigastric pain; Abdominal pain      potassium citrate (UROCIT-K) 10 mEq Take 1 tablet (10 mEq total) by mouth 3 (three) times a day with meals  Qty: 30 tablet, Refills: 5    Associated Diagnoses: Ureteral calculus      dicyclomine (BENTYL) 20 mg tablet Take 1 tablet (20 mg total) by mouth 2 (two) times a day  Qty: 20 tablet, Refills: 0    Associated Diagnoses: Abdominal pain      sucralfate (CARAFATE) 1 g/10 mL suspension Take 10 mL (1 g total) by mouth 4 (four) times a day  Qty: 420 mL, Refills: 1    Associated Diagnoses: Epigastric pain; Abdominal pain                 PDMP Review       Value Time User    PDMP Reviewed  Yes 11/30/2021  8:09 AM Wilmer Giron DO          ED Provider  Electronically Signed by           Flaco Luna PA-C  05/02/22 0007

## 2022-05-02 NOTE — H&P
[address] [facility] [address] [place]  H&P- Alonzo Chavira 1965, 61 y o  male MRN: [id number]  Unit/Bed#: RM07 Encounter: [id number]  Primary Care Provider: Stephanie Zelaya DO   Date and time admitted to hospital: 5/2/2022  1:26 PM    * Acute CHF (congestive heart failure) [facility])  Assessment & Plan  Wt Readings from Last 3 Encounters:   05/02/22 120 kg (265 lb)   03/28/22 121 kg (267 lb)   01/31/22 117 kg (257 lb)     Patient had cholecystectomy 2 weeks ago, doing okay until last Thursday  Started to have shortness of breath and fatigue  SOB gets worse with laying down, especially at night  No past medical history of chronic cardia problems  This is a new onset acute CHF  No home blood pressure medication  No family history of cardiac problems  No stress,smoking, alcohol use , no URI symptoms, no h/o DVT/PE, COPD/Asthma  No sick contacts  Patient reports that 10 years ago he had ablation done, but does not remember exactly for what  Since then his feeling well       In the ED:  Labs notable  for BNP-4 055, D-dimer-1 79, initial trop 12,CT notable for mild pulmonary edema,ECG- sinus tach, given Lasix 40 mg IV    Admit med surg    -Monitor vitals    -tele    -Follow-up echo, and duplex results, CBC, CMP, TSH hgA1c,trop 4hr, Mg, lactic acid     -consult cardiology, recommendation appreciate    -Lasix 40 mg b i d       VTE Prophylaxis: Enoxaparin (Lovenox)  / sequential compression device   Code Status:  Full    Discussion with family:  Patient and wife    Anticipated Length of Stay:  Patient will be admitted on an Inpatient basis with an anticipated length of stay of  More then 2 midnights  Justification for Hospital Stay:  For evaluation and management of new onset     Total Time for Visit, including Counseling / Coordination of Care: 60 minutes  Greater than 50% of this total time spent on direct patient counseling and coordination of care      Chief Complaint:   SOB and fatigue     History of Present Illness:    Marcine Harada is a 61 y o  male with past medical history of gallstone s/p cholecystectomy 2 weeks ago, GERD presents today with shortness of breadth since Thursday  After surgery has been doing okay  He notices that shortness of breath gets worse at night when he is laying down  No previous history of shortness of breath urine additionally patient reports that he has been feeling weak since started since Thursday  Patient reports that he had ablation done 10 years ago after which he has been feeling okay  Patient also reports that he has some dry cough which started after surgery  Patient states that he has been having watery, nonbloody diarrhea which is associated with greasy food  Patient denies any recent stress, URI symptoms, no excessive water drinking, fever, chest pain, nausea, vomiting, chills, diaphoresis leg swelling  In the ED; labs remarkable for source BNP 4 055, D-dimer1 79 , his initial trop 12, CT notable for mild pulmonary edema  EKG notable for sinus tach  Patient received Lasix for 40 mg IV  Patient is admitted to [place] [address] for further evaluation and management of new onset acute CHF  Review of Systems:    Review of Systems   Constitutional: Negative for activity change, appetite change, chills, diaphoresis, fatigue and fever  HENT: Negative for congestion, drooling, mouth sores, rhinorrhea, sneezing and sore throat  Respiratory: Positive for cough (Dry, nonproductive) and shortness of breath  Negative for choking, chest tightness and wheezing  Cardiovascular: Negative for chest pain, palpitations and leg swelling  Gastrointestinal: Positive for diarrhea  Negative for abdominal distention, anal bleeding, constipation and nausea  Genitourinary: Negative for difficulty urinating, dysuria and hematuria  Skin: Negative for color change  Neurological: Positive for weakness  Negative for dizziness         Past Medical and Surgical History: Past Medical History:   Diagnosis Date    Anxiety     GERD (gastroesophageal reflux disease)     Hyperlipidemia     Kidney stones        Past Surgical History:   Procedure Laterality Date    CARDIAC ELECTROPHYSIOLOGY STUDY AND ABLATION      CARDIAC ELECTROPHYSIOLOGY STUDY AND ABLATION      CARDIAC SURGERY  2014    ablation    CHOLECYSTECTOMY      COLONOSCOPY      ELBOW SURGERY Right     FL RETROGRADE PYELOGRAM  6/29/2019    FL RETROGRADE PYELOGRAM  8/29/2019    FL RETROGRADE PYELOGRAM  9/25/2019    HERNIA REPAIR Right     ing     IR NEPHROSTOMY TUBE PLACEMENT  8/30/2019    KIDNEY STONE SURGERY      KNEE ARTHROSCOPY Left     KNEE CARTILAGE SURGERY      MI CYSTO/URETERO W/LITHOTRIPSY &INDWELL STENT INSRT Right 8/29/2019    Procedure: CYSTOSCOPY URETEROSCOPY WITH LITHOTRIPSY HOLMIUM LASER, RETROGRADE PYELOGRAM;  Surgeon: Sugey Greenberg MD;  Location: [facility];  Service: Urology    MI CYSTOURETHROSCOPY,URETER CATHETER Right 6/29/2019    Procedure: CYSTOSCOPY RETROGRADE PYELOGRAM WITH INSERTION STENT URETERAL;  Surgeon: Justice Crawley MD;  Location: [facility];  Service: Urology    MI PERCUT [place] CM Right 9/25/2019    Procedure: NEPHROLITHOTOMY  PERCUTANEOUS ([facility])  MEATAL DILATION; ANTEGRADE URETEROSCOPY AND STENT REMOVAL;  Surgeon: Sugey Greenberg MD;  [facility];  Service: Urology    WRIST SURGERY Right        Meds/Allergies:    Prior to Admission medications    Medication Sig Start Date End Date Taking?  Authorizing Provider   allopurinol (ZYLOPRIM) 100 mg tablet Take 1 tablet (100 mg total) by mouth daily 7/12/21  Yes Mk Mis, DO   atorvastatin (LIPITOR) 20 mg tablet Take 1 tablet (20 mg total) by mouth daily 8/4/21  Yes Mk Mis, DO   DULoxetine (CYMBALTA) 60 mg delayed release capsule Take 1 capsule (60 mg total) by mouth daily 11/30/21  Yes Mk Mis, DO   omeprazole (PriLOSEC) 20 mg delayed release capsule Take 1 capsule (20 mg total) by mouth daily 12/29/21  Yes Carlos Chaudhary DO   potassium citrate (UROCIT-K) 10 mEq Take 1 tablet (10 mEq total) by mouth 3 (three) times a day with meals 7/12/21  Yes Carlos Chaudhary DO   dicyclomine (BENTYL) 20 mg tablet Take 1 tablet (20 mg total) by mouth 2 (two) times a day  Patient not taking: Reported on 5/2/2022 8/4/21   Janessa Donahue MD   sucralfate (CARAFATE) 1 g/10 mL suspension Take 10 mL (1 g total) by mouth 4 (four) times a day  Patient not taking: Reported on 5/2/2022  9/10/21   Carlos Chaudhary DO   ibuprofen (MOTRIN) 800 mg tablet Take 1 tablet (800 mg total) by mouth 3 (three) times a day as needed for pain, fever 2/6/18 8/20/18  Sonya Rosas MD     I have reviewed home medications with patient personally  Allergies:    Allergies   Allergen Reactions    Ancef [Cefazolin] Nausea Only     Preoperative admin-nausea and dry heaving    Other Itching     Peanuts     Peanut-Containing Drug Products - Food Allergy Itching    Sulfa Antibiotics Rash    Toradol [Ketorolac Tromethamine] Rash     Tolerates ibuprofen       Social History:     Marital Status: /Civil Union   Patient Pre-hospital Living Situation:  Lives at home with wife  Patient Pre-hospital Level of Mobility:  No restrictions  Patient Pre-hospital Diet Restrictions:  None fatty diet  Substance Use History:   Social History     Substance and Sexual Activity   Alcohol Use Not Currently    Comment: occasionally     Social History     Tobacco Use   Smoking Status Never Smoker   Smokeless Tobacco Former User   Tobacco Comment    former chewing tobacco-quit 2014     Social History     Substance and Sexual Activity   Drug Use No       Family History:    non-contributory    Physical Exam:     Vitals:   Blood Pressure: 140/94 (05/02/22 1730)  Pulse: 100 (05/02/22 1730)  Temperature: (!) 96 8 °F (36 °C) (05/02/22 1330)  Temp Source: Temporal (05/02/22 1330)  Respirations: 18 (05/02/22 1730)  Weight - Scale: 120 kg (265 lb) (05/02/22 1330)  SpO2: 96 % (05/02/22 1730)    Physical Exam  Constitutional:       Appearance: Normal appearance  He is obese  HENT:      Head: Normocephalic and atraumatic  Nose: Nose normal       Mouth/Throat:      Mouth: Mucous membranes are moist    Eyes:      Conjunctiva/sclera: Conjunctivae normal    Cardiovascular:      Rate and Rhythm: Normal rate and regular rhythm  Pulses: Normal pulses  Heart sounds: Normal heart sounds  Pulmonary:      Effort: Pulmonary effort is normal       Breath sounds: Normal breath sounds  No stridor  No wheezing, rhonchi or rales  Abdominal:      General: Abdomen is flat  Bowel sounds are normal       Palpations: Abdomen is soft  Skin:     Capillary Refill: Capillary refill takes less than 2 seconds  Neurological:      Mental Status: He is alert  Additional Data:     Lab Results: I have personally reviewed pertinent reports  Results from last 7 days   Lab Units 05/02/22  1336   WBC Thousand/uL 9 25   HEMOGLOBIN g/dL 14 5   HEMATOCRIT % 43 0   PLATELETS Thousands/uL 400*   NEUTROS PCT % 72   LYMPHS PCT % 20   MONOS PCT % 6   EOS PCT % 1     Results from last 7 days   Lab Units 05/02/22  1336   SODIUM mmol/L 140   POTASSIUM mmol/L 3 7   CHLORIDE mmol/L 107   CO2 mmol/L 23   BUN mg/dL 14   CREATININE mg/dL 1 02   ANION GAP mmol/L 10   CALCIUM mg/dL 8 7   ALBUMIN g/dL 3 3*   TOTAL BILIRUBIN mg/dL 0 61   ALK PHOS U/L 92   ALT U/L 29   AST U/L 13   GLUCOSE RANDOM mg/dL 102                       Imaging: I have personally reviewed pertinent reports  CTA ED chest PE Study   Final Result by Shonna Jimenez MD (05/02 3906)      Mild pulmonary edema and trace pleural effusions likely on the basis of CHF  No pulmonary arterial embolism or pulmonary infiltrate/consolidation  Workstation performed: [id number]         XR chest 1 view portable   Final Result by Sarah Beltran MD (05/02 5058)      1    Question subtle bandlike opacity at the right lung base which could represent atelectasis (favored) or pneumonia  However, recommend upright PA and lateral chest radiographs to confirm the finding or document resolution  2   Interval apparent enlargement of the heart, though potentially accentuated by portable technique  The above-recommended upright radiographs can also assess whether the finding is real          Note: I personally discussed this study with Edelmiro Nageotte on 5/2/2022 at 2:48 PM   She reports clinical findings warranting chest CTA, which would obviate the need for the previously recommended chest radiographs  Workstation performed: [id number]         VAS lower limb venous duplex study, complete bilateral    (Results Pending)       EKG, Pathology, and Other Studies Reviewed on Admission:   · EKG: sinus tachycardia    Allscripts / Epic Records Reviewed: Yes     ** Please Note: This note has been constructed using a voice recognition system   **

## 2022-05-02 NOTE — ASSESSMENT & PLAN NOTE
Wt Readings from Last 3 Encounters:   05/02/22 120 kg (265 lb)   03/28/22 121 kg (267 lb)   01/31/22 117 kg (257 lb)     Patient had cholecystectomy 2 weeks ago, doing okay until last Thursday  Started to have shortness of breath and fatigue  SOB gets worse with laying down, especially at night  No past medical history of chronic cardia problems  This is a new onset acute CHF  No home blood pressure medication  No family history of cardiac problems  No stress,smoking, alcohol use , no URI symptoms, no h/o DVT/PE, COPD/Asthma  No sick contacts  Patient reports that 10 years ago he had ablation done, but does not remember exactly for what  Since then his feeling well       In the ED:  Labs notable  for BNP-4 055, D-dimer-1 79, initial trop 12,CT notable for mild pulmonary edema,ECG- sinus tach, given Lasix 40 mg IV    Admit med surg    -Monitor vitals    -tele    -Follow-up echo, and duplex results, CBC, CMP, TSH hgA1c,trop 4hr, Mg, lactic acid     -consult cardiology, recommendation appreciate    -Lasix 40 mg b i d

## 2022-05-02 NOTE — TELEPHONE ENCOUNTER
Patient had his gallbladder removed about two weeks ago at [facility]  He is not feeling well, trouble breathing, headache, tired  I told him to go to the ER

## 2022-05-03 ENCOUNTER — APPOINTMENT (INPATIENT)
Dept: NON INVASIVE DIAGNOSTICS | Facility: HOSPITAL | Age: 57
DRG: 291 | End: 2022-05-03
Payer: COMMERCIAL

## 2022-05-03 PROBLEM — E78.2 MIXED HYPERLIPIDEMIA: Status: ACTIVE | Noted: 2022-05-03

## 2022-05-03 PROBLEM — E78.1 HYPERTRIGLYCERIDEMIA: Status: ACTIVE | Noted: 2022-05-03

## 2022-05-03 PROBLEM — I50.21 ACUTE SYSTOLIC CONGESTIVE HEART FAILURE (HCC): Status: ACTIVE | Noted: 2022-05-02

## 2022-05-03 PROBLEM — R79.89 ELEVATED PLATELET COUNT: Status: ACTIVE | Noted: 2022-05-03

## 2022-05-03 PROBLEM — R06.83 SNORING: Status: ACTIVE | Noted: 2022-05-03

## 2022-05-03 PROBLEM — E55.9 VITAMIN D INSUFFICIENCY: Status: ACTIVE | Noted: 2022-05-03

## 2022-05-03 PROBLEM — E78.6 LOW HDL (UNDER 40): Status: ACTIVE | Noted: 2022-05-03

## 2022-05-03 LAB
25(OH)D3 SERPL-MCNC: 25.3 NG/ML (ref 30–100)
ALBUMIN SERPL BCP-MCNC: 3.4 G/DL (ref 3.5–5)
ALP SERPL-CCNC: 103 U/L (ref 46–116)
ALT SERPL W P-5'-P-CCNC: 28 U/L (ref 12–78)
ANION GAP SERPL CALCULATED.3IONS-SCNC: 12 MMOL/L (ref 4–13)
AORTIC ROOT: 3.2 CM
APICAL FOUR CHAMBER EJECTION FRACTION: 46 %
AST SERPL W P-5'-P-CCNC: 19 U/L (ref 5–45)
BASOPHILS # BLD AUTO: 0.06 THOUSANDS/ΜL (ref 0–0.1)
BASOPHILS NFR BLD AUTO: 1 % (ref 0–1)
BILIRUB SERPL-MCNC: 0.87 MG/DL (ref 0.2–1)
BUN SERPL-MCNC: 15 MG/DL (ref 5–25)
CALCIUM ALBUM COR SERPL-MCNC: 9.7 MG/DL (ref 8.3–10.1)
CALCIUM SERPL-MCNC: 9.2 MG/DL (ref 8.3–10.1)
CHLORIDE SERPL-SCNC: 103 MMOL/L (ref 100–108)
CHOLEST SERPL-MCNC: 190 MG/DL
CK SERPL-CCNC: 28 U/L (ref 39–308)
CO2 SERPL-SCNC: 24 MMOL/L (ref 21–32)
CREAT SERPL-MCNC: 1.06 MG/DL (ref 0.6–1.3)
EOSINOPHIL # BLD AUTO: 0.13 THOUSAND/ΜL (ref 0–0.61)
EOSINOPHIL NFR BLD AUTO: 2 % (ref 0–6)
ERYTHROCYTE [DISTWIDTH] IN BLOOD BY AUTOMATED COUNT: 14.1 % (ref 11.6–15.1)
EST. AVERAGE GLUCOSE BLD GHB EST-MCNC: 108 MG/DL
FRACTIONAL SHORTENING: 23 % (ref 28–44)
GFR SERPL CREATININE-BSD FRML MDRD: 76 ML/MIN/1.73SQ M
GLUCOSE SERPL-MCNC: 108 MG/DL (ref 65–140)
HBA1C MFR BLD: 5.4 %
HCT VFR BLD AUTO: 43.8 % (ref 36.5–49.3)
HDLC SERPL-MCNC: 29 MG/DL
HGB BLD-MCNC: 14.5 G/DL (ref 12–17)
IMM GRANULOCYTES # BLD AUTO: 0.07 THOUSAND/UL (ref 0–0.2)
IMM GRANULOCYTES NFR BLD AUTO: 1 % (ref 0–2)
INTERVENTRICULAR SEPTUM IN DIASTOLE (PARASTERNAL SHORT AXIS VIEW): 1.1 CM
INTERVENTRICULAR SEPTUM: 1.1 CM (ref 0.57–1.08)
LACTATE SERPL-SCNC: 1.4 MMOL/L (ref 0.5–2)
LDLC SERPL CALC-MCNC: 120 MG/DL (ref 0–100)
LEFT ATRIUM AREA SYSTOLE SINGLE PLANE A4C: 22.9 CM2
LEFT ATRIUM SIZE: 4.6 CM
LEFT INTERNAL DIMENSION IN SYSTOLE: 4.1 CM (ref 5.55–8.42)
LEFT VENTRICULAR INTERNAL DIMENSION IN DIASTOLE: 5.3 CM (ref 9.37–13.97)
LEFT VENTRICULAR POSTERIOR WALL IN END DIASTOLE: 1.1 CM (ref 0.56–1.06)
LEFT VENTRICULAR STROKE VOLUME: 61 ML
LVSV (TEICH): 61 ML
LYMPHOCYTES # BLD AUTO: 1.54 THOUSANDS/ΜL (ref 0.6–4.47)
LYMPHOCYTES NFR BLD AUTO: 17 % (ref 14–44)
MAGNESIUM SERPL-MCNC: 2.1 MG/DL (ref 1.6–2.6)
MCH RBC QN AUTO: 29.8 PG (ref 26.8–34.3)
MCHC RBC AUTO-ENTMCNC: 33.1 G/DL (ref 31.4–37.4)
MCV RBC AUTO: 90 FL (ref 82–98)
MONOCYTES # BLD AUTO: 0.52 THOUSAND/ΜL (ref 0.17–1.22)
MONOCYTES NFR BLD AUTO: 6 % (ref 4–12)
MV E'TISSUE VEL-SEP: 10 CM/S
NEUTROPHILS # BLD AUTO: 6.55 THOUSANDS/ΜL (ref 1.85–7.62)
NEUTS SEG NFR BLD AUTO: 73 % (ref 43–75)
NONHDLC SERPL-MCNC: 161 MG/DL
NRBC BLD AUTO-RTO: 0 /100 WBCS
PHOSPHATE SERPL-MCNC: 4.3 MG/DL (ref 2.7–4.5)
PLATELET # BLD AUTO: 442 THOUSANDS/UL (ref 149–390)
PMV BLD AUTO: 10.9 FL (ref 8.9–12.7)
POTASSIUM SERPL-SCNC: 3.7 MMOL/L (ref 3.5–5.3)
PROCALCITONIN SERPL-MCNC: 0.06 NG/ML
PROT SERPL-MCNC: 7.8 G/DL (ref 6.4–8.2)
RA PRESSURE ESTIMATED: 10 MMHG
RBC # BLD AUTO: 4.87 MILLION/UL (ref 3.88–5.62)
RIGHT ATRIUM AREA SYSTOLE A4C: 17.8 CM2
RIGHT VENTRICLE ID DIMENSION: 2.7 CM
RV PSP: 43 MMHG
SL CV LV EF: 40
SL CV PED ECHO LEFT VENTRICLE DIASTOLIC VOLUME (MOD BIPLANE) 2D: 136 ML
SL CV PED ECHO LEFT VENTRICLE SYSTOLIC VOLUME (MOD BIPLANE) 2D: 74 ML
SODIUM SERPL-SCNC: 139 MMOL/L (ref 136–145)
TR MAX PG: 33 MMHG
TR PEAK VELOCITY: 2.9 M/S
TRICUSPID ANNULAR PLANE SYSTOLIC EXCURSION: 2 CM
TRICUSPID VALVE PEAK REGURGITATION VELOCITY: 2.89 M/S
TRIGL SERPL-MCNC: 207 MG/DL
TSH SERPL DL<=0.05 MIU/L-ACNC: 3.06 UIU/ML (ref 0.45–4.5)
WBC # BLD AUTO: 8.87 THOUSAND/UL (ref 4.31–10.16)
Z-SCORE OF INTERVENTRICULAR SEPTUM IN END DIASTOLE: 2.14
Z-SCORE OF LEFT VENTRICULAR DIMENSION IN END DIASTOLE: -7.7
Z-SCORE OF LEFT VENTRICULAR DIMENSION IN END SYSTOLE: -4.04
Z-SCORE OF LEFT VENTRICULAR POSTERIOR WALL IN END DIASTOLE: 2.25

## 2022-05-03 PROCEDURE — 99232 SBSQ HOSP IP/OBS MODERATE 35: CPT | Performed by: INTERNAL MEDICINE

## 2022-05-03 PROCEDURE — 82306 VITAMIN D 25 HYDROXY: CPT | Performed by: INTERNAL MEDICINE

## 2022-05-03 PROCEDURE — 99222 1ST HOSP IP/OBS MODERATE 55: CPT | Performed by: PHYSICIAN ASSISTANT

## 2022-05-03 PROCEDURE — 93306 TTE W/DOPPLER COMPLETE: CPT | Performed by: INTERNAL MEDICINE

## 2022-05-03 PROCEDURE — 84145 PROCALCITONIN (PCT): CPT | Performed by: INTERNAL MEDICINE

## 2022-05-03 PROCEDURE — 80053 COMPREHEN METABOLIC PANEL: CPT | Performed by: INTERNAL MEDICINE

## 2022-05-03 PROCEDURE — 83036 HEMOGLOBIN GLYCOSYLATED A1C: CPT | Performed by: INTERNAL MEDICINE

## 2022-05-03 PROCEDURE — 83735 ASSAY OF MAGNESIUM: CPT | Performed by: INTERNAL MEDICINE

## 2022-05-03 PROCEDURE — 84100 ASSAY OF PHOSPHORUS: CPT | Performed by: INTERNAL MEDICINE

## 2022-05-03 PROCEDURE — 80061 LIPID PANEL: CPT | Performed by: INTERNAL MEDICINE

## 2022-05-03 PROCEDURE — 93306 TTE W/DOPPLER COMPLETE: CPT

## 2022-05-03 PROCEDURE — 82550 ASSAY OF CK (CPK): CPT | Performed by: INTERNAL MEDICINE

## 2022-05-03 PROCEDURE — 83605 ASSAY OF LACTIC ACID: CPT | Performed by: INTERNAL MEDICINE

## 2022-05-03 PROCEDURE — 84443 ASSAY THYROID STIM HORMONE: CPT | Performed by: INTERNAL MEDICINE

## 2022-05-03 PROCEDURE — 85025 COMPLETE CBC W/AUTO DIFF WBC: CPT | Performed by: INTERNAL MEDICINE

## 2022-05-03 RX ORDER — MELATONIN
2000 DAILY
Status: DISCONTINUED | OUTPATIENT
Start: 2022-05-04 | End: 2022-05-05 | Stop reason: HOSPADM

## 2022-05-03 RX ORDER — CARVEDILOL 3.12 MG/1
6.25 TABLET ORAL ONCE
Status: COMPLETED | OUTPATIENT
Start: 2022-05-03 | End: 2022-05-03

## 2022-05-03 RX ORDER — CARVEDILOL 3.12 MG/1
6.25 TABLET ORAL 2 TIMES DAILY WITH MEALS
Status: DISCONTINUED | OUTPATIENT
Start: 2022-05-03 | End: 2022-05-05 | Stop reason: HOSPADM

## 2022-05-03 RX ORDER — CARVEDILOL 3.12 MG/1
6.25 TABLET ORAL 2 TIMES DAILY WITH MEALS
Status: DISCONTINUED | OUTPATIENT
Start: 2022-05-03 | End: 2022-05-03

## 2022-05-03 RX ORDER — POTASSIUM CHLORIDE 20 MEQ/1
40 TABLET, EXTENDED RELEASE ORAL ONCE
Status: COMPLETED | OUTPATIENT
Start: 2022-05-03 | End: 2022-05-03

## 2022-05-03 RX ORDER — ACETAMINOPHEN 325 MG/1
650 TABLET ORAL EVERY 6 HOURS PRN
Status: DISCONTINUED | OUTPATIENT
Start: 2022-05-03 | End: 2022-05-05 | Stop reason: HOSPADM

## 2022-05-03 RX ADMIN — CARVEDILOL 6.25 MG: 3.12 TABLET, FILM COATED ORAL at 21:41

## 2022-05-03 RX ADMIN — FUROSEMIDE 40 MG: 10 INJECTION, SOLUTION INTRAMUSCULAR; INTRAVENOUS at 17:55

## 2022-05-03 RX ADMIN — ACETAMINOPHEN 650 MG: 325 TABLET ORAL at 18:04

## 2022-05-03 RX ADMIN — ENOXAPARIN SODIUM 40 MG: 40 INJECTION SUBCUTANEOUS at 17:55

## 2022-05-03 RX ADMIN — ATORVASTATIN CALCIUM 20 MG: 40 TABLET, FILM COATED ORAL at 17:55

## 2022-05-03 RX ADMIN — FUROSEMIDE 40 MG: 10 INJECTION, SOLUTION INTRAMUSCULAR; INTRAVENOUS at 09:07

## 2022-05-03 RX ADMIN — ACETAMINOPHEN 650 MG: 325 TABLET ORAL at 11:07

## 2022-05-03 RX ADMIN — ALLOPURINOL 100 MG: 100 TABLET ORAL at 09:03

## 2022-05-03 RX ADMIN — POTASSIUM CHLORIDE 40 MEQ: 20 TABLET, EXTENDED RELEASE ORAL at 09:03

## 2022-05-03 RX ADMIN — DULOXETINE HYDROCHLORIDE 60 MG: 30 CAPSULE, DELAYED RELEASE ORAL at 09:03

## 2022-05-03 RX ADMIN — CARVEDILOL 6.25 MG: 3.12 TABLET, FILM COATED ORAL at 14:56

## 2022-05-03 NOTE — UTILIZATION REVIEW
Initial Clinical Review    Admission: Date/Time/Statement:   Admission Orders (From admission, onward)     Ordered        05/02/22 1552  Inpatient Admission  Once                      Orders Placed This Encounter   Procedures    Inpatient Admission     Standing Status:   Standing     Number of Occurrences:   1     Order Specific Question:   Level of Care     Answer:   Med Surg [16]     Order Specific Question:   Estimated length of stay     Answer:   More than 2 Midnights     Order Specific Question:   Certification     Answer:   I certify that inpatient services are medically necessary for this patient for a duration of greater than two midnights  See H&P and MD Progress Notes for additional information about the patient's course of treatment  ED Arrival Information     Expected Arrival Acuity    - 5/2/2022 13:14 Emergent         Means of arrival Escorted by Service Admission type    [facility] Emergency         Arrival complaint    Shortness Of Breath        Chief Complaint   Patient presents with    Shortness of Breath     pt had gallbladder removed 2 weeks ago  now started with sob headache and weakness for last 4 days     Initial Presentation: 61 y o  male presents to the ED from home with c/o SOB which worsens with lying down, dry cough, weakness since 4/28 along with watery, nonbloody diarrhea associted with eating greasy food  He is post op cholecystectomy and has been doing well  PMH: gallstone s/p cholecystectomy 2 weeks ago, GERD  In the ED he is afebrile but has HTN  He had elevated D dimer, CK 28, proBNP 4055  Imaging shows either atelectasis or pneumonia, possible heart enlargement, no PE, mild pulmonary edema and trace pleural effusions likely on basis of CHF  No thrombus on BLE doppler  He is treated with IV Lasix 40 mg, tylenol, Lipitor, Sq Lovenox  On exam he has clear lungs, abd flat    He is admitted to INPATIENT status with Acute CHF - Tele, echo, consult cardio, Lasix 40 mg IV BID>      Date: 5/3   Day 2: Will do stress test on 5/4  Continue IV Lasix 40 mg BID,  Starting on Coreg, ACEI or ARB will add  Reports improving SOB  And no headache but still + fatigue  5/3 Cardio Consult -  Acute combined CHF - elevated BNP, imaging with P edema  Not peripherally overloaded on exam  IV Lasix 40 mg IBD, fluid restriction, low Na diet, daily standing wt  New CM with unclear etiology  Will need eventual ischemic evaluation with a nuclear stress test vs  Cardiac catheterization once he is optimized from a CHF standpoint  Will start carvedilol 6 25 mg BID, hopefully can add afterload reducing agents tomorrow pending BP and renal function  Tele showing brief periods of tachycardia overnight for which he was symptomatic - these episodes appear to be SVT - if he has recurrence recommend EKG    Statin therapy, monitor BP with diuresis and addition of Carvedilol        ED Triage Vitals [05/02/22 1330]   Temperature Pulse Respirations Blood Pressure SpO2   (!) 96 8 °F (36 °C) 105 20 (!) 159/102 98 %      Temp Source Heart Rate Source Patient Position - Orthostatic VS BP Location FiO2 (%)   Temporal Monitor Sitting Right arm --      Pain Score       No Pain          Wt Readings from Last 1 Encounters:   05/03/22 115 kg (253 lb)     Additional Vital Signs:   05/03/22 06:59:04 97 8 °F (36 6 °C) 95 18 131/98 109 93 % -- --   05/03/22 0640 -- 80 -- 147/94 -- -- -- --   05/02/22 2127 -- -- -- -- -- -- None (Room air) --   05/02/22 21:14:28 97 4 °F (36 3 °C) Abnormal  97 18 127/94 105 94 % None (Room air) Sitting   05/02/22 1900 -- 93 14 147/94 115 92 % None (Room air) Lying   05/02/22 1800 -- 99 18 154/102 Abnormal  121 94 % None (Room air) Sitting   05/02/22 1730 -- 100 18 140/94 -- 96 % None (Room air) Sitting   05/02/22 1600 -- 95 20 152/87 111 94 % None (Room air) Sitting   05/02/22 1530 -- 97 18 145/84 106 92 % None (Room air) Sitting   05/02/22 1430 -- 97 20 159/78 112 95 % None (Room air) Sitting   05/02/22 1400 -- 94 18 147/81 106 95 % None (Room air) Sitting     Pertinent Labs/Diagnostic Test Results:     5/2 ECG - ST    5/3 Echo -   Left Ventricle: Left ventricular cavity size is mildly dilated  Wall thickness is increased  The left ventricular ejection fraction is 40%  Systolic function is moderately reduced  There is moderate global hypokinesis with regional variation  There is borderline concentric hypertrophy    Left Atrium: The atrium is moderately dilated    Mitral Valve: There is mild to moderate regurgitation    Tricuspid Valve: There is mild to moderate regurgitation  The estimated right ventricular systolic pressure is 35 39 mmHg    Pulmonic Valve: There is mild regurgitation  VAS lower limb venous duplex study, complete bilateral   Final Result by Del Grimes MD (05/02 1819)   No evidence of thrombus bilat   CTA ED chest PE Study   Final Result by Shonna Jimenez MD (05/02 2853)      Mild pulmonary edema and trace pleural effusions likely on the basis of CHF  No pulmonary arterial embolism or pulmonary infiltrate/consolidation  XR chest 1 view portable   Final Result by Sarah Beltran MD (05/02 0693)      1  Question subtle bandlike opacity at the right lung base which could represent atelectasis (favored) or pneumonia  However, recommend upright PA and lateral chest radiographs to confirm the finding or document resolution  2   Interval apparent enlargement of the heart, though potentially accentuated by portable technique    The above-recommended upright radiographs can also assess whether the finding is real      Results from last 7 days   Lab Units 05/02/22  1358   SARS-COV-2  Negative     Results from last 7 days   Lab Units 05/03/22  0434 05/02/22  1336   WBC Thousand/uL 8 87 9 25   HEMOGLOBIN g/dL 14 5 14 5   HEMATOCRIT % 43 8 43 0   PLATELETS Thousands/uL 442* 400*   NEUTROS ABS Thousands/µL 6 55 6 66 Results from last 7 days   Lab Units 05/03/22  0434 05/02/22  1336   SODIUM mmol/L 139 140   POTASSIUM mmol/L 3 7 3 7   CHLORIDE mmol/L 103 107   CO2 mmol/L 24 23   ANION GAP mmol/L 12 10   BUN mg/dL 15 14   CREATININE mg/dL 1 06 1 02   EGFR ml/min/1 73sq m 76 80   CALCIUM mg/dL 9 2 8 7   MAGNESIUM mg/dL 2 1  --    PHOSPHORUS mg/dL 4 3  --      Results from last 7 days   Lab Units 05/03/22  0434 05/02/22  1336   AST U/L 19 13   ALT U/L 28 29   ALK PHOS U/L 103 92   TOTAL PROTEIN g/dL 7 8 7 5   ALBUMIN g/dL 3 4* 3 3*   TOTAL BILIRUBIN mg/dL 0 87 0 61         Results from last 7 days   Lab Units 05/03/22  0434 05/02/22  1336   GLUCOSE RANDOM mg/dL 108 102     Results from last 7 days   Lab Units 05/03/22  0434   CK TOTAL U/L 28*     Results from last 7 days   Lab Units 05/02/22  1801 05/02/22  1547 05/02/22  1336   HS TNI 0HR ng/L  --   --  12   HS TNI 2HR ng/L  --  12  --    HSTNI D2 ng/L  --  0  --    HS TNI 4HR ng/L 14  --   --    HSTNI D4 ng/L 2  --   --      Results from last 7 days   Lab Units 05/02/22  1336   D-DIMER QUANTITATIVE ug/ml FEU 1 79*         Results from last 7 days   Lab Units 05/03/22  0434   TSH 3RD GENERATON uIU/mL 3 060     Results from last 7 days   Lab Units 05/03/22  0438   PROCALCITONIN ng/ml 0 06     Results from last 7 days   Lab Units 05/03/22  0434   LACTIC ACID mmol/L 1 4             Results from last 7 days   Lab Units 05/02/22  1336   NT-PRO BNP pg/mL 4,055*     Results from last 7 days   Lab Units 05/02/22  1358   INFLUENZA A PCR  Negative   INFLUENZA B PCR  Negative   RSV PCR  Negative     ED Treatment:   Medication Administration from 05/02/2022 1314 to 05/02/2022 2111    Date/Time Order Dose Route Action   05/02/2022 1455 iohexol (OMNIPAQUE) 350 MG/ML injection (SINGLE-DOSE) 85 mL 85 mL Intravenous Given   05/02/2022 1628 furosemide (LASIX) injection 40 mg 40 mg Intravenous Given   05/02/2022 1628 acetaminophen (TYLENOL) tablet 325 mg 325 mg Oral Given   05/02/2022 1720 atorvastatin (LIPITOR) tablet 20 mg 20 mg Oral Given   05/02/2022 1727 enoxaparin (LOVENOX) subcutaneous injection 40 mg 40 mg Subcutaneous Given   05/02/2022 1930 acetaminophen (TYLENOL) tablet 650 mg 650 mg Oral Given        Past Medical History:   Diagnosis Date    Anxiety     GERD (gastroesophageal reflux disease)     Hyperlipidemia     Kidney stones      Admitting Diagnosis: Shortness of breath [R06 02]  Dyspnea [R06 00]  Acute CHF (congestive heart failure) (HCC) [I50 9]  Age/Sex: 61 y o  male  Admission Orders:  Scheduled Medications:  allopurinol, 100 mg, Oral, Daily  atorvastatin, 20 mg, Oral, QPM  DULoxetine, 60 mg, Oral, Daily  enoxaparin, 40 mg, Subcutaneous, Q24H  furosemide, 40 mg, Intravenous, BID (diuretic)      Continuous IV Infusions:     PRN Meds:  acetaminophen, 650 mg, Oral, Q6H PRN -x 1 5/2    Tele  Daily wt  Fluid restriction  IP CONSULT TO CARDIOLOGY    Network Utilization Review Department  ATTENTION: Please call with any questions or concerns to [phone] and carefully listen to the prompts so that you are directed to the right person  All voicemails are confidential   Pieter Bell all requests for admission clinical reviews, approved or denied determinations and any other requests to dedicated fax number below belonging to the campus where the patient is receiving treatment   List of dedicated fax numbers for the Facilities:  [address] DENIALS (Administrative/Medical Necessity) [phone]   [address] (Maternity/NICU/Pediatrics) [phone]   [address] [place] [address] [place] [address] [facility] Jeanna Christian [address] [phone]

## 2022-05-03 NOTE — OCCUPATIONAL THERAPY NOTE
Occupational Therapy         Patient Name: Alonzo JOHNSON Date: 5/3/2022       05/03/22 7624   OT Last Visit   OT Visit Date 05/03/22   Note Type   Note type Screen     Order received and chart review performed; per nursing report, pt is performing at Independent level with self-care tasks as well as functional mobility with no device; pt does not require skilled OT needs at this time; will d/c OT orders at this time      Elise Guerra, OT

## 2022-05-03 NOTE — PHYSICAL THERAPY NOTE
PHYSICAL THERAPY      Patient Name: Lizette Bird  EBPVI'P Date: 5/3/2022        05/03/22 1000   PT Last Visit   PT Visit Date 05/03/22   Note Type   Note type Screen   Additional Comments Order received and chart review performed  Per nursing report, pt is performing functional mobility independently with no device  Pt does not require skilled PT intervention at this time; will d/c PT orders         Hardy Butt, PT

## 2022-05-03 NOTE — PLAN OF CARE
Problem: Potential for Falls  Goal: Patient will remain free of falls  Description: INTERVENTIONS:  - Educate patient/family on patient safety including physical limitations  - Instruct patient to call for assistance with activity   - Consult OT/PT to assist with strengthening/mobility   - Keep Call bell within reach  - Keep bed low and locked with side rails adjusted as appropriate  - Keep care items and personal belongings within reach  - Initiate and maintain comfort rounds  - Make Fall Risk Sign visible to staff  - Offer Toileting every 2 Hours, in advance of need  - Initiate/Maintain fall alarm  - Obtain necessary fall risk management equipment: alarm, nonskid socks, yellow bracelet  - Apply yellow socks and bracelet for high fall risk patients  - Consider moving patient to room near nurses station  Outcome: Progressing     Problem: PAIN - ADULT  Goal: Verbalizes/displays adequate comfort level or baseline comfort level  Description: Interventions:  - Encourage patient to monitor pain and request assistance  - Assess pain using appropriate pain scale  - Administer analgesics based on type and severity of pain and evaluate response  - Implement non-pharmacological measures as appropriate and evaluate response  - Consider cultural and social influences on pain and pain management  - Notify physician/advanced practitioner if interventions unsuccessful or patient reports new pain  Outcome: Progressing     Problem: INFECTION - ADULT  Goal: Absence or prevention of progression during hospitalization  Description: INTERVENTIONS:  - Assess and monitor for signs and symptoms of infection  - Monitor lab/diagnostic results  - Monitor all insertion sites, i e  indwelling lines, tubes, and drains  - Monitor endotracheal if appropriate and nasal secretions for changes in amount and color  - [place] appropriate cooling/warming therapies per order  - Administer medications as ordered  - Instruct and encourage patient and family to use good hand hygiene technique  - Identify and instruct in appropriate isolation precautions for identified infection/condition  Outcome: Progressing     Problem: SAFETY ADULT  Goal: Patient will remain free of falls  Description: INTERVENTIONS:  - Educate patient/family on patient safety including physical limitations  - Instruct patient to call for assistance with activity   - Consult OT/PT to assist with strengthening/mobility   - Keep Call bell within reach  - Keep bed low and locked with side rails adjusted as appropriate  - Keep care items and personal belongings within reach  - Initiate and maintain comfort rounds  - Make Fall Risk Sign visible to staff  - Offer Toileting every 2 Hours, in advance of need  - Initiate/Maintain fall alarm  - Obtain necessary fall risk management equipment: alarm, nonskid socks, yellow bracelet  - Apply yellow socks and bracelet for high fall risk patients  - Consider moving patient to room near nurses station  Outcome: Progressing  Goal: Maintain or return to baseline ADL function  Description: INTERVENTIONS:  -  Assess patient's ability to carry out ADLs; assess patient's baseline for ADL function and identify physical deficits which impact ability to perform ADLs (bathing, care of mouth/teeth, toileting, grooming, dressing, etc )  - Assess/evaluate cause of self-care deficits   - Assess range of motion  - Assess patient's mobility; develop plan if impaired  - Assess patient's need for assistive devices and provide as appropriate  - Encourage maximum independence but intervene and supervise when necessary  - Involve family in performance of ADLs  - Assess for home care needs following discharge   - Consider OT consult to assist with ADL evaluation and planning for discharge  - Provide patient education as appropriate  Outcome: Progressing  Goal: Maintains/Returns to pre admission functional level  Description: INTERVENTIONS:  - Perform BMAT or MOVE assessment daily  - Set and communicate daily mobility goal to care team and patient/family/caregiver  - Collaborate with rehabilitation services on mobility goals if consulted  - Perform Range of Motion 4 times a day  - Reposition patient every 2 hours  - Dangle patient 3 times a day  - Stand patient 3 times a day  - Ambulate patient 3 times a day  - Out of bed to chair 3 times a day   - Out of bed for meals 3 times a day  - Out of bed for toileting  - Record patient progress and toleration of activity level   Outcome: Progressing     Problem: DISCHARGE PLANNING  Goal: Discharge to home or other facility with appropriate resources  Description: INTERVENTIONS:  - Identify barriers to discharge w/patient and caregiver  - Arrange for needed discharge resources and transportation as appropriate  - Identify discharge learning needs (meds, wound care, etc )  - Arrange for interpretive services to assist at discharge as needed  - Refer to Case Management Department for coordinating discharge planning if the patient needs post-hospital services based on physician/advanced practitioner order or complex needs related to functional status, cognitive ability, or social support system  Outcome: Progressing     Problem: Knowledge Deficit  Goal: Patient/family/caregiver demonstrates understanding of disease process, treatment plan, medications, and discharge instructions  Description: Complete learning assessment and assess knowledge base    Interventions:  - Provide teaching at level of understanding  - Provide teaching via preferred learning methods  Outcome: Progressing     Problem: CARDIOVASCULAR - ADULT  Goal: Maintains optimal cardiac output and hemodynamic stability  Description: INTERVENTIONS:  - Monitor I/O, vital signs and rhythm  - Monitor for S/S and trends of decreased cardiac output  - Administer and titrate ordered vasoactive medications to optimize hemodynamic stability  - Assess quality of pulses, skin color and temperature  - Assess for signs of decreased coronary artery perfusion  - Instruct patient to report change in severity of symptoms  Outcome: Progressing  Goal: Absence of cardiac dysrhythmias or at baseline rhythm  Description: INTERVENTIONS:  - Continuous cardiac monitoring, vital signs, obtain 12 lead EKG if ordered  - Administer antiarrhythmic and heart rate control medications as ordered  - Monitor electrolytes and administer replacement therapy as ordered  Outcome: Progressing     Problem: RESPIRATORY - ADULT  Goal: Achieves optimal ventilation and oxygenation  Description: INTERVENTIONS:  - Assess for changes in respiratory status  - Assess for changes in mentation and behavior  - Position to facilitate oxygenation and minimize respiratory effort  - Oxygen administered by appropriate delivery if ordered  - Initiate smoking cessation education as indicated  - Encourage broncho-pulmonary hygiene including cough, deep breathe, Incentive Spirometry  - Assess the need for suctioning and aspirate as needed  - Assess and instruct to report SOB or any respiratory difficulty  - Respiratory Therapy support as indicated  Outcome: Progressing     Problem: METABOLIC, FLUID AND ELECTROLYTES - ADULT  Goal: Electrolytes maintained within normal limits  Description: INTERVENTIONS:  - Monitor labs and assess patient for signs and symptoms of electrolyte imbalances  - Administer electrolyte replacement as ordered  - Monitor response to electrolyte replacements, including repeat lab results as appropriate  - Instruct patient on fluid and nutrition as appropriate  Outcome: Progressing  Goal: Fluid balance maintained  Description: INTERVENTIONS:  - Monitor labs   - Monitor I/O and WT  - Instruct patient on fluid and nutrition as appropriate  - Assess for signs & symptoms of volume excess or deficit  Outcome: Progressing     Problem: HEMATOLOGIC - ADULT  Goal: Maintains hematologic stability  Description: INTERVENTIONS  - Assess for signs and symptoms of bleeding or hemorrhage  - Monitor labs  - Administer supportive blood products/factors as ordered and appropriate  Outcome: Progressing     Problem: MOBILITY - ADULT  Goal: Maintain or return to baseline ADL function  Description: INTERVENTIONS:  -  Assess patient's ability to carry out ADLs; assess patient's baseline for ADL function and identify physical deficits which impact ability to perform ADLs (bathing, care of mouth/teeth, toileting, grooming, dressing, etc )  - Assess/evaluate cause of self-care deficits   - Assess range of motion  - Assess patient's mobility; develop plan if impaired  - Assess patient's need for assistive devices and provide as appropriate  - Encourage maximum independence but intervene and supervise when necessary  - Involve family in performance of ADLs  - Assess for home care needs following discharge   - Consider OT consult to assist with ADL evaluation and planning for discharge  - Provide patient education as appropriate  Outcome: Progressing  Goal: Maintains/Returns to pre admission functional level  Description: INTERVENTIONS:  - Perform BMAT or MOVE assessment daily    - Set and communicate daily mobility goal to care team and patient/family/caregiver  - Collaborate with rehabilitation services on mobility goals if consulted  - Perform Range of Motion 4 times a day  - Reposition patient every 2 hours    - Dangle patient 3 times a day  - Stand patient 3 times a day  - Ambulate patient 3 times a day  - Out of bed to chair 3 times a day   - Out of bed for meals 3 times a day  - Out of bed for toileting  - Record patient progress and toleration of activity level   Outcome: Progressing

## 2022-05-03 NOTE — PROGRESS NOTES
[facility] [address]  Progress Note - Nabil Dumont 1965, 61 y o  male MRN: [id number]  Unit/Bed#: 700-92 Encounter: [id number]  Primary Care Provider: Chace Garcia DO   Date and time admitted to hospital: 5/2/2022  1:26 PM    * Acute CHF (congestive heart failure) [facility])  Assessment & Plan  Wt Readings from Last 3 Encounters:   05/03/22 115 kg (253 lb 1 4 oz)   03/28/22 121 kg (267 lb)   01/31/22 117 kg (257 lb)     Patient had cholecystectomy 2 weeks ago, doing okay until last Thursday  Started to have shortness of breath and fatigue  SOB gets worse with laying down, especially at night  No past medical history of chronic cardia problems  This is a new onset acute CHF  No home blood pressure medication  No family history of cardiac problems  No stress,smoking, alcohol use , no URI symptoms, no h/o DVT/PE, COPD/Asthma  No sick contacts  Patient reports that 10 years ago he had ablation done, but does not remember exactly for what  Since then his feeling well         In the ED:  Labs notable  for BNP-4 055, D-dimer-1 79, initial trop 12,CT notable for mild pulmonary edema,ECG- sinus tach, given Lasix 40 mg IV    Admitted to   med surg    -Monitor vitals, currently stable    -strict I & O's     -tele    -Follow-up echo result    -  LE duplex -no evidence of acute chronic deep vein thrombosis    - CBC, CMP, TSH ,trop 4hr, Mg, lactic acid- unremarkable    -follow-up hemoglobin A1c    -patient will need an outpatient ischemic workup and evaluation of the coronary artery calcifications observed on CTA of the chest PE study on 5/2/2022     -consult cardiology, recommendation appreciate    -continue Lasix 40 mg b i d     -consult cardiology, recommendations appreciate    -patient will need an outpatient sleep study in regarding obstructive sleep able, his BMI is 36 31       VTE Pharmacologic Prophylaxis:   Pharmacologic: Enoxaparin (Lovenox)  Mechanical VTE Prophylaxis in Place: Yes      Discussions with Specialists or Other Care Team Provider:  Cardiology    Education and Discussions with Family / Patient:  With patient and wife    Time Spent for Care: 30 minutes  More than 50% of total time spent on counseling and coordination of care as described above  Current Length of Stay: 1 day(s)    Current Patient Status: Inpatient   Certification Statement: The patient will continue to require additional inpatient hospital stay due to IV medication requirement, and further evaluation    Discharge Plan:  1 or 2 days  if stable    Code Status: Level 1 - Full Code      Subjective: Today Am seen and examined patient  Overnight no acute events  He reports that he SOB improved since yesterday and he does not have HA   He  still complaints of  the fatigue  Otherwise he has normal PO intake and BM  His vitals are stable  Objective:     Vitals:   Temp (24hrs), Av 3 °F (36 3 °C), Min:96 8 °F (36 °C), Max:97 8 °F (36 6 °C)    Temp:  [96 8 °F (36 °C)-97 8 °F (36 6 °C)] 97 8 °F (36 6 °C)  HR:  [] 95  Resp:  [14-20] 18  BP: (127-159)/() 131/98  SpO2:  [92 %-98 %] 93 %  Body mass index is 36 31 kg/m²  Input and Output Summary (last 24 hours): Intake/Output Summary (Last 24 hours) at 5/3/2022 0814  Last data filed at 2022 1910  Gross per 24 hour   Intake --   Output 600 ml   Net -600 ml       Physical Exam:     Physical Exam  Constitutional:       Appearance: He is obese  HENT:      Head: Normocephalic and atraumatic  Nose: Nose normal       Mouth/Throat:      Mouth: Mucous membranes are moist    Eyes:      Conjunctiva/sclera: Conjunctivae normal    Cardiovascular:      Rate and Rhythm: Normal rate and regular rhythm  Pulses: Normal pulses  Heart sounds: Normal heart sounds  Pulmonary:      Effort: Pulmonary effort is normal       Breath sounds: Normal breath sounds  Abdominal:      General: Abdomen is flat   Bowel sounds are normal       Palpations: Abdomen is soft  Skin:     General: Skin is warm  Capillary Refill: Capillary refill takes less than 2 seconds  Neurological:      Mental Status: He is alert  Psychiatric:         Mood and Affect: Mood normal          Thought Content: Thought content normal            Additional Data:     Labs:    Results from last 7 days   Lab Units 05/03/22  0434   WBC Thousand/uL 8 87   HEMOGLOBIN g/dL 14 5   HEMATOCRIT % 43 8   PLATELETS Thousands/uL 442*   NEUTROS PCT % 73   LYMPHS PCT % 17   MONOS PCT % 6   EOS PCT % 2     Results from last 7 days   Lab Units 05/03/22  0434   SODIUM mmol/L 139   POTASSIUM mmol/L 3 7   CHLORIDE mmol/L 103   CO2 mmol/L 24   BUN mg/dL 15   CREATININE mg/dL 1 06   ANION GAP mmol/L 12   CALCIUM mg/dL 9 2   ALBUMIN g/dL 3 4*   TOTAL BILIRUBIN mg/dL 0 87   ALK PHOS U/L 103   ALT U/L 28   AST U/L 19   GLUCOSE RANDOM mg/dL 108                 Results from last 7 days   Lab Units 05/03/22  0438 05/03/22  0434   LACTIC ACID mmol/L  --  1 4   PROCALCITONIN ng/ml 0 06  --            * I Have Reviewed All Lab Data Listed Above  * Additional Pertinent Lab Tests Reviewed: Mary 66 Admission Reviewed    Imaging:    VAS lower limb venous duplex study, complete bilateral   Final Result      CTA ED chest PE Study   Final Result      Mild pulmonary edema and trace pleural effusions likely on the basis of CHF  No pulmonary arterial embolism or pulmonary infiltrate/consolidation  Workstation performed: [id number]         XR chest 1 view portable   Final Result      1  Question subtle bandlike opacity at the right lung base which could represent atelectasis (favored) or pneumonia  However, recommend upright PA and lateral chest radiographs to confirm the finding or document resolution  2   Interval apparent enlargement of the heart, though potentially accentuated by portable technique    The above-recommended upright radiographs can also assess whether the finding is real          Note: I personally discussed this study with Britney Gordon on 5/2/2022 at 2:48 PM   She reports clinical findings warranting chest CTA, which would obviate the need for the previously recommended chest radiographs  Workstation performed: [id number]                 Imaging Personally Reviewed by Myself Includes:     Recent Cultures (last 7 days):           Last 24 Hours Medication List:   Current Facility-Administered Medications   Medication Dose Route Frequency Provider Last Rate    acetaminophen  650 mg Oral Q6H PRN Thania Smith PA-C      allopurinol  100 mg Oral Daily Scurryeloina Olivo, DO      atorvastatin  20 mg Oral QPM Salvatore Olivo DO      DULoxetine  60 mg Oral Daily Brayden WINTER [place], DO      enoxaparin  40 mg Subcutaneous Q24H Brayden Olivo DO      furosemide  40 mg Intravenous BID (diuretic) Salvatore Olivo DO      potassium chloride  40 mEq Oral Once Jacqueline Bias, DO          Today, Patient Was Seen By: Hill Rodriguez MD    ** Please Note: Dictation voice to text software may have been used in the creation of this document   **

## 2022-05-03 NOTE — CASE MANAGEMENT
Case Management Assessment & Discharge Planning Note    Patient name Evelyn Molina  Location /837-22 MRN [id number]  : 1965 Date 5/3/2022       Current Admission Date: 2022  Current Admission Diagnosis:Acute systolic congestive heart failure [facility])   Patient Active Problem List    Diagnosis Date Noted    Vitamin D insufficiency 2022    Low HDL (under 40) 2022    Hypertriglyceridemia 2022    Mixed hyperlipidemia 2022    Snoring 2022    Elevated platelet count     Acute systolic congestive heart failure ([address] ) 2022    Coronary artery calcification seen on CT scan 2022    GERD (gastroesophageal reflux disease)     Anxiety     Dyslipidemia 2021    Ulnar nerve entrapment at elbow 2021    Bilateral carpal tunnel syndrome 2021    Osteoarthritis of knee 2021    Impingement syndrome of shoulder region 2021    Chronic pain of both shoulders 2021    Chronic pain of left knee 2021    Hyperuricemia 2021    Ear itching 2020    Recurrent unilateral inguinal hernia 2019    Retained ureteral stent 2019    Right ureteral calculus 2019    Ureteral calculus 2019    Constipation 2019    Kidney stones     Adjustment disorder with mixed anxiety and depressed mood 2018    Anorgasmia of male 2017    Bilateral hand numbness 2017    Tremor 2017    Vitamin D deficiency 2017    Kidney stone 2017    Chronic musculoskeletal pain 11/15/2017    Generalized obesity 11/15/2017    History of alcohol abuse 2013    Urinary tract stones 2013      LOS (days): 1  Geometric Mean LOS (GMLOS) (days):   Days to GMLOS:     OBJECTIVE:    Risk of Unplanned Readmission Score: 10         Current admission status: Inpatient       Preferred Pharmacy:   [facility] [address], [address] [address] [place] [address] [place] [address]  Phone: [phone] Fax: [phone]    [facility] [address] [place] [address]  Phone: [phone] Fax: [phone] [address] - [place] - [address]  [address]  [place] [address]  Phone: [phone] Fax: [phone]    Primary Care Provider: Julien Sutton DO    Primary Insurance: BLUE CROSS  Secondary Insurance:     ASSESSMENT:  [address], [address] Representative - Spouse   Primary Phone: [phone] (Mobile)               Advance Directives  Does patient have a [address]?: No  Was patient offered paperwork?: Yes (declines)  Does patient currently have a Health Care decision maker?: Yes, please see Health Care Proxy section  Does patient have Advance Directives?: No  Was patient offered paperwork?: Yes (declines)  Primary Contact: Naman Valdez (Spouse)  [phone] ()         Readmission Root Cause  30 Day Readmission: No    Patient Information  Admitted from[de-identified] Home  Mental Status: Alert  During Assessment patient was accompanied by: Spouse  Assessment information provided by[de-identified] Patient  Primary Caregiver: Self  Support Systems: Spouse/significant other  [place] of Residence: [facility] do you live in?: [address] entry access options   Select all that apply : Stairs  Number of steps to enter home : 1  Type of Current Residence: [address] [place] home  Upon entering residence, is there a bedroom on the main floor (no further steps)?: No  A bedroom is located on the following floor levels of residence (select all that apply):: 2nd Floor  Upon entering residence, is there a bathroom on the main floor (no further steps)?: No  Indicate which floors of current residence have a bathroom (select all the apply):: 2nd Floor  Number of steps to 2nd floor from main floor: One [address]  In the last 12 months, was there a time when you were not able to pay the mortgage or rent on time?: No  In the last 12 months, how many places have you lived?: 1  In the last 12 months, was there a time when you did not have a steady place to sleep or slept in a shelter (including now)?: No  Homeless/housing insecurity resource given?: N/A  Living Arrangements: Lives w/ Spouse/significant other    Activities of Daily Living Prior to Admission  Functional Status: Independent  Completes ADLs independently?: Yes  Ambulates independently?: Yes  Does patient use assisted devices?: No  Does patient currently own DME?: No  Does patient have a history of Outpatient Therapy (PT/OT)?: No  Does the patient have a history of Short-Term Rehab?: No  Does patient have a history of HHC?: No  [facility]         Patient Information Continued  Does patient have prescription coverage?: Yes  Within the past 12 months, you worried that your food would run out before you got the money to buy more : Never true  Within the past 12 months, the food you bought just didnt last and you didnt have money to get more : Never true  Food insecurity resource given?: N/A  Does patient receive dialysis treatments?: No  Does patient have a history of substance abuse?: No  Does patient have a history of Mental Health Diagnosis?: Yes (anxiety; depression)  Is patient receiving treatment for mental health?: No  Patient declined treatment information    Has patient received inpatient treatment related to mental health in the last 2 years?: No         Means of Transportation  Means of Transport to Appts[de-identified] Drives Self  In the past 12 months, has lack of transportation kept you from medical appointments or from getting medications?: No  In the past 12 months, has lack of transportation kept you from meetings, work, or from getting things needed for daily living?: No  Was application for public transport provided?: N/A    DISCHARGE DETAILS:    Discharge planning discussed with[de-identified] patient and wife who was present in the room        CM contacted family/caregiver?: Yes (present in the room)  Were Treatment Team discharge recommendations reviewed with patient/caregiver?: Yes  Did patient/caregiver verbalize understanding of patient care needs?: Yes  Were patient/caregiver advised of the risks associated with not following Treatment Team discharge recommendations?: Yes         Requested [address]         Is the patient interested in [place] [address] at discharge?: No    DME Referral Provided  Referral made for DME?: No         Would you like to participate in our [facility] [address] service program?  : No - Declined       Discharge Destination Plan[de-identified] Home  Transport at Discharge : Family (wife)      Plans at this time are home on dc with OP follow up  CM will follow and assist in dc planning

## 2022-05-03 NOTE — CONSULTS
Consultation - Cardiology   Esau Vasquez 61 y o  male MRN: [id number]  Unit/Bed#: 295-28 Encounter: [id number]    Consults      Physician Requesting Consult: Desire Mayer DO  Reason for Consult / Principal Problem: acute congestive heart failure    Assessment/Plan:  1  Acute systolic and diastolic congestive heart failure   - presented with shortness of breath/orthopnea and fatigue 2 weeks after laparoscopic cholecystectomy for gallstone pancreatitis    - BNP elevated, CTA with pulmonary edema    - I/O do not appear accurate, patient reports 2-3x urinary output which was recorded   - does not appear peripherally volume overloaded on exam   - ordered lasix 40 mg IV BID today - agree for today as patient still has intermittent orthopnea and renal function has been tolerating diuresis    - echocardiogram shows an EF of 40% with global hypokinesis with regional variations, mild moderate mitral and tricuspid regurgitation without prior for comparison - see plan below    - he needs daily standing weights - weight today 253 lbs which is about dry weight per patient   - continue strict I/O   - will implement a low sodium diet and fluid restriction   - monitor daily BMP    2  Cardiomyopathy, new   - echocardiogram this admission shows an EF of 40% with global hypokinesis with regional variations, mild moderate mitral and tricuspid regurgitation   - etiology is unclear at this time - possible ischemia vs  Stress induced in the setting of recent surgery vs  Viral although no viral prodrome was noted   - will need eventual ischemic evaluation with a nuclear stress test vs  Cardiac catheterization once he is optimized from a CHF standpoint    - will start GDMT with carvedilol 6 25 mg BID, hopefully can add afterload reducing agents tomorrow pending BP and renal function     3   History of ablation, possible paroxysmal SVT   - patient reports having an ablation 12 years ago at [facility]   - he is somewhat of a poor historian and does not know what he had the ablation performed for   - description of prior symptoms is consistent with SVT   - continue telemetry monitoring here - he did have brief periods of tachycardia overnight for which he was symptomatic - these episodes appear to be SVT - if he has recurrence recommend EKG   - carvedilol will be added as above    4  Dyslipidemia   - LDL as high as 175, this admission LDL of 120   - continue statin therapy   - dose will need to be increased if patient is found to have CAD or diabetes    5  Elevated blood pressure without diagnosis of hypertension   - BP average here 140's/90's   - will watch closely with diuresis and addition of carvedilol as above    History of Present Illness   HPI: Nabil Dumont is a 61y o  year old male with prior cardiac ablation 12 years ago at [facility] - details unknown and dyslipidemia  The patient presented to the ER yesterday for the evaluation of shortness of breath as well as fatigue  Two weeks ago he was admitted at [facility] with gallstone pancreatitis  He underwent laparoscopic cholecystectomy  He states he was given a fair amount of IV fluids  Initially after discharge home he felt generally well  About a week later he developed extreme fatigue despite sleeping 12-13 hours at night  He had worsening dyspnea on exertion, mainly if he would ascend a hill  Two nights ago he started noticing orthopnea  He had associated heaviness in his chest when lying flat  He denies exertional chest pain/pressure/discomfort  He had not noticed any lower extremity edema  He had mild abdominal bloating  He has noticed occasional palpitations and intermittent episodes of lightheadedness  He denies syncope  12 years ago he had a cardiac ablation at [facility]  He is not sure if this was for SVT or atrial fibrillation  He states he would have episodes of near passing out with associated palpitations   He denies prior history of MI/stent placement or congestive heart failure  He does admit to a high sodium diet  Upon admission here he underwent a CTA which was negative for pulmonary embolism but did reveal mild pulmonary edema and small bilateral pleural effusions  BNP was elevated at over 4,000  He was given lasix 40 mg IV x 1 dose with good urinary output per patient  He states with this, orthopnea resolved for a few hours but then returned  He also noted transient palpitations overnight  An echocardiogram is pending  Cardiology was consulted for further evaluation and management  He is a never smoker  He states about 8 years ago he drank alcohol on a regular basis  Most recently he drinks rarely and on a social basis  Review of Systems   Cardiovascular: Positive for chest pain, dyspnea on exertion, orthopnea and palpitations  Negative for leg swelling  Respiratory: Positive for shortness of breath  Negative for cough  Gastrointestinal: Positive for bloating  Neurological: Positive for light-headedness  Negative for dizziness  All other systems reviewed and are negative      Historical Information   Past Medical History:   Diagnosis Date    Anxiety     GERD (gastroesophageal reflux disease)     Hyperlipidemia     Kidney stones      Past Surgical History:   Procedure Laterality Date    CARDIAC ELECTROPHYSIOLOGY STUDY AND ABLATION      CARDIAC ELECTROPHYSIOLOGY STUDY AND ABLATION      CARDIAC SURGERY  2014    ablation    CHOLECYSTECTOMY      COLONOSCOPY      ELBOW SURGERY Right     FL RETROGRADE PYELOGRAM  6/29/2019    FL RETROGRADE PYELOGRAM  8/29/2019    FL RETROGRADE PYELOGRAM  9/25/2019    HERNIA REPAIR Right     ing     IR NEPHROSTOMY TUBE PLACEMENT  8/30/2019    KIDNEY STONE SURGERY      KNEE ARTHROSCOPY Left     KNEE CARTILAGE SURGERY      NE CYSTO/URETERO W/LITHOTRIPSY &INDWELL STENT INSRT Right 8/29/2019    Procedure: CYSTOSCOPY URETEROSCOPY WITH LITHOTRIPSY HOLMIUM LASER, RETROGRADE PYELOGRAM; Surgeon: Peri Velazquez MD;  Location: [facility];  Service: Urology    OK CYSTOURETHROSCOPY,URETER CATHETER Right 6/29/2019    Procedure: CYSTOSCOPY RETROGRADE PYELOGRAM WITH INSERTION STENT URETERAL;  Surgeon: Sony Friend MD;  Location: [facility];  Service: Urology    OK JAE Goff CM Right 9/25/2019    Procedure: NEPHROLITHOTOMY  PERCUTANEOUS ([facility])  MEATAL DILATION; ANTEGRADE URETEROSCOPY AND STENT REMOVAL;  Surgeon: Peri Velazquez MD;  Location[facility];  Service: Urology    WRIST SURGERY Right      Social History     Substance and Sexual Activity   Alcohol Use Not Currently    Comment: occasionally     Social History     Substance and Sexual Activity   Drug Use No     Social History     Tobacco Use   Smoking Status Never Smoker   Smokeless Tobacco Former User   Tobacco Comment    former chewing tobacco-quit 2014     Family History: non-contributory    Meds/Allergies   all current active meds have been reviewed       Allergies   Allergen Reactions    Ancef [Cefazolin] Nausea Only     Preoperative admin-nausea and dry heaving    Other Itching     Peanuts     Peanut-Containing Drug Products - Food Allergy Itching    Sulfa Antibiotics Rash    Toradol [Ketorolac Tromethamine] Rash     Tolerates ibuprofen       Objective   Vitals: Blood pressure 131/98, pulse 95, temperature 97 8 °F (36 6 °C), resp  rate 18, height 5' 10" (1 778 m), weight 115 kg (253 lb), SpO2 93 %  , Body mass index is 36 3 kg/m² ,   Orthostatic Blood Pressures      Most Recent Value   Blood Pressure 131/98 filed at 05/03/2022 7999   Patient Position - Orthostatic VS Sitting filed at 05/02/2022 9930        Systolic (78IBK), BKU:029 , Min:127 , MWH:859     Diastolic (04XRM), TFF:25, Min:78, Max:102      Intake/Output Summary (Last 24 hours) at 5/3/2022 1004  Last data filed at 5/2/2022 1910  Gross per 24 hour   Intake --   Output 600 ml   Net -600 ml       Weight (last 2 days)     Date/Time Weight 05/03/22 0640 115 (253)    05/03/22 0600 115 (253 09)    05/02/22 21:14:28 115 (252 87)    05/02/22 1330 120 (265)          Invasive Devices  Report    Peripheral Intravenous Line            Peripheral IV 05/02/22 Left Antecubital <1 day                  Physical Exam  Vitals reviewed  Constitutional:       General: He is not in acute distress  Appearance: He is well-developed  He is obese  He is not diaphoretic  HENT:      Head: Normocephalic and atraumatic  Eyes:      Pupils: Pupils are equal, round, and reactive to light  Neck:      Vascular: No carotid bruit  Cardiovascular:      Rate and Rhythm: Normal rate and regular rhythm  Pulses:           Radial pulses are 2+ on the right side and 2+ on the left side  Dorsalis pedis pulses are 2+ on the right side and 2+ on the left side  Heart sounds: S1 normal and S2 normal  No murmur heard  Pulmonary:      Effort: Pulmonary effort is normal  No respiratory distress  Breath sounds: Normal breath sounds  No wheezing or rales  Abdominal:      General: There is distension  Tenderness: There is no abdominal tenderness  Musculoskeletal:         General: Normal range of motion  Cervical back: Normal range of motion  Right lower leg: No edema  Left lower leg: No edema  Skin:     General: Skin is warm and dry  Findings: No erythema  Neurological:      General: No focal deficit present  Mental Status: He is alert and oriented to person, place, and time     Psychiatric:         Mood and Affect: Mood normal          Behavior: Behavior normal              Laboratory Results:  Results from last 7 days   Lab Units 05/03/22  0434   CK TOTAL U/L 28*       CBC with diff:   Results from last 7 days   Lab Units 05/03/22  0434 05/02/22  1336   WBC Thousand/uL 8 87 9 25   HEMOGLOBIN g/dL 14 5 14 5   HEMATOCRIT % 43 8 43 0   MCV fL 90 90   PLATELETS Thousands/uL 442* 400*   MCH pg 29 8 30 5   MCHC g/dL 33 1 33 7 RDW % 14 1 13 9   MPV fL 10 9 10 3   NRBC AUTO /100 WBCs 0 0         CMP:  Results from last 7 days   Lab Units 05/03/22  0434 05/02/22  1336   POTASSIUM mmol/L 3 7 3 7   CHLORIDE mmol/L 103 107   CO2 mmol/L 24 23   BUN mg/dL 15 14   CREATININE mg/dL 1 06 1 02   CALCIUM mg/dL 9 2 8 7   AST U/L 19 13   ALT U/L 28 29   ALK PHOS U/L 103 92   EGFR ml/min/1 73sq m 76 80         BMP:  Results from last 7 days   Lab Units 05/03/22  0434 05/02/22  1336   POTASSIUM mmol/L 3 7 3 7   CHLORIDE mmol/L 103 107   CO2 mmol/L 24 23   BUN mg/dL 15 14   CREATININE mg/dL 1 06 1 02   CALCIUM mg/dL 9 2 8 7       BNP:  No results for input(s): BNP in the last 72 hours  Magnesium:   Results from last 7 days   Lab Units 05/03/22  0434   MAGNESIUM mg/dL 2 1       Coags:       TSH:       Hemoglobin A1C       Lipid Profile:   Results from last 7 days   Lab Units 05/03/22  0434   TRIGLYCERIDES mg/dL 207*   HDL mg/dL 29*       Cardiac testing:   No results found for this or any previous visit  No results found for this or any previous visit  No results found for this or any previous visit  No results found for this or any previous visit  Imaging: I have personally reviewed pertinent reports  XR chest 1 view portable    Result Date: 5/2/2022  Narrative: CHEST INDICATION:   Shortness of breath  Cholecystectomy 2 weeks ago  COMPARISON:  2/11/2019 EXAM PERFORMED/VIEWS:  XR CHEST PORTABLE FINDINGS: Low density bandlike opacity at the right lung base persists on both acquired images and somewhat obscures vascular branching  No left-sided consolidation  No pneumothorax or effusion on either side  Heart appears larger than in 2019, though size is at least partially accentuated by portable technique  No pulmonary vascular cephalization    Osseous structures appear within normal limits for patient age  Impression: 1    Question subtle bandlike opacity at the right lung base which could represent atelectasis (favored) or pneumonia  However, recommend upright PA and lateral chest radiographs to confirm the finding or document resolution  2   Interval apparent enlargement of the heart, though potentially accentuated by portable technique  The above-recommended upright radiographs can also assess whether the finding is real  Note: I personally discussed this study with Kokiayse Gómez on 5/2/2022 at 2:48 PM   She reports clinical findings warranting chest CTA, which would obviate the need for the previously recommended chest radiographs  Workstation performed: [id number]     CTA ED chest PE Study    Result Date: 5/2/2022  Narrative: CTA - CHEST WITH IV CONTRAST - PULMONARY ANGIOGRAM INDICATION:   dyspnea, + d dimer  COMPARISON: None  TECHNIQUE: CTA examination of the chest was performed using angiographic technique according to a protocol specifically tailored to evaluate for pulmonary embolism  Axial, sagittal, and coronal 2D reformatted images were created from the source data and  submitted for interpretation  In addition, coronal 3D MIP postprocessing was performed on the acquisition scanner  Radiation dose length product (DLP) for this visit:  536 07 mGy-cm   This examination, like all CT scans performed in the [facility], was performed utilizing techniques to minimize radiation dose exposure, including the use of iterative  reconstruction and automated exposure control  IV Contrast:  85 mL of iohexol (OMNIPAQUE)  FINDINGS: PULMONARY ARTERIAL TREE:  No pulmonary embolus is seen  LUNGS:  Mild diffuse interlobular septal thickening indicating pulmonary edema  No focal infiltrate or consolidation  No endotracheal or endobronchial lesion  PLEURA:  Trace pleural effusions  HEART/GREAT VESSELS:  Cardiomegaly  Slow transit of the IV contrast bolus through the cardiac chambers is suggestive of cardiac failure  No thoracic aortic aneurysm  Coronary artery calcifications  No pericardial effusion   MEDIASTINUM AND GONZALO:  Shotty mediastinal nodes without adenopathy  CHEST WALL AND LOWER NECK:   Unremarkable  VISUALIZED STRUCTURES IN THE UPPER ABDOMEN:  Cholecystectomy clips  OSSEOUS STRUCTURES:  No acute fracture or destructive osseous lesion  Impression: Mild pulmonary edema and trace pleural effusions likely on the basis of CHF  No pulmonary arterial embolism or pulmonary infiltrate/consolidation  Workstation performed: [id number]     VAS lower limb venous duplex study, complete bilateral    Result Date: 5/2/2022  Narrative:  [facility] REPORT CLINICAL: Indications: Elevated d-dimer and edema  Patient denies lower extremity pain or swelling  Operative History: Cardiac Ablation Risk Factors The patient has history of Hyperlipidemia  CONCLUSION:   Impression: RIGHT LOWER LIMB: No evidence of acute or chronic deep vein thrombosis  LEFT LOWER LIMB: No evidence of acute or chronic deep vein thrombosis    SIGNATURE: Electronically Signed by: Alex Vasquez on 2022-05-02 06:19:43 PM      EKG reviewed personally: sinus rhythm, septal infarct, low voltage  Telemetry reviewed personally: normal sinus rhythm, intermittent periods of tachycardia    Assessment:  Principal Problem:    Acute CHF (congestive heart failure) (HCC)  Active Problems:    Coronary artery calcification seen on CT scan          Code Status: Level 1 - Full Code

## 2022-05-03 NOTE — ASSESSMENT & PLAN NOTE
Wt Readings from Last 3 Encounters:   05/03/22 115 kg (253 lb 1 4 oz)   03/28/22 121 kg (267 lb)   01/31/22 117 kg (257 lb)     Patient had cholecystectomy 2 weeks ago, doing okay until last Thursday  Started to have shortness of breath and fatigue  SOB gets worse with laying down, especially at night  No past medical history of chronic cardia problems  This is a new onset acute CHF  No home blood pressure medication  No family history of cardiac problems  No stress,smoking, alcohol use , no URI symptoms, no h/o DVT/PE, COPD/Asthma  No sick contacts  Patient reports that 10 years ago he had ablation done, but does not remember exactly for what  Since then his feeling well         In the ED:  Labs notable  for BNP-4 055, D-dimer-1 79, initial trop 12,CT notable for mild pulmonary edema,ECG- sinus tach, given Lasix 40 mg IV    Admitted to   med surg    -Monitor vitals, currently stable    -strict I & O's     -tele    -Follow-up echo result    -  LE duplex -no evidence of acute chronic deep vein thrombosis    - CBC, CMP, TSH ,trop 4hr, Mg, lactic acid- unremarkable    -follow-up hemoglobin A1c    -patient will need an outpatient ischemic workup and evaluation of the coronary artery calcifications observed on CTA of the chest PE study on 5/2/2022     -consult cardiology, recommendation appreciate    -continue Lasix 40 mg b i d     -consult cardiology, recommendations appreciate    -patient will need an outpatient sleep study in regarding obstructive sleep able, his BMI is 36 31

## 2022-05-04 ENCOUNTER — APPOINTMENT (INPATIENT)
Dept: NUCLEAR MEDICINE | Facility: HOSPITAL | Age: 57
DRG: 291 | End: 2022-05-04
Payer: COMMERCIAL

## 2022-05-04 PROBLEM — I47.1 PAROXYSMAL SVT (SUPRAVENTRICULAR TACHYCARDIA) (HCC): Status: ACTIVE | Noted: 2022-05-04

## 2022-05-04 PROBLEM — I47.10 PAROXYSMAL SVT (SUPRAVENTRICULAR TACHYCARDIA): Status: ACTIVE | Noted: 2022-05-04

## 2022-05-04 LAB
ALBUMIN SERPL BCP-MCNC: 3.5 G/DL (ref 3.5–5)
ALP SERPL-CCNC: 104 U/L (ref 46–116)
ALT SERPL W P-5'-P-CCNC: 31 U/L (ref 12–78)
ANION GAP SERPL CALCULATED.3IONS-SCNC: 11 MMOL/L (ref 4–13)
AST SERPL W P-5'-P-CCNC: 21 U/L (ref 5–45)
ATRIAL RATE: 101 BPM
BASOPHILS # BLD AUTO: 0.04 THOUSANDS/ΜL (ref 0–0.1)
BASOPHILS NFR BLD AUTO: 1 % (ref 0–1)
BILIRUB SERPL-MCNC: 0.99 MG/DL (ref 0.2–1)
BUN SERPL-MCNC: 17 MG/DL (ref 5–25)
CALCIUM SERPL-MCNC: 9.4 MG/DL (ref 8.3–10.1)
CHLORIDE SERPL-SCNC: 102 MMOL/L (ref 100–108)
CO2 SERPL-SCNC: 26 MMOL/L (ref 21–32)
CREAT SERPL-MCNC: 1.1 MG/DL (ref 0.6–1.3)
EOSINOPHIL # BLD AUTO: 0.19 THOUSAND/ΜL (ref 0–0.61)
EOSINOPHIL NFR BLD AUTO: 3 % (ref 0–6)
ERYTHROCYTE [DISTWIDTH] IN BLOOD BY AUTOMATED COUNT: 13.8 % (ref 11.6–15.1)
GFR SERPL CREATININE-BSD FRML MDRD: 73 ML/MIN/1.73SQ M
GLUCOSE SERPL-MCNC: 108 MG/DL (ref 65–140)
HCT VFR BLD AUTO: 45 % (ref 36.5–49.3)
HGB BLD-MCNC: 15.3 G/DL (ref 12–17)
IMM GRANULOCYTES # BLD AUTO: 0.03 THOUSAND/UL (ref 0–0.2)
IMM GRANULOCYTES NFR BLD AUTO: 0 % (ref 0–2)
LYMPHOCYTES # BLD AUTO: 1.64 THOUSANDS/ΜL (ref 0.6–4.47)
LYMPHOCYTES NFR BLD AUTO: 22 % (ref 14–44)
MAGNESIUM SERPL-MCNC: 2.1 MG/DL (ref 1.6–2.6)
MCH RBC QN AUTO: 30.2 PG (ref 26.8–34.3)
MCHC RBC AUTO-ENTMCNC: 34 G/DL (ref 31.4–37.4)
MCV RBC AUTO: 89 FL (ref 82–98)
MONOCYTES # BLD AUTO: 0.51 THOUSAND/ΜL (ref 0.17–1.22)
MONOCYTES NFR BLD AUTO: 7 % (ref 4–12)
NEUTROPHILS # BLD AUTO: 4.95 THOUSANDS/ΜL (ref 1.85–7.62)
NEUTS SEG NFR BLD AUTO: 67 % (ref 43–75)
NRBC BLD AUTO-RTO: 0 /100 WBCS
NUC STRESS EJECTION FRACTION: 39 %
P AXIS: 73 DEGREES
PHOSPHATE SERPL-MCNC: 4.2 MG/DL (ref 2.7–4.5)
PLATELET # BLD AUTO: 429 THOUSANDS/UL (ref 149–390)
PMV BLD AUTO: 10.5 FL (ref 8.9–12.7)
POTASSIUM SERPL-SCNC: 3.9 MMOL/L (ref 3.5–5.3)
PR INTERVAL: 198 MS
PROCALCITONIN SERPL-MCNC: 0.07 NG/ML
PROT SERPL-MCNC: 7.9 G/DL (ref 6.4–8.2)
QRS AXIS: 213 DEGREES
QRSD INTERVAL: 70 MS
QT INTERVAL: 308 MS
QTC INTERVAL: 399 MS
RBC # BLD AUTO: 5.06 MILLION/UL (ref 3.88–5.62)
SL CV REST NUCLEAR ISOTOPE DOSE: 10.8 MCI
SL CV STRESS NUCLEAR ISOTOPE DOSE: 32.8 MCI
SODIUM SERPL-SCNC: 139 MMOL/L (ref 136–145)
STRESS ST DEPRESSION: 0 MM
STRESS/REST PERFUSION RATIO: 1.01
T WAVE AXIS: 91 DEGREES
VENTRICULAR RATE: 101 BPM
WBC # BLD AUTO: 7.36 THOUSAND/UL (ref 4.31–10.16)

## 2022-05-04 PROCEDURE — 99232 SBSQ HOSP IP/OBS MODERATE 35: CPT | Performed by: INTERNAL MEDICINE

## 2022-05-04 PROCEDURE — 84100 ASSAY OF PHOSPHORUS: CPT | Performed by: INTERNAL MEDICINE

## 2022-05-04 PROCEDURE — 85025 COMPLETE CBC W/AUTO DIFF WBC: CPT | Performed by: INTERNAL MEDICINE

## 2022-05-04 PROCEDURE — A9502 TC99M TETROFOSMIN: HCPCS

## 2022-05-04 PROCEDURE — 78452 HT MUSCLE IMAGE SPECT MULT: CPT

## 2022-05-04 PROCEDURE — 78452 HT MUSCLE IMAGE SPECT MULT: CPT | Performed by: INTERNAL MEDICINE

## 2022-05-04 PROCEDURE — 80053 COMPREHEN METABOLIC PANEL: CPT | Performed by: INTERNAL MEDICINE

## 2022-05-04 PROCEDURE — 93016 CV STRESS TEST SUPVJ ONLY: CPT | Performed by: INTERNAL MEDICINE

## 2022-05-04 PROCEDURE — 84145 PROCALCITONIN (PCT): CPT | Performed by: INTERNAL MEDICINE

## 2022-05-04 PROCEDURE — G1004 CDSM NDSC: HCPCS

## 2022-05-04 PROCEDURE — 93017 CV STRESS TEST TRACING ONLY: CPT

## 2022-05-04 PROCEDURE — 93018 CV STRESS TEST I&R ONLY: CPT | Performed by: INTERNAL MEDICINE

## 2022-05-04 PROCEDURE — 93010 ELECTROCARDIOGRAM REPORT: CPT | Performed by: INTERNAL MEDICINE

## 2022-05-04 PROCEDURE — 83735 ASSAY OF MAGNESIUM: CPT | Performed by: INTERNAL MEDICINE

## 2022-05-04 RX ORDER — LISINOPRIL 5 MG/1
5 TABLET ORAL DAILY
Status: DISCONTINUED | OUTPATIENT
Start: 2022-05-04 | End: 2022-05-05 | Stop reason: HOSPADM

## 2022-05-04 RX ORDER — POTASSIUM CHLORIDE 20 MEQ/1
20 TABLET, EXTENDED RELEASE ORAL ONCE
Status: COMPLETED | OUTPATIENT
Start: 2022-05-04 | End: 2022-05-04

## 2022-05-04 RX ADMIN — POTASSIUM CHLORIDE 20 MEQ: 20 TABLET, EXTENDED RELEASE ORAL at 14:32

## 2022-05-04 RX ADMIN — FUROSEMIDE 40 MG: 10 INJECTION, SOLUTION INTRAMUSCULAR; INTRAVENOUS at 07:57

## 2022-05-04 RX ADMIN — ALLOPURINOL 100 MG: 100 TABLET ORAL at 08:00

## 2022-05-04 RX ADMIN — CARVEDILOL 6.25 MG: 3.12 TABLET, FILM COATED ORAL at 08:00

## 2022-05-04 RX ADMIN — CHOLECALCIFEROL TAB 25 MCG (1000 UNIT) 2000 UNITS: 25 TAB at 08:00

## 2022-05-04 RX ADMIN — FUROSEMIDE 40 MG: 10 INJECTION, SOLUTION INTRAMUSCULAR; INTRAVENOUS at 16:59

## 2022-05-04 RX ADMIN — ATORVASTATIN CALCIUM 20 MG: 40 TABLET, FILM COATED ORAL at 17:00

## 2022-05-04 RX ADMIN — DULOXETINE HYDROCHLORIDE 60 MG: 30 CAPSULE, DELAYED RELEASE ORAL at 08:00

## 2022-05-04 RX ADMIN — ENOXAPARIN SODIUM 40 MG: 40 INJECTION SUBCUTANEOUS at 17:00

## 2022-05-04 RX ADMIN — CARVEDILOL 6.25 MG: 3.12 TABLET, FILM COATED ORAL at 16:59

## 2022-05-04 RX ADMIN — ACETAMINOPHEN 650 MG: 325 TABLET ORAL at 11:36

## 2022-05-04 RX ADMIN — REGADENOSON 0.4 MG: 0.08 INJECTION, SOLUTION INTRAVENOUS at 13:12

## 2022-05-04 NOTE — ASSESSMENT & PLAN NOTE
· History of SVT in the past requiring ablation  · Was observed to have a few episodes of tachycardia, possible SVT on telemetry monitoring  · Continue telemetry monitoring  · Initiated on coreg 6 25 mg PO BID

## 2022-05-04 NOTE — PLAN OF CARE
Problem: Potential for Falls  Goal: Patient will remain free of falls  Description: INTERVENTIONS:  - Educate patient/family on patient safety including physical limitations  - Instruct patient to call for assistance with activity   - Consult OT/PT to assist with strengthening/mobility   - Keep Call bell within reach  - Keep bed low and locked with side rails adjusted as appropriate  - Keep care items and personal belongings within reach  - Initiate and maintain comfort rounds  - Make Fall Risk Sign visible to staff  - Offer Toileting every 2 Hours, in advance of need  - Initiate/Maintain fall alarm  - Obtain necessary fall risk management equipment: alarm, nonskid socks, yellow bracelet  - Apply yellow socks and bracelet for high fall risk patients  - Consider moving patient to room near nurses station  Outcome: Progressing     Problem: PAIN - ADULT  Goal: Verbalizes/displays adequate comfort level or baseline comfort level  Description: Interventions:  - Encourage patient to monitor pain and request assistance  - Assess pain using appropriate pain scale  - Administer analgesics based on type and severity of pain and evaluate response  - Implement non-pharmacological measures as appropriate and evaluate response  - Consider cultural and social influences on pain and pain management  - Notify physician/advanced practitioner if interventions unsuccessful or patient reports new pain  Outcome: Progressing     Problem: INFECTION - ADULT  Goal: Absence or prevention of progression during hospitalization  Description: INTERVENTIONS:  - Assess and monitor for signs and symptoms of infection  - Monitor lab/diagnostic results  - Monitor all insertion sites, i e  indwelling lines, tubes, and drains  - Monitor endotracheal if appropriate and nasal secretions for changes in amount and color  - [place] appropriate cooling/warming therapies per order  - Administer medications as ordered  - Instruct and encourage patient and family to use good hand hygiene technique  - Identify and instruct in appropriate isolation precautions for identified infection/condition  Outcome: Progressing     Problem: SAFETY ADULT  Goal: Patient will remain free of falls  Description: INTERVENTIONS:  - Educate patient/family on patient safety including physical limitations  - Instruct patient to call for assistance with activity   - Consult OT/PT to assist with strengthening/mobility   - Keep Call bell within reach  - Keep bed low and locked with side rails adjusted as appropriate  - Keep care items and personal belongings within reach  - Initiate and maintain comfort rounds  - Make Fall Risk Sign visible to staff  - Offer Toileting every 2 Hours, in advance of need  - Initiate/Maintain fall alarm  - Obtain necessary fall risk management equipment: alarm, nonskid socks, yellow bracelet  - Apply yellow socks and bracelet for high fall risk patients  - Consider moving patient to room near nurses station  Outcome: Progressing  Goal: Maintain or return to baseline ADL function  Description: INTERVENTIONS:  -  Assess patient's ability to carry out ADLs; assess patient's baseline for ADL function and identify physical deficits which impact ability to perform ADLs (bathing, care of mouth/teeth, toileting, grooming, dressing, etc )  - Assess/evaluate cause of self-care deficits   - Assess range of motion  - Assess patient's mobility; develop plan if impaired  - Assess patient's need for assistive devices and provide as appropriate  - Encourage maximum independence but intervene and supervise when necessary  - Involve family in performance of ADLs  - Assess for home care needs following discharge   - Consider OT consult to assist with ADL evaluation and planning for discharge  - Provide patient education as appropriate  Outcome: Progressing  Goal: Maintains/Returns to pre admission functional level  Description: INTERVENTIONS:  - Perform BMAT or MOVE assessment daily  - Set and communicate daily mobility goal to care team and patient/family/caregiver  - Collaborate with rehabilitation services on mobility goals if consulted  - Perform Range of Motion 4 times a day  - Reposition patient every 2 hours  - Dangle patient 3 times a day  - Stand patient 3 times a day  - Ambulate patient 3 times a day  - Out of bed to chair 3 times a day   - Out of bed for meals 3 times a day  - Out of bed for toileting  - Record patient progress and toleration of activity level   Outcome: Progressing     Problem: DISCHARGE PLANNING  Goal: Discharge to home or other facility with appropriate resources  Description: INTERVENTIONS:  - Identify barriers to discharge w/patient and caregiver  - Arrange for needed discharge resources and transportation as appropriate  - Identify discharge learning needs (meds, wound care, etc )  - Arrange for interpretive services to assist at discharge as needed  - Refer to Case Management Department for coordinating discharge planning if the patient needs post-hospital services based on physician/advanced practitioner order or complex needs related to functional status, cognitive ability, or social support system  Outcome: Progressing     Problem: Knowledge Deficit  Goal: Patient/family/caregiver demonstrates understanding of disease process, treatment plan, medications, and discharge instructions  Description: Complete learning assessment and assess knowledge base    Interventions:  - Provide teaching at level of understanding  - Provide teaching via preferred learning methods  Outcome: Progressing     Problem: CARDIOVASCULAR - ADULT  Goal: Maintains optimal cardiac output and hemodynamic stability  Description: INTERVENTIONS:  - Monitor I/O, vital signs and rhythm  - Monitor for S/S and trends of decreased cardiac output  - Administer and titrate ordered vasoactive medications to optimize hemodynamic stability  - Assess quality of pulses, skin color and temperature  - Assess for signs of decreased coronary artery perfusion  - Instruct patient to report change in severity of symptoms  Outcome: Progressing  Goal: Absence of cardiac dysrhythmias or at baseline rhythm  Description: INTERVENTIONS:  - Continuous cardiac monitoring, vital signs, obtain 12 lead EKG if ordered  - Administer antiarrhythmic and heart rate control medications as ordered  - Monitor electrolytes and administer replacement therapy as ordered  Outcome: Progressing     Problem: RESPIRATORY - ADULT  Goal: Achieves optimal ventilation and oxygenation  Description: INTERVENTIONS:  - Assess for changes in respiratory status  - Assess for changes in mentation and behavior  - Position to facilitate oxygenation and minimize respiratory effort  - Oxygen administered by appropriate delivery if ordered  - Initiate smoking cessation education as indicated  - Encourage broncho-pulmonary hygiene including cough, deep breathe, Incentive Spirometry  - Assess the need for suctioning and aspirate as needed  - Assess and instruct to report SOB or any respiratory difficulty  - Respiratory Therapy support as indicated  Outcome: Progressing     Problem: METABOLIC, FLUID AND ELECTROLYTES - ADULT  Goal: Electrolytes maintained within normal limits  Description: INTERVENTIONS:  - Monitor labs and assess patient for signs and symptoms of electrolyte imbalances  - Administer electrolyte replacement as ordered  - Monitor response to electrolyte replacements, including repeat lab results as appropriate  - Instruct patient on fluid and nutrition as appropriate  Outcome: Progressing  Goal: Fluid balance maintained  Description: INTERVENTIONS:  - Monitor labs   - Monitor I/O and WT  - Instruct patient on fluid and nutrition as appropriate  - Assess for signs & symptoms of volume excess or deficit  Outcome: Progressing     Problem: HEMATOLOGIC - ADULT  Goal: Maintains hematologic stability  Description: INTERVENTIONS  - Assess for signs and symptoms of bleeding or hemorrhage  - Monitor labs  - Administer supportive blood products/factors as ordered and appropriate  Outcome: Progressing     Problem: MOBILITY - ADULT  Goal: Maintain or return to baseline ADL function  Description: INTERVENTIONS:  -  Assess patient's ability to carry out ADLs; assess patient's baseline for ADL function and identify physical deficits which impact ability to perform ADLs (bathing, care of mouth/teeth, toileting, grooming, dressing, etc )  - Assess/evaluate cause of self-care deficits   - Assess range of motion  - Assess patient's mobility; develop plan if impaired  - Assess patient's need for assistive devices and provide as appropriate  - Encourage maximum independence but intervene and supervise when necessary  - Involve family in performance of ADLs  - Assess for home care needs following discharge   - Consider OT consult to assist with ADL evaluation and planning for discharge  - Provide patient education as appropriate  Outcome: Progressing  Goal: Maintains/Returns to pre admission functional level  Description: INTERVENTIONS:  - Perform BMAT or MOVE assessment daily    - Set and communicate daily mobility goal to care team and patient/family/caregiver  - Collaborate with rehabilitation services on mobility goals if consulted  - Perform Range of Motion 4 times a day  - Reposition patient every 2 hours    - Dangle patient 3 times a day  - Stand patient 3 times a day  - Ambulate patient 3 times a day  - Out of bed to chair 3 times a day   - Out of bed for meals 3 times a day  - Out of bed for toileting  - Record patient progress and toleration of activity level   Outcome: Progressing

## 2022-05-04 NOTE — UTILIZATION REVIEW
Continued Stay Review    Date: 5-4-22                       Current Patient Class: inpatient  Current Level of Care: med surg    HPI:57 y o  male initially admitted on  [birth year] for acute systolic heart failure, cardiomyopathy,   Svt with prior ablation  Assessment/Plan:       Exercise stress myoview study today to evaluate ischemia  Add lisinopril daily for cardiomyopathy  Continue iv lasix bid  Follow up BMP in am  Monitor on telemetry  Nuclear stress test       Resting ECG: The ECG shows normal sinus rhythm  Low QRS voltage  PRWP    Stress ECG: A pharmacological stress test was performed using regadenoson  The patient reached the end of the protocol  The patient reported abdominal discomfort during the stress test  Symptoms began during stress and ended during recovery    Stress ECG: No ST deviation is noted  The stress ECG is negative for ischemia after maximal exercise, without reproduction of symptoms  Arrhythmias during stress: rare PVCs  The ECG was negative for ischemia    Perfusion: There are no perfusion defects    Stress Function: Left ventricular function post-stress is abnormal  Global function is moderately reduced  Post-stress ejection fraction is 39 %    Stress Combined Conclusion: The ECG and SPECT imaging portions of the stress study are concordant with no evidence of stress induced myocardial ischemia     5-5-22    Today ,K dur given x1 and  coreg 6 25 , lipitor and aspirin added  Start demadex on 5-6 outpatient  Home oxygen qualifying test completed  Patient does not qualify for home O2  Discharge to home with outpatient cardiac referral        Home O2 Qualifying test   1  Baseline SPO2 on Room Air at rest 93 %   a  If <= 88% on Room Air add O2 via NC to obtain SpO2 >=88%  If LPM needed, document LPM  needed to reach =>88%     1  SPO2 during exertion on Room Air 94  %  a  During exertion monitor SPO2   If SPO2 increases >=89%, do not add supplemental oxygen     2  SPO2 on Oxygen at Rest na % at na LPM     3  SPO2 during exertion on Oxygen na % at na LPM     4  Test performed during exertion activity   Walking room air medium pace, no increase WOB or no incr SOB, 47ft    Vital Signs:       Date/Time Temp Pulse Resp BP MAP (mmHg) SpO2 O2 Device   05/04/22 09:26:22 -- 86 -- 103/73 83 96 % --   05/04/22 07:00:03 -- 94 18 131/82 98 94 % --   05/03/22 21:44:30 -- 93 20 128/80 96 94 % --   05/03/22 17:57:49 -- 94 -- 118/80 93 91 % --   05/03/22 14:24:17 97 5 °F (36 4 °C) 103 18 121/82 95 94 % --   05/03/22 12:27:32 -- 103 -- 137/96 110 93 % --   05/03/22 1100 -- -- -- -- -- -- None (Room air)   05/03/22 06:59:04 97 8 °F (36 6 °C) 95 18 131/98 109 93 % --             Pertinent Labs/Diagnostic Results:   Results from last 7 days   Lab Units 05/02/22  1358   SARS-COV-2  Negative     Results from last 7 days   Lab Units 05/04/22 0520 05/03/22  0434 05/02/22  1336   WBC Thousand/uL 7 36 8 87 9 25   HEMOGLOBIN g/dL 15 3 14 5 14 5   HEMATOCRIT % 45 0 43 8 43 0   PLATELETS Thousands/uL 429* 442* 400*   NEUTROS ABS Thousands/µL 4 95 6 55 6 66         Results from last 7 days   Lab Units 05/04/22 0520 05/03/22  0434 05/02/22  1336   SODIUM mmol/L 139 139 140   POTASSIUM mmol/L 3 9 3 7 3 7   CHLORIDE mmol/L 102 103 107   CO2 mmol/L 26 24 23   ANION GAP mmol/L 11 12 10   BUN mg/dL 17 15 14   CREATININE mg/dL 1 10 1 06 1 02   EGFR ml/min/1 73sq m 73 76 80   CALCIUM mg/dL 9 4 9 2 8 7   MAGNESIUM mg/dL 2 1 2 1  --    PHOSPHORUS mg/dL 4 2 4 3  --      Results from last 7 days   Lab Units 05/04/22 0520 05/03/22  0434 05/02/22  1336   AST U/L 21 19 13   ALT U/L 31 28 29   ALK PHOS U/L 104 103 92   TOTAL PROTEIN g/dL 7 9 7 8 7 5   ALBUMIN g/dL 3 5 3 4* 3 3*   TOTAL BILIRUBIN mg/dL 0 99 0 87 0 61         Results from last 7 days   Lab Units 05/04/22  0520 05/03/22  0434 05/02/22  1336   GLUCOSE RANDOM mg/dL 108 108 102         Results from last 7 days   Lab Units 05/03/22  0434 HEMOGLOBIN A1C % 5 4   EAG mg/dl 108       Results from last 7 days   Lab Units 05/03/22  0434   CK TOTAL U/L 28*     Results from last 7 days   Lab Units 05/02/22  1801 05/02/22  1547 05/02/22  1336   HS TNI 0HR ng/L  --   --  12   HS TNI 2HR ng/L  --  12  --    HSTNI D2 ng/L  --  0  --    HS TNI 4HR ng/L 14  --   --    HSTNI D4 ng/L 2  --   --      Results from last 7 days   Lab Units 05/02/22  1336   D-DIMER QUANTITATIVE ug/ml FEU 1 79*         Results from last 7 days   Lab Units 05/03/22  0434   TSH 3RD GENERATON uIU/mL 3 060     Results from last 7 days   Lab Units 05/04/22  0520 05/03/22  0438   PROCALCITONIN ng/ml 0 07 0 06     Results from last 7 days   Lab Units 05/03/22  0434   LACTIC ACID mmol/L 1 4             Results from last 7 days   Lab Units 05/02/22  1336   NT-PRO BNP pg/mL 4,055*       Results from last 7 days   Lab Units 05/02/22  1358   INFLUENZA A PCR  Negative   INFLUENZA B PCR  Negative   RSV PCR  Negative       Scheduled Medications:    allopurinol, 100 mg, Oral, Daily  atorvastatin, 20 mg, Oral, QPM  carvedilol, 6 25 mg, Oral, BID With Meals  cholecalciferol, 2,000 Units, Oral, Daily  DULoxetine, 60 mg, Oral, Daily  enoxaparin, 40 mg, Subcutaneous, Q24H  furosemide, 40 mg, Intravenous, BID (diuretic)  lisinopril, 5 mg, Oral, Daily  potassium chloride, 20 mEq, Oral, Once      Continuous IV Infusions:     PRN Meds:  acetaminophen, 650 mg, Oral, Q6H PRN        Discharge Plan: to be determined     Network Utilization Review Department  ATTENTION: Please call with any questions or concerns to [phone] and carefully listen to the prompts so that you are directed to the right person  All voicemails are confidential   Cassie Wallis all requests for admission clinical reviews, approved or denied determinations and any other requests to dedicated fax number below belonging to the campus where the patient is receiving treatment   List of dedicated fax numbers for the Facilities:  Jolanta Renae NUMBER   ADMISSION DENIALS (Administrative/Medical Necessity) [phone]   [address] (Maternity/NICU/Pediatrics) 261 [facility],7[address] Floor [facility] [address] [place] [address]  [phone]   Loren Hassan [address] [place] [address] [id number] [place] [address] Kylah [address] [facility] [place] [address] [phone]

## 2022-05-04 NOTE — PROGRESS NOTES
Progress Note - Cardiology   Jeyson Salas 61 y o  male MRN: [id number]  Unit/Bed#: 411-01 Encounter: [id number]    Assessment:  1  Acute systolic heart failure  2  Cardiomyopathy  3  SVT with prior ablation  4  Elevated BP with a diagnosis of HTN  5  Hyperlipidemia    Plan:  1  Exercise stress myoview study today to evaluate for ischemia  2  Add Lisinopril 5 mg daily for cardiomyopathy  3  Continue IV Lasix thru today  4  BMP in am                  Subjective/Objective      He feels much better  Negative 1680 cc fluid balance yesterday  He denies CP, SOB, LE edema  Weight down to 247 lbs      K+ 3 9  BUN 17  Creatinine 1 1            Vitals: /82   Pulse 94   Temp 97 5 °F (36 4 °C)   Resp 18   Ht 5' 10" (1 778 m)   Wt 112 kg (247 lb 2 2 oz)   SpO2 94%   BMI 35 46 kg/m²   Vitals:    05/03/22 0640 05/04/22 0600   Weight: 115 kg (253 lb) 112 kg (247 lb 2 2 oz)     Orthostatic Blood Pressures      Most Recent Value   Blood Pressure 131/82 filed at 05/04/2022 0700   Patient Position - Orthostatic VS Sitting filed at 05/02/2022 2114            Intake/Output Summary (Last 24 hours) at 5/4/2022 0900  Last data filed at 5/4/2022 0759  Gross per 24 hour   Intake 720 ml   Output 2400 ml   Net -1680 ml       Invasive Devices  Report    Peripheral Intravenous Line            Peripheral IV 05/02/22 Left Antecubital 1 day                Review of Systems: Negative   Physical Exam: /82   Pulse 94   Temp 97 5 °F (36 4 °C)   Resp 18   Ht 5' 10" (1 778 m)   Wt 112 kg (247 lb 2 2 oz)   SpO2 94%   BMI 35 46 kg/m²     General Appearance:    Alert, cooperative, no distress, appears stated age   Head:    Normocephalic, without obvious abnormality, atraumatic   Eyes:    PERRL, conjunctiva/corneas clear, EOM's intact, fundi     benign, both eyes        Ears:    Normal TM's and external ear canals, both ears   Nose:   Nares normal, septum midline, mucosa normal, no drainage    or sinus tenderness   Throat: Lips, mucosa, and tongue normal; teeth and gums normal   Neck:   Supple, symmetrical, trachea midline, no adenopathy;        thyroid:  No enlargement/tenderness/nodules; no carotid    bruit or JVD   Back:     Symmetric, no curvature, ROM normal, no CVA tenderness   Lungs:     Clear to auscultation bilaterally, respirations unlabored   Chest wall:    No tenderness or deformity   Heart:    Regular rate and rhythm, S1 and S2 normal, no murmur, rub   or gallop   Abdomen:     Soft, non-tender, bowel sounds active all four quadrants,     no masses, no organomegaly           Extremities:   Extremities normal, atraumatic, no cyanosis or edema   Pulses:   2+ and symmetric all extremities   Skin:   Skin color, texture, turgor normal, no rashes or lesions   Lymph nodes:   Cervical, supraclavicular, and axillary nodes normal   Neurologic:   CNII-XII intact  Normal strength, sensation and reflexes       throughout       Lab Results: I have personally reviewed pertinent lab results  Imaging: I have personally reviewed pertinent reports

## 2022-05-04 NOTE — ASSESSMENT & PLAN NOTE
· Lifestyle modifications are recommended including weight loss, improving his diet, and increasing his amount of physical activity

## 2022-05-04 NOTE — ASSESSMENT & PLAN NOTE
Wt Readings from Last 3 Encounters:   05/04/22 112 kg (247 lb 2 2 oz)   03/28/22 121 kg (267 lb)   01/31/22 117 kg (257 lb)     · Found to have a decreased left ventricular ejection fraction of 40% on echocardiogram completed on 05/03/2022  · The patient was seen in consultation by Cardiology    · Continue lasix 40 mg IV BID  · Initiated on coreg 6 25 mg PO BID and lisinopril 5 mg PO Qdaily per Cardiology  · Check a nuclear stress test on 05/04/2022 per Cardiology

## 2022-05-04 NOTE — PLAN OF CARE
Problem: Potential for Falls  Goal: Patient will remain free of falls  Description: INTERVENTIONS:  - Educate patient/family on patient safety including physical limitations  - Instruct patient to call for assistance with activity   - Consult OT/PT to assist with strengthening/mobility   - Keep Call bell within reach  - Keep bed low and locked with side rails adjusted as appropriate  - Keep care items and personal belongings within reach  - Initiate and maintain comfort rounds  - Make Fall Risk Sign visible to staff  - Offer Toileting every 2 Hours, in advance of need  - Initiate/Maintain fall alarm  - Obtain necessary fall risk management equipment: alarm, nonskid socks, yellow bracelet  - Apply yellow socks and bracelet for high fall risk patients  - Consider moving patient to room near nurses station  Outcome: Progressing     Problem: PAIN - ADULT  Goal: Verbalizes/displays adequate comfort level or baseline comfort level  Description: Interventions:  - Encourage patient to monitor pain and request assistance  - Assess pain using appropriate pain scale  - Administer analgesics based on type and severity of pain and evaluate response  - Implement non-pharmacological measures as appropriate and evaluate response  - Consider cultural and social influences on pain and pain management  - Notify physician/advanced practitioner if interventions unsuccessful or patient reports new pain  Outcome: Progressing     Problem: INFECTION - ADULT  Goal: Absence or prevention of progression during hospitalization  Description: INTERVENTIONS:  - Assess and monitor for signs and symptoms of infection  - Monitor lab/diagnostic results  - Monitor all insertion sites, i e  indwelling lines, tubes, and drains  - Monitor endotracheal if appropriate and nasal secretions for changes in amount and color  - [place] appropriate cooling/warming therapies per order  - Administer medications as ordered  - Instruct and encourage patient and family to use good hand hygiene technique  - Identify and instruct in appropriate isolation precautions for identified infection/condition  Outcome: Progressing     Problem: SAFETY ADULT  Goal: Patient will remain free of falls  Description: INTERVENTIONS:  - Educate patient/family on patient safety including physical limitations  - Instruct patient to call for assistance with activity   - Consult OT/PT to assist with strengthening/mobility   - Keep Call bell within reach  - Keep bed low and locked with side rails adjusted as appropriate  - Keep care items and personal belongings within reach  - Initiate and maintain comfort rounds  - Make Fall Risk Sign visible to staff  - Offer Toileting every 2 Hours, in advance of need  - Initiate/Maintain fall alarm  - Obtain necessary fall risk management equipment: alarm, nonskid socks, yellow bracelet  - Apply yellow socks and bracelet for high fall risk patients  - Consider moving patient to room near nurses station  Outcome: Progressing  Goal: Maintain or return to baseline ADL function  Description: INTERVENTIONS:  -  Assess patient's ability to carry out ADLs; assess patient's baseline for ADL function and identify physical deficits which impact ability to perform ADLs (bathing, care of mouth/teeth, toileting, grooming, dressing, etc )  - Assess/evaluate cause of self-care deficits   - Assess range of motion  - Assess patient's mobility; develop plan if impaired  - Assess patient's need for assistive devices and provide as appropriate  - Encourage maximum independence but intervene and supervise when necessary  - Involve family in performance of ADLs  - Assess for home care needs following discharge   - Consider OT consult to assist with ADL evaluation and planning for discharge  - Provide patient education as appropriate  Outcome: Progressing  Goal: Maintains/Returns to pre admission functional level  Description: INTERVENTIONS:  - Perform BMAT or MOVE assessment daily  - Set and communicate daily mobility goal to care team and patient/family/caregiver  - Collaborate with rehabilitation services on mobility goals if consulted  - Perform Range of Motion 4 times a day  - Reposition patient every 2 hours  - Dangle patient 3 times a day  - Stand patient 3 times a day  - Ambulate patient 3 times a day  - Out of bed to chair 3 times a day   - Out of bed for meals 3 times a day  - Out of bed for toileting  - Record patient progress and toleration of activity level   Outcome: Progressing     Problem: DISCHARGE PLANNING  Goal: Discharge to home or other facility with appropriate resources  Description: INTERVENTIONS:  - Identify barriers to discharge w/patient and caregiver  - Arrange for needed discharge resources and transportation as appropriate  - Identify discharge learning needs (meds, wound care, etc )  - Arrange for interpretive services to assist at discharge as needed  - Refer to Case Management Department for coordinating discharge planning if the patient needs post-hospital services based on physician/advanced practitioner order or complex needs related to functional status, cognitive ability, or social support system  Outcome: Progressing     Problem: Knowledge Deficit  Goal: Patient/family/caregiver demonstrates understanding of disease process, treatment plan, medications, and discharge instructions  Description: Complete learning assessment and assess knowledge base    Interventions:  - Provide teaching at level of understanding  - Provide teaching via preferred learning methods  Outcome: Progressing     Problem: CARDIOVASCULAR - ADULT  Goal: Maintains optimal cardiac output and hemodynamic stability  Description: INTERVENTIONS:  - Monitor I/O, vital signs and rhythm  - Monitor for S/S and trends of decreased cardiac output  - Administer and titrate ordered vasoactive medications to optimize hemodynamic stability  - Assess quality of pulses, skin color and temperature  - Assess for signs of decreased coronary artery perfusion  - Instruct patient to report change in severity of symptoms  Outcome: Progressing  Goal: Absence of cardiac dysrhythmias or at baseline rhythm  Description: INTERVENTIONS:  - Continuous cardiac monitoring, vital signs, obtain 12 lead EKG if ordered  - Administer antiarrhythmic and heart rate control medications as ordered  - Monitor electrolytes and administer replacement therapy as ordered  Outcome: Progressing     Problem: RESPIRATORY - ADULT  Goal: Achieves optimal ventilation and oxygenation  Description: INTERVENTIONS:  - Assess for changes in respiratory status  - Assess for changes in mentation and behavior  - Position to facilitate oxygenation and minimize respiratory effort  - Oxygen administered by appropriate delivery if ordered  - Initiate smoking cessation education as indicated  - Encourage broncho-pulmonary hygiene including cough, deep breathe, Incentive Spirometry  - Assess the need for suctioning and aspirate as needed  - Assess and instruct to report SOB or any respiratory difficulty  - Respiratory Therapy support as indicated  Outcome: Progressing     Problem: METABOLIC, FLUID AND ELECTROLYTES - ADULT  Goal: Electrolytes maintained within normal limits  Description: INTERVENTIONS:  - Monitor labs and assess patient for signs and symptoms of electrolyte imbalances  - Administer electrolyte replacement as ordered  - Monitor response to electrolyte replacements, including repeat lab results as appropriate  - Instruct patient on fluid and nutrition as appropriate  Outcome: Progressing  Goal: Fluid balance maintained  Description: INTERVENTIONS:  - Monitor labs   - Monitor I/O and WT  - Instruct patient on fluid and nutrition as appropriate  - Assess for signs & symptoms of volume excess or deficit  Outcome: Progressing     Problem: HEMATOLOGIC - ADULT  Goal: Maintains hematologic stability  Description: INTERVENTIONS  - Assess for signs and symptoms of bleeding or hemorrhage  - Monitor labs  - Administer supportive blood products/factors as ordered and appropriate  Outcome: Progressing     Problem: MOBILITY - ADULT  Goal: Maintain or return to baseline ADL function  Description: INTERVENTIONS:  -  Assess patient's ability to carry out ADLs; assess patient's baseline for ADL function and identify physical deficits which impact ability to perform ADLs (bathing, care of mouth/teeth, toileting, grooming, dressing, etc )  - Assess/evaluate cause of self-care deficits   - Assess range of motion  - Assess patient's mobility; develop plan if impaired  - Assess patient's need for assistive devices and provide as appropriate  - Encourage maximum independence but intervene and supervise when necessary  - Involve family in performance of ADLs  - Assess for home care needs following discharge   - Consider OT consult to assist with ADL evaluation and planning for discharge  - Provide patient education as appropriate  Outcome: Progressing  Goal: Maintains/Returns to pre admission functional level  Description: INTERVENTIONS:  - Perform BMAT or MOVE assessment daily    - Set and communicate daily mobility goal to care team and patient/family/caregiver  - Collaborate with rehabilitation services on mobility goals if consulted  - Perform Range of Motion 4 times a day  - Reposition patient every 2 hours    - Dangle patient 3 times a day  - Stand patient 3 times a day  - Ambulate patient 3 times a day  - Out of bed to chair 3 times a day   - Out of bed for meals 3 times a day  - Out of bed for toileting  - Record patient progress and toleration of activity level   Outcome: Progressing     Problem: MOBILITY - ADULT  Goal: Maintain or return to baseline ADL function  Description: INTERVENTIONS:  -  Assess patient's ability to carry out ADLs; assess patient's baseline for ADL function and identify physical deficits which impact ability to perform ADLs (bathing, care of mouth/teeth, toileting, grooming, dressing, etc )  - Assess/evaluate cause of self-care deficits   - Assess range of motion  - Assess patient's mobility; develop plan if impaired  - Assess patient's need for assistive devices and provide as appropriate  - Encourage maximum independence but intervene and supervise when necessary  - Involve family in performance of ADLs  - Assess for home care needs following discharge   - Consider OT consult to assist with ADL evaluation and planning for discharge  - Provide patient education as appropriate  Outcome: Progressing  Goal: Maintains/Returns to pre admission functional level  Description: INTERVENTIONS:  - Perform BMAT or MOVE assessment daily    - Set and communicate daily mobility goal to care team and patient/family/caregiver  - Collaborate with rehabilitation services on mobility goals if consulted  - Perform Range of Motion 4 times a day  - Reposition patient every 2 hours    - Dangle patient 3 times a day  - Stand patient 3 times a day  - Ambulate patient 3 times a day  - Out of bed to chair 3 times a day   - Out of bed for meals 3 times a day  - Out of bed for toileting  - Record patient progress and toleration of activity level   Outcome: Progressing

## 2022-05-04 NOTE — PROGRESS NOTES
[facility] [address]  Progress Note - Evelyn Molina 1965, 61 y o  male MRN: [id number]  Unit/Bed#: 956-77 Encounter: [id number]  Primary Care Provider: Gregoria Blevins DO   Date and time admitted to hospital: 5/2/2022  1:26 PM    * Acute systolic congestive heart failure [facility])  Assessment & Plan  Wt Readings from Last 3 Encounters:   05/04/22 112 kg (247 lb 2 2 oz)   03/28/22 121 kg (267 lb)   01/31/22 117 kg (257 lb)     · Found to have a decreased left ventricular ejection fraction of 40% on echocardiogram completed on 05/03/2022  · The patient was seen in consultation by Cardiology  · Continue lasix 40 mg IV BID  · Initiated on coreg 6 25 mg PO BID and lisinopril 5 mg PO Qdaily per Cardiology  · Check a nuclear stress test on 05/04/2022 per Cardiology    Paroxysmal SVT (supraventricular tachycardia) ([address] )  Assessment & Plan  · History of SVT in the past requiring ablation  · Was observed to have a few episodes of tachycardia, possible SVT on telemetry monitoring  · Continue telemetry monitoring  · Initiated on coreg 6 25 mg PO BID    Elevated platelet count  Assessment & Plan  · Likely reactive in nature  · Follow the platelet count    Results from last 7 days   Lab Units 05/04/22  0520 05/03/22  0434 05/02/22  1336   WBC Thousand/uL 7 36 8 87 9 25   HEMOGLOBIN g/dL 15 3 14 5 14 5   HEMATOCRIT % 45 0 43 8 43 0   PLATELETS Thousands/uL 429* 442* 400*         Snoring  Assessment & Plan  · Needs an outpatient sleep study to check for obstructive sleep apnea    Mixed hyperlipidemia  Assessment & Plan  · Continue statin therapy    Hypertriglyceridemia  Assessment & Plan  · Continue statin therapy    Low HDL (under 40)  Assessment & Plan  · Lifestyle modifications are recommended including weight loss, improving his diet, and increasing his amount of physical activity  Vitamin D insufficiency  Assessment & Plan  · Initiate cholecalciferol 2000 I  U  PO Qdaily  · Recheck a vitamin D 25-OH level in 2-3 months with his PCP    Coronary artery calcification seen on CT scan  Assessment & Plan  · Continue statin therapy  · Initiated on coreg 6 25 mg PO BID  · Check a nuclear stress test on 05/04/2022 per Cardiology    CTA of the chest/PE protocol (05/02/2022): HEART/GREAT VESSELS:  Cardiomegaly  Slow transit of the IV contrast bolus through the cardiac chambers is suggestive of cardiac failure  No thoracic aortic aneurysm  Coronary artery calcifications  No pericardial effusion  VTE Pharmacologic Prophylaxis: VTE Score: 5 High Risk (Score >/= 5) - Pharmacological DVT Prophylaxis Ordered: enoxaparin (Lovenox)  Sequential Compression Devices Ordered  Patient Centered Rounds: I performed bedside rounds with nursing staff today  Discussions with Specialists or Other Care Team Provider: I discussed the case with Cardiology  Education and Discussions with Family / Patient: Updated  (wife) via phone  Time Spent for Care: 30 minutes  More than 50% of total time spent on counseling and coordination of care as described above  Current Length of Stay: 2 day(s)  Current Patient Status: Inpatient   Certification Statement: The patient will continue to require additional inpatient hospital stay due to the need for IV lasix treatment and for a nuclear stress test today  Discharge Plan: Anticipate discharge in 24-48 hrs to home  Code Status: Level 1 - Full Code    Subjective: The patient was seen and examined  The patient is doing better  No chest pain  No shortness of breath  No abdominal pain  No nausea or vomiting  Objective:     Vitals:   No data recorded  HR:  [86-94] 86  Resp:  [18-20] 18  BP: (103-131)/(73-82) 103/73  SpO2:  [91 %-96 %] 96 %  Body mass index is 35 46 kg/m²  Input and Output Summary (last 24 hours):      Intake/Output Summary (Last 24 hours) at 5/4/2022 1436  Last data filed at 5/4/2022 1156  Gross per 24 hour   Intake 720 ml   Output 2200 ml   Net -1480 ml       Physical Exam:   Physical Exam   General:  NAD, follows commands  HEENT:  NC/AT, mucous membranes moist  Neck:  Supple, No JVP elevation  CV:  + S1, + S2, RRR  Pulm:  Scant bibasilar crackles  Abd:  Soft, Non-tender, Non-distended  Ext:  No clubbing/cyanosis/edema  Skin:  No rashes  Neuro:  Awake, alert, oriented  Psych:  Normal mood and affect      Additional Data:    Labs:  Results from last 7 days   Lab Units 05/04/22  0520   WBC Thousand/uL 7 36   HEMOGLOBIN g/dL 15 3   HEMATOCRIT % 45 0   PLATELETS Thousands/uL 429*   NEUTROS PCT % 67   LYMPHS PCT % 22   MONOS PCT % 7   EOS PCT % 3     Results from last 7 days   Lab Units 05/04/22  0520   SODIUM mmol/L 139   POTASSIUM mmol/L 3 9   CHLORIDE mmol/L 102   CO2 mmol/L 26   BUN mg/dL 17   CREATININE mg/dL 1 10   ANION GAP mmol/L 11   CALCIUM mg/dL 9 4   ALBUMIN g/dL 3 5   TOTAL BILIRUBIN mg/dL 0 99   ALK PHOS U/L 104   ALT U/L 31   AST U/L 21   GLUCOSE RANDOM mg/dL 108             Results from last 7 days   Lab Units 05/03/22  0434   HEMOGLOBIN A1C % 5 4     Results from last 7 days   Lab Units 05/04/22  0520 05/03/22  0438 05/03/22  0434   LACTIC ACID mmol/L  --   --  1 4   PROCALCITONIN ng/ml 0 07 0 06  --        Lines/Drains:  Invasive Devices  Report    Peripheral Intravenous Line            Peripheral IV 05/02/22 Left Antecubital 2 days                  Telemetry:  Telemetry Orders (From admission, onward)             48 Hour Telemetry Monitoring  Continuous x 48 hours        References:    Telemetry Guidelines   Question:  Reason for 48 Hour Telemetry  Answer:  Arrhythmias Requiring Medical Therapy (eg  SVT, Vtach/fib, Bradycardia, Uncontrolled A-fib)                 Telemetry Reviewed: Normal Sinus Rhythm  Indication for Continued Telemetry Use: Arrthymias requiring medical therapy             Imaging: No pertinent imaging reviewed      Recent Cultures (last 7 days):         Last 24 Hours Medication List:   Current Facility-Administered Medications   Medication Dose Route Frequency Provider Last Rate    acetaminophen  650 mg Oral Q6H PRN Leeanne Irons [place], DO      allopurinol  100 mg Oral Daily Brayden WINTER [place],       atorvastatin  20 mg Oral QPM Leeanne Irons [place], DO      carvedilol  6 25 mg Oral BID With Meals Rick Perez PA-C      cholecalciferol  2,000 Units Oral Daily Leeanne Irons [place], DO      DULoxetine  60 mg Oral Daily Leeanne Irons [place], DO      enoxaparin  40 mg Subcutaneous Q24H Leeanne Irons [place], DO      furosemide  40 mg Intravenous BID (diuretic) Leeanne Irons [place], DO      lisinopril  5 mg Oral Daily Cheli Silva MD          Today, Patient Was Seen By: Mark Barcenas DO    **Please Note: This note may have been constructed using a voice recognition system  **

## 2022-05-04 NOTE — ASSESSMENT & PLAN NOTE
· Continue statin therapy  · Initiated on coreg 6 25 mg PO BID  · Check a nuclear stress test on 05/04/2022 per Cardiology    CTA of the chest/PE protocol (05/02/2022): HEART/GREAT VESSELS:  Cardiomegaly  Slow transit of the IV contrast bolus through the cardiac chambers is suggestive of cardiac failure  No thoracic aortic aneurysm  Coronary artery calcifications  No pericardial effusion

## 2022-05-04 NOTE — ASSESSMENT & PLAN NOTE
· Likely reactive in nature  · Follow the platelet count    Results from last 7 days   Lab Units 05/04/22  0520 05/03/22  0434 05/02/22  1336   WBC Thousand/uL 7 36 8 87 9 25   HEMOGLOBIN g/dL 15 3 14 5 14 5   HEMATOCRIT % 45 0 43 8 43 0   PLATELETS Thousands/uL 429* 442* 400*

## 2022-05-05 ENCOUNTER — TRANSITIONAL CARE MANAGEMENT (OUTPATIENT)
Dept: FAMILY MEDICINE CLINIC | Facility: CLINIC | Age: 57
End: 2022-05-05

## 2022-05-05 VITALS
SYSTOLIC BLOOD PRESSURE: 128 MMHG | BODY MASS INDEX: 35.35 KG/M2 | TEMPERATURE: 97.7 F | WEIGHT: 246.91 LBS | HEART RATE: 88 BPM | RESPIRATION RATE: 19 BRPM | OXYGEN SATURATION: 94 % | HEIGHT: 70 IN | DIASTOLIC BLOOD PRESSURE: 82 MMHG

## 2022-05-05 PROBLEM — E83.39 HYPERPHOSPHATEMIA: Status: ACTIVE | Noted: 2022-05-05

## 2022-05-05 LAB
ALBUMIN SERPL BCP-MCNC: 3.6 G/DL (ref 3.5–5)
ALP SERPL-CCNC: 101 U/L (ref 46–116)
ALT SERPL W P-5'-P-CCNC: 32 U/L (ref 12–78)
ANION GAP SERPL CALCULATED.3IONS-SCNC: 10 MMOL/L (ref 4–13)
AST SERPL W P-5'-P-CCNC: 16 U/L (ref 5–45)
BASOPHILS # BLD AUTO: 0.06 THOUSANDS/ΜL (ref 0–0.1)
BASOPHILS NFR BLD AUTO: 1 % (ref 0–1)
BILIRUB SERPL-MCNC: 0.94 MG/DL (ref 0.2–1)
BUN SERPL-MCNC: 24 MG/DL (ref 5–25)
CALCIUM SERPL-MCNC: 9.4 MG/DL (ref 8.3–10.1)
CHEST PAIN STATEMENT: NORMAL
CHLORIDE SERPL-SCNC: 102 MMOL/L (ref 100–108)
CO2 SERPL-SCNC: 27 MMOL/L (ref 21–32)
CREAT SERPL-MCNC: 1.18 MG/DL (ref 0.6–1.3)
EOSINOPHIL # BLD AUTO: 0.19 THOUSAND/ΜL (ref 0–0.61)
EOSINOPHIL NFR BLD AUTO: 2 % (ref 0–6)
ERYTHROCYTE [DISTWIDTH] IN BLOOD BY AUTOMATED COUNT: 13.8 % (ref 11.6–15.1)
GFR SERPL CREATININE-BSD FRML MDRD: 67 ML/MIN/1.73SQ M
GLUCOSE SERPL-MCNC: 104 MG/DL (ref 65–140)
HCT VFR BLD AUTO: 45.9 % (ref 36.5–49.3)
HGB BLD-MCNC: 15.6 G/DL (ref 12–17)
IMM GRANULOCYTES # BLD AUTO: 0.04 THOUSAND/UL (ref 0–0.2)
IMM GRANULOCYTES NFR BLD AUTO: 1 % (ref 0–2)
LYMPHOCYTES # BLD AUTO: 1.77 THOUSANDS/ΜL (ref 0.6–4.47)
LYMPHOCYTES NFR BLD AUTO: 22 % (ref 14–44)
MAGNESIUM SERPL-MCNC: 2.2 MG/DL (ref 1.6–2.6)
MAX DIASTOLIC BP: 80 MMHG
MAX HEART RATE: 105 BPM
MAX PREDICTED HEART RATE: 162 BPM
MAX. SYSTOLIC BP: 106 MMHG
MCH RBC QN AUTO: 30.4 PG (ref 26.8–34.3)
MCHC RBC AUTO-ENTMCNC: 34 G/DL (ref 31.4–37.4)
MCV RBC AUTO: 89 FL (ref 82–98)
MONOCYTES # BLD AUTO: 0.57 THOUSAND/ΜL (ref 0.17–1.22)
MONOCYTES NFR BLD AUTO: 7 % (ref 4–12)
NEUTROPHILS # BLD AUTO: 5.45 THOUSANDS/ΜL (ref 1.85–7.62)
NEUTS SEG NFR BLD AUTO: 67 % (ref 43–75)
NRBC BLD AUTO-RTO: 0 /100 WBCS
NT-PROBNP SERPL-MCNC: 1574 PG/ML
PHOSPHATE SERPL-MCNC: 4.8 MG/DL (ref 2.7–4.5)
PLATELET # BLD AUTO: 412 THOUSANDS/UL (ref 149–390)
PMV BLD AUTO: 10.5 FL (ref 8.9–12.7)
POTASSIUM SERPL-SCNC: 3.9 MMOL/L (ref 3.5–5.3)
PROCALCITONIN SERPL-MCNC: 0.08 NG/ML
PROT SERPL-MCNC: 8.2 G/DL (ref 6.4–8.2)
PROTOCOL NAME: NORMAL
RBC # BLD AUTO: 5.14 MILLION/UL (ref 3.88–5.62)
REASON FOR TERMINATION: NORMAL
SODIUM SERPL-SCNC: 139 MMOL/L (ref 136–145)
TARGET HR FORMULA: NORMAL
TEST INDICATION: NORMAL
TIME IN EXERCISE PHASE: NORMAL
WBC # BLD AUTO: 8.08 THOUSAND/UL (ref 4.31–10.16)

## 2022-05-05 PROCEDURE — 99232 SBSQ HOSP IP/OBS MODERATE 35: CPT | Performed by: INTERNAL MEDICINE

## 2022-05-05 PROCEDURE — 83735 ASSAY OF MAGNESIUM: CPT | Performed by: INTERNAL MEDICINE

## 2022-05-05 PROCEDURE — 84145 PROCALCITONIN (PCT): CPT | Performed by: INTERNAL MEDICINE

## 2022-05-05 PROCEDURE — 80053 COMPREHEN METABOLIC PANEL: CPT | Performed by: INTERNAL MEDICINE

## 2022-05-05 PROCEDURE — 94760 N-INVAS EAR/PLS OXIMETRY 1: CPT

## 2022-05-05 PROCEDURE — 83880 ASSAY OF NATRIURETIC PEPTIDE: CPT | Performed by: INTERNAL MEDICINE

## 2022-05-05 PROCEDURE — 84100 ASSAY OF PHOSPHORUS: CPT | Performed by: INTERNAL MEDICINE

## 2022-05-05 PROCEDURE — 99239 HOSP IP/OBS DSCHRG MGMT >30: CPT | Performed by: INTERNAL MEDICINE

## 2022-05-05 PROCEDURE — 94761 N-INVAS EAR/PLS OXIMETRY MLT: CPT

## 2022-05-05 PROCEDURE — 85025 COMPLETE CBC W/AUTO DIFF WBC: CPT | Performed by: INTERNAL MEDICINE

## 2022-05-05 RX ORDER — MELATONIN
2000 DAILY
Qty: 60 TABLET | Refills: 0 | Status: SHIPPED | OUTPATIENT
Start: 2022-05-06 | End: 2022-06-28 | Stop reason: SDUPTHER

## 2022-05-05 RX ORDER — POTASSIUM CHLORIDE 750 MG/1
10 CAPSULE, EXTENDED RELEASE ORAL DAILY
Qty: 30 CAPSULE | Refills: 0 | Status: SHIPPED | OUTPATIENT
Start: 2022-05-05 | End: 2022-06-28 | Stop reason: SDUPTHER

## 2022-05-05 RX ORDER — LISINOPRIL 5 MG/1
5 TABLET ORAL DAILY
Qty: 30 TABLET | Refills: 0 | Status: SHIPPED | OUTPATIENT
Start: 2022-05-06 | End: 2022-06-13 | Stop reason: SDUPTHER

## 2022-05-05 RX ORDER — TORSEMIDE 20 MG/1
20 TABLET ORAL DAILY
Qty: 30 TABLET | Refills: 0 | Status: SHIPPED | OUTPATIENT
Start: 2022-05-06 | End: 2022-06-28 | Stop reason: SDUPTHER

## 2022-05-05 RX ORDER — CARVEDILOL 6.25 MG/1
6.25 TABLET ORAL 2 TIMES DAILY WITH MEALS
Qty: 60 TABLET | Refills: 0 | Status: SHIPPED | OUTPATIENT
Start: 2022-05-05 | End: 2022-06-28 | Stop reason: SDUPTHER

## 2022-05-05 RX ORDER — ATORVASTATIN CALCIUM 40 MG/1
40 TABLET, FILM COATED ORAL EVERY EVENING
Qty: 30 TABLET | Refills: 0 | Status: SHIPPED | OUTPATIENT
Start: 2022-05-05 | End: 2022-06-28 | Stop reason: SDUPTHER

## 2022-05-05 RX ORDER — ASPIRIN 81 MG/1
81 TABLET ORAL DAILY
Qty: 30 TABLET | Refills: 0 | Status: SHIPPED | OUTPATIENT
Start: 2022-05-05 | End: 2022-06-13 | Stop reason: SDUPTHER

## 2022-05-05 RX ORDER — POTASSIUM CHLORIDE 20 MEQ/1
20 TABLET, EXTENDED RELEASE ORAL ONCE
Status: COMPLETED | OUTPATIENT
Start: 2022-05-05 | End: 2022-05-05

## 2022-05-05 RX ADMIN — LISINOPRIL 5 MG: 5 TABLET ORAL at 08:00

## 2022-05-05 RX ADMIN — CHOLECALCIFEROL TAB 25 MCG (1000 UNIT) 2000 UNITS: 25 TAB at 08:00

## 2022-05-05 RX ADMIN — ALLOPURINOL 100 MG: 100 TABLET ORAL at 08:01

## 2022-05-05 RX ADMIN — FUROSEMIDE 40 MG: 10 INJECTION, SOLUTION INTRAMUSCULAR; INTRAVENOUS at 08:01

## 2022-05-05 RX ADMIN — DULOXETINE HYDROCHLORIDE 60 MG: 30 CAPSULE, DELAYED RELEASE ORAL at 08:00

## 2022-05-05 RX ADMIN — POTASSIUM CHLORIDE 20 MEQ: 20 TABLET, EXTENDED RELEASE ORAL at 08:00

## 2022-05-05 RX ADMIN — CARVEDILOL 6.25 MG: 3.12 TABLET, FILM COATED ORAL at 08:00

## 2022-05-05 NOTE — PLAN OF CARE
Problem: Potential for Falls  Goal: Patient will remain free of falls  Description: INTERVENTIONS:  - Educate patient/family on patient safety including physical limitations  - Instruct patient to call for assistance with activity   - Consult OT/PT to assist with strengthening/mobility   - Keep Call bell within reach  - Keep bed low and locked with side rails adjusted as appropriate  - Keep care items and personal belongings within reach  - Initiate and maintain comfort rounds  - Make Fall Risk Sign visible to staff  - Offer Toileting every 2 Hours, in advance of need  - Initiate/Maintain fall alarm  - Obtain necessary fall risk management equipment: alarm, nonskid socks, yellow bracelet  - Apply yellow socks and bracelet for high fall risk patients  - Consider moving patient to room near nurses station  Outcome: Progressing     Problem: PAIN - ADULT  Goal: Verbalizes/displays adequate comfort level or baseline comfort level  Description: Interventions:  - Encourage patient to monitor pain and request assistance  - Assess pain using appropriate pain scale  - Administer analgesics based on type and severity of pain and evaluate response  - Implement non-pharmacological measures as appropriate and evaluate response  - Consider cultural and social influences on pain and pain management  - Notify physician/advanced practitioner if interventions unsuccessful or patient reports new pain  Outcome: Progressing     Problem: INFECTION - ADULT  Goal: Absence or prevention of progression during hospitalization  Description: INTERVENTIONS:  - Assess and monitor for signs and symptoms of infection  - Monitor lab/diagnostic results  - Monitor all insertion sites, i e  indwelling lines, tubes, and drains  - Monitor endotracheal if appropriate and nasal secretions for changes in amount and color  - [place] appropriate cooling/warming therapies per order  - Administer medications as ordered  - Instruct and encourage patient and family to use good hand hygiene technique  - Identify and instruct in appropriate isolation precautions for identified infection/condition  Outcome: Progressing     Problem: SAFETY ADULT  Goal: Patient will remain free of falls  Description: INTERVENTIONS:  - Educate patient/family on patient safety including physical limitations  - Instruct patient to call for assistance with activity   - Consult OT/PT to assist with strengthening/mobility   - Keep Call bell within reach  - Keep bed low and locked with side rails adjusted as appropriate  - Keep care items and personal belongings within reach  - Initiate and maintain comfort rounds  - Make Fall Risk Sign visible to staff  - Offer Toileting every 2 Hours, in advance of need  - Initiate/Maintain fall alarm  - Obtain necessary fall risk management equipment: alarm, nonskid socks, yellow bracelet  - Apply yellow socks and bracelet for high fall risk patients  - Consider moving patient to room near nurses station  Outcome: Progressing  Goal: Maintain or return to baseline ADL function  Description: INTERVENTIONS:  -  Assess patient's ability to carry out ADLs; assess patient's baseline for ADL function and identify physical deficits which impact ability to perform ADLs (bathing, care of mouth/teeth, toileting, grooming, dressing, etc )  - Assess/evaluate cause of self-care deficits   - Assess range of motion  - Assess patient's mobility; develop plan if impaired  - Assess patient's need for assistive devices and provide as appropriate  - Encourage maximum independence but intervene and supervise when necessary  - Involve family in performance of ADLs  - Assess for home care needs following discharge   - Consider OT consult to assist with ADL evaluation and planning for discharge  - Provide patient education as appropriate  Outcome: Progressing  Goal: Maintains/Returns to pre admission functional level  Description: INTERVENTIONS:  - Perform BMAT or MOVE assessment daily  - Set and communicate daily mobility goal to care team and patient/family/caregiver  - Collaborate with rehabilitation services on mobility goals if consulted  - Perform Range of Motion 4 times a day  - Reposition patient every 2 hours  - Dangle patient 3 times a day  - Stand patient 3 times a day  - Ambulate patient 3 times a day  - Out of bed to chair 3 times a day   - Out of bed for meals 3 times a day  - Out of bed for toileting  - Record patient progress and toleration of activity level   Outcome: Progressing     Problem: DISCHARGE PLANNING  Goal: Discharge to home or other facility with appropriate resources  Description: INTERVENTIONS:  - Identify barriers to discharge w/patient and caregiver  - Arrange for needed discharge resources and transportation as appropriate  - Identify discharge learning needs (meds, wound care, etc )  - Arrange for interpretive services to assist at discharge as needed  - Refer to Case Management Department for coordinating discharge planning if the patient needs post-hospital services based on physician/advanced practitioner order or complex needs related to functional status, cognitive ability, or social support system  Outcome: Progressing     Problem: Knowledge Deficit  Goal: Patient/family/caregiver demonstrates understanding of disease process, treatment plan, medications, and discharge instructions  Description: Complete learning assessment and assess knowledge base    Interventions:  - Provide teaching at level of understanding  - Provide teaching via preferred learning methods  Outcome: Progressing     Problem: CARDIOVASCULAR - ADULT  Goal: Maintains optimal cardiac output and hemodynamic stability  Description: INTERVENTIONS:  - Monitor I/O, vital signs and rhythm  - Monitor for S/S and trends of decreased cardiac output  - Administer and titrate ordered vasoactive medications to optimize hemodynamic stability  - Assess quality of pulses, skin color and temperature  - Assess for signs of decreased coronary artery perfusion  - Instruct patient to report change in severity of symptoms  Outcome: Progressing  Goal: Absence of cardiac dysrhythmias or at baseline rhythm  Description: INTERVENTIONS:  - Continuous cardiac monitoring, vital signs, obtain 12 lead EKG if ordered  - Administer antiarrhythmic and heart rate control medications as ordered  - Monitor electrolytes and administer replacement therapy as ordered  Outcome: Progressing     Problem: RESPIRATORY - ADULT  Goal: Achieves optimal ventilation and oxygenation  Description: INTERVENTIONS:  - Assess for changes in respiratory status  - Assess for changes in mentation and behavior  - Position to facilitate oxygenation and minimize respiratory effort  - Oxygen administered by appropriate delivery if ordered  - Initiate smoking cessation education as indicated  - Encourage broncho-pulmonary hygiene including cough, deep breathe, Incentive Spirometry  - Assess the need for suctioning and aspirate as needed  - Assess and instruct to report SOB or any respiratory difficulty  - Respiratory Therapy support as indicated  Outcome: Progressing     Problem: METABOLIC, FLUID AND ELECTROLYTES - ADULT  Goal: Electrolytes maintained within normal limits  Description: INTERVENTIONS:  - Monitor labs and assess patient for signs and symptoms of electrolyte imbalances  - Administer electrolyte replacement as ordered  - Monitor response to electrolyte replacements, including repeat lab results as appropriate  - Instruct patient on fluid and nutrition as appropriate  Outcome: Progressing  Goal: Fluid balance maintained  Description: INTERVENTIONS:  - Monitor labs   - Monitor I/O and WT  - Instruct patient on fluid and nutrition as appropriate  - Assess for signs & symptoms of volume excess or deficit  Outcome: Progressing     Problem: HEMATOLOGIC - ADULT  Goal: Maintains hematologic stability  Description: INTERVENTIONS  - Assess for signs and symptoms of bleeding or hemorrhage  - Monitor labs  - Administer supportive blood products/factors as ordered and appropriate  Outcome: Progressing     Problem: MOBILITY - ADULT  Goal: Maintain or return to baseline ADL function  Description: INTERVENTIONS:  -  Assess patient's ability to carry out ADLs; assess patient's baseline for ADL function and identify physical deficits which impact ability to perform ADLs (bathing, care of mouth/teeth, toileting, grooming, dressing, etc )  - Assess/evaluate cause of self-care deficits   - Assess range of motion  - Assess patient's mobility; develop plan if impaired  - Assess patient's need for assistive devices and provide as appropriate  - Encourage maximum independence but intervene and supervise when necessary  - Involve family in performance of ADLs  - Assess for home care needs following discharge   - Consider OT consult to assist with ADL evaluation and planning for discharge  - Provide patient education as appropriate  Outcome: Progressing  Goal: Maintains/Returns to pre admission functional level  Description: INTERVENTIONS:  - Perform BMAT or MOVE assessment daily    - Set and communicate daily mobility goal to care team and patient/family/caregiver  - Collaborate with rehabilitation services on mobility goals if consulted  - Perform Range of Motion 4 times a day  - Reposition patient every 2 hours    - Dangle patient 3 times a day  - Stand patient 3 times a day  - Ambulate patient 3 times a day  - Out of bed to chair 3 times a day   - Out of bed for meals 3 times a day  - Out of bed for toileting  - Record patient progress and toleration of activity level   Outcome: Progressing

## 2022-05-05 NOTE — PLAN OF CARE
Problem: Potential for Falls  Goal: Patient will remain free of falls  Description: INTERVENTIONS:  - Educate patient/family on patient safety including physical limitations  - Instruct patient to call for assistance with activity   - Consult OT/PT to assist with strengthening/mobility   - Keep Call bell within reach  - Keep bed low and locked with side rails adjusted as appropriate  - Keep care items and personal belongings within reach  - Initiate and maintain comfort rounds  - Make Fall Risk Sign visible to staff  - Offer Toileting every 2 Hours, in advance of need  - Initiate/Maintain fall alarm  - Obtain necessary fall risk management equipment: alarm, nonskid socks, yellow bracelet  - Apply yellow socks and bracelet for high fall risk patients  - Consider moving patient to room near nurses station  Outcome: Progressing     Problem: PAIN - ADULT  Goal: Verbalizes/displays adequate comfort level or baseline comfort level  Description: Interventions:  - Encourage patient to monitor pain and request assistance  - Assess pain using appropriate pain scale  - Administer analgesics based on type and severity of pain and evaluate response  - Implement non-pharmacological measures as appropriate and evaluate response  - Consider cultural and social influences on pain and pain management  - Notify physician/advanced practitioner if interventions unsuccessful or patient reports new pain  Outcome: Progressing     Problem: INFECTION - ADULT  Goal: Absence or prevention of progression during hospitalization  Description: INTERVENTIONS:  - Assess and monitor for signs and symptoms of infection  - Monitor lab/diagnostic results  - Monitor all insertion sites, i e  indwelling lines, tubes, and drains  - Monitor endotracheal if appropriate and nasal secretions for changes in amount and color  - [place] appropriate cooling/warming therapies per order  - Administer medications as ordered  - Instruct and encourage patient and family to use good hand hygiene technique  - Identify and instruct in appropriate isolation precautions for identified infection/condition  Outcome: Progressing     Problem: SAFETY ADULT  Goal: Patient will remain free of falls  Description: INTERVENTIONS:  - Educate patient/family on patient safety including physical limitations  - Instruct patient to call for assistance with activity   - Consult OT/PT to assist with strengthening/mobility   - Keep Call bell within reach  - Keep bed low and locked with side rails adjusted as appropriate  - Keep care items and personal belongings within reach  - Initiate and maintain comfort rounds  - Make Fall Risk Sign visible to staff  - Offer Toileting every 2 Hours, in advance of need  - Initiate/Maintain fall alarm  - Obtain necessary fall risk management equipment: alarm, nonskid socks, yellow bracelet  - Apply yellow socks and bracelet for high fall risk patients  - Consider moving patient to room near nurses station  Outcome: Progressing  Goal: Maintain or return to baseline ADL function  Description: INTERVENTIONS:  -  Assess patient's ability to carry out ADLs; assess patient's baseline for ADL function and identify physical deficits which impact ability to perform ADLs (bathing, care of mouth/teeth, toileting, grooming, dressing, etc )  - Assess/evaluate cause of self-care deficits   - Assess range of motion  - Assess patient's mobility; develop plan if impaired  - Assess patient's need for assistive devices and provide as appropriate  - Encourage maximum independence but intervene and supervise when necessary  - Involve family in performance of ADLs  - Assess for home care needs following discharge   - Consider OT consult to assist with ADL evaluation and planning for discharge  - Provide patient education as appropriate  Outcome: Progressing  Goal: Maintains/Returns to pre admission functional level  Description: INTERVENTIONS:  - Perform BMAT or MOVE assessment daily  - Set and communicate daily mobility goal to care team and patient/family/caregiver  - Collaborate with rehabilitation services on mobility goals if consulted  - Perform Range of Motion 4 times a day  - Reposition patient every 2 hours  - Dangle patient 3 times a day  - Stand patient 3 times a day  - Ambulate patient 3 times a day  - Out of bed to chair 3 times a day   - Out of bed for meals 3 times a day  - Out of bed for toileting  - Record patient progress and toleration of activity level   Outcome: Progressing     Problem: DISCHARGE PLANNING  Goal: Discharge to home or other facility with appropriate resources  Description: INTERVENTIONS:  - Identify barriers to discharge w/patient and caregiver  - Arrange for needed discharge resources and transportation as appropriate  - Identify discharge learning needs (meds, wound care, etc )  - Arrange for interpretive services to assist at discharge as needed  - Refer to Case Management Department for coordinating discharge planning if the patient needs post-hospital services based on physician/advanced practitioner order or complex needs related to functional status, cognitive ability, or social support system  Outcome: Progressing     Problem: Knowledge Deficit  Goal: Patient/family/caregiver demonstrates understanding of disease process, treatment plan, medications, and discharge instructions  Description: Complete learning assessment and assess knowledge base    Interventions:  - Provide teaching at level of understanding  - Provide teaching via preferred learning methods  Outcome: Progressing     Problem: CARDIOVASCULAR - ADULT  Goal: Maintains optimal cardiac output and hemodynamic stability  Description: INTERVENTIONS:  - Monitor I/O, vital signs and rhythm  - Monitor for S/S and trends of decreased cardiac output  - Administer and titrate ordered vasoactive medications to optimize hemodynamic stability  - Assess quality of pulses, skin color and temperature  - Assess for signs of decreased coronary artery perfusion  - Instruct patient to report change in severity of symptoms  Outcome: Progressing  Goal: Absence of cardiac dysrhythmias or at baseline rhythm  Description: INTERVENTIONS:  - Continuous cardiac monitoring, vital signs, obtain 12 lead EKG if ordered  - Administer antiarrhythmic and heart rate control medications as ordered  - Monitor electrolytes and administer replacement therapy as ordered  Outcome: Progressing     Problem: RESPIRATORY - ADULT  Goal: Achieves optimal ventilation and oxygenation  Description: INTERVENTIONS:  - Assess for changes in respiratory status  - Assess for changes in mentation and behavior  - Position to facilitate oxygenation and minimize respiratory effort  - Oxygen administered by appropriate delivery if ordered  - Initiate smoking cessation education as indicated  - Encourage broncho-pulmonary hygiene including cough, deep breathe, Incentive Spirometry  - Assess the need for suctioning and aspirate as needed  - Assess and instruct to report SOB or any respiratory difficulty  - Respiratory Therapy support as indicated  Outcome: Progressing     Problem: METABOLIC, FLUID AND ELECTROLYTES - ADULT  Goal: Electrolytes maintained within normal limits  Description: INTERVENTIONS:  - Monitor labs and assess patient for signs and symptoms of electrolyte imbalances  - Administer electrolyte replacement as ordered  - Monitor response to electrolyte replacements, including repeat lab results as appropriate  - Instruct patient on fluid and nutrition as appropriate  Outcome: Progressing  Goal: Fluid balance maintained  Description: INTERVENTIONS:  - Monitor labs   - Monitor I/O and WT  - Instruct patient on fluid and nutrition as appropriate  - Assess for signs & symptoms of volume excess or deficit  Outcome: Progressing     Problem: HEMATOLOGIC - ADULT  Goal: Maintains hematologic stability  Description: INTERVENTIONS  - Assess for signs and symptoms of bleeding or hemorrhage  - Monitor labs  - Administer supportive blood products/factors as ordered and appropriate  Outcome: Progressing     Problem: MOBILITY - ADULT  Goal: Maintain or return to baseline ADL function  Description: INTERVENTIONS:  -  Assess patient's ability to carry out ADLs; assess patient's baseline for ADL function and identify physical deficits which impact ability to perform ADLs (bathing, care of mouth/teeth, toileting, grooming, dressing, etc )  - Assess/evaluate cause of self-care deficits   - Assess range of motion  - Assess patient's mobility; develop plan if impaired  - Assess patient's need for assistive devices and provide as appropriate  - Encourage maximum independence but intervene and supervise when necessary  - Involve family in performance of ADLs  - Assess for home care needs following discharge   - Consider OT consult to assist with ADL evaluation and planning for discharge  - Provide patient education as appropriate  Outcome: Progressing  Goal: Maintains/Returns to pre admission functional level  Description: INTERVENTIONS:  - Perform BMAT or MOVE assessment daily    - Set and communicate daily mobility goal to care team and patient/family/caregiver  - Collaborate with rehabilitation services on mobility goals if consulted  - Perform Range of Motion 4 times a day  - Reposition patient every 2 hours    - Dangle patient 3 times a day  - Stand patient 3 times a day  - Ambulate patient 3 times a day  - Out of bed to chair 3 times a day   - Out of bed for meals 3 times a day  - Out of bed for toileting  - Record patient progress and toleration of activity level   Outcome: Progressing

## 2022-05-05 NOTE — ASSESSMENT & PLAN NOTE
· Likely reactive in nature  · Follow the platelet count with PCP, during hospital stay down trending    Results from last 7 days   Lab Units 05/05/22  0447 05/04/22  0520 05/03/22  0434   WBC [place]/uL 8 08 7 36 8 87   HEMOGLOBIN g/dL 15 6 15 3 14 5   HEMATOCRIT % 45 9 45 0 43 8   PLATELETS Thousands/uL 412* 429* 442*

## 2022-05-05 NOTE — ASSESSMENT & PLAN NOTE
Wt Readings from Last 3 Encounters:   05/05/22 112 kg (246 lb 14 6 oz)   03/28/22 121 kg (267 lb)   01/31/22 117 kg (257 lb)     · Found to have a decreased left ventricular ejection fraction of 40% on echocardiogram completed on 05/03/2022  · The patient was seen in consultation by Cardiology    · Initiated lasix 40 mg IV BID  · Initiated on coreg 6 25 mg PO BID and lisinopril 5 mg PO Qdaily per Cardiology  ·  nuclear stress test on 05/04/2022 un remark, no stressed induced myocardial ischemia, per Cardiology  · Upon discharge patient needs to continue following medication, per Cardiology carvedilol 6 25 mg b i d , torsemide 20 mg daily lisinopril 5 mg daily, k 10mEq daily  · Patient is to follow-up with outpatient Cardiology within 1-2 weeks

## 2022-05-05 NOTE — DISCHARGE SUMMARY
[facility] [address] [place]  Discharge- Evelyn oMlina 1965, 61 y o  male MRN: [id number]  Unit/Bed#: 376-23 Encounter: [id number]  Primary Care Provider: Gregoria Blevins DO   Date and time admitted to hospital: 5/2/2022  1:26 PM    * Acute systolic congestive heart failure [facility])  Assessment & Plan  Wt Readings from Last 3 Encounters:   05/05/22 112 kg (246 lb 14 6 oz)   03/28/22 121 kg (267 lb)   01/31/22 117 kg (257 lb)     · Found to have a decreased left ventricular ejection fraction of 40% on echocardiogram completed on 05/03/2022  · The patient was seen in consultation by Cardiology  · Initiated lasix 40 mg IV BID  · Initiated on coreg 6 25 mg PO BID and lisinopril 5 mg PO Qdaily per Cardiology  ·  nuclear stress test on 05/04/2022 un remark, no stressed induced myocardial ischemia, per Cardiology  · Upon discharge patient needs to continue following medication, per Cardiology carvedilol 6 25 mg b i d , torsemide 20 mg daily lisinopril 5 mg daily, k 10mEq daily  · Patient is to follow-up with outpatient Cardiology within 1-2 weeks    Hyperphosphatemia  Assessment & Plan  Follow-up with PCP after vitamin-D level is within normal level, given vitamin-D during hospital stay    Paroxysmal SVT (supraventricular tachycardia) ([facility] [address] )  Assessment & Plan  · History of SVT in the past requiring ablation  · Was observed to have a few episodes of tachycardia, possible SVT on telemetry monitoring  · Continue telemetry monitoring  · Initiated on coreg 6 25 mg PO BID    Per cardiology  · Follow-up with outpatient Cardiology within 2 weeks    Elevated platelet count  Assessment & Plan  · Likely reactive in nature  · Follow the platelet count with PCP, during hospital stay down trending    Results from last 7 days   Lab Units 05/05/22  0447 05/04/22  0520 05/03/22  0434   WBC Thousand/uL 8 08 7 36 8 87   HEMOGLOBIN g/dL 15 6 15 3 14 5   HEMATOCRIT % 45 9 45 0 43 8   PLATELETS Thousands/uL 412* 429* 442* Snoring  Assessment & Plan  · Needs an outpatient sleep study to check for obstructive sleep apnea  · Follow-up with PCP    Mixed hyperlipidemia  Assessment & Plan  · Continued statin therapy  · Follow-up with PCP    Hypertriglyceridemia  Assessment & Plan  · Continued statin therapy    Low HDL (under 40)  Assessment & Plan  · Lifestyle modifications was recommended including weight loss, improving his diet, and increasing his amount of physical activity  Vitamin D insufficiency  Assessment & Plan  · Initiate cholecalciferol 2000 I  U  PO Qdaily  · Recheck a vitamin D 25-OH level in 2-3 months with his PCP    Coronary artery calcification seen on CT scan  Assessment & Plan  · Continued statin therapy  · Initiated on coreg 6 25 mg PO BID  · Check a nuclear stress test on 05/04/2022 per Cardiology, no stress induced myocardial ischemia noted    CTA of the chest/PE protocol (05/02/2022): HEART/GREAT VESSELS:  Cardiomegaly  Slow transit of the IV contrast bolus through the cardiac chambers is suggestive of cardiac failure  No thoracic aortic aneurysm  Coronary artery calcifications  No pericardial effusion  Discharging Physician / Practitioner: Mayank Shrestha MD  PCP: Matt Harris DO  Admission Date:   Admission Orders (From admission, onward)     Ordered        05/02/22 1552  Inpatient Admission  Once            05/02/22 1552  Inpatient Admission  Once                      Discharge Date: 05/05/22    Medical Problems             Resolved Problems  Date Reviewed: 5/4/2022    None                Consultations During Hospital Stay:  · Cardiology, respiratory therapy, physical therapy, occupational therapy    Procedures Performed:   VAS lower limb venous duplex study, complete bilateral   Final Result      CTA ED chest PE Study   Final Result      Mild pulmonary edema and trace pleural effusions likely on the basis of CHF        No pulmonary arterial embolism or pulmonary infiltrate/consolidation  Workstation performed: [id number]         XR chest 1 view portable   Final Result      1  Question subtle bandlike opacity at the right lung base which could represent atelectasis (favored) or pneumonia  However, recommend upright PA and lateral chest radiographs to confirm the finding or document resolution  2   Interval apparent enlargement of the heart, though potentially accentuated by portable technique  The above-recommended upright radiographs can also assess whether the finding is real          Note: I personally discussed this study with Britney Heidi on 5/2/2022 at 2:48 PM   She reports clinical findings warranting chest CTA, which would obviate the need for the previously recommended chest radiographs  Workstation performed: [id number]             Significant Findings / Test Results:   · None    Incidental Findings:   · None    Test Results Pending at Discharge (will require follow up):    Results for orders placed or performed during the hospital encounter of 05/02/22   COVID/FLU/RSV - 2 hour TAT    Specimen: Nasopharyngeal Swab   Result Value Ref Range    SARS-CoV-2 Negative Negative    INFLUENZA A PCR Negative Negative    INFLUENZA B PCR Negative Negative    RSV PCR Negative Negative   CBC and differential   Result Value Ref Range    WBC 9 25 4 31 - 10 16 Thousand/uL    RBC 4 76 3 88 - 5 62 Million/uL    Hemoglobin 14 5 12 0 - 17 0 g/dL    Hematocrit 43 0 36 5 - 49 3 %    MCV 90 82 - 98 fL    MCH 30 5 26 8 - 34 3 pg    MCHC 33 7 31 4 - 37 4 g/dL    RDW 13 9 11 6 - 15 1 %    MPV 10 3 8 9 - 12 7 fL    Platelets 736 (H) 860 - 390 Thousands/uL    nRBC 0 /100 WBCs    Neutrophils Relative 72 43 - 75 %    Immat GRANS % 1 0 - 2 %    Lymphocytes Relative 20 14 - 44 %    Monocytes Relative 6 4 - 12 %    Eosinophils Relative 1 0 - 6 %    Basophils Relative 0 0 - 1 %    Neutrophils Absolute 6 66 1 85 - 7 62 Thousands/µL    Immature Grans Absolute 0 05 0 00 - 0 20 Thousand/uL    Lymphocytes Absolute 1 89 0 60 - 4 47 Thousands/µL    Monocytes Absolute 0 53 0 17 - 1 22 Thousand/µL    Eosinophils Absolute 0 09 0 00 - 0 61 Thousand/µL    Basophils Absolute 0 03 0 00 - 0 10 Thousands/µL   Comprehensive metabolic panel   Result Value Ref Range    Sodium 140 136 - 145 mmol/L    Potassium 3 7 3 5 - 5 3 mmol/L    Chloride 107 100 - 108 mmol/L    CO2 23 21 - 32 mmol/L    ANION GAP 10 4 - 13 mmol/L    BUN 14 5 - 25 mg/dL    Creatinine 1 02 0 60 - 1 30 mg/dL    Glucose 102 65 - 140 mg/dL    Calcium 8 7 8 3 - 10 1 mg/dL    Corrected Calcium 9 3 8 3 - 10 1 mg/dL    AST 13 5 - 45 U/L    ALT 29 12 - 78 U/L    Alkaline Phosphatase 92 46 - 116 U/L    Total Protein 7 5 6 4 - 8 2 g/dL    Albumin 3 3 (L) 3 5 - 5 0 g/dL    Total Bilirubin 0 61 0 20 - 1 00 mg/dL    eGFR 80 ml/min/1 73sq m   HS Troponin 0hr (reflex protocol)   Result Value Ref Range    hs TnI 0hr 12 "Refer to ACS Flowchart"- see link ng/L   D-Dimer   Result Value Ref Range    D-Dimer, Quant 1 79 (H) <0 50 ug/ml FEU   NT-BNP PRO   Result Value Ref Range    NT-proBNP 4,055 (H) <125 pg/mL   HS Troponin I 2hr   Result Value Ref Range    hs TnI 2hr 12 "Refer to ACS Flowchart"- see link ng/L    Delta 2hr hsTnI 0 <20 ng/L   HS Troponin I 4hr   Result Value Ref Range    hs TnI 4hr 14 "Refer to ACS Flowchart"- see link ng/L    Delta 4hr hsTnI 2 <20 ng/L   CBC and differential   Result Value Ref Range    WBC 8 87 4 31 - 10 16 Thousand/uL    RBC 4 87 3 88 - 5 62 Million/uL    Hemoglobin 14 5 12 0 - 17 0 g/dL    Hematocrit 43 8 36 5 - 49 3 %    MCV 90 82 - 98 fL    MCH 29 8 26 8 - 34 3 pg    MCHC 33 1 31 4 - 37 4 g/dL    RDW 14 1 11 6 - 15 1 %    MPV 10 9 8 9 - 12 7 fL    Platelets 876 (H) 604 - 390 Thousands/uL    nRBC 0 /100 WBCs    Neutrophils Relative 73 43 - 75 %    Immat GRANS % 1 0 - 2 %    Lymphocytes Relative 17 14 - 44 %    Monocytes Relative 6 4 - 12 %    Eosinophils Relative 2 0 - 6 %    Basophils Relative 1 0 - 1 %    Neutrophils Absolute 6 55 1 85 - 7 62 Thousands/µL    Immature Grans Absolute 0 07 0 00 - 0 20 Thousand/uL    Lymphocytes Absolute 1 54 0 60 - 4 47 Thousands/µL    Monocytes Absolute 0 52 0 17 - 1 22 Thousand/µL    Eosinophils Absolute 0 13 0 00 - 0 61 Thousand/µL    Basophils Absolute 0 06 0 00 - 0 10 Thousands/µL   CK   Result Value Ref Range    Total CK 28 (L) 39 - 308 U/L   Comprehensive metabolic panel   Result Value Ref Range    Sodium 139 136 - 145 mmol/L    Potassium 3 7 3 5 - 5 3 mmol/L    Chloride 103 100 - 108 mmol/L    CO2 24 21 - 32 mmol/L    ANION GAP 12 4 - 13 mmol/L    BUN 15 5 - 25 mg/dL    Creatinine 1 06 0 60 - 1 30 mg/dL    Glucose 108 65 - 140 mg/dL    Calcium 9 2 8 3 - 10 1 mg/dL    Corrected Calcium 9 7 8 3 - 10 1 mg/dL    AST 19 5 - 45 U/L    ALT 28 12 - 78 U/L    Alkaline Phosphatase 103 46 - 116 U/L    Total Protein 7 8 6 4 - 8 2 g/dL    Albumin 3 4 (L) 3 5 - 5 0 g/dL    Total Bilirubin 0 87 0 20 - 1 00 mg/dL    eGFR 76 ml/min/1 73sq m   Magnesium   Result Value Ref Range    Magnesium 2 1 1 6 - 2 6 mg/dL   Phosphorus   Result Value Ref Range    Phosphorus 4 3 2 7 - 4 5 mg/dL   Procalcitonin   Result Value Ref Range    Procalcitonin 0 06 <=0 25 ng/ml   Lactic acid, plasma   Result Value Ref Range    LACTIC ACID 1 4 0 5 - 2 0 mmol/L   Vitamin D 25 hydroxy   Result Value Ref Range    Vit D, 25-Hydroxy 25 3 (L) 30 0 - 100 0 ng/mL   Hemoglobin A1C   Result Value Ref Range    Hemoglobin A1C 5 4 Normal 3 8-5 6%; PreDiabetic 5 7-6 4%;  Diabetic >=6 5%; Glycemic control for adults with diabetes <7 0% %     mg/dl   TSH, 3rd generation   Result Value Ref Range    TSH 3RD GENERATON 3 060 0 450 - 4 500 uIU/mL   Lipid panel   Result Value Ref Range    Cholesterol 190 See Comment mg/dL    Triglycerides 207 (H) See Comment mg/dL    HDL, Direct 29 (L) >=40 mg/dL    LDL Calculated 120 (H) 0 - 100 mg/dL    Non-HDL-Chol (CHOL-HDL) 161 mg/dl   CBC and differential   Result Value Ref Range    WBC 7 36 4 31 - 10 16 Thousand/uL    RBC 5 06 3 88 - 5 62 Million/uL    Hemoglobin 15 3 12 0 - 17 0 g/dL    Hematocrit 45 0 36 5 - 49 3 %    MCV 89 82 - 98 fL    MCH 30 2 26 8 - 34 3 pg    MCHC 34 0 31 4 - 37 4 g/dL    RDW 13 8 11 6 - 15 1 %    MPV 10 5 8 9 - 12 7 fL    Platelets 557 (H) 049 - 390 Thousands/uL    nRBC 0 /100 WBCs    Neutrophils Relative 67 43 - 75 %    Immat GRANS % 0 0 - 2 %    Lymphocytes Relative 22 14 - 44 %    Monocytes Relative 7 4 - 12 %    Eosinophils Relative 3 0 - 6 %    Basophils Relative 1 0 - 1 %    Neutrophils Absolute 4 95 1 85 - 7 62 Thousands/µL    Immature Grans Absolute 0 03 0 00 - 0 20 Thousand/uL    Lymphocytes Absolute 1 64 0 60 - 4 47 Thousands/µL    Monocytes Absolute 0 51 0 17 - 1 22 Thousand/µL    Eosinophils Absolute 0 19 0 00 - 0 61 Thousand/µL    Basophils Absolute 0 04 0 00 - 0 10 Thousands/µL   Comprehensive metabolic panel   Result Value Ref Range    Sodium 139 136 - 145 mmol/L    Potassium 3 9 3 5 - 5 3 mmol/L    Chloride 102 100 - 108 mmol/L    CO2 26 21 - 32 mmol/L    ANION GAP 11 4 - 13 mmol/L    BUN 17 5 - 25 mg/dL    Creatinine 1 10 0 60 - 1 30 mg/dL    Glucose 108 65 - 140 mg/dL    Calcium 9 4 8 3 - 10 1 mg/dL    AST 21 5 - 45 U/L    ALT 31 12 - 78 U/L    Alkaline Phosphatase 104 46 - 116 U/L    Total Protein 7 9 6 4 - 8 2 g/dL    Albumin 3 5 3 5 - 5 0 g/dL    Total Bilirubin 0 99 0 20 - 1 00 mg/dL    eGFR 73 ml/min/1 73sq m   Magnesium   Result Value Ref Range    Magnesium 2 1 1 6 - 2 6 mg/dL   Phosphorus   Result Value Ref Range    Phosphorus 4 2 2 7 - 4 5 mg/dL   Procalcitonin   Result Value Ref Range    Procalcitonin 0 07 <=0 25 ng/ml   CBC and differential   Result Value Ref Range    WBC 8 08 4 31 - 10 16 Thousand/uL    RBC 5 14 3 88 - 5 62 Million/uL    Hemoglobin 15 6 12 0 - 17 0 g/dL    Hematocrit 45 9 36 5 - 49 3 %    MCV 89 82 - 98 fL    MCH 30 4 26 8 - 34 3 pg    MCHC 34 0 31 4 - 37 4 g/dL    RDW 13 8 11 6 - 15 1 %    MPV 10 5 8 9 - 12 7 fL Platelets 131 (H) 357 - 390 Thousands/uL    nRBC 0 /100 WBCs    Neutrophils Relative 67 43 - 75 %    Immat GRANS % 1 0 - 2 %    Lymphocytes Relative 22 14 - 44 %    Monocytes Relative 7 4 - 12 %    Eosinophils Relative 2 0 - 6 %    Basophils Relative 1 0 - 1 %    Neutrophils Absolute 5 45 1 85 - 7 62 Thousands/µL    Immature Grans Absolute 0 04 0 00 - 0 20 Thousand/uL    Lymphocytes Absolute 1 77 0 60 - 4 47 Thousands/µL    Monocytes Absolute 0 57 0 17 - 1 22 Thousand/µL    Eosinophils Absolute 0 19 0 00 - 0 61 Thousand/µL    Basophils Absolute 0 06 0 00 - 0 10 Thousands/µL   Comprehensive metabolic panel   Result Value Ref Range    Sodium 139 136 - 145 mmol/L    Potassium 3 9 3 5 - 5 3 mmol/L    Chloride 102 100 - 108 mmol/L    CO2 27 21 - 32 mmol/L    ANION GAP 10 4 - 13 mmol/L    BUN 24 5 - 25 mg/dL    Creatinine 1 18 0 60 - 1 30 mg/dL    Glucose 104 65 - 140 mg/dL    Calcium 9 4 8 3 - 10 1 mg/dL    AST 16 5 - 45 U/L    ALT 32 12 - 78 U/L    Alkaline Phosphatase 101 46 - 116 U/L    Total Protein 8 2 6 4 - 8 2 g/dL    Albumin 3 6 3 5 - 5 0 g/dL    Total Bilirubin 0 94 0 20 - 1 00 mg/dL    eGFR 67 ml/min/1 73sq m   Magnesium   Result Value Ref Range    Magnesium 2 2 1 6 - 2 6 mg/dL   Phosphorus   Result Value Ref Range    Phosphorus 4 8 (H) 2 7 - 4 5 mg/dL   Procalcitonin   Result Value Ref Range    Procalcitonin 0 08 <=0 25 ng/ml   NT-BNP PRO   Result Value Ref Range    NT-proBNP 1,574 (H) <125 pg/mL   Stress strip   Result Value Ref Range    Protocol Name [address]     Time In Exercise Phase 00:04:00     MAX  SYSTOLIC  mmHg    Max Diastolic Bp 80 mmHg    Max Heart Rate 105 BPM    Max Predicted Heart Rate 162 BPM    Reason for Termination Test Complete        Test Indication CHF     Target Hr Formular (220 - Age)*85%     Arrhy During Ex      ECG Interp Before Ex      ECG Interp during Ex      Ex Summary Comment      Chest Pain Statement none     Overall Hr Response To Exercise      Overall BP Response To Exercise     ECG 12 lead   Result Value Ref Range    Ventricular Rate 101 BPM    Atrial Rate 101 BPM    OK Interval 198 ms    QRSD Interval 70 ms    QT Interval 308 ms    QTC Interval 399 ms    P Axis 73 degrees    QRS Axis 213 degrees    T Wave Axis 91 degrees   Echo complete w/ contrast if indicated   Result Value Ref Range    LA size 4 6 cm    Triscuspid Valve Regurgitation Peak Gradient 33 0 mmHg    Tricuspid valve peak regurgitation velocity 2 89 m/s    LVPWd 1 10 0 56 - 1 06 cm    MV E' Tissue Velocity Septal 10 cm/s    Tricuspid annular plane systolic excursion 6 49 cm    TR Peak Gildardo 2 9 m/s    Right Ventricular Peak Systolic Pressure 96 04 mmHg    IVSd 9 06 cm    LV DIASTOLIC VOLUME (MOD BIPLANE) 2D 136 mL    LEFT VENTRICLE SYSTOLIC VOLUME (MOD BIPLANE) 2D 74 mL    Left ventricular stroke volume (2D) 61 00 mL    A4C EF 46 %    LVIDd 5 30 9 37 - 13 97 cm    IVS 1 1 0 57 - 1 08 cm    LVIDS 4 10 5 55 - 8 42 cm    FS 23 28 - 44 %    Ao root 3 20 cm    RVID d 2 7 cm    Est  RA pres 10 0 mmHg    MARIO A4C 22 9 cm2    RAA A4C 17 8 cm2    LVSV, 2D 61 mL    ZLVPWD 2 25     ZLVIDS -4 04     ZLVIDD -7 70     ZIVSD 2 14     LV EF 40    NM myocardial perfusion spect (stress and/or rest)   Result Value Ref Range    Rest Nuclear Isotope Dose 10 80 mCi    Stress Nuclear Isotope Dose 32 80 mCi    ST Depression (mm) 0 mm    Stress/rest perfusion ratio 1 01     EF (%) 39 %        Outpatient Tests Requested:  · None    Complications:  None    Reason for Admission:  Acute CHF    Hospital Course: As per admission:    Arnav Delgado is a 61 y o  male with past medical history of gallstone s/p cholecystectomy 2 weeks ago, GERD presents today with shortness of breadth since Thursday  After surgery has been doing okay  He notices that shortness of breath gets worse at night when he is laying down    No previous history of shortness of breath urine additionally patient reports that he has been feeling weak since started since Thursday  Patient reports that he had ablation done 10 years ago after which he has been feeling okay         Patient also reports that he has some dry cough which started after surgery  Patient states that he has been having watery, nonbloody diarrhea which is associated with greasy food      Patient denies any recent stress, URI symptoms, no excessive water drinking, fever, chest pain, nausea, vomiting, chills, diaphoresis leg swelling      In the ED; labs remarkable for source BNP 4 055, D-dimer1 79 , his initial trop 12, CT notable for mild pulmonary edema  EKG notable for sinus tach  Patient received Lasix for 40 mg IV      Patient is admitted to [place] [address] for further evaluation and management of new onset acute CHF  Hospital course:  Alan Ferris is a 61 y o  male patient who originally presented to the hospital on 5/2/2022 due to shortness of breath and chest pain  Upon admission to [facility]/surg patient was started on IV Lasix 40 mg b i d  His echo results worse remarkable for EF of 40 %  Cardiology was consulted  Per Cardiology also patient had stress test done which showed no stress-induced myocardial ischemia  Upon discharge patient will need to continue to follow medication carvedilol 6 25 mg b i d  Torsemide 20 mg daily, lisinopril 5 mg daily, potassium 10mEq daily  Patient needs to follow up with Cardiology within 2 weeks  Upon discharge patient is hemodynamically stable, clinically well appearing, has normal p o  Intake in bowel movement  Patient understands and agrees with the plan  Please see above list of diagnoses and related plan for additional information  Condition at Discharge: stable     Discharge Day Visit / Exam:     Subjective:  No new concerns or complaints    Patient feels well  Vitals: Blood Pressure: 128/82 (05/05/22 0712)  Pulse: 88 (05/05/22 0712)  Temperature: 97 7 °F (36 5 °C) (05/05/22 0712)  Temp Source: Oral (05/02/22 2114)  Respirations: 19 (05/05/22 4229)  Height: 5' 10" (177 8 cm) (05/03/22 0640)  Weight - Scale: 112 kg (246 lb 14 6 oz) (05/05/22 0600)  SpO2: 94 % (05/05/22 2970)  Exam:   Physical Exam  Vitals and nursing note reviewed  Constitutional:       Appearance: He is well-developed  HENT:      Head: Normocephalic and atraumatic  Nose: Nose normal       Mouth/Throat:      Mouth: Mucous membranes are moist    Eyes:      Conjunctiva/sclera: Conjunctivae normal    Cardiovascular:      Rate and Rhythm: Normal rate and regular rhythm  Pulses: Normal pulses  Heart sounds: Normal heart sounds  No murmur heard  No gallop  Pulmonary:      Effort: Pulmonary effort is normal  No respiratory distress  Breath sounds: Normal breath sounds  Abdominal:      General: There is no distension  Palpations: Abdomen is soft  There is no mass  Tenderness: There is no abdominal tenderness  Musculoskeletal:      Cervical back: Neck supple  Skin:     General: Skin is warm and dry  Capillary Refill: Capillary refill takes less than 2 seconds  Neurological:      Mental Status: He is alert  Psychiatric:         Mood and Affect: Mood normal          Discussion with Family:  Patient and wife    Discharge instructions/Information to patient and family:   See after visit summary for information provided to patient and family  Provisions for Follow-Up Care:  See after visit summary for information related to follow-up care and any pertinent home health orders  Disposition:     Home    For Discharges to [facility] SNF:   · Not Applicable to this Patient - Not Applicable to this Patient    Planned Readmission:  No     Discharge Statement:  I spent 60  minutes discharging the patient  This time was spent on the day of discharge  I had direct contact with the patient on the day of discharge   Greater than 50% of the total time was spent examining patient, answering all patient questions, arranging and discussing plan of care with patient as well as directly providing post-discharge instructions  Additional time then spent on discharge activities  Discharge Medications:  See after visit summary for reconciled discharge medications provided to patient and family        ** Please Note: This note has been constructed using a voice recognition system **

## 2022-05-05 NOTE — PROGRESS NOTES
Progress Note - Cardiology   Choloida Josue 61 y o  male MRN: [id number]  Unit/Bed#: 411-01 Encounter: [id number]    Assessment:  1  Cardiomyopathy - no ischemia seen on nuclear stress test  2  Acute systolic heart failure  3  SVT with prior ablation      Plan:  1  OK for discharge today  2  Discharge meds :    Carvedilol 6 25 mg BID  Torsemide 20 mg daily  Lisinopril 5 mg daily  K+ 10 meq daily    Follow up with Tia in 1 - 2 weeks  Subjective/Objective     He feels well and denies CP, SOB, LE edema    /82  Negative 2050 cc overnight  Weight down to 246 lbs    Creatinine 1 18  BUN 24  K+ 3 9          Vitals: /82   Pulse 88   Temp 97 7 °F (36 5 °C)   Resp 19   Ht 5' 10" (1 778 m)   Wt 112 kg (246 lb 14 6 oz)   SpO2 94%   BMI 35 43 kg/m²   Vitals:    05/05/22 0449 05/05/22 0600   Weight: 112 kg (247 lb 2 2 oz) 112 kg (246 lb 14 6 oz)     Orthostatic Blood Pressures      Most Recent Value   Blood Pressure 128/82 filed at 05/05/2022 1416   Patient Position - Orthostatic VS Sitting filed at 05/02/2022 2114            Intake/Output Summary (Last 24 hours) at 5/5/2022 1900  Last data filed at 5/4/2022 1935  Gross per 24 hour   Intake --   Output 2050 ml   Net -2050 ml       Invasive Devices  Report    Peripheral Intravenous Line            Peripheral IV 05/02/22 Left Antecubital 2 days                Review of Systems: Negative     Physical Exam: /82   Pulse 88   Temp 97 7 °F (36 5 °C)   Resp 19   Ht 5' 10" (1 778 m)   Wt 112 kg (246 lb 14 6 oz)   SpO2 94%   BMI 35 43 kg/m²     General Appearance:    Alert, cooperative, no distress, appears stated age   Head:    Normocephalic, without obvious abnormality, atraumatic   Eyes:    PERRL, conjunctiva/corneas clear, EOM's intact, fundi     benign, both eyes        Ears:    Normal TM's and external ear canals, both ears   Nose:   Nares normal, septum midline, mucosa normal, no drainage    or sinus tenderness   Throat:   Lips, mucosa, and tongue normal; teeth and gums normal   Neck:   Supple, symmetrical, trachea midline, no adenopathy;        thyroid:  No enlargement/tenderness/nodules; no carotid    bruit or JVD   Back:     Symmetric, no curvature, ROM normal, no CVA tenderness   Lungs:     Clear to auscultation bilaterally, respirations unlabored   Chest wall:    No tenderness or deformity   Heart:    Regular rate and rhythm, S1 and S2 normal, no murmur, rub   or gallop   Abdomen:     Soft, non-tender, bowel sounds active all four quadrants,     no masses, no organomegaly           Extremities:   Extremities normal, atraumatic, no cyanosis or edema   Pulses:   2+ and symmetric all extremities   Skin:   Skin color, texture, turgor normal, no rashes or lesions   Lymph nodes:   Cervical, supraclavicular, and axillary nodes normal   Neurologic:   CNII-XII intact  Normal strength, sensation and reflexes       throughout       Lab Results: I have personally reviewed pertinent lab results  Imaging: I have personally reviewed pertinent reports

## 2022-05-05 NOTE — ASSESSMENT & PLAN NOTE
· History of SVT in the past requiring ablation  · Was observed to have a few episodes of tachycardia, possible SVT on telemetry monitoring  · Continue telemetry monitoring  · Initiated on coreg 6 25 mg PO BID    Per cardiology  · Follow-up with outpatient Cardiology within 2 weeks

## 2022-05-05 NOTE — ASSESSMENT & PLAN NOTE
Follow-up with PCP after vitamin-D level is within normal level, given vitamin-D during hospital stay

## 2022-05-05 NOTE — ASSESSMENT & PLAN NOTE
· Initiate cholecalciferol 2000 I  U  PO Qdaily  · Recheck a vitamin D 25-OH level in 2-3 months with his PCP

## 2022-05-05 NOTE — RESPIRATORY THERAPY NOTE
Home Oxygen Qualifying Test     Patient name: Christophe Ryder        : 1965   Date of Test:  May 5, 2022  Diagnosis:acute systolic congestive heart failure   Home Oxygen Test:    **Medicare Guidelines require item(s) 1-5 on all ambulatory patients or 1 and 2 on non-ambulatory patients  1  Baseline SPO2 on Room Air at rest 93 %   a  If <= 88% on Room Air add O2 via NC to obtain SpO2 >=88%  If LPM needed, document LPM  needed to reach =>88%    2  SPO2 during exertion on Room Air 94  %  a  During exertion monitor SPO2  If SPO2 increases >=89%, do not add supplemental oxygen    3  SPO2 on Oxygen at Rest na % at na LPM    4  SPO2 during exertion on Oxygen na % at na LPM    5  Test performed during exertion activity  Walking room air medium pace, no increase WOB or no incr SOB, 474ft     []  Supplemental Home Oxygen is indicated  [x]  Client does not qualify for home oxygen      6  Respiratory Additional Notes- Walking room air medium pace, no increase WOB or no incr SOB, 474ft      Glinda Boeck, RT Pt result in chart.

## 2022-05-05 NOTE — DISCHARGE INSTRUCTIONS
Please have a sleep study completed  Sleep apnea can increase your risk of heart attack, heart failure, and stroke  Heart Failure   WHAT YOU NEED TO KNOW:   Heart failure is a condition that does not allow your heart to fill or pump properly  Not enough oxygen in your blood gets to your organs and tissues  Fluid may not move through your body properly  Fluid builds up and causes swelling and trouble breathing  This is known as congestive heart failure  Heart failure may start in the left or right ventricle  Heart failure is often caused by damage or injury to your heart  The damage may be caused by other heart problems, diabetes, or high blood pressure  The damage may have also been caused by an infection  Heart failure is a long-term condition that tends to get worse over time  It is important to manage your health to improve your quality of life  DISCHARGE INSTRUCTIONS:   Call your local emergency number (911 in the [address] [facility]) if:   · You have any of the following signs of a heart attack:      ? Squeezing, pressure, or pain in your chest    ? You may  also have any of the following:     § Discomfort or pain in your back, neck, jaw, stomach, or arm    § Shortness of breath    § Nausea or vomiting    § Lightheadedness or a sudden cold sweat      Call your doctor if:   · Your heartbeat is fast, slow, or uneven all the time  · You have symptoms of worsening heart failure:      ? Shortness of breath at rest, at night, or that is getting worse in any way    ? Weight gain of 3 or more pounds (1 4 kg) in a day, or more than your healthcare provider says is okay    ? More swelling in your legs or ankles    ? Abdominal pain or swelling    ? More coughing    ? Loss of appetite    ? Feeling tired all the time    · You feel hopeless or depressed, or you have lost interest in things you used to enjoy  · You often feel worried or afraid  · You have questions or concerns about your condition or care      Medicines: · Medicines  may be given to help regulate your heart rhythm and lower your blood pressure  You may also need medicines to help decrease extra fluids  Medicines, such as NSAIDs, may be stopped if they are causing your heart failure to become worse  Do not stop any of your medicines on your own  · Take your medicine as directed  Contact your healthcare provider if you think your medicine is not helping or if you have side effects  Tell him or her if you are allergic to any medicine  Keep a list of the medicines, vitamins, and herbs you take  Include the amounts, and when and why you take them  Bring the list or the pill bottles to follow-up visits  Carry your medicine list with you in case of an emergency  Go to cardiac rehab if directed:  Cardiac rehab is a program run by specialists who will help you safely strengthen your heart  In the program you will learn about exercise, relaxation, stress management, and heart-healthy nutrition  Manage swelling from extra fluid:   · Elevate (raise) your legs above the level of your heart  This will help with fluid that builds up in your legs or ankles  Elevate your legs as often as possible during the day  Prop your legs on pillows or blankets to keep them elevated comfortably  Try not to stand for long periods of time during the day  Move around to keep your blood circulating  · Limit sodium (salt)  Ask how much sodium you can have each day  Your healthcare provider may give you a limit, such as 2,300 milligrams (mg) a day  Your provider or a dietitian can teach you how to read food labels for the number of mg in a food  He or she can also help you find ways to have less salt  For example, if you add salt to food as you cook, do not add more at the table  · Drink liquids as directed  You may need to limit the amount of liquid you drink within 24 hours   Your healthcare provider will tell you how much liquid to have and which liquids are best for you  He or she may tell you to limit liquid to 1 5 to 2 liters in a day  He or she will also tell you how often to drink liquid throughout the day  · Weigh yourself every morning  Use the same scale, in the same spot  Do this after you use the bathroom, but before you eat or drink  Wear the same type of clothing each time  Write down your weight and call your healthcare provider if you have a sudden weight gain  Swelling and weight gain are signs of fluid buildup  Manage heart failure: Your quality of life may improve with treatment and the following:  · Do not smoke  Nicotine and other chemicals in cigarettes and cigars can cause lung and heart damage  Ask your healthcare provider for information if you currently smoke and need help to quit  E-cigarettes or smokeless tobacco still contain nicotine  Talk to your healthcare provider before you use these products  · Do not drink alcohol or use illegal drugs  Alcohol and drugs can increase your risk for high blood pressure, diabetes, and coronary artery disease  · Eat heart-healthy foods  Heart-healthy foods include fruits, vegetables, lean meat (such as beef, chicken, or pork), and low-fat dairy products  Fatty fish such as salmon and tuna are also heart healthy  Other heart-healthy foods include walnuts, whole-grain breads, beans, and cooked beans  Replace butter and margarine with heart-healthy oils such as olive oil or canola oil  Your provider or a dietitian can help you create heart-healthy meal plans  · Manage any chronic health conditions you have  These include high blood pressure, diabetes, obesity, high cholesterol, metabolic syndrome, and COPD  You will have fewer symptoms if you manage these health conditions  Follow your healthcare provider's recommendations and follow up with him or her regularly  · Maintain a healthy weight    Being overweight can increase your risk for high blood pressure, diabetes, and coronary artery disease  These conditions can make your symptoms worse  Ask your healthcare provider how much you should weigh  Ask him or her to help you create a weight loss plan if you are overweight  · Stay active  Activity can help keep your symptoms from getting worse  Walking is a type of physical activity that helps maintain your strength and improve your mood  Physical activity also helps you manage your weight  Work with your healthcare provider to create an exercise plan that is right for you  · Get vaccines as directed  The flu and pneumonia can be severe for a person who has heart failure  Vaccines protect you from these infections  Get a flu shot every year as soon as it is recommended, usually in September or October  You may also need the pneumonia vaccine  Your healthcare provider can tell you if you need other vaccines, and when to get them  Follow up with your doctor within 7 days and as directed: You may need to return for other tests  You may need home health care  A healthcare provider will monitor your vital signs, weight, and make sure your medicines are working  Write down your questions so you remember to ask them during your visits  Join a support group:  Heart failure can be difficult to manage  It may be helpful to talk with others who have heart failure  You may learn how to better manage your condition or get emotional support  For more information:  · [address]  [place]   Phone: [phone]  Web Address: https://www strong com/  [facility] 2022 Information is for End User's use only and may not be sold, redistributed or otherwise used for commercial purposes  All illustrations and images included in CareNotes® are the copyrighted property of [employer]  or [facility] Antonia Corrales   The above information is an  only  It is not intended as medical advice for individual conditions or treatments  Talk to your doctor, nurse or pharmacist before following any medical regimen to see if it is safe and effective for you  Heart Failure   WHAT YOU NEED TO KNOW:   What is heart failure? Heart failure is a condition that does not allow your heart to fill or pump properly  Your heart cannot pump enough oxygen in your blood to your organs and tissues  Fluid may not move through your body properly  Fluid builds up and causes swelling and trouble breathing  This is known as congestive heart failure  Heart failure may start in the left or right ventricle  Heart failure is a long-term condition that tends to get worse over time  It is important to manage your health to improve your quality of life  What are the signs and symptoms of heart failure? The signs and symptoms depend on how severe your heart failure is  You may have any of the following:  · Trouble breathing with activity that worsens to trouble breathing at rest    · Shortness of breath while lying flat    · Severe shortness of breath and coughing at night that usually wakes you    · Feeling lightheaded when you stand up    · Purple color around your mouth and nails    · Confusion or anxiety    · Chest pain at night    · Periods of no breathing, then breathing fast    · Lack of energy (often worsened by physical activity), or trouble sleeping    · Swelling in your ankles, legs, or abdomen    · Heartbeat that is fast or not regular    · Fingers and toes feel cool to the touch    How is heart failure diagnosed? Tell your healthcare provider about your health history and the medicines you take  He or she will ask about your shortness of breath and other symptoms  You may need any of the following:  · Blood tests  are used to check for heart problems such as coronary artery disease or decreased blood flow  Blood tests also give healthcare providers information about your kidney, liver, and thyroid function   The results can also show an infection  · An EKG  test records your heart rhythm and how fast your heart beats  It shows healthcare providers if you have heart block or have had a heart attack  · Echocardiogram  is a type of ultrasound  Sound waves are used to show the structure and function of your heart  This test may show if there are problems with your heart valves  It may also show if the chambers of your heart are working properly  · X-ray, CT, or MRI  pictures may be taken of your heart and lungs  The pictures may show the cause of your heart failure, or blood clots or fluid in your lungs  You may be given contrast liquid to help your heart show up better in the pictures  Tell the healthcare provider if you have ever had an allergic reaction to contrast liquid  Do not enter the MRI room with anything metal  Metal can cause serious injury  Tell the provider if you have any metal in or on your body  How is heart failure treated? Your healthcare providers will help you manage any other health conditions that may be causing your heart failure  The goals of treatment are to manage, slow, or reverse heart damage  Treatment may include any of the following:  · Medicines  may be given to help regulate your heart rhythm and lower your blood pressure  You may also need medicines to help decrease extra fluids  Medicines, such as NSAIDs, may be stopped if they are causing your heart failure to become worse  Do not stop any of your medicines on your own  · Cardiac rehab  is a program run by specialists who will help you safely strengthen your heart  In the program you will learn about exercise, relaxation, stress management, and heart-healthy nutrition  Cardiac rehab may be recommended if your heart failure is not severe  · Oxygen  may help you breathe easier if your oxygen level is lower than normal  A CPAP machine may be used to keep your airway open while you sleep           · Surgery  can be done to implant a pacemaker or another device in your chest to regulate your heart rhythm  Other types of surgery can open blocked heart vessels, replace a damaged heart valve, or remove scar tissue  What can I do to manage swelling from extra fluid? · Elevate (raise) your legs above the level of your heart  This will help with fluid that builds up in your legs or ankles  Elevate your legs as often as possible during the day  Prop your legs on pillows or blankets to keep them elevated comfortably  Try not to stand for long periods of time during the day  Move around to keep your blood circulating  · Limit sodium (salt)  Ask how much sodium you can have each day  Your healthcare provider may give you a limit, such as 2,300 milligrams (mg) a day  Your provider or a dietitian can teach you how to read food labels for the number of mg in a food  He or she can also help you find ways to have less salt  For example, if you add salt to food as you cook, do not add more at the table  · Drink liquids as directed  You may need to limit the amount of liquid you drink within 24 hours  Your healthcare provider will tell you how much liquid to have and which liquids are best for you  He or she may tell you to limit liquid to 1 5 to 2 liters in a day  He or she will also tell you how often to drink liquid throughout the day  · Weigh yourself every morning  Use the same scale, in the same spot  Do this after you use the bathroom, but before you eat or drink  Wear the same type of clothing each time  Write down your weight and call your healthcare provider if you have a sudden weight gain  Swelling and weight gain are signs of fluid buildup  What can I do to manage heart failure? Your quality of life may improve with treatment and the following:  · Do not smoke  Nicotine and other chemicals in cigarettes and cigars can cause lung and heart damage   Ask your healthcare provider for information if you currently smoke and need help to quit  E-cigarettes or smokeless tobacco still contain nicotine  Talk to your healthcare provider before you use these products  · Do not drink alcohol or use illegal drugs  Alcohol and drugs can increase your risk for high blood pressure, diabetes, and coronary artery disease  · Eat heart-healthy foods  Heart-healthy foods include fruits, vegetables, lean meat (such as beef, chicken, or pork), and low-fat dairy products  Fatty fish such as salmon and tuna are also heart healthy  Other heart-healthy foods include walnuts, whole-grain breads, beans, and cooked beans  Replace butter and margarine with heart-healthy oils such as olive oil or canola oil  Your provider or a dietitian can help you create heart-healthy meal plans  · Manage any chronic health conditions you have  These include high blood pressure, diabetes, obesity, high cholesterol, metabolic syndrome, and COPD  You will have fewer symptoms if you manage these health conditions  Follow your healthcare provider's recommendations and follow up with him or her regularly  · Maintain a healthy weight  Being overweight can increase your risk for high blood pressure, diabetes, and coronary artery disease  These conditions can make your symptoms worse  Ask your healthcare provider how much you should weigh  Ask him or her to help you create a weight loss plan if you are overweight  · Stay active  Activity can help keep your symptoms from getting worse  Walking is a type of physical activity that helps maintain your strength and improve your mood  Physical activity also helps you manage your weight  Work with your healthcare provider to create an exercise plan that is right for you  · Get vaccines as directed  The flu and pneumonia can be severe for a person who has heart failure  Vaccines protect you from these infections  Get a flu shot every year as soon as it is recommended, usually in September or October   You may also need the pneumonia vaccine  Your healthcare provider can tell you if you need other vaccines, and when to get them  Call your local emergency number [phone] in the [address] [facility]) if:   · You have any of the following signs of a heart attack:      ? Squeezing, pressure, or pain in your chest    ? You may  also have any of the following:     § Discomfort or pain in your back, neck, jaw, stomach, or arm    § Shortness of breath    § Nausea or vomiting    § Lightheadedness or a sudden cold sweat      When should I call my doctor? · Your heartbeat is fast, slow, or uneven all the time  · You have symptoms of worsening heart failure:      ? Shortness of breath at rest, at night, or that is getting worse in any way    ? Weight gain of 3 or more pounds (1 4 kg) in a day, or more than your healthcare provider says is okay    ? More swelling in your legs or ankles    ? Abdominal pain or swelling    ? More coughing    ? Feeling tired all the time    · You feel hopeless or depressed, or you have lost interest in things you used to enjoy  · You often feel worried or afraid  · You have questions or concerns about your condition or care  CARE AGREEMENT:   You have the right to help plan your care  Learn about your health condition and how it may be treated  Discuss treatment options with your healthcare providers to decide what care you want to receive  You always have the right to refuse treatment  The above information is an  only  It is not intended as medical advice for individual conditions or treatments  Talk to your doctor, nurse or pharmacist before following any medical regimen to see if it is safe and effective for you  © Copyright [employer] 2022 Information is for End User's use only and may not be sold, redistributed or otherwise used for commercial purposes   All illustrations and images included in CareNotes® are the copyrighted property of [employer]  or [address] [place] [address] [employer]ape

## 2022-05-05 NOTE — CASE MANAGEMENT
Case Management Discharge Planning Note    Patient name Alan Ferris  Location [address] [id number] MRN [id number]  : 1965 Date 2022       Current Admission Date: 2022  Current Admission Diagnosis:Acute systolic congestive heart failure [facility])   Patient Active Problem List    Diagnosis Date Noted    Hyperphosphatemia 2022    Paroxysmal SVT (supraventricular tachycardia) ([address] ) 2022    Vitamin D insufficiency 2022    Low HDL (under 40) 2022    Hypertriglyceridemia 2022    Mixed hyperlipidemia 2022    Snoring 2022    Elevated platelet count     Acute systolic congestive heart failure ([address] ) 2022    Coronary artery calcification seen on CT scan 2022    GERD (gastroesophageal reflux disease)     Anxiety     Dyslipidemia 2021    Ulnar nerve entrapment at elbow 2021    Bilateral carpal tunnel syndrome 2021    Osteoarthritis of knee 2021    Impingement syndrome of shoulder region 2021    Chronic pain of both shoulders 2021    Chronic pain of left knee 2021    Hyperuricemia 2021    Ear itching 2020    Recurrent unilateral inguinal hernia 2019    Retained ureteral stent 2019    Right ureteral calculus 2019    Ureteral calculus 2019    Constipation 2019    Kidney stones     Adjustment disorder with mixed anxiety and depressed mood 2018    Anorgasmia of male 2017    Bilateral hand numbness 2017    Tremor 2017    Vitamin D deficiency 2017    Kidney stone 2017    Chronic musculoskeletal pain 11/15/2017    Generalized obesity 11/15/2017    History of alcohol abuse 2013    Urinary tract stones 2013      LOS (days): 3  Geometric Mean LOS (GMLOS) (days): 3 80  Days to GMLOS:1     OBJECTIVE:  Risk of Unplanned Readmission Score: 10         Current admission status: Inpatient   Preferred Pharmacy:   [facility] [address], [address] [address]  Phone: [phone] Fax: [phone]    [facility] [address], [address] [facility] [address]  Phone: [phone] Fax: [phone] [address]a [address], [address] [facility] [address]  [facility] 918[address]350  [address] [facility] 202[address]206  [address] [facility],[address] [place] [address]  Phone: [phone] Fax: [phone]    Primary Care Provider: Adelaida Palacio DO    Primary Insurance: BLUE CROSS  Secondary Insurance:     DISCHARGE DETAILS:Pt is being dc'd home on this date with OP follow up

## 2022-05-05 NOTE — PLAN OF CARE
Problem: Potential for Falls  Goal: Patient will remain free of falls  Description: INTERVENTIONS:  - Educate patient/family on patient safety including physical limitations  - Instruct patient to call for assistance with activity   - Consult OT/PT to assist with strengthening/mobility   - Keep Call bell within reach  - Keep bed low and locked with side rails adjusted as appropriate  - Keep care items and personal belongings within reach  - Initiate and maintain comfort rounds  - Make Fall Risk Sign visible to staff  - Offer Toileting every 2 Hours, in advance of need  - Initiate/Maintain fall alarm  - Obtain necessary fall risk management equipment: alarm, nonskid socks, yellow bracelet  - Apply yellow socks and bracelet for high fall risk patients  - Consider moving patient to room near nurses station  5/5/2022 1116 by Tavon Inch  Outcome: Completed  5/5/2022 0742 by Tavon Inch  Outcome: Progressing     Problem: PAIN - ADULT  Goal: Verbalizes/displays adequate comfort level or baseline comfort level  Description: Interventions:  - Encourage patient to monitor pain and request assistance  - Assess pain using appropriate pain scale  - Administer analgesics based on type and severity of pain and evaluate response  - Implement non-pharmacological measures as appropriate and evaluate response  - Consider cultural and social influences on pain and pain management  - Notify physician/advanced practitioner if interventions unsuccessful or patient reports new pain  5/5/2022 1116 by Tavon Inch  Outcome: Completed  5/5/2022 0742 by Tavon Inch  Outcome: Progressing     Problem: INFECTION - ADULT  Goal: Absence or prevention of progression during hospitalization  Description: INTERVENTIONS:  - Assess and monitor for signs and symptoms of infection  - Monitor lab/diagnostic results  - Monitor all insertion sites, i e  indwelling lines, tubes, and drains  - Monitor endotracheal if appropriate and nasal secretions for changes in amount and color  - [place] appropriate cooling/warming therapies per order  - Administer medications as ordered  - Instruct and encourage patient and family to use good hand hygiene technique  - Identify and instruct in appropriate isolation precautions for identified infection/condition  5/5/2022 1116 by Arjun Brar  Outcome: Completed  5/5/2022 0742 by Arjun Brar  Outcome: Progressing     Problem: SAFETY ADULT  Goal: Patient will remain free of falls  Description: INTERVENTIONS:  - Educate patient/family on patient safety including physical limitations  - Instruct patient to call for assistance with activity   - Consult OT/PT to assist with strengthening/mobility   - Keep Call bell within reach  - Keep bed low and locked with side rails adjusted as appropriate  - Keep care items and personal belongings within reach  - Initiate and maintain comfort rounds  - Make Fall Risk Sign visible to staff  - Offer Toileting every 2 Hours, in advance of need  - Initiate/Maintain fall alarm  - Obtain necessary fall risk management equipment: alarm, nonskid socks, yellow bracelet  - Apply yellow socks and bracelet for high fall risk patients  - Consider moving patient to room near nurses station  5/5/2022 1116 by Arjun Brar  Outcome: Completed  5/5/2022 0742 by Arjun Brar  Outcome: Progressing  Goal: Maintain or return to baseline ADL function  Description: INTERVENTIONS:  -  Assess patient's ability to carry out ADLs; assess patient's baseline for ADL function and identify physical deficits which impact ability to perform ADLs (bathing, care of mouth/teeth, toileting, grooming, dressing, etc )  - Assess/evaluate cause of self-care deficits   - Assess range of motion  - Assess patient's mobility; develop plan if impaired  - Assess patient's need for assistive devices and provide as appropriate  - Encourage maximum independence but intervene and supervise when necessary  - Involve family in performance of ADLs  - Assess for home care needs following discharge   - Consider OT consult to assist with ADL evaluation and planning for discharge  - Provide patient education as appropriate  5/5/2022 1116 by Eduardo Lepe  Outcome: Completed  5/5/2022 0742 by Eduardo Lepe  Outcome: Progressing  Goal: Maintains/Returns to pre admission functional level  Description: INTERVENTIONS:  - Perform BMAT or MOVE assessment daily    - Set and communicate daily mobility goal to care team and patient/family/caregiver  - Collaborate with rehabilitation services on mobility goals if consulted  - Perform Range of Motion 4 times a day  - Reposition patient every 2 hours    - Dangle patient 3 times a day  - Stand patient 3 times a day  - Ambulate patient 3 times a day  - Out of bed to chair 3 times a day   - Out of bed for meals 3 times a day  - Out of bed for toileting  - Record patient progress and toleration of activity level   5/5/2022 1116 by Eduardo eLpe  Outcome: Completed  5/5/2022 0742 by Eduardo Lepe  Outcome: Progressing     Problem: DISCHARGE PLANNING  Goal: Discharge to home or other facility with appropriate resources  Description: INTERVENTIONS:  - Identify barriers to discharge w/patient and caregiver  - Arrange for needed discharge resources and transportation as appropriate  - Identify discharge learning needs (meds, wound care, etc )  - Arrange for interpretive services to assist at discharge as needed  - Refer to Case Management Department for coordinating discharge planning if the patient needs post-hospital services based on physician/advanced practitioner order or complex needs related to functional status, cognitive ability, or social support system  5/5/2022 1116 by Eduardo Lepe  Outcome: Completed  5/5/2022 0742 by Eduardo Lepe  Outcome: Progressing     Problem: Knowledge Deficit  Goal: Patient/family/caregiver demonstrates understanding of disease process, treatment plan, medications, and discharge instructions  Description: Complete learning assessment and assess knowledge base    Interventions:  - Provide teaching at level of understanding  - Provide teaching via preferred learning methods  5/5/2022 1116 by Starla Mobley  Outcome: Completed  5/5/2022 0742 by Starla Mobley  Outcome: Progressing     Problem: CARDIOVASCULAR - ADULT  Goal: Maintains optimal cardiac output and hemodynamic stability  Description: INTERVENTIONS:  - Monitor I/O, vital signs and rhythm  - Monitor for S/S and trends of decreased cardiac output  - Administer and titrate ordered vasoactive medications to optimize hemodynamic stability  - Assess quality of pulses, skin color and temperature  - Assess for signs of decreased coronary artery perfusion  - Instruct patient to report change in severity of symptoms  5/5/2022 1116 by Starla Mobley  Outcome: Completed  5/5/2022 0742 by Starla Mobley  Outcome: Progressing  Goal: Absence of cardiac dysrhythmias or at baseline rhythm  Description: INTERVENTIONS:  - Continuous cardiac monitoring, vital signs, obtain 12 lead EKG if ordered  - Administer antiarrhythmic and heart rate control medications as ordered  - Monitor electrolytes and administer replacement therapy as ordered  5/5/2022 1116 by Starla Mobley  Outcome: Completed  5/5/2022 0742 by Starla Mobley  Outcome: Progressing     Problem: RESPIRATORY - ADULT  Goal: Achieves optimal ventilation and oxygenation  Description: INTERVENTIONS:  - Assess for changes in respiratory status  - Assess for changes in mentation and behavior  - Position to facilitate oxygenation and minimize respiratory effort  - Oxygen administered by appropriate delivery if ordered  - Initiate smoking cessation education as indicated  - Encourage broncho-pulmonary hygiene including cough, deep breathe, Incentive Spirometry  - Assess the need for suctioning and aspirate as needed  - Assess and instruct to report SOB or any respiratory difficulty  - Respiratory Therapy support as indicated  5/5/2022 1116 by Brendan Blount  Outcome: Completed  5/5/2022 0742 by Brendan Blount  Outcome: Progressing     Problem: METABOLIC, FLUID AND ELECTROLYTES - ADULT  Goal: Electrolytes maintained within normal limits  Description: INTERVENTIONS:  - Monitor labs and assess patient for signs and symptoms of electrolyte imbalances  - Administer electrolyte replacement as ordered  - Monitor response to electrolyte replacements, including repeat lab results as appropriate  - Instruct patient on fluid and nutrition as appropriate  5/5/2022 1116 by Brendan Blount  Outcome: Completed  5/5/2022 0742 by Brendan Blount  Outcome: Progressing  Goal: Fluid balance maintained  Description: INTERVENTIONS:  - Monitor labs   - Monitor I/O and WT  - Instruct patient on fluid and nutrition as appropriate  - Assess for signs & symptoms of volume excess or deficit  5/5/2022 1116 by Brendan Blount  Outcome: Completed  5/5/2022 0742 by Brendan Blount  Outcome: Progressing     Problem: HEMATOLOGIC - ADULT  Goal: Maintains hematologic stability  Description: INTERVENTIONS  - Assess for signs and symptoms of bleeding or hemorrhage  - Monitor labs  - Administer supportive blood products/factors as ordered and appropriate  5/5/2022 1116 by Brendan Blount  Outcome: Completed  5/5/2022 0742 by Brendan Blount  Outcome: Progressing     Problem: MOBILITY - ADULT  Goal: Maintain or return to baseline ADL function  Description: INTERVENTIONS:  -  Assess patient's ability to carry out ADLs; assess patient's baseline for ADL function and identify physical deficits which impact ability to perform ADLs (bathing, care of mouth/teeth, toileting, grooming, dressing, etc )  - Assess/evaluate cause of self-care deficits   - Assess range of motion  - Assess patient's mobility; develop plan if impaired  - Assess patient's need for assistive devices and provide as appropriate  - Encourage maximum independence but intervene and supervise when necessary  - Involve family in performance of ADLs  - Assess for home care needs following discharge   - Consider OT consult to assist with ADL evaluation and planning for discharge  - Provide patient education as appropriate  5/5/2022 1116 by Brendan Blount  Outcome: Completed  5/5/2022 0742 by Brendan Blount  Outcome: Progressing  Goal: Maintains/Returns to pre admission functional level  Description: INTERVENTIONS:  - Perform BMAT or MOVE assessment daily    - Set and communicate daily mobility goal to care team and patient/family/caregiver  - Collaborate with rehabilitation services on mobility goals if consulted  - Perform Range of Motion 4 times a day  - Reposition patient every 2 hours    - Dangle patient 3 times a day  - Stand patient 3 times a day  - Ambulate patient 3 times a day  - Out of bed to chair 3 times a day   - Out of bed for meals 3 times a day  - Out of bed for toileting  - Record patient progress and toleration of activity level   5/5/2022 1116 by Brendan Blount  Outcome: Completed  5/5/2022 0742 by Brendan Blount  Outcome: Progressing

## 2022-05-05 NOTE — ASSESSMENT & PLAN NOTE
· Continued statin therapy  · Initiated on coreg 6 25 mg PO BID  · Check a nuclear stress test on 05/04/2022 per Cardiology, no stress induced myocardial ischemia noted    CTA of the chest/PE protocol (05/02/2022): HEART/GREAT VESSELS:  Cardiomegaly  Slow transit of the IV contrast bolus through the cardiac chambers is suggestive of cardiac failure  No thoracic aortic aneurysm  Coronary artery calcifications  No pericardial effusion

## 2022-05-09 ENCOUNTER — APPOINTMENT (OUTPATIENT)
Dept: LAB | Facility: MEDICAL CENTER | Age: 57
End: 2022-05-09
Payer: COMMERCIAL

## 2022-05-09 DIAGNOSIS — E83.39 HYPERPHOSPHATEMIA: ICD-10-CM

## 2022-05-09 DIAGNOSIS — R79.89 ELEVATED PLATELET COUNT: ICD-10-CM

## 2022-05-09 DIAGNOSIS — I25.10 CORONARY ARTERY CALCIFICATION SEEN ON CT SCAN: ICD-10-CM

## 2022-05-09 DIAGNOSIS — E78.00 HYPERCHOLESTEROLEMIA: ICD-10-CM

## 2022-05-09 DIAGNOSIS — I50.21 ACUTE SYSTOLIC CONGESTIVE HEART FAILURE (HCC): ICD-10-CM

## 2022-05-09 LAB
ALBUMIN SERPL BCP-MCNC: 3.9 G/DL (ref 3.5–5)
ALP SERPL-CCNC: 98 U/L (ref 46–116)
ALT SERPL W P-5'-P-CCNC: 34 U/L (ref 12–78)
ANION GAP SERPL CALCULATED.3IONS-SCNC: 6 MMOL/L (ref 4–13)
AST SERPL W P-5'-P-CCNC: 20 U/L (ref 5–45)
BASOPHILS # BLD AUTO: 0.05 THOUSANDS/ΜL (ref 0–0.1)
BASOPHILS NFR BLD AUTO: 1 % (ref 0–1)
BILIRUB SERPL-MCNC: 0.64 MG/DL (ref 0.2–1)
BUN SERPL-MCNC: 34 MG/DL (ref 5–25)
CALCIUM SERPL-MCNC: 10.1 MG/DL (ref 8.3–10.1)
CHLORIDE SERPL-SCNC: 107 MMOL/L (ref 100–108)
CO2 SERPL-SCNC: 26 MMOL/L (ref 21–32)
CREAT SERPL-MCNC: 1.15 MG/DL (ref 0.6–1.3)
EOSINOPHIL # BLD AUTO: 0.14 THOUSAND/ΜL (ref 0–0.61)
EOSINOPHIL NFR BLD AUTO: 2 % (ref 0–6)
ERYTHROCYTE [DISTWIDTH] IN BLOOD BY AUTOMATED COUNT: 14.3 % (ref 11.6–15.1)
GFR SERPL CREATININE-BSD FRML MDRD: 69 ML/MIN/1.73SQ M
GLUCOSE P FAST SERPL-MCNC: 95 MG/DL (ref 65–99)
HCT VFR BLD AUTO: 48.3 % (ref 36.5–49.3)
HGB BLD-MCNC: 16.2 G/DL (ref 12–17)
IMM GRANULOCYTES # BLD AUTO: 0.03 THOUSAND/UL (ref 0–0.2)
IMM GRANULOCYTES NFR BLD AUTO: 0 % (ref 0–2)
LYMPHOCYTES # BLD AUTO: 1.66 THOUSANDS/ΜL (ref 0.6–4.47)
LYMPHOCYTES NFR BLD AUTO: 23 % (ref 14–44)
MCH RBC QN AUTO: 30.6 PG (ref 26.8–34.3)
MCHC RBC AUTO-ENTMCNC: 33.5 G/DL (ref 31.4–37.4)
MCV RBC AUTO: 91 FL (ref 82–98)
MONOCYTES # BLD AUTO: 0.49 THOUSAND/ΜL (ref 0.17–1.22)
MONOCYTES NFR BLD AUTO: 7 % (ref 4–12)
NEUTROPHILS # BLD AUTO: 4.74 THOUSANDS/ΜL (ref 1.85–7.62)
NEUTS SEG NFR BLD AUTO: 67 % (ref 43–75)
NRBC BLD AUTO-RTO: 0 /100 WBCS
PHOSPHATE SERPL-MCNC: 3.7 MG/DL (ref 2.7–4.5)
PLATELET # BLD AUTO: 353 THOUSANDS/UL (ref 149–390)
PMV BLD AUTO: 10.9 FL (ref 8.9–12.7)
POTASSIUM SERPL-SCNC: 4.4 MMOL/L (ref 3.5–5.3)
PROT SERPL-MCNC: 8.3 G/DL (ref 6.4–8.2)
RBC # BLD AUTO: 5.3 MILLION/UL (ref 3.88–5.62)
SODIUM SERPL-SCNC: 139 MMOL/L (ref 136–145)
WBC # BLD AUTO: 7.11 THOUSAND/UL (ref 4.31–10.16)

## 2022-05-09 PROCEDURE — 36415 COLL VENOUS BLD VENIPUNCTURE: CPT

## 2022-05-09 PROCEDURE — 80053 COMPREHEN METABOLIC PANEL: CPT

## 2022-05-09 PROCEDURE — 84100 ASSAY OF PHOSPHORUS: CPT

## 2022-05-09 PROCEDURE — 85025 COMPLETE CBC W/AUTO DIFF WBC: CPT

## 2022-05-13 ENCOUNTER — OFFICE VISIT (OUTPATIENT)
Dept: FAMILY MEDICINE CLINIC | Facility: CLINIC | Age: 57
End: 2022-05-13
Payer: COMMERCIAL

## 2022-05-13 VITALS
TEMPERATURE: 96.1 F | DIASTOLIC BLOOD PRESSURE: 68 MMHG | WEIGHT: 253.2 LBS | OXYGEN SATURATION: 97 % | HEART RATE: 89 BPM | HEIGHT: 70 IN | BODY MASS INDEX: 36.25 KG/M2 | SYSTOLIC BLOOD PRESSURE: 102 MMHG

## 2022-05-13 DIAGNOSIS — I50.21 ACUTE SYSTOLIC CONGESTIVE HEART FAILURE (HCC): Primary | ICD-10-CM

## 2022-05-13 PROBLEM — E55.9 VITAMIN D DEFICIENCY: Status: RESOLVED | Noted: 2017-11-29 | Resolved: 2022-05-13

## 2022-05-13 PROBLEM — K76.0 HEPATIC STEATOSIS: Status: ACTIVE | Noted: 2022-04-20

## 2022-05-13 PROCEDURE — 1111F DSCHRG MED/CURRENT MED MERGE: CPT | Performed by: FAMILY MEDICINE

## 2022-05-13 PROCEDURE — 99495 TRANSJ CARE MGMT MOD F2F 14D: CPT | Performed by: FAMILY MEDICINE

## 2022-05-13 NOTE — PROGRESS NOTES
Transition of Care  Follow-up After Hospitalization    Nadine Child 61 y o  male   Date:  5/13/2022    TCM Call (since 4/12/2022)     Date and time call was made  5/5/2022  1:33 PM    Hospital care reviewed  Records reviewed    Patient was hospitialized at  [address] [place] [address]    Date of Admission  05/02/22    Date of discharge  05/05/22    Diagnosis  acute congestive heart failure    Disposition  Home    Were the patients medications reviewed and updated  No    Current Symptoms  None      TCM Call (since 4/12/2022)     Post hospital issues  None    Scheduled for follow up? Yes    Patients specialists  Cardiologist  cardiologist is with [facility]    Did you obtain your prescribed medications  Yes    Do you need help managing your prescriptions or medications  No    Is transportation to your appointment needed  No    I have advised the patient to call PCP with any new or worsening symptoms  Pavan Ortega,         NATALY PARADA records were reviewed  Medications upon discharge reviewed/updated  Discharge Disposition:    Follow up visits with other specialists:       Assessment and Plan:  Reviewed medications with patient  Follow-up with Cardiology as scheduled  Patient is scheduled to get a heart catheterization on June 6th  Patient will continue to watch his diet, decrease salt intake  Patient told to hold on any all call at least until he sees Cardiology  I will see him back in June as scheduled or p r n  Maude Dandy was seen today for transition of care management  Diagnoses and all orders for this visit:    Acute systolic congestive heart failure (HCC)          HPI:  RUTHY  Patient was admitted for acute systolic heart failure  Patient had been having increased shortness of breath  He had a cholecystectomy 2 weeks before this admission  Patient is feeling much better  No chest pain or shortness of breath  Saw Cardiology earlier this week    Lab work that he had done last week was good  Tolerating his medications well  He is trying to watch salt intake      ROS: Review of Systems   Constitutional: Negative  Respiratory: Negative  Cardiovascular: Negative  Gastrointestinal: Negative  Genitourinary: Negative  Past Medical History:   Diagnosis Date    Anxiety     GERD (gastroesophageal reflux disease)     Hyperlipidemia     Kidney stones        Past Surgical History:   Procedure Laterality Date    CARDIAC ELECTROPHYSIOLOGY STUDY AND ABLATION      CARDIAC ELECTROPHYSIOLOGY STUDY AND ABLATION      CARDIAC SURGERY  2014    ablation    CHOLECYSTECTOMY      COLONOSCOPY      ELBOW SURGERY Right     FL RETROGRADE PYELOGRAM  6/29/2019    FL RETROGRADE PYELOGRAM  8/29/2019    FL RETROGRADE PYELOGRAM  9/25/2019    HERNIA REPAIR Right     ing     IR NEPHROSTOMY TUBE PLACEMENT  8/30/2019    KIDNEY STONE SURGERY      KNEE ARTHROSCOPY Left     KNEE CARTILAGE SURGERY      MI CYSTO/URETERO W/LITHOTRIPSY &INDWELL STENT INSRT Right 8/29/2019    Procedure: CYSTOSCOPY URETEROSCOPY WITH LITHOTRIPSY HOLMIUM LASER, RETROGRADE PYELOGRAM;  Surgeon: Rodolfo Torres MD;  Location: [facility];  Service: Urology    MI CYSTOURETHROSCOPY,URETER CATHETER Right 6/29/2019    Procedure: CYSTOSCOPY RETROGRADE PYELOGRAM WITH INSERTION STENT URETERAL;  Surgeon: Siomara Davis MD;  Location: [facility];  Service: Urology    MI LITAUT [place] CM Right 9/25/2019    Procedure: NEPHROLITHOTOMY  PERCUTANEOUS ([facility])   MEATAL DILATION; ANTEGRADE URETEROSCOPY AND STENT REMOVAL;  Surgeon: Rodolfo Torres MD;  [facility];  Service: Urology    WRIST SURGERY Right        Social History     Socioeconomic History    Marital status: /Civil Union     Spouse name: None    Number of children: None    Years of education: None    Highest education level: None   Occupational History    None   Tobacco Use    Smoking status: Never Smoker    Smokeless tobacco: Former User    Tobacco comment: former chewing tobacco-quit 2014   Vaping Use    Vaping Use: Never used   Substance and Sexual Activity    Alcohol use: Not Currently     Comment: occasionally    Drug use: No    Sexual activity: None   Other Topics Concern    None   Social History Narrative    None     Social Determinants of Health     Financial Resource Strain: Not on file   Food Insecurity: No Food Insecurity    Worried About Running Out of Food in the Last Year: Never true    Mikal of Food in the Last Year: Never true   Transportation Needs: No Transportation Needs    Lack of Transportation (Medical): No    Lack of Transportation (Non-Medical):  No   Physical Activity: Not on file   Stress: Not on file   Social Connections: Not on file   Intimate Partner Violence: Not on file   Housing Stability: Low Risk     Unable to Pay for Housing in the Last Year: No    Number of Places Lived in the Last Year: 1    Unstable Housing in the Last Year: No       Family History   Problem Relation Age of Onset    Cancer Mother     Cancer Father        Allergies   Allergen Reactions    Ancef [Cefazolin] Nausea Only     Preoperative admin-nausea and dry heaving    Other Itching     Peanuts     Peanut-Containing Drug Products - Food Allergy Itching    Sulfa Antibiotics Rash    Toradol [Ketorolac Tromethamine] Rash     Tolerates ibuprofen         Current Outpatient Medications:     allopurinol (ZYLOPRIM) 100 mg tablet, Take 1 tablet (100 mg total) by mouth daily, Disp: 30 tablet, Rfl: 5    aspirin (ECOTRIN LOW STRENGTH) 81 mg EC tablet, Take 1 tablet (81 mg total) by mouth daily To prevent heart attack, Disp: 30 tablet, Rfl: 0    atorvastatin (LIPITOR) 40 mg tablet, Take 1 tablet (40 mg total) by mouth every evening This is an increased dose to help prevent heart attack and stroke , Disp: 30 tablet, Rfl: 0    carvedilol (COREG) 6 25 mg tablet, Take 1 tablet (6 25 mg total) by mouth 2 (two) times a day with meals To strengthen the heart and for blood pressure, Disp: 60 tablet, Rfl: 0    cholecalciferol (VITAMIN D3) 1,000 units tablet, Take 2 tablets (2,000 Units total) by mouth daily For a low vitamin D level, Disp: 60 tablet, Rfl: 0    DULoxetine (CYMBALTA) 60 mg delayed release capsule, Take 1 capsule (60 mg total) by mouth daily, Disp: 30 capsule, Rfl: 5    lisinopril (ZESTRIL) 5 mg tablet, Take 1 tablet (5 mg total) by mouth daily To strengthen the heart and for blood pressure, Disp: 30 tablet, Rfl: 0    omeprazole (PriLOSEC) 20 mg delayed release capsule, Take 1 capsule (20 mg total) by mouth daily, Disp: 30 capsule, Rfl: 3    torsemide (DEMADEX) 20 mg tablet, Take 1 tablet (20 mg total) by mouth daily, Disp: 30 tablet, Rfl: 0    potassium chloride (MICRO-K) 10 MEQ CR capsule, Take 1 capsule (10 mEq total) by mouth daily To take with torsemide which lowers the potassium level, Disp: 30 capsule, Rfl: 0      Physical Exam:  /68   Pulse 89   Temp (!) 96 1 °F (35 6 °C)   Ht 5' 10" (1 778 m)   Wt 115 kg (253 lb 3 2 oz)   SpO2 97%   BMI 36 33 kg/m²     Physical Exam  Vitals reviewed  Constitutional:       General: He is not in acute distress  Appearance: Normal appearance  He is well-developed  He is not ill-appearing, toxic-appearing or diaphoretic  HENT:      Head: Normocephalic and atraumatic  Eyes:      Conjunctiva/sclera: Conjunctivae normal    Cardiovascular:      Rate and Rhythm: Normal rate and regular rhythm  Heart sounds: Normal heart sounds  No murmur heard  No friction rub  No gallop  Pulmonary:      Effort: Pulmonary effort is normal  No respiratory distress  Breath sounds: Normal breath sounds  No wheezing, rhonchi or rales  Musculoskeletal:      Right lower leg: No edema  Left lower leg: No edema  Neurological:      General: No focal deficit present  Mental Status: He is alert and oriented to person, place, and time     Psychiatric:         Mood and Affect: Mood normal          Behavior: Behavior normal          Thought Content:  Thought content normal          Judgment: Judgment normal              Labs:  Lab Results   Component Value Date    WBC 7 11 05/09/2022    HGB 16 2 05/09/2022    HCT 48 3 05/09/2022    MCV 91 05/09/2022     05/09/2022     Lab Results   Component Value Date     04/14/2015    K 4 4 05/09/2022     05/09/2022    CO2 26 05/09/2022    ANIONGAP 12 04/14/2015    BUN 34 (H) 05/09/2022    CREATININE 1 15 05/09/2022    GLUCOSE 88 04/14/2015    GLUF 95 05/09/2022    CALCIUM 10 1 05/09/2022    CORRECTEDCA 9 7 05/03/2022    AST 20 05/09/2022    ALT 34 05/09/2022    ALKPHOS 98 05/09/2022    PROT 7 0 04/14/2015    BILITOT 0 6 04/14/2015    EGFR 69 05/09/2022

## 2022-06-13 DIAGNOSIS — I25.10 CORONARY ARTERY CALCIFICATION SEEN ON CT SCAN: ICD-10-CM

## 2022-06-13 DIAGNOSIS — I50.21 ACUTE SYSTOLIC CONGESTIVE HEART FAILURE (HCC): ICD-10-CM

## 2022-06-13 RX ORDER — ASPIRIN 81 MG/1
81 TABLET ORAL DAILY
Qty: 30 TABLET | Refills: 3 | Status: SHIPPED | OUTPATIENT
Start: 2022-06-13 | End: 2022-06-28 | Stop reason: SDUPTHER

## 2022-06-13 RX ORDER — LISINOPRIL 5 MG/1
5 TABLET ORAL DAILY
Qty: 30 TABLET | Refills: 3 | Status: SHIPPED | OUTPATIENT
Start: 2022-06-13 | End: 2022-06-28 | Stop reason: SDUPTHER

## 2022-06-28 DIAGNOSIS — I25.10 CORONARY ARTERY CALCIFICATION SEEN ON CT SCAN: ICD-10-CM

## 2022-06-28 DIAGNOSIS — E55.9 VITAMIN D INSUFFICIENCY: ICD-10-CM

## 2022-06-28 DIAGNOSIS — E78.00 HYPERCHOLESTEROLEMIA: ICD-10-CM

## 2022-06-28 DIAGNOSIS — I50.21 ACUTE SYSTOLIC CONGESTIVE HEART FAILURE (HCC): ICD-10-CM

## 2022-06-28 RX ORDER — POTASSIUM CHLORIDE 750 MG/1
10 CAPSULE, EXTENDED RELEASE ORAL DAILY
Qty: 30 CAPSULE | Refills: 0 | Status: SHIPPED | OUTPATIENT
Start: 2022-06-28 | End: 2022-07-26 | Stop reason: SDUPTHER

## 2022-06-28 RX ORDER — LISINOPRIL 5 MG/1
5 TABLET ORAL DAILY
Qty: 30 TABLET | Refills: 3 | Status: SHIPPED | OUTPATIENT
Start: 2022-06-28

## 2022-06-28 RX ORDER — CARVEDILOL 6.25 MG/1
6.25 TABLET ORAL 2 TIMES DAILY WITH MEALS
Qty: 60 TABLET | Refills: 0 | Status: SHIPPED | OUTPATIENT
Start: 2022-06-28 | End: 2022-07-26 | Stop reason: SDUPTHER

## 2022-06-28 RX ORDER — ATORVASTATIN CALCIUM 40 MG/1
40 TABLET, FILM COATED ORAL EVERY EVENING
Qty: 30 TABLET | Refills: 0 | Status: SHIPPED | OUTPATIENT
Start: 2022-06-28 | End: 2022-07-26 | Stop reason: SDUPTHER

## 2022-06-28 RX ORDER — MELATONIN
2000 DAILY
Qty: 60 TABLET | Refills: 0 | Status: SHIPPED | OUTPATIENT
Start: 2022-06-28

## 2022-06-28 RX ORDER — TORSEMIDE 20 MG/1
20 TABLET ORAL DAILY
Qty: 30 TABLET | Refills: 0 | Status: SHIPPED | OUTPATIENT
Start: 2022-06-28 | End: 2022-07-26 | Stop reason: SDUPTHER

## 2022-06-28 RX ORDER — ASPIRIN 81 MG/1
81 TABLET ORAL DAILY
Qty: 30 TABLET | Refills: 3 | Status: SHIPPED | OUTPATIENT
Start: 2022-06-28

## 2022-07-08 DIAGNOSIS — F41.8 ANXIETY WITH DEPRESSION: ICD-10-CM

## 2022-07-08 RX ORDER — DULOXETIN HYDROCHLORIDE 60 MG/1
60 CAPSULE, DELAYED RELEASE ORAL DAILY
Qty: 30 CAPSULE | Refills: 5 | Status: SHIPPED | OUTPATIENT
Start: 2022-07-08

## 2022-07-26 DIAGNOSIS — E78.00 HYPERCHOLESTEROLEMIA: ICD-10-CM

## 2022-07-26 DIAGNOSIS — I25.10 CORONARY ARTERY CALCIFICATION SEEN ON CT SCAN: ICD-10-CM

## 2022-07-26 DIAGNOSIS — I50.21 ACUTE SYSTOLIC CONGESTIVE HEART FAILURE (HCC): ICD-10-CM

## 2022-07-26 RX ORDER — POTASSIUM CHLORIDE 750 MG/1
10 CAPSULE, EXTENDED RELEASE ORAL DAILY
Qty: 30 CAPSULE | Refills: 3 | Status: SHIPPED | OUTPATIENT
Start: 2022-07-26

## 2022-07-26 RX ORDER — TORSEMIDE 20 MG/1
20 TABLET ORAL DAILY
Qty: 30 TABLET | Refills: 3 | Status: SHIPPED | OUTPATIENT
Start: 2022-07-26

## 2022-07-26 RX ORDER — CARVEDILOL 6.25 MG/1
6.25 TABLET ORAL 2 TIMES DAILY WITH MEALS
Qty: 60 TABLET | Refills: 3 | Status: SHIPPED | OUTPATIENT
Start: 2022-07-26

## 2022-07-26 RX ORDER — ATORVASTATIN CALCIUM 40 MG/1
40 TABLET, FILM COATED ORAL EVERY EVENING
Qty: 30 TABLET | Refills: 3 | Status: SHIPPED | OUTPATIENT
Start: 2022-07-26

## 2023-01-12 ENCOUNTER — OFFICE VISIT (OUTPATIENT)
Dept: URGENT CARE | Facility: MEDICAL CENTER | Age: 58
End: 2023-01-12

## 2023-01-12 VITALS
HEART RATE: 96 BPM | RESPIRATION RATE: 20 BRPM | HEIGHT: 70 IN | OXYGEN SATURATION: 98 % | TEMPERATURE: 96.9 F | SYSTOLIC BLOOD PRESSURE: 130 MMHG | WEIGHT: 265 LBS | DIASTOLIC BLOOD PRESSURE: 74 MMHG | BODY MASS INDEX: 37.94 KG/M2

## 2023-01-12 DIAGNOSIS — L23.7 ALLERGIC CONTACT DERMATITIS DUE TO PLANTS, EXCEPT FOOD: Primary | ICD-10-CM

## 2023-01-12 RX ORDER — PREDNISONE 10 MG/1
TABLET ORAL
Qty: 27 TABLET | Refills: 0 | Status: SHIPPED | OUTPATIENT
Start: 2023-01-12 | End: 2023-01-22

## 2023-01-12 NOTE — PROGRESS NOTES
[facility]        NAME: Karen Goldstein is a 61 y o  male  : 1965    MRN: [id number]  DATE: 2023  TIME: 1:41 PM    Assessment and Plan   Allergic contact dermatitis due to plants, except food [L23 7]  1  Allergic contact dermatitis due to plants, except food  predniSONE 10 mg tablet            Patient Instructions   If symptoms continue/get worse around  Your eye you need to be seen by an ophthalmologist      Follow up with PCP in 3-5 days  Proceed to  ER if symptoms worsen  Chief Complaint     Chief Complaint   Patient presents with   • Poison Ivy     Pt  Possibly exposed to poison ivy or oak , symptoms started 2 days ago, using calamine lotion with no relief, itchy and irritated, noted on face and around eyes, hands, arms, groin          History of Present Illness       About 2 days ago  Worse since yesterday  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[employer], and unsure if poison was on that  Using calamine lotion without much relief  He did not take any benadryl, only using the calamine lotion  Review of Systems   Review of Systems   Constitutional: Negative for chills and fever  HENT: Negative for congestion, ear pain and sore throat  Eyes: Negative for pain, discharge, redness, itching and visual disturbance  Rash/irritation jabier-orbital     Respiratory: Negative for cough and shortness of breath  Gastrointestinal: Negative for abdominal pain and vomiting  Genitourinary: Negative for dysuria and hematuria  Musculoskeletal: Negative for arthralgias and back pain  Skin: Positive for rash  Negative for color change  Neurological: Negative for seizures and syncope  All other systems reviewed and are negative          Current Medications       Current Outpatient Medications:   •  allopurinol (ZYLOPRIM) 100 mg tablet, Take 1 tablet (100 mg total) by mouth daily, Disp: 30 tablet, Rfl: 5  •  aspirin (ECOTRIN LOW STRENGTH) 81 mg EC tablet, Take 1 tablet (81 mg total) by mouth daily To prevent heart attack, Disp: 30 tablet, Rfl: 3  •  atorvastatin (LIPITOR) 40 mg tablet, Take 1 tablet (40 mg total) by mouth every evening This is an increased dose to help prevent heart attack and stroke , Disp: 30 tablet, Rfl: 3  •  carvedilol (COREG) 6 25 mg tablet, Take 1 tablet (6 25 mg total) by mouth 2 (two) times a day with meals To strengthen the heart and for blood pressure, Disp: 60 tablet, Rfl: 3  •  cholecalciferol (VITAMIN D3) 1,000 units tablet, Take 2 tablets (2,000 Units total) by mouth daily For a low vitamin D level, Disp: 60 tablet, Rfl: 0  •  DULoxetine (CYMBALTA) 60 mg delayed release capsule, Take 1 capsule (60 mg total) by mouth daily, Disp: 30 capsule, Rfl: 5  •  lisinopril (ZESTRIL) 5 mg tablet, Take 1 tablet (5 mg total) by mouth daily To strengthen the heart and for blood pressure, Disp: 30 tablet, Rfl: 3  •  potassium chloride (MICRO-K) 10 MEQ CR capsule, Take 1 capsule (10 mEq total) by mouth daily To take with torsemide which lowers the potassium level, Disp: 30 capsule, Rfl: 3  •  predniSONE 10 mg tablet, Take 4 tablets (40 mg total) by mouth daily for 3 days, THEN 3 tablets (30 mg total) daily for 3 days, THEN 2 tablets (20 mg total) daily for 2 days, THEN 1 tablet (10 mg total) daily for 2 days  , Disp: 27 tablet, Rfl: 0  •  omeprazole (PriLOSEC) 20 mg delayed release capsule, Take 1 capsule (20 mg total) by mouth daily (Patient not taking: Reported on 1/12/2023), Disp: 30 capsule, Rfl: 3  •  torsemide (DEMADEX) 20 mg tablet, Take 1 tablet (20 mg total) by mouth daily (Patient not taking: Reported on 1/12/2023), Disp: 30 tablet, Rfl: 3    Current Allergies     Allergies as of 01/12/2023 - Reviewed 01/12/2023   Allergen Reaction Noted   • Ancef [cefazolin] Nausea Only 08/29/2019   • Other Itching 02/06/2018   • Peanut-containing drug products - food allergy Itching 02/06/2018   • Sulfa antibiotics Rash 05/08/2017   • Toradol [ketorolac tromethamine] Rash 05/08/2017            The following portions of the patient's history were reviewed and updated as appropriate: allergies, current medications, past family history, past medical history, past social history, past surgical history and problem list      Past Medical History:   Diagnosis Date   • Anxiety    • GERD (gastroesophageal reflux disease)    • Hyperlipidemia    • Kidney stones        Past Surgical History:   Procedure Laterality Date   • CARDIAC ELECTROPHYSIOLOGY STUDY AND ABLATION     • CARDIAC ELECTROPHYSIOLOGY STUDY AND ABLATION     • CARDIAC SURGERY  2014    ablation   • CHOLECYSTECTOMY     • COLONOSCOPY     • ELBOW SURGERY Right    • FL RETROGRADE PYELOGRAM  6/29/2019   • FL RETROGRADE PYELOGRAM  8/29/2019   • FL RETROGRADE PYELOGRAM  9/25/2019   • HERNIA REPAIR Right     ing    • IR NEPHROSTOMY TUBE PLACEMENT  8/30/2019   • KIDNEY STONE SURGERY     • KNEE ARTHROSCOPY Left    • KNEE CARTILAGE SURGERY     • IL CYSTO BLADDER W/URETERAL CATHETERIZATION Right 6/29/2019    Procedure: CYSTOSCOPY RETROGRADE PYELOGRAM WITH INSERTION STENT URETERAL;  Surgeon: Moon Theodore MD;  Location: [facility];  Service: Urology   • IL CYSTO/URETERO W/LITHOTRIPSY &INDWELL STENT INSRT Right 8/29/2019    Procedure: CYSTOSCOPY URETEROSCOPY WITH LITHOTRIPSY HOLMIUM LASER, RETROGRADE PYELOGRAM;  Surgeon: Kayleigh Kingsley MD;  Location: [facility];  Service: Urology   • IL PERQ NL/PL LITHOTRP COMPLEX >2 CM MLT LOCATIONS Right 9/25/2019    Procedure: NEPHROLITHOTOMY  PERCUTANEOUS ([facility])  MEATAL DILATION; ANTEGRADE URETEROSCOPY AND STENT REMOVAL;  Surgeon: Kayleigh Kingsley MD;  Location: [facility];  Service: Urology   • WRIST SURGERY Right        Family History   Problem Relation Age of Onset   • Cancer Mother    • Cancer Father          Medications have been verified          Objective   /74   Pulse 96   Temp (!) 96 9 °F (36 1 °C)   Resp 20   Ht 5' 10" (1 778 m)   Wt 120 kg (265 lb)   SpO2 98%   BMI 38 02 kg/m²        Physical Exam     Physical Exam  Vitals and nursing note reviewed  Constitutional:       General: He is not in acute distress  Appearance: Normal appearance  He is normal weight  He is not ill-appearing  HENT:      Head: Normocephalic and atraumatic  Ears:      Comments: Rash noted around ears  Nose: Nose normal       Mouth/Throat:      Mouth: Mucous membranes are moist       Pharynx: Oropharynx is clear  Eyes:      Extraocular Movements: Extraocular movements intact  Conjunctiva/sclera: Conjunctivae normal       Pupils: Pupils are equal, round, and reactive to light  Cardiovascular:      Rate and Rhythm: Normal rate and regular rhythm  Pulses: Normal pulses  Pulmonary:      Effort: Pulmonary effort is normal  No respiratory distress  Abdominal:      General: Abdomen is flat  Bowel sounds are normal       Palpations: Abdomen is soft  Musculoskeletal:         General: Normal range of motion  Cervical back: Normal range of motion and neck supple  Skin:     General: Skin is warm  Capillary Refill: Capillary refill takes less than 2 seconds  Findings: Rash present  Neurological:      General: No focal deficit present  Mental Status: He is alert and oriented to person, place, and time     Psychiatric:         Mood and Affect: Mood normal          Behavior: Behavior normal

## 2023-01-12 NOTE — PATIENT INSTRUCTIONS
You may use Over the Counter Benadryl as needed (specifically at night)  Be sure to drink lots of fluids  Be sure to eat when taking the steroids

## 2023-01-16 DIAGNOSIS — F41.8 ANXIETY WITH DEPRESSION: ICD-10-CM

## 2023-01-16 RX ORDER — DULOXETIN HYDROCHLORIDE 60 MG/1
60 CAPSULE, DELAYED RELEASE ORAL DAILY
Qty: 30 CAPSULE | Refills: 5 | Status: SHIPPED | OUTPATIENT
Start: 2023-01-16

## 2023-02-26 ENCOUNTER — APPOINTMENT (EMERGENCY)
Dept: RADIOLOGY | Facility: HOSPITAL | Age: 58
End: 2023-02-26

## 2023-02-26 ENCOUNTER — HOSPITAL ENCOUNTER (EMERGENCY)
Facility: HOSPITAL | Age: 58
Discharge: HOME | End: 2023-02-26
Attending: EMERGENCY MEDICINE

## 2023-02-26 VITALS
TEMPERATURE: 97.3 F | SYSTOLIC BLOOD PRESSURE: 143 MMHG | BODY MASS INDEX: 39.46 KG/M2 | RESPIRATION RATE: 18 BRPM | WEIGHT: 275 LBS | DIASTOLIC BLOOD PRESSURE: 79 MMHG | HEART RATE: 93 BPM | OXYGEN SATURATION: 99 %

## 2023-02-26 DIAGNOSIS — R42 LIGHTHEADEDNESS: ICD-10-CM

## 2023-02-26 DIAGNOSIS — E83.42 HYPOMAGNESEMIA: ICD-10-CM

## 2023-02-26 DIAGNOSIS — E87.6 HYPOKALEMIA: Primary | ICD-10-CM

## 2023-02-26 LAB
2HR DELTA HS TROPONIN: 1 NG/L
ANION GAP SERPL CALCULATED.3IONS-SCNC: 9 MMOL/L (ref 4–13)
BASOPHILS # BLD AUTO: 0.03 THOUSANDS/ÂΜL (ref 0–0.1)
BASOPHILS NFR BLD AUTO: 0 % (ref 0–1)
BNP SERPL-MCNC: 244 PG/ML (ref 0–100)
BUN SERPL-MCNC: 18 MG/DL (ref 5–25)
CALCIUM SERPL-MCNC: 8.9 MG/DL (ref 8.4–10.2)
CARDIAC TROPONIN I PNL SERPL HS: 14 NG/L
CARDIAC TROPONIN I PNL SERPL HS: 15 NG/L
CHLORIDE SERPL-SCNC: 105 MMOL/L (ref 96–108)
CO2 SERPL-SCNC: 25 MMOL/L (ref 21–32)
CREAT SERPL-MCNC: 1.15 MG/DL (ref 0.6–1.3)
EOSINOPHIL # BLD AUTO: 0.08 THOUSAND/ÂΜL (ref 0–0.61)
EOSINOPHIL NFR BLD AUTO: 1 % (ref 0–6)
ERYTHROCYTE [DISTWIDTH] IN BLOOD BY AUTOMATED COUNT: 13.8 % (ref 11.6–15.1)
FLUAV RNA RESP QL NAA+PROBE: NEGATIVE
FLUBV RNA RESP QL NAA+PROBE: NEGATIVE
GFR SERPL CREATININE-BSD FRML MDRD: 69 ML/MIN/1.73SQ M
GLUCOSE SERPL-MCNC: 120 MG/DL (ref 65–140)
HCT VFR BLD AUTO: 41.6 % (ref 36.5–49.3)
HGB BLD-MCNC: 14.7 G/DL (ref 12–17)
IMM GRANULOCYTES # BLD AUTO: 0.04 THOUSAND/UL (ref 0–0.2)
IMM GRANULOCYTES NFR BLD AUTO: 1 % (ref 0–2)
LYMPHOCYTES # BLD AUTO: 1.74 THOUSANDS/ÂΜL (ref 0.6–4.47)
LYMPHOCYTES NFR BLD AUTO: 22 % (ref 14–44)
MAGNESIUM SERPL-MCNC: 1.8 MG/DL (ref 1.9–2.7)
MCH RBC QN AUTO: 32 PG (ref 26.8–34.3)
MCHC RBC AUTO-ENTMCNC: 35.3 G/DL (ref 31.4–37.4)
MCV RBC AUTO: 90 FL (ref 82–98)
MONOCYTES # BLD AUTO: 0.53 THOUSAND/ÂΜL (ref 0.17–1.22)
MONOCYTES NFR BLD AUTO: 7 % (ref 4–12)
NEUTROPHILS # BLD AUTO: 5.53 THOUSANDS/ÂΜL (ref 1.85–7.62)
NEUTS SEG NFR BLD AUTO: 69 % (ref 43–75)
NRBC BLD AUTO-RTO: 0 /100 WBCS
PLATELET # BLD AUTO: 258 THOUSANDS/UL (ref 149–390)
PMV BLD AUTO: 10.4 FL (ref 8.9–12.7)
POTASSIUM SERPL-SCNC: 3.2 MMOL/L (ref 3.5–5.3)
RBC # BLD AUTO: 4.6 MILLION/UL (ref 3.88–5.62)
RSV RNA RESP QL NAA+PROBE: NEGATIVE
SARS-COV-2 RNA RESP QL NAA+PROBE: NEGATIVE
SODIUM SERPL-SCNC: 139 MMOL/L (ref 135–147)
WBC # BLD AUTO: 7.95 THOUSAND/UL (ref 4.31–10.16)

## 2023-02-26 RX ORDER — SODIUM CHLORIDE 9 MG/ML
3 INJECTION INTRAVENOUS
Status: DISCONTINUED | OUTPATIENT
Start: 2023-02-26 | End: 2023-02-27 | Stop reason: HOSPADM

## 2023-02-26 RX ORDER — MAGNESIUM SULFATE HEPTAHYDRATE 40 MG/ML
2 INJECTION, SOLUTION INTRAVENOUS ONCE
Status: COMPLETED | OUTPATIENT
Start: 2023-02-26 | End: 2023-02-26

## 2023-02-26 RX ORDER — POTASSIUM CHLORIDE 20 MEQ/1
40 TABLET, EXTENDED RELEASE ORAL ONCE
Status: COMPLETED | OUTPATIENT
Start: 2023-02-26 | End: 2023-02-26

## 2023-02-26 RX ADMIN — POTASSIUM CHLORIDE 40 MEQ: 1500 TABLET, EXTENDED RELEASE ORAL at 20:35

## 2023-02-26 RX ADMIN — MAGNESIUM SULFATE HEPTAHYDRATE 2 G: 40 INJECTION, SOLUTION INTRAVENOUS at 20:35

## 2023-02-27 LAB
ATRIAL RATE: 93 BPM
ATRIAL RATE: 96 BPM
P AXIS: 62 DEGREES
P AXIS: 78 DEGREES
PR INTERVAL: 184 MS
PR INTERVAL: 192 MS
QRS AXIS: 107 DEGREES
QRS AXIS: 108 DEGREES
QRSD INTERVAL: 74 MS
QRSD INTERVAL: 76 MS
QT INTERVAL: 364 MS
QT INTERVAL: 390 MS
QTC INTERVAL: 459 MS
QTC INTERVAL: 484 MS
T WAVE AXIS: 79 DEGREES
T WAVE AXIS: 83 DEGREES
VENTRICULAR RATE: 93 BPM
VENTRICULAR RATE: 96 BPM

## 2023-02-27 NOTE — DISCHARGE INSTRUCTIONS
Please continue all your medications at their current dosages and frequencies  You should make an appointment with your cardiologist within the next several weeks if possible  If you develop chest pain, any episodes of passing out, severe shortness of breath, or pain with breathing, please go to the ER

## 2023-02-27 NOTE — ED PROVIDER NOTES
History  Chief Complaint   Patient presents with   • Medical Problem     Gallbladder out about a year ago in  [place] [address] [facility] then became very tired, came to [place] and had a lot of fluid on lungs and was told had degenerative heart failure, per pt is starting to feel like this again  Pt stating feeling fluttering on and off  Pt having headaches and very tired not sleeping well, denies chest pain, sob intermittent     This is a 69-year-old man with the noted past medical history who presents to the emergency department with increasing fatigue over the past 2 days with intermittent palpitations for about the past 4 days  He has had some degree of mild orthopnea over the past several days as well without any dyspnea at rest or significant dyspnea with physical exertion  He was otherwise in normal state of health until symptom onset  He was admitted to this facility in May 2022 with congestive heart failure with pulmonary edema; Echo at that time demonstrated reduced ejection fraction (see below)  He did improve with diuresis and was discharged in good condition  He subsequently followed with a cardiologist in the [facility] system and underwent cardiac catheterization for which no stents were placed  He is maintained on torsemide at this point due to the history of CHF  He is not sure if he has had any changes in his weight although suspect that he has gained some weight  He has not had any lower extremity edema which she is aware  He has not had a new cough that he has not had previously  No fevers over the past several days  No hemoptysis  No nausea/vomiting/diaphoresis/chest pain/syncope  Has had a prior history of cardiac ablation states that the episodes of palpitations he is having now feels somewhat similar to what he has had previously  No changes in his medications in the past month  He has been adherent to his medications      DDx includes but is not limited to: Congestive heart failure exacerbation, cardiac arrhythmia, electrolyte disturbance, acute renal impairment, ACS, pneumonia  CBC/BMP/magnesium/troponin/BNP/chest x-ray  He noted a recent COVID-19 contact in the past several days although most of his symptoms are not particularly concerning for that; it is straightforward to test for this nonetheless  Physician pending  History provided by:  Patient and medical records  Medical Problem  Associated symptoms: shortness of breath    Associated symptoms: no abdominal pain, no chest pain, no cough, no fatigue, no fever, no headaches, no nausea and no vomiting        Prior to Admission Medications   Prescriptions Last Dose Informant Patient Reported? Taking? DULoxetine (CYMBALTA) 60 mg delayed release capsule   No No   Sig: Take 1 capsule (60 mg total) by mouth daily   allopurinol (ZYLOPRIM) 100 mg tablet   No No   Sig: Take 1 tablet (100 mg total) by mouth daily   aspirin (ECOTRIN LOW STRENGTH) 81 mg EC tablet   No No   Sig: Take 1 tablet (81 mg total) by mouth daily To prevent heart attack   atorvastatin (LIPITOR) 40 mg tablet   No No   Sig: Take 1 tablet (40 mg total) by mouth every evening This is an increased dose to help prevent heart attack and stroke     carvedilol (COREG) 6 25 mg tablet   No No   Sig: Take 1 tablet (6 25 mg total) by mouth 2 (two) times a day with meals To strengthen the heart and for blood pressure   cholecalciferol (VITAMIN D3) 1,000 units tablet   No No   Sig: Take 2 tablets (2,000 Units total) by mouth daily For a low vitamin D level   lisinopril (ZESTRIL) 5 mg tablet   No No   Sig: Take 1 tablet (5 mg total) by mouth daily To strengthen the heart and for blood pressure   omeprazole (PriLOSEC) 20 mg delayed release capsule   No No   Sig: Take 1 capsule (20 mg total) by mouth daily   Patient not taking: Reported on 1/12/2023   potassium chloride (MICRO-K) 10 MEQ CR capsule   No No   Sig: Take 1 capsule (10 mEq total) by mouth daily To take with torsemide which lowers the potassium level   torsemide (DEMADEX) 20 mg tablet   No No   Sig: Take 1 tablet (20 mg total) by mouth daily   Patient not taking: Reported on 1/12/2023      Facility-Administered Medications: None       Past Medical History:   Diagnosis Date   • Anxiety    • CHF (congestive heart failure) (HCC)    • GERD (gastroesophageal reflux disease)    • Hyperlipidemia    • Kidney stones        Past Surgical History:   Procedure Laterality Date   • CARDIAC ELECTROPHYSIOLOGY STUDY AND ABLATION     • CARDIAC ELECTROPHYSIOLOGY STUDY AND ABLATION     • CARDIAC SURGERY  2014    ablation   • CHOLECYSTECTOMY     • COLONOSCOPY     • ELBOW SURGERY Right    • FL RETROGRADE PYELOGRAM  6/29/2019   • FL RETROGRADE PYELOGRAM  8/29/2019   • FL RETROGRADE PYELOGRAM  9/25/2019   • HERNIA REPAIR Right     ing    • IR NEPHROSTOMY TUBE PLACEMENT  8/30/2019   • KIDNEY STONE SURGERY     • KNEE ARTHROSCOPY Left    • KNEE CARTILAGE SURGERY     • [place] CYSTO BLADDER W/URETERAL CATHETERIZATION Right 6/29/2019    Procedure: CYSTOSCOPY RETROGRADE PYELOGRAM WITH INSERTION STENT URETERAL;  Surgeon: Kiya Li MD;  [facility];  Service: Urology   • [place] CYSTO/URETERO W/LITHOTRIPSY &INDWELL STENT INSRT Right 8/29/2019    Procedure: CYSTOSCOPY URETEROSCOPY WITH LITHOTRIPSY HOLMIUM LASER, RETROGRADE PYELOGRAM;  Surgeon: Jenna Stroud MD;  Location: [facility];  Service: Urology   • [place] PERQ NL/PL LITHOTRP COMPLEX >2 CM MLT LOCATIONS Right 9/25/2019    Procedure: NEPHROLITHOTOMY  PERCUTANEOUS (PCNL)  MEATAL DILATION; ANTEGRADE URETEROSCOPY AND STENT REMOVAL;  Surgeon: Jenna Stroud MD;  [facility];  Service: Urology   • WRIST SURGERY Right        Family History   Problem Relation Age of Onset   • Cancer Mother    • Cancer Father      I have reviewed and agree with the history as documented      E-Cigarette/Vaping   • E-Cigarette Use Never User      E-Cigarette/Vaping Substances   • Nicotine No    • THC No    • CBD No    • Flavoring No      Social History     Tobacco Use   • Smoking status: Never   • Smokeless tobacco: Former   • Tobacco comments:     former chewing tobacco-quit 2014   Vaping Use   • Vaping Use: Never used   Substance Use Topics   • Alcohol use: Not Currently     Comment: occasionally   • Drug use: No       Review of Systems   Constitutional: Negative for chills, fatigue and fever  Respiratory: Positive for shortness of breath  Negative for cough and chest tightness  Cardiovascular: Positive for palpitations  Negative for chest pain  Gastrointestinal: Negative for abdominal pain, nausea and vomiting  Neurological: Negative for dizziness, syncope, weakness, light-headedness and headaches  Hematological: Does not bruise/bleed easily  All other systems reviewed and are negative  Physical Exam  Physical Exam  Vitals and nursing note reviewed  Constitutional:       General: He is awake  He is not in acute distress  Appearance: Normal appearance  He is well-developed  HENT:      Head: Normocephalic and atraumatic  Right Ear: Hearing and external ear normal       Left Ear: Hearing and external ear normal    Neck:      Thyroid: No thyroid mass or thyroid tenderness  Vascular: No JVD  Trachea: Trachea and phonation normal    Cardiovascular:      Rate and Rhythm: Normal rate and regular rhythm  Pulses:           Radial pulses are 2+ on the right side and 2+ on the left side  Dorsalis pedis pulses are 2+ on the right side and 2+ on the left side  Posterior tibial pulses are 2+ on the right side and 2+ on the left side  Heart sounds: Normal heart sounds, S1 normal and S2 normal  No murmur heard  No friction rub  No gallop  Pulmonary:      Effort: Pulmonary effort is normal  Tachypnea (RR 22 on my exam) present  No respiratory distress  Breath sounds: Normal breath sounds  No stridor  No decreased breath sounds, wheezing, rhonchi or rales  Abdominal:      General: There is no distension  Palpations: There is no mass  Tenderness: There is no abdominal tenderness  There is no guarding or rebound  Lymphadenopathy:      Cervical: No cervical adenopathy  Skin:     General: Skin is warm and dry  Neurological:      Mental Status: He is alert and oriented to person, place, and time  GCS: GCS eye subscore is 4  GCS verbal subscore is 5  GCS motor subscore is 6  Cranial Nerves: No cranial nerve deficit  Sensory: No sensory deficit  Motor: No abnormal muscle tone  Comments: PERRLA; EOMI  Sensation intact to light touch over face in V1-V3 distribution bilaterally  Facial expressions symmetric  Tongue/uvula midline  Shoulder shrug equal bilaterally  Strength 5/5 in UE/LE bilaterally  Sensation intact to light touch in UE/LE bilaterally           Vital Signs  ED Triage Vitals   Temperature Pulse Respirations Blood Pressure SpO2   02/26/23 1906 02/26/23 1906 02/26/23 1906 02/26/23 1906 02/26/23 1906   (!) 96 7 °F (35 9 °C) 97 18 142/77 95 %      Temp Source Heart Rate Source Patient Position - Orthostatic VS BP Location FiO2 (%)   02/26/23 1906 02/26/23 1906 -- 02/26/23 2156 --   Tympanic Monitor  Right arm       Pain Score       02/26/23 1906       5           Vitals:    02/26/23 1906 02/26/23 2156   BP: 142/77 143/79   Pulse: 97 93         Visual Acuity      ED Medications  Medications   sodium chloride (PF) 0 9 % injection 3 mL (has no administration in time range)   potassium chloride (K-DUR,KLOR-CON) CR tablet 40 mEq (40 mEq Oral Given 2/26/23 2035)   magnesium sulfate 2 g/50 mL IVPB (premix) 2 g (2 g Intravenous New Bag 2/26/23 2035)       Diagnostic Studies  Results Reviewed     Procedure Component Value Units Date/Time    HS Troponin I 2hr [[id number]]  (Normal) Collected: 02/26/23 2122    Lab Status: Final result Specimen: Blood from Arm, Right Updated: 02/26/23 2157     hs TnI 2hr 15 ng/L      Delta 2hr hsTnI 1 ng/L FLU/RSV/COVID - if FLU/RSV clinically relevant [[id number]]  (Normal) Collected: 02/26/23 2012    Lab Status: Final result Specimen: Nares from Nose Updated: 02/26/23 2108     SARS-CoV-2 Negative     INFLUENZA A PCR Negative     INFLUENZA B PCR Negative     RSV PCR Negative    Narrative:      FOR PEDIATRIC PATIENTS - copy/paste COVID Guidelines URL to browser: [email or web address]    SARS-CoV-2 assay is a Nucleic Acid Amplification assay intended for the  qualitative detection of nucleic acid from SARS-CoV-2 in nasopharyngeal  swabs  Results are for the presumptive identification of SARS-CoV-2 RNA  Positive results are indicative of infection with SARS-CoV-2, the virus  causing COVID-19, but do not rule out bacterial infection or co-infection  with other viruses  Laboratories within the [place] and its  territories are required to report all positive results to the appropriate  public health authorities  Negative results do not preclude SARS-CoV-2  infection and should not be used as the sole basis for treatment or other  patient management decisions  Negative results must be combined with  clinical observations, patient history, and epidemiological information  This test has not been FDA cleared or approved  This test has been authorized by FDA under an Emergency Use Authorization  (EUA)  This test is only authorized for the duration of time the  declaration that circumstances exist justifying the authorization of the  emergency use of an in vitro diagnostic tests for detection of SARS-CoV-2  virus and/or diagnosis of COVID-19 infection under section 564(b)(1) of  the Act, 21 U  S C  498ARX-4(T)(8), unless the authorization is terminated  or revoked sooner  The test has been validated but independent review by FDA  and CLIA is pending  Test performed using [employer]: This RT-PCR assay targets N2,  a region unique to SARS-CoV-2   A conserved region in the E-gene was chosen  for pan-Sarbecovirus detection which includes SARS-CoV-2  According to CMS-2020-01-R, this platform meets the definition of high-throughput technology      HS Troponin I 4hr [[id number]]     Lab Status: No result Specimen: Blood     HS Troponin 0hr (reflex protocol) [[id number]]  (Normal) Collected: 02/26/23 1924    Lab Status: Final result Specimen: Blood from Arm, Right Updated: 02/26/23 2003     hs TnI 0hr 14 ng/L     B-Type Natriuretic Peptide(BNP) [[id number]]  (Abnormal) Collected: 02/26/23 1924    Lab Status: Final result Specimen: Blood from Arm, Right Updated: 02/26/23 2000      pg/mL     Basic metabolic panel [[id number]]  (Abnormal) Collected: 02/26/23 1924    Lab Status: Final result Specimen: Blood from Arm, Right Updated: 02/26/23 1950     Sodium 139 mmol/L      Potassium 3 2 mmol/L      Chloride 105 mmol/L      CO2 25 mmol/L      ANION GAP 9 mmol/L      BUN 18 mg/dL      Creatinine 1 15 mg/dL      Glucose 120 mg/dL      Calcium 8 9 mg/dL      eGFR 69 ml/min/1 73sq m     Narrative:      [place] guidelines for Chronic Kidney Disease (CKD):   •  Stage 1 with normal or high GFR (GFR > 90 mL/min/1 73 square meters)  •  Stage 2 Mild CKD (GFR = 60-89 mL/min/1 73 square meters)  •  Stage 3A Moderate CKD (GFR = 45-59 mL/min/1 73 square meters)  •  Stage 3B Moderate CKD (GFR = 30-44 mL/min/1 73 square meters)  •  Stage 4 Severe CKD (GFR = 15-29 mL/min/1 73 square meters)  •  Stage 5 End Stage CKD (GFR <15 mL/min/1 73 square meters)  Note: GFR calculation is accurate only with a steady state creatinine    Magnesium [[id number]]  (Abnormal) Collected: 02/26/23 1924    Lab Status: Final result Specimen: Blood from Arm, Right Updated: 02/26/23 1950     Magnesium 1 8 mg/dL     CBC and differential [[id number]] Collected: 02/26/23 1924    Lab Status: Final result Specimen: Blood from Arm, Right Updated: 02/26/23 1933     WBC 7 95 Thousand/uL RBC 4 60 Million/uL      Hemoglobin 14 7 g/dL      Hematocrit 41 6 %      MCV 90 fL      MCH 32 0 pg      MCHC 35 3 g/dL      RDW 13 8 %      MPV 10 4 fL      Platelets 992 Thousands/uL      nRBC 0 /100 WBCs      Neutrophils Relative 69 %      Immat GRANS % 1 %      Lymphocytes Relative 22 %      Monocytes Relative 7 %      Eosinophils Relative 1 %      Basophils Relative 0 %      Neutrophils Absolute 5 53 Thousands/µL      Immature Grans Absolute 0 04 Thousand/uL      Lymphocytes Absolute 1 74 Thousands/µL      Monocytes Absolute 0 53 Thousand/µL      Eosinophils Absolute 0 08 Thousand/µL      Basophils Absolute 0 03 Thousands/µL                  X-ray chest 2 views   ED Interpretation by Mirza Brady DO (02/26 1946)   Airway is midline  Mild bandlike opacity at the right lung base appears slightly improved compared to the 2 May 2022 chest x-ray  Possible mild vascular congestion but no pleural effusion  There is no pneumothorax  Cardiac silhouette is slightly enlarged  Osseous fractures appear normal                  Procedures  Procedures         ED Course  ED Course as of 02/26/23 2211   Sun Feb 26, 2023   1906 ECG NSR 96 bpm  Low voltage   QRS 76 QTc 459  Right axis  Q wave V2/aVL  No acute ST/T changes  No changes from 2 May 2022  Interpreted by me   1943 CBC and differential  WBC wnl  Hg/Hct wnl  Plt wnl   1943 Note   6867 Basic metabolic panel(!)  Mild hypokalemia and hypomagnesemia; will replete   2001 B-Type Natriuretic Peptide(BNP)(! )  Only mild elevation  Of note, during hospitalization for CHF exacerbation in May 2022, Pro-BNP was elevated to 4000 (reference value <125)  2001 Echo 3 May 2022:    ·  Left Ventricle: Left ventricular cavity size is mildly dilated  Wall thickness is increased  The left ventricular ejection fraction is 40%  Systolic function is moderately reduced  There is moderate global hypokinesis with regional variation  There is borderline concentric hypertrophy    · Left Atrium: The atrium is moderately dilated  ·  Mitral Valve: There is mild to moderate regurgitation  ·  Tricuspid Valve: There is mild to moderate regurgitation  The estimated right ventricular systolic pressure is 32 64 mmHg  ·  Pulmonic Valve: There is mild regurgitation         2005 HS Troponin 0hr (reflex protocol)  Nonischemic but will require 2-hour repeat value; mild elevation likely secondary to congestive heart failure   2037 Reviewed results of work-up thus far with patient including minimal elevation in BNP, mild hypokalemia, and mild hypomagnesemia  He does take potassium supplementation but states that he has been inconsistent with using it over the past year  Will need repeat cardiac enzyme and adequate repletion of potassium/magnesium prior to discharge  He is in agreement with this plan  2114 FLU/RSV/COVID - if FLU/RSV clinically relevant  Negative   2122 ECG NSR 93 bpm  Low voltage   QRS 74 QTc 484  No changes from prior  No ectopy  Q-wave V2/AVL  No changes from prior  Interpreted by me   2204 HS Troponin I 2hr  Delta of 1 not c/w ACS         Medical Decision Making  Results of work-up reviewed with patient at length  No evidence of ACS  No cardiac arrhythmia while in the emergency department  No evidence of lower respiratory tract infection  Hypokalemia and hypomagnesemia are present that were adequately repleted prior to discharge  Advised that he continue his potassium supplementation as he is at high risk of recurrent hypokalemia if he does not  He should maintain all of his medications at the current dosages and frequencies  He has an appointment with his cardiologist in May 2023 which I advised he move up if possible for further evaluation  Return to the emergency department for any ACS type symptoms  All questions were answered to patient's satisfaction prior to discharge  He expressed understanding and agreed to plan      Hypokalemia: acute illness or injury  Hypomagnesemia: acute illness or injury  Lightheadedness: acute illness or injury  Amount and/or Complexity of Data Reviewed  Labs: ordered  Decision-making details documented in ED Course  Radiology: ordered and independent interpretation performed  Risk  Prescription drug management  Disposition  Final diagnoses:   Hypokalemia   Hypomagnesemia   Lightheadedness     Time reflects when diagnosis was documented in both MDM as applicable and the Disposition within this note     Time User Action Codes Description Comment    2/26/2023 10:04 PM Pam Carrillo [E87 6] Hypokalemia     2/26/2023 10:04 PM Pam Harmon Add [E83 42] Hypomagnesemia     2/26/2023 10:04 PM Pam Harmon Add [R42] [facility]       ED Disposition     ED Disposition   Discharge    Condition   Stable    Date/Time   Sun Feb 26, 2023 10:06 PM    Comment   Nimesh Emmanuel discharge to home/self care  Follow-up Information    None         Patient's Medications   Discharge Prescriptions    No medications on file       No discharge procedures on file      PDMP Review       Value Time User    PDMP Reviewed  Yes 11/30/2021  8:09 AM Carole Calhoun DO          ED Provider  Electronically Signed by           Christy Catalan DO  02/26/23 2231

## 2023-03-07 ENCOUNTER — OFFICE VISIT (OUTPATIENT)
Dept: FAMILY MEDICINE CLINIC | Facility: CLINIC | Age: 58
End: 2023-03-07

## 2023-03-07 VITALS
HEART RATE: 98 BPM | TEMPERATURE: 98.2 F | DIASTOLIC BLOOD PRESSURE: 84 MMHG | HEIGHT: 70 IN | OXYGEN SATURATION: 96 % | SYSTOLIC BLOOD PRESSURE: 122 MMHG | WEIGHT: 270.6 LBS | BODY MASS INDEX: 38.74 KG/M2

## 2023-03-07 DIAGNOSIS — Z00.00 ANNUAL PHYSICAL EXAM: Primary | ICD-10-CM

## 2023-03-07 DIAGNOSIS — Z11.1 ENCOUNTER FOR PPD TEST: ICD-10-CM

## 2023-03-07 DIAGNOSIS — I50.21 ACUTE SYSTOLIC CONGESTIVE HEART FAILURE (HCC): ICD-10-CM

## 2023-03-07 DIAGNOSIS — I47.1 SUPRAVENTRICULAR TACHYCARDIA (HCC): ICD-10-CM

## 2023-03-07 PROBLEM — Z90.49 S/P LAPAROSCOPIC CHOLECYSTECTOMY: Status: ACTIVE | Noted: 2022-05-11

## 2023-03-07 RX ORDER — POTASSIUM CHLORIDE 750 MG/1
10 CAPSULE, EXTENDED RELEASE ORAL DAILY
Qty: 30 CAPSULE | Refills: 3 | Status: SHIPPED | OUTPATIENT
Start: 2023-03-07

## 2023-03-07 NOTE — PROGRESS NOTES
Name: Emeka Schumacher      : 1965      MRN: [id number]  Encounter Provider: Marin Valdivia DO  Encounter Date: 3/7/2023   Encounter department: [address] [place] [address]     1  Annual physical exam    2  Encounter for PPD test  -     TB Skin Test    3  Acute systolic congestive heart failure (HCC)  -     Basic metabolic panel  -     Magnesium    4  Supraventricular tachycardia (HCC)         Subjective     HPI  Review of Systems    Past Medical History:   Diagnosis Date   • Anxiety    • CHF (congestive heart failure) (HCC)    • GERD (gastroesophageal reflux disease)    • Hyperlipidemia    • Kidney stones      Past Surgical History:   Procedure Laterality Date   • CARDIAC ELECTROPHYSIOLOGY STUDY AND ABLATION     • CARDIAC ELECTROPHYSIOLOGY STUDY AND ABLATION     • CARDIAC SURGERY      ablation   • CHOLECYSTECTOMY     • COLONOSCOPY     • ELBOW SURGERY Right    • FL RETROGRADE PYELOGRAM  2019   • FL RETROGRADE PYELOGRAM  2019   • FL RETROGRADE PYELOGRAM  2019   • HERNIA REPAIR Right     ing    • IR NEPHROSTOMY TUBE PLACEMENT  2019   • KIDNEY STONE SURGERY     • KNEE ARTHROSCOPY Left    • KNEE CARTILAGE SURGERY     • [place] CYSTO BLADDER W/URETERAL CATHETERIZATION Right 2019    Procedure: CYSTOSCOPY RETROGRADE PYELOGRAM WITH INSERTION STENT URETERAL;  Surgeon: Zacarias Ellis MD;  [facility];  Service: Urology   • [place] CYSTO/URETERO W/LITHOTRIPSY &INDWELL STENT INSRT Right 2019    Procedure: CYSTOSCOPY URETEROSCOPY WITH LITHOTRIPSY HOLMIUM LASER, RETROGRADE PYELOGRAM;  Surgeon: Oj Edwards MD;  [facility];  Service: Urology   • [place] PERQ NL/PL LITHOTRP COMPLEX >2 CM MLT LOCATIONS Right 2019    Procedure: NEPHROLITHOTOMY  PERCUTANEOUS (PCNL)   MEATAL DILATION; ANTEGRADE URETEROSCOPY AND STENT REMOVAL;  Surgeon: Oj Edwards MD;  [facility];  Service: Urology   • WRIST SURGERY Right      Family History   Problem Relation Age of Onset   • Cancer Mother    • Cancer Father      Social History     Socioeconomic History   • Marital status: /Civil Union     Spouse name: None   • Number of children: None   • Years of education: None   • Highest education level: None   Occupational History   • None   Tobacco Use   • Smoking status: Never   • Smokeless tobacco: Former   • Tobacco comments:     former chewing tobacco-quit 2014   Vaping Use   • Vaping Use: Never used   Substance and Sexual Activity   • Alcohol use: Not Currently     Comment: occasionally   • Drug use: No   • Sexual activity: Not Currently   Other Topics Concern   • None   Social History Narrative   • None     Social Determinants of Health     Financial Resource Strain: Not on file   Food Insecurity: No Food Insecurity   • Worried About Running Out of Food in the Last Year: Never true   • Ran Out of Food in the Last Year: Never true   Transportation Needs: No Transportation Needs   • Lack of Transportation (Medical): No   • Lack of Transportation (Non-Medical): No   Physical Activity: Not on file   Stress: Not on file   Social Connections: Not on file   Intimate Partner Violence: Not on file   Housing Stability: Low Risk    • Unable to Pay for Housing in the Last Year: No   • Number of Places Lived in the Last Year: 1   • Unstable Housing in the Last Year: No     Current Outpatient Medications on File Prior to Visit   Medication Sig   • allopurinol (ZYLOPRIM) 100 mg tablet Take 1 tablet (100 mg total) by mouth daily   • aspirin (ECOTRIN LOW STRENGTH) 81 mg EC tablet Take 1 tablet (81 mg total) by mouth daily To prevent heart attack   • atorvastatin (LIPITOR) 40 mg tablet Take 1 tablet (40 mg total) by mouth every evening This is an increased dose to help prevent heart attack and stroke     • carvedilol (COREG) 6 25 mg tablet Take 1 tablet (6 25 mg total) by mouth 2 (two) times a day with meals To strengthen the heart and for blood pressure   • cholecalciferol (VITAMIN D3) 1,000 units tablet Take 2 tablets (2,000 Units total) by mouth daily For a low vitamin D level   • DULoxetine (CYMBALTA) 60 mg delayed release capsule Take 1 capsule (60 mg total) by mouth daily   • lisinopril (ZESTRIL) 5 mg tablet Take 1 tablet (5 mg total) by mouth daily To strengthen the heart and for blood pressure   • potassium chloride (MICRO-K) 10 MEQ CR capsule Take 1 capsule (10 mEq total) by mouth daily To take with torsemide which lowers the potassium level   • omeprazole (PriLOSEC) 20 mg delayed release capsule Take 1 capsule (20 mg total) by mouth daily (Patient not taking: Reported on 1/12/2023)   • torsemide (DEMADEX) 20 mg tablet Take 1 tablet (20 mg total) by mouth daily (Patient not taking: Reported on 1/12/2023)   • [DISCONTINUED] ibuprofen (MOTRIN) 800 mg tablet Take 1 tablet (800 mg total) by mouth 3 (three) times a day as needed for pain, fever     Allergies   Allergen Reactions   • Ancef [Cefazolin] Nausea Only     Preoperative admin-nausea and dry heaving   • Other Itching     Peanuts    • Peanut-Containing Drug Products - Food Allergy Itching   • Sulfa Antibiotics Rash   • Toradol [Ketorolac Tromethamine] Rash     Tolerates ibuprofen     Immunization History   Administered Date(s) Administered   • Tdap 02/02/2022       Objective     /84   Pulse 98   Temp 98 2 °F (36 8 °C)   Ht 5' 10" (1 778 m)   Wt 123 kg (270 lb 9 6 oz)   SpO2 96%   BMI 38 83 kg/m²     Physical Exam  Felipe Ny DO

## 2023-03-07 NOTE — PROGRESS NOTES
[facility]    NAME: Zay Bauer  AGE: 60 y o  SEX: male  : 1965     DATE: 3/7/2023     Assessment and Plan:   Patient will start taking his potassium again  I will check a BMP and magnesium level on him next week  Patient will call his cardiologist in [address] [place] [address] Drive to follow-up  I will see him back in the next couple months or as needed  PPD placed today  Recheck PPD in 2 days  Problem List Items Addressed This Visit        Cardiovascular and Mediastinum    Acute systolic congestive heart failure (HCC)    Relevant Orders    Basic metabolic panel    Magnesium   Other Visit Diagnoses     Annual physical exam    -  Primary    Encounter for PPD test        Relevant Orders    TB Skin Test    Supraventricular tachycardia ([facility] [address] )              Immunizations and preventive care screenings were discussed with patient today  Appropriate education was printed on patient's after visit summary  Counseling:  Dental Health: discussed importance of regular tooth brushing, flossing, and dental visits  Exercise: the importance of regular exercise/physical activity was discussed  Recommend exercise 3-5 times per week for at least 30 minutes  BMI Counseling: Body mass index is 38 83 kg/m²  The BMI is above normal  Nutrition recommendations include decreasing portion sizes, encouraging healthy choices of fruits and vegetables, decreasing fast food intake, consuming healthier snacks, limiting drinks that contain sugar, moderation in carbohydrate intake, increasing intake of lean protein, reducing intake of saturated and trans fat and reducing intake of cholesterol  No pharmacotherapy was ordered  Rationale for BMI follow-up plan is due to patient being overweight or obese  Return in 3 months (on 2023) for Recheck  Chief Complaint:     Chief Complaint   Patient presents with   • Annual Exam     PPD, was in the ER a few days ago  Still feeling tired      History of Present Illness:     Adult Annual Physical   Patient here for a comprehensive physical exam  The patient reports problems - Patient was in the ER last week for feeling fatigued  Lab work revealed mildly low potassium and magnesium  Patient had not been taking his potassium  Has not seen his cardiologist since last year  He is feeling better now       Diet and Physical Activity  Diet/Nutrition: well balanced diet  Exercise: no formal exercise  Depression Screening  PHQ-2/9 Depression Screening         General Health  Sleep: sleeps well  Hearing: normal - bilateral   Vision: wears glasses  Dental: no dental visits for >1 year   Health  Symptoms include: none     Review of Systems:     Review of Systems   Constitutional: Negative  Respiratory: Negative  Cardiovascular: Negative  Gastrointestinal: Negative  Genitourinary: Negative         Past Medical History:     Past Medical History:   Diagnosis Date   • Anxiety    • CHF (congestive heart failure) ([address] )    • GERD (gastroesophageal reflux disease)    • Hyperlipidemia    • Kidney stones       Past Surgical History:     Past Surgical History:   Procedure Laterality Date   • CARDIAC ELECTROPHYSIOLOGY STUDY AND ABLATION     • CARDIAC ELECTROPHYSIOLOGY STUDY AND ABLATION     • CARDIAC SURGERY  2014    ablation   • CHOLECYSTECTOMY     • COLONOSCOPY     • ELBOW SURGERY Right    • FL RETROGRADE PYELOGRAM  6/29/2019   • FL RETROGRADE PYELOGRAM  8/29/2019   • FL RETROGRADE PYELOGRAM  9/25/2019   • HERNIA REPAIR Right     ing    • IR NEPHROSTOMY TUBE PLACEMENT  8/30/2019   • KIDNEY STONE SURGERY     • KNEE ARTHROSCOPY Left    • KNEE CARTILAGE SURGERY     • KS CYSTO BLADDER W/URETERAL CATHETERIZATION Right 6/29/2019    Procedure: CYSTOSCOPY RETROGRADE PYELOGRAM WITH INSERTION STENT URETERAL;  Surgeon: Antwan Phillips MD;  [facility];  Service: Urology   • KS CYSTO/URETERO W/LITHOTRIPSY &INDWELL STENT INSRT Right 8/29/2019    Procedure: CYSTOSCOPY URETEROSCOPY WITH LITHOTRIPSY HOLMIUM LASER, RETROGRADE PYELOGRAM;  Surgeon: Александр Jurado MD;  Location: [facility];  Service: Urology   • [place] PERQ NL/PL LITHOTRP COMPLEX >2 CM MLT LOCATIONS Right 9/25/2019    Procedure: NEPHROLITHOTOMY  PERCUTANEOUS ([facility])  MEATAL DILATION; ANTEGRADE URETEROSCOPY AND STENT REMOVAL;  Surgeon: Александр Jurado MD;  [facility];  Service: Urology   • WRIST SURGERY Right       Family History:     Family History   Problem Relation Age of Onset   • Cancer Mother    • Cancer Father       Social History:     Social History     Socioeconomic History   • Marital status: /Civil Union     Spouse name: None   • Number of children: None   • Years of education: None   • Highest education level: None   Occupational History   • None   Tobacco Use   • Smoking status: Never   • Smokeless tobacco: Former   • Tobacco comments:     former chewing tobacco-quit 2014   Vaping Use   • Vaping Use: Never used   Substance and Sexual Activity   • Alcohol use: Not Currently     Comment: occasionally   • Drug use: No   • Sexual activity: Not Currently   Other Topics Concern   • None   Social History Narrative   • None     Social Determinants of Health     Financial Resource Strain: Not on file   Food Insecurity: No Food Insecurity   • Worried About Running Out of Food in the Last Year: Never true   • Ran Out of Food in the Last Year: Never true   Transportation Needs: No Transportation Needs   • Lack of Transportation (Medical): No   • Lack of Transportation (Non-Medical):  No   Physical Activity: Not on file   Stress: Not on file   Social Connections: Not on file   Intimate Partner Violence: Not on file   Housing Stability: Low Risk    • Unable to Pay for Housing in the Last Year: No   • Number of Places Lived in the Last Year: 1   • Unstable Housing in the Last Year: No      Current Medications:     Current Outpatient Medications Medication Sig Dispense Refill   • allopurinol (ZYLOPRIM) 100 mg tablet Take 1 tablet (100 mg total) by mouth daily 30 tablet 5   • aspirin (ECOTRIN LOW STRENGTH) 81 mg EC tablet Take 1 tablet (81 mg total) by mouth daily To prevent heart attack 30 tablet 3   • atorvastatin (LIPITOR) 40 mg tablet Take 1 tablet (40 mg total) by mouth every evening This is an increased dose to help prevent heart attack and stroke  30 tablet 3   • carvedilol (COREG) 6 25 mg tablet Take 1 tablet (6 25 mg total) by mouth 2 (two) times a day with meals To strengthen the heart and for blood pressure 60 tablet 3   • cholecalciferol (VITAMIN D3) 1,000 units tablet Take 2 tablets (2,000 Units total) by mouth daily For a low vitamin D level 60 tablet 0   • DULoxetine (CYMBALTA) 60 mg delayed release capsule Take 1 capsule (60 mg total) by mouth daily 30 capsule 5   • lisinopril (ZESTRIL) 5 mg tablet Take 1 tablet (5 mg total) by mouth daily To strengthen the heart and for blood pressure 30 tablet 3   • potassium chloride (MICRO-K) 10 MEQ CR capsule Take 1 capsule (10 mEq total) by mouth daily To take with torsemide which lowers the potassium level 30 capsule 3   • omeprazole (PriLOSEC) 20 mg delayed release capsule Take 1 capsule (20 mg total) by mouth daily (Patient not taking: Reported on 1/12/2023) 30 capsule 3   • torsemide (DEMADEX) 20 mg tablet Take 1 tablet (20 mg total) by mouth daily (Patient not taking: Reported on 1/12/2023) 30 tablet 3     No current facility-administered medications for this visit  Allergies:      Allergies   Allergen Reactions   • Ancef [Cefazolin] Nausea Only     Preoperative admin-nausea and dry heaving   • Other Itching     Peanuts    • Peanut-Containing Drug Products - Food Allergy Itching   • Sulfa Antibiotics Rash   • Toradol [Ketorolac Tromethamine] Rash     Tolerates ibuprofen      Physical Exam:     /84   Pulse 98   Temp 98 2 °F (36 8 °C)   Ht 5' 10" (1 778 m)   Wt 123 kg (270 lb 9 6 oz) SpO2 96%   BMI 38 83 kg/m²     Physical Exam  Vitals reviewed  Constitutional:       General: He is not in acute distress  Appearance: Normal appearance  He is well-developed  He is not diaphoretic  HENT:      Head: Normocephalic and atraumatic  Eyes:      Conjunctiva/sclera: Conjunctivae normal    Cardiovascular:      Rate and Rhythm: Normal rate and regular rhythm  Heart sounds: Normal heart sounds  No murmur heard  No friction rub  No gallop  Pulmonary:      Effort: Pulmonary effort is normal  No respiratory distress  Breath sounds: Normal breath sounds  No wheezing, rhonchi or rales  Musculoskeletal:      Right lower leg: No edema  Left lower leg: No edema  Neurological:      General: No focal deficit present  Mental Status: He is alert and oriented to person, place, and time  Psychiatric:         Mood and Affect: Mood normal          Behavior: Behavior normal          Thought Content:  Thought content normal          Judgment: Judgment normal           Efrain Mccarthy DO  [facility]

## 2023-03-09 LAB
INDURATION: 0 MM
TB SKIN TEST: NEGATIVE

## 2023-03-20 ENCOUNTER — TELEPHONE (OUTPATIENT)
Dept: FAMILY MEDICINE CLINIC | Facility: CLINIC | Age: 58
End: 2023-03-20

## 2023-03-20 NOTE — TELEPHONE ENCOUNTER
Pt in today for BP reading; 132/86  States he does not feel right  He does have a cardiology appt on Thursday however he has been having a lot of fluttering at night  He also has increased breathing at night  Does not have any chest pain, heart fluttering, or any issues right now  Told him if symptoms get worse or anything to go to ER

## 2023-03-27 ENCOUNTER — TELEPHONE (OUTPATIENT)
Dept: FAMILY MEDICINE CLINIC | Facility: CLINIC | Age: 58
End: 2023-03-27

## 2023-03-27 DIAGNOSIS — N20.0 KIDNEY STONE: Primary | ICD-10-CM

## 2023-04-03 ENCOUNTER — HOSPITAL ENCOUNTER (OUTPATIENT)
Dept: CT IMAGING | Facility: HOSPITAL | Age: 58
Discharge: HOME | End: 2023-04-03

## 2023-04-03 DIAGNOSIS — N20.0 KIDNEY STONE: ICD-10-CM

## 2023-06-08 ENCOUNTER — RA CDI HCC (OUTPATIENT)
Dept: OTHER | Facility: HOSPITAL | Age: 58
End: 2023-06-08

## 2023-06-08 NOTE — PROGRESS NOTES
[facility]       Chart reviewed, no opportunity found: CHART REVIEWED, NO OPPORTUNITY FOUND        Patients Insurance        Commercial Insurance: [address] [place] [address]

## 2023-06-12 ENCOUNTER — OFFICE VISIT (OUTPATIENT)
Dept: FAMILY MEDICINE CLINIC | Facility: CLINIC | Age: 58
End: 2023-06-12
Payer: COMMERCIAL

## 2023-06-12 VITALS
TEMPERATURE: 97.8 F | SYSTOLIC BLOOD PRESSURE: 112 MMHG | OXYGEN SATURATION: 98 % | BODY MASS INDEX: 37.13 KG/M2 | DIASTOLIC BLOOD PRESSURE: 80 MMHG | WEIGHT: 258.8 LBS | HEART RATE: 83 BPM

## 2023-06-12 DIAGNOSIS — I50.21 ACUTE SYSTOLIC CONGESTIVE HEART FAILURE (HCC): Primary | ICD-10-CM

## 2023-06-12 DIAGNOSIS — E78.5 DYSLIPIDEMIA: ICD-10-CM

## 2023-06-12 PROCEDURE — 99213 OFFICE O/P EST LOW 20 MIN: CPT | Performed by: FAMILY MEDICINE

## 2023-06-12 RX ORDER — TORSEMIDE 20 MG/1
TABLET ORAL
COMMUNITY
Start: 2023-06-05

## 2023-06-12 NOTE — PROGRESS NOTES
Name: Karina Mejia      : 1965      MRN: [id number]  Encounter Provider: Adriane Morrissey DO  Encounter Date: 2023   Encounter department: [address] [place] [address] Road   Patient will get his lab work done that I had ordered on him in the past   Continue same medications  Follow-up with cardiology as scheduled  Follow-up here in 4 months or as needed  1  Acute systolic congestive heart failure ([facility] [address] )    2  Dyslipidemia           Subjective     Patient here today for follow-up  Patient denies any chest pain or shortness of breath  Patient's weight has been stable  Tolerating his medications well  Review of Systems   Constitutional: Negative  Respiratory: Negative  Cardiovascular: Negative  Gastrointestinal: Negative  Genitourinary: Negative          Past Medical History:   Diagnosis Date   • Anxiety    • CHF (congestive heart failure) ([facility] [address] )    • GERD (gastroesophageal reflux disease)    • Hyperlipidemia    • Kidney stones      Past Surgical History:   Procedure Laterality Date   • CARDIAC ELECTROPHYSIOLOGY STUDY AND ABLATION     • CARDIAC ELECTROPHYSIOLOGY STUDY AND ABLATION     • CARDIAC SURGERY  2014    ablation   • CHOLECYSTECTOMY     • COLONOSCOPY     • ELBOW SURGERY Right    • FL RETROGRADE PYELOGRAM  2019   • FL RETROGRADE PYELOGRAM  2019   • FL RETROGRADE PYELOGRAM  2019   • HERNIA REPAIR Right     ing    • IR NEPHROSTOMY TUBE PLACEMENT  2019   • KIDNEY STONE SURGERY     • KNEE ARTHROSCOPY Left    • KNEE CARTILAGE SURGERY     • [place] CYSTO BLADDER W/URETERAL CATHETERIZATION Right 2019    Procedure: CYSTOSCOPY RETROGRADE PYELOGRAM WITH INSERTION STENT URETERAL;  Surgeon: Deepa Telles MD;  [facility];  Service: Urology   • [place] CYSTO/URETERO W/LITHOTRIPSY &INDWELL STENT INSRT Right 2019    Procedure: CYSTOSCOPY URETEROSCOPY WITH LITHOTRIPSY HOLMIUM LASER, RETROGRADE PYELOGRAM;  Surgeon: Emeli Santiago MD; [facility]: [facility];  Service: Urology   • [place] PERQ NL/PL LITHOTRP COMPLEX >2 CM MLT LOCATIONS Right 9/25/2019    Procedure: NEPHROLITHOTOMY  PERCUTANEOUS ([facility])  MEATAL DILATION; ANTEGRADE URETEROSCOPY AND STENT REMOVAL;  Surgeon: Roxane Jimenez MD;  [facility];  Service: Urology   • WRIST SURGERY Right      Family History   Problem Relation Age of Onset   • Cancer Mother    • Cancer Father      Social History     Socioeconomic History   • Marital status: /Civil Union     Spouse name: None   • Number of children: None   • Years of education: None   • Highest education level: None   Occupational History   • None   Tobacco Use   • Smoking status: Never   • Smokeless tobacco: Former   • Tobacco comments:     former chewing tobacco-quit 2014   Vaping Use   • Vaping Use: Never used   Substance and Sexual Activity   • Alcohol use: Not Currently     Comment: occasionally   • Drug use: No   • Sexual activity: Not Currently   Other Topics Concern   • None   Social History Narrative   • None     Social Determinants of Health     Financial Resource Strain: Not on file   Food Insecurity: No Food Insecurity (5/3/2022)    Hunger Vital Sign    • Worried About Running Out of Food in the Last Year: Never true    • Ran Out of Food in the Last Year: Never true   Transportation Needs: No Transportation Needs (5/3/2022)    PRAPARE - Transportation    • Lack of Transportation (Medical): No    • Lack of Transportation (Non-Medical):  No   Physical Activity: Not on file   Stress: Not on file   Social Connections: Not on file   Intimate Partner Violence: Not on file   Housing Stability: Low Risk  (5/3/2022)    Housing Stability Vital Sign    • Unable to Pay for Housing in the Last Year: No    • Number of Places Lived in the Last Year: 1    • Unstable Housing in the Last Year: No     Current Outpatient Medications on File Prior to Visit   Medication Sig   • allopurinol (ZYLOPRIM) 100 mg tablet Take 1 tablet (100 mg total) by mouth daily   • aspirin (ECOTRIN LOW STRENGTH) 81 mg EC tablet Take 1 tablet (81 mg total) by mouth daily To prevent heart attack   • atorvastatin (LIPITOR) 80 mg tablet    • cholecalciferol (VITAMIN D3) 1,000 units tablet Take 2 tablets (2,000 Units total) by mouth daily For a low vitamin D level   • DULoxetine (CYMBALTA) 60 mg delayed release capsule Take 1 capsule (60 mg total) by mouth daily   • Entresto 24-26 MG TABS TAKE 1 TABLET BY MOUTH IN THE MORNING AND 1 TABLET BEFORE BEDTIME   • Jardiance 10 MG TABS tablet TAKE ONE (1) TABLET BY MOUTH IN THE MORNING   • metoprolol succinate (TOPROL-XL) 25 mg 24 hr tablet TAKE ONE (1) TABLET BY MOUTH IN THE MORNING   • potassium chloride (MICRO-K) 10 MEQ CR capsule Take 1 capsule (10 mEq total) by mouth daily To take with torsemide which lowers the potassium level   • torsemide (DEMADEX) 20 mg tablet    • [DISCONTINUED] atorvastatin (LIPITOR) 40 mg tablet Take 1 tablet (40 mg total) by mouth every evening This is an increased dose to help prevent heart attack and stroke  (Patient not taking: Reported on 4/18/2023)   • [DISCONTINUED] ibuprofen (MOTRIN) 800 mg tablet Take 1 tablet (800 mg total) by mouth 3 (three) times a day as needed for pain, fever     Allergies   Allergen Reactions   • Ancef [Cefazolin] Nausea Only     Preoperative admin-nausea and dry heaving   • Other Itching     Peanuts    • Peanut-Containing Drug Products - Food Allergy Itching   • Sulfa Antibiotics Rash   • Toradol [Ketorolac Tromethamine] Rash     Tolerates ibuprofen     Immunization History   Administered Date(s) Administered   • Tdap 02/02/2022   • Tuberculin Skin Test-PPD Intradermal 03/07/2023       Objective     /80 (BP Location: Left arm, Patient Position: Sitting, Cuff Size: Extra-Large)   Pulse 83   Temp 97 8 °F (36 6 °C)   Wt 117 kg (258 lb 12 8 oz)   SpO2 98%   BMI 37 13 kg/m²     Physical Exam  Vitals reviewed     Constitutional:       General: He is not in acute distress  Appearance: Normal appearance  He is well-developed  He is not ill-appearing, toxic-appearing or diaphoretic  HENT:      Head: Normocephalic and atraumatic  Eyes:      Conjunctiva/sclera: Conjunctivae normal    Cardiovascular:      Rate and Rhythm: Normal rate and regular rhythm  Heart sounds: Normal heart sounds  No murmur heard  No friction rub  No gallop  Pulmonary:      Effort: Pulmonary effort is normal  No respiratory distress  Breath sounds: Normal breath sounds  No wheezing, rhonchi or rales  Musculoskeletal:      Right lower leg: No edema  Left lower leg: No edema  Neurological:      General: No focal deficit present  Mental Status: He is alert and oriented to person, place, and time  Psychiatric:         Mood and Affect: Mood normal          Behavior: Behavior normal          Thought Content:  Thought content normal          Judgment: Judgment normal        Alonzo Ken DO

## 2023-06-16 NOTE — ASSESSMENT & PLAN NOTE
· Initiate cholecalciferol 2000 I  U  PO Qdaily  · Recheck a vitamin D 25-OH level in 2-3 months with his PCP Chonodrocutaneous Helical Advancement Flap Text: The defect edges were debeveled with a #15 scalpel blade.  Given the location of the defect and the proximity to free margins a chondrocutaneous helical advancement flap was deemed most appropriate.  Using a sterile surgical marker, the appropriate advancement flap was drawn incorporating the defect and placing the expected incisions within the relaxed skin tension lines where possible.    The area thus outlined was incised deep to adipose tissue with a #15 scalpel blade.  The skin margins were undermined to an appropriate distance in all directions utilizing iris scissors. Cimzia Pregnancy And Lactation Text: This medication crosses the placenta but can be considered safe in certain situations. Cimzia may be excreted in breast milk.

## 2023-08-04 DIAGNOSIS — F41.8 ANXIETY WITH DEPRESSION: ICD-10-CM

## 2023-08-04 DIAGNOSIS — I50.21 ACUTE SYSTOLIC CONGESTIVE HEART FAILURE (HCC): ICD-10-CM

## 2023-08-04 RX ORDER — DULOXETIN HYDROCHLORIDE 60 MG/1
60 CAPSULE, DELAYED RELEASE ORAL DAILY
Qty: 30 CAPSULE | Refills: 5 | Status: SHIPPED | OUTPATIENT
Start: 2023-08-04

## 2023-08-04 RX ORDER — POTASSIUM CHLORIDE 750 MG/1
CAPSULE, EXTENDED RELEASE ORAL
Qty: 30 CAPSULE | Refills: 5 | Status: SHIPPED | OUTPATIENT
Start: 2023-08-04

## 2023-10-13 ENCOUNTER — RA CDI HCC (OUTPATIENT)
Dept: OTHER | Facility: HOSPITAL | Age: 58
End: 2023-10-13

## 2023-10-13 NOTE — PROGRESS NOTES
[facility] coding opportunities       Chart reviewed, no opportunity found: CHART REVIEWED, NO OPPORTUNITY FOUND        Patients Insurance        Commercial Insurance: Arturo Maher

## 2023-10-16 ENCOUNTER — OFFICE VISIT (OUTPATIENT)
Dept: FAMILY MEDICINE CLINIC | Facility: CLINIC | Age: 58
End: 2023-10-16
Payer: COMMERCIAL

## 2023-10-16 VITALS
SYSTOLIC BLOOD PRESSURE: 122 MMHG | OXYGEN SATURATION: 97 % | TEMPERATURE: 96.8 F | HEIGHT: 70 IN | HEART RATE: 67 BPM | DIASTOLIC BLOOD PRESSURE: 82 MMHG | BODY MASS INDEX: 38.91 KG/M2 | WEIGHT: 271.8 LBS

## 2023-10-16 DIAGNOSIS — E55.9 VITAMIN D INSUFFICIENCY: ICD-10-CM

## 2023-10-16 DIAGNOSIS — F41.9 ANXIETY: ICD-10-CM

## 2023-10-16 DIAGNOSIS — E78.5 DYSLIPIDEMIA: Primary | ICD-10-CM

## 2023-10-16 DIAGNOSIS — I50.21 ACUTE SYSTOLIC CONGESTIVE HEART FAILURE (HCC): ICD-10-CM

## 2023-10-16 DIAGNOSIS — Z12.5 PROSTATE CANCER SCREENING: ICD-10-CM

## 2023-10-16 DIAGNOSIS — K21.9 GASTROESOPHAGEAL REFLUX DISEASE WITHOUT ESOPHAGITIS: ICD-10-CM

## 2023-10-16 PROCEDURE — 99214 OFFICE O/P EST MOD 30 MIN: CPT | Performed by: FAMILY MEDICINE

## 2023-10-16 NOTE — PROGRESS NOTES
Name: Narinder Terrell      : 1965      MRN: [id number]  Encounter Provider: Giana Pepper DO  Encounter Date: 10/16/2023   Encounter department: [facility]   Lab work ordered. Continue same medications. Continue to watch diet. Follow-up in 4 months or as needed. 1. Dyslipidemia  -     Comprehensive metabolic panel  -     Lipid Panel with Direct LDL reflex    2. Gastroesophageal reflux disease without esophagitis  -     CBC and differential    3. Acute systolic congestive heart failure ([address])    4. Vitamin D insufficiency  -     Vitamin D 25 hydroxy    5. Anxiety  -     TSH, 3rd generation with Free T4 reflex    6. Prostate cancer screening  -     PSA, Total Screen    7. BMI 39.0-39.9,adult           Subjective     Patient here today for follow-up. Denies any chest pain or shortness of breath. Continues to follow-up with cardiology. Still needs to get his lab work done      Review of Systems   Constitutional: Negative. Respiratory: Negative. Cardiovascular: Negative. Gastrointestinal: Negative. Genitourinary: Negative.         Past Medical History:   Diagnosis Date   • Anxiety    • CHF (congestive heart failure) (HCC)    • GERD (gastroesophageal reflux disease)    • Hyperlipidemia    • Kidney stones      Past Surgical History:   Procedure Laterality Date   • CARDIAC ELECTROPHYSIOLOGY STUDY AND ABLATION     • CARDIAC ELECTROPHYSIOLOGY STUDY AND ABLATION     • CARDIAC SURGERY      ablation   • CHOLECYSTECTOMY     • COLONOSCOPY     • ELBOW SURGERY Right    • FL RETROGRADE PYELOGRAM  2019   • FL RETROGRADE PYELOGRAM  2019   • FL RETROGRADE PYELOGRAM  2019   • HERNIA REPAIR Right     ing    • IR NEPHROSTOMY TUBE PLACEMENT  2019   • KIDNEY STONE SURGERY     • KNEE ARTHROSCOPY Left    • KNEE CARTILAGE SURGERY     • LA CYSTO BLADDER W/URETERAL CATHETERIZATION Right 2019    Procedure: CYSTOSCOPY RETROGRADE PYELOGRAM WITH INSERTION STENT URETERAL;  Surgeon: Tino Hinojosa MD;  Location: [facility];  Service: Urology   • ME CYSTO/URETERO W/LITHOTRIPSY &INDWELL STENT INSRT Right 8/29/2019    Procedure: CYSTOSCOPY URETEROSCOPY WITH LITHOTRIPSY HOLMIUM LASER, RETROGRADE PYELOGRAM;  Surgeon: Chika Weber MD;  Location: [facility];  Service: Urology   • ME PERQ NL/PL LITHOTRP COMPLEX >2 CM MLT LOCATIONS Right 9/25/2019    Procedure: NEPHROLITHOTOMY  PERCUTANEOUS ([facility]). MEATAL DILATION; ANTEGRADE URETEROSCOPY AND STENT REMOVAL;  Surgeon: Chika Weber MD;  Location: [facility];  Service: Urology   • WRIST SURGERY Right      Family History   Problem Relation Age of Onset   • Cancer Mother    • Cancer Father      Social History     Socioeconomic History   • Marital status: /Civil Union     Spouse name: None   • Number of children: None   • Years of education: None   • Highest education level: None   Occupational History   • None   Tobacco Use   • Smoking status: Never   • Smokeless tobacco: Former   • Tobacco comments:     former chewing tobacco-quit 2014   Vaping Use   • Vaping Use: Never used   Substance and Sexual Activity   • Alcohol use: Not Currently     Comment: occasionally   • Drug use: No   • Sexual activity: Not Currently   Other Topics Concern   • None   Social History Narrative   • None     Social Determinants of Health     Financial Resource Strain: Not on file   Food Insecurity: No Food Insecurity (5/3/2022)    Hunger Vital Sign    • Worried About Running Out of Food in the Last Year: Never true    • Ran Out of Food in the Last Year: Never true   Transportation Needs: No Transportation Needs (5/3/2022)    PRAPARE - Transportation    • Lack of Transportation (Medical): No    • Lack of Transportation (Non-Medical):  No   Physical Activity: Not on file   Stress: Not on file   Social Connections: Not on file   Intimate Partner Violence: Not on file   Housing Stability: Low Risk  (5/3/2022)    Housing Stability Vital Sign    • Unable to Pay for Housing in the Last Year: No    • Number of Places Lived in the Last Year: 1    • Unstable Housing in the Last Year: No     Current Outpatient Medications on File Prior to Visit   Medication Sig   • allopurinol (ZYLOPRIM) 100 mg tablet Take 1 tablet (100 mg total) by mouth daily   • Aspirin Low Dose 81 MG EC tablet TAKE ONE (1) TABLET BY MOUTH DAILY TO PREVENT A HEART ATTACK   • atorvastatin (LIPITOR) 80 mg tablet    • cholecalciferol (VITAMIN D3) 1,000 units tablet Take 2 tablets (2,000 Units total) by mouth daily For a low vitamin D level   • DULoxetine (CYMBALTA) 60 mg delayed release capsule TAKE 1 CAPSULE BY MOUTH ONCE DAILY   • Entresto 24-26 MG TABS TAKE 1 TABLET BY MOUTH IN THE MORNING AND 1 TABLET BEFORE BEDTIME   • Jardiance 10 MG TABS tablet TAKE ONE (1) TABLET BY MOUTH IN THE MORNING   • metoprolol succinate (TOPROL-XL) 25 mg 24 hr tablet TAKE ONE (1) TABLET BY MOUTH IN THE MORNING   • potassium chloride (MICRO-K) 10 MEQ CR capsule TAKE 1 CAPSULE BY MOUTH DAILY WITH TORSEMIDE   • torsemide (DEMADEX) 20 mg tablet    • [DISCONTINUED] ibuprofen (MOTRIN) 800 mg tablet Take 1 tablet (800 mg total) by mouth 3 (three) times a day as needed for pain, fever     Allergies   Allergen Reactions   • Ancef [Cefazolin] Nausea Only     Preoperative admin-nausea and dry heaving   • Other Itching     Peanuts    • Peanut-Containing Drug Products - Food Allergy Itching   • Sulfa Antibiotics Rash   • Toradol [Ketorolac Tromethamine] Rash     Tolerates ibuprofen     Immunization History   Administered Date(s) Administered   • Tdap 02/02/2022   • Tuberculin Skin Test-PPD Intradermal 03/07/2023       Objective     /82   Pulse 67   Temp (!) 96.8 °F (36 °C)   Ht 5' 10" (1.778 m)   Wt 123 kg (271 lb 12.8 oz)   SpO2 97%   BMI 39.00 kg/m²     Physical Exam  Vitals reviewed. Constitutional:       General: He is not in acute distress. Appearance: Normal appearance. He is well-developed.  He is not ill-appearing, toxic-appearing or diaphoretic. HENT:      Head: Normocephalic and atraumatic. Eyes:      Conjunctiva/sclera: Conjunctivae normal.   Cardiovascular:      Rate and Rhythm: Normal rate and regular rhythm. Heart sounds: Normal heart sounds. No murmur heard. No friction rub. No gallop. Pulmonary:      Effort: Pulmonary effort is normal. No respiratory distress. Breath sounds: Normal breath sounds. No wheezing, rhonchi or rales. Musculoskeletal:      Right lower leg: No edema. Left lower leg: No edema. Neurological:      General: No focal deficit present. Mental Status: He is alert and oriented to person, place, and time. Psychiatric:         Mood and Affect: Mood normal.         Behavior: Behavior normal.         Thought Content: Thought content normal.         Judgment: Judgment normal.       Willie Given, DO  BMI Counseling: Body mass index is 39 kg/m². The BMI is above normal. Nutrition recommendations include reducing portion sizes, decreasing overall calorie intake, 3-5 servings of fruits/vegetables daily, reducing fast food intake, consuming healthier snacks, decreasing soda and/or juice intake, moderation in carbohydrate intake, increasing intake of lean protein, reducing intake of saturated fat and trans fat, and reducing intake of cholesterol.

## 2023-10-16 NOTE — PATIENT INSTRUCTIONS
Heart Healthy Diet   AMBULATORY CARE:   A heart healthy diet  is an eating plan low in unhealthy fats and sodium (salt). The plan is high in healthy fats and fiber. A heart healthy diet helps improve your cholesterol levels and lowers your risk for heart disease and stroke. A dietitian will teach you how to read and understand food labels. Heart healthy diet guidelines to follow:   Choose foods that contain healthy fats:      Unsaturated fats  include monounsaturated and polyunsaturated fats. Unsaturated fat is found in foods such as soybean, canola, olive, corn, and safflower oils. It is also found in soft tub margarine that is made with liquid vegetable oil. Omega-3 fat  is found in certain fish, such as salmon, tuna, and trout, and in walnuts and flaxseed. Eat fish high in omega-3 fats at least 2 times a week. Limit or do not have unhealthy fats:      Cholesterol  is found in animal foods, such as eggs and lobster, and in dairy products made from whole milk. Limit cholesterol to less than 200 mg each day. Saturated fat  is found in meats, such as gutierrez and hamburger. It is also found in chicken or turkey skin, whole milk, and butter. Limit saturated fat to less than 7% of your total daily calories. Trans fat  is found in packaged foods, such as potato chips and cookies. It is also in hard margarine, some fried foods, and shortening. Do not eat foods that contain trans fats. Get 20 to 30 grams of fiber each day. Fruits, vegetables, whole-grain foods, and legumes (cooked beans) are good sources of fiber. Limit sodium as directed. You may be told to limit sodium, such as to 2,000 mg or less each day. Choose low-sodium or no-salt-added foods. Add little or no salt to food you prepare. Use herbs and spices in place of salt.        Include the following in your heart healthy plan:  Ask your dietitian or healthcare provider how many servings to have each day from the following food groups:  Grains:      Whole-wheat breads, cereals, and pastas, and brown rice    Low-fat, low-sodium crackers and chips    Vegetables:      Broccoli, green beans, green peas, and spinach    Collards, kale, and lima beans    Carrots, sweet potatoes, tomatoes, and peppers    Canned vegetables with no salt added    Fruits:      Bananas, peaches, pears, and pineapple    Grapes, raisins, and dates    Oranges, tangerines, grapefruit, orange juice, and grapefruit juice    Apricots, mangoes, melons, and papaya    Raspberries and strawberries    Canned fruit with no added sugar    Low-fat dairy:      Nonfat (skim) milk, 1% milk, and low-fat almond, cashew, or soy milks fortified with calcium    Low-fat cheese, regular or frozen yogurt, and cottage cheese    Meats and proteins:      Lean cuts of beef and pork (loin, leg, round), skinless chicken and turkey    Legumes, soy products, egg whites, or nuts    Limit or do not include the following in your heart healthy plan:   Foods and liquids that contain unhealthy fats and oils:      Whole or 2% milk, cream cheese, sour cream, or cheese    High-fat cuts of beef (T-bone steaks, ribs), chicken or turkey with skin, and organ meats such as liver    Butter, stick margarine, shortening, and cooking oils such as coconut or palm oil    Foods and liquids high in sodium:      Packaged foods, such as frozen dinners, cookies, macaroni and cheese, and cereals with more than 300 mg of sodium per serving    Vegetables with added sodium, such as instant potatoes, vegetables with added sauces, or regular canned vegetables    Cured or smoked meats, such as hot dogs, gutierrez, and sausage    High-sodium ketchup, barbecue sauce, salad dressing, pickles, olives, soy sauce, or miso    Foods and liquids high in sugar:      Candy, cake, cookies, pies, or doughnuts    Soft drinks (soda), sports drinks, or sweetened tea    Canned or dry mixes for cakes, soups, sauces, or gravies    Other healthy heart guidelines:   Do not smoke. Nicotine and other chemicals in cigarettes and cigars can cause lung and heart damage. Ask your healthcare provider for information if you currently smoke and need help to quit. E-cigarettes or smokeless tobacco still contain nicotine. Talk to your provider before you use these products. Limit or do not drink alcohol as directed. Alcohol can damage your heart and raise your blood pressure. Your healthcare provider may give you specific daily and weekly limits. The general recommended limit is 1 drink a day for women 21 or older and for men 72 or older. Do not have more than 3 drinks within 24 hours or 7 within a week. The recommended limit is 2 drinks a day for men 24to 59years of age. Do not have more than 4 drinks within 24 hours or 14 within a week. A drink of alcohol is 12 ounces of beer, 5 ounces of wine, or 1½ ounces of liquor. Maintain a healthy weight. Extra body weight makes your heart work harder. Ask your provider what a healthy weight is for you. He or she can help you create a safe weight loss plan, if needed. Exercise regularly. Exercise can help you maintain a healthy weight and improve your blood pressure and cholesterol levels. Regular exercise can also decrease your risk for heart problems. Ask your provider about the best exercise plan for you. Do not start an exercise program without asking your provider. Follow up with your doctor or cardiologist as directed:  Write down your questions so you remember to ask them during your visits. © Copyright Emil[employer] 2023 Information is for End User's use only and may not be sold, redistributed or otherwise used for commercial purposes. The above information is an  only. It is not intended as medical advice for individual conditions or treatments. Talk to your doctor, nurse or pharmacist before following any medical regimen to see if it is safe and effective for you.

## 2023-11-06 NOTE — OP NOTE
OPERATIVE REPORT  PATIENT NAME: Nathan Holstein    :  1965  MRN: [id number]  Pt Location: [facility] OR ROOM 01    SURGERY DATE: 2019    Surgeon(s) and Role:     * Michelle Thorne MD - Primary    Preop Diagnosis:  Right ureteral calculus [N20 1]    Post-Op Diagnosis Codes:     * Right ureteral calculus [N20 1]    Procedure(s) (LRB):  CYSTOSCOPY URETEROSCOPY WITH LITHOTRIPSY HOLMIUM LASER, RETROGRADE PYELOGRAM (Right)    Specimen(s):  * No specimens in log *    Estimated Blood Loss:   Minimal    Drains:  Urethral Catheter Coude 22 Fr  (Active)   Site Assessment Clean;Skin intact 2019 10:30 AM   Collection Container Standard drainage bag 2019 10:30 AM   Number of days: 0       Ureteral Drain/Stent Right ureter 6 Fr  (Active)   Number of days: 61       Anesthesia Type:   General    Operative Indications:  Right ureteral calculus [N20 1]      Operative Findings:  1  Patent distal urethral strictures easily passable with 22 Canadian cystoscope  2  Densely encrusted distal coil of stent  3  Right ureteral stent was not able to be un coiled or uncurled or removed due to calcifications along the ureteral portion of the stent and likely around the proximal coil  4  Attempted passage of wires through the stent and ureteroscopy alongside with laser lithotripsy was successful in removing some of the stone however we were not able to reach the ureteral scope to the proximal coil  5  Moderate to severe hydronephrosis of the renal pelvis  6  Aborted attempt at further manipulation of the stent    Complications:   None    Procedure and Technique:  Nathan Holstein is a 53 y o  male with with a history of significant stone disease  Approximately 6 years ago, the patient underwent a percutaneous surgery at an outside hospital due to a staghorn calculus  The patient reports that he forms copious amounts of stones and passes them regularly    In , the patient underwent a right-sided stent placement for a 1 3 cm stone  He return to the office for evaluation and was set up for ureteroscopy  The patient has noted progressive pain did have a repeat CT scan it the end of July  It appeared the stone had been pushed into the renal pelvis  The patient was counseled regarding their options and ultimately opted to proceed with cystoscopy, [RIGHT] retrograde pyelogram, ureteroscopy with possible laser lithotripsy and basket extraction of stone, stent placement    Risks and benefits of the procedure were described and the patient signed an informed consent  On 8/29/2019, the patient proceeded to the operating room  They were laid supine on the operating room table and a pre-procedure time out was performed with all parties present and in agreement of the procedure planned and laterality  Patient received intravenous antibiotics in the form of ancef and sequential compression devices were placed on bilateral lower extremities  Patient had onset of nausea with administration of ancef They were then induced with a general endotracheal anesthetic  The patient was placed in dorsal lithotomy position with care to pad all pressure points  His perineum and genitalia were prepped and draped in the sterile fashion  A 22 Danish cystoscope was introduced into the urethra  We noted distal urethral strictures that were circumferential and wide-mouth  The 25 Western Alondra scope was easily passed  Once inside the bladder, we immediately encountered a large amount of incrustation around the distal coil of the stent  The stent had been placed in June and was approximately 2 months with significant incrustation  A combination of stent grasper and laser lithotripsy with a 365 nanometer fiber was performed to allow for manipulation of the stent  Stent graspers were then used to remove the distal coil of the stent into the urethra    Continuous fluoroscopy demonstrated that the proximal coil of the stent did not un for all and although it moved distally in the renal pelvis and ureter, it did not easily exit the patient unfortunately, we were not able to remove the distal portion of the stent out of the patient's urethral meatus as there was not enough manipulation  A long semi rigid ureteral scope was then advanced alongside the stent and will Bard solo +wire that had been placed as a safety  We encountered copious amounts of incrustation alongside the stents which we used a 365 nanometer laser fiber  With careful laser and movement proximally, we were able to achieve significant amount of removal of stone incrustation  With some of this mobility, we were able to eventually remove the distal coil out through the urethral meatus  Attempts were made to pass Bard solo +wires with hydrophilic tips and even an Amplatz superstiff wire through the previously placed stent  Unfortunately the internal lumen of the stent was likely encrusted as well  The distal portion was cut and this was re-attempted but again there was no success  Unfortunately after more than an hour of work, we were not able to reach the proximal coil of the stent with the ureteral scope  For fear of laceration or trauma to the ureter, eventually we made the decision to abort further attempts  The distal coil of the stent was replaced into the patient's bladder  Over our previously placed safety wire, a 5 Wolof tiger tip catheter was advanced  Was not able to pass more proximal than the encrusted proximal coil of the stent  Retrograde pyelogram was performed that showed no extravasation but significant hydronephrosis  The wire and the 5 Wolof catheter were ultimately removed  Because of all of the work and the urethral strictures, we opted to place a 20 Western Alondra coude tip Enriquez catheter  10 cc of sterile water replaced in the balloon  2% viscous lidocaine had been placed per urethra and a belladonna and opium suppository was placed per rectum      At the completion of the procedure, the patient was extubated and transferred to PACU in good condition  Plan-the patient will be admitted to the hospital overnight  He will be given a diet  He will be NPO after midnight  I have discussed with the interventional radiology team placement of a right percutaneous nephrostomy tube tomorrow  The patient will ultimately require discussion of either percutaneous nephrolithotomy surgery to address the encrusted proximal coil of the stent and his initial stone or consideration of an intermediate attempt at extracorporeal shockwave lithotripsy       I was present for the entire procedure    Patient Disposition:  PACU     SIGNATURE: Martha Mckeon MD  DATE: August 29, 2019  TIME: 10:45 AM no fever/no chills/no sweating/no weight loss/no weight gain/no malaise

## 2024-02-08 ENCOUNTER — RA CDI HCC (OUTPATIENT)
Dept: OTHER | Facility: HOSPITAL | Age: 59
End: 2024-02-08

## 2024-02-08 DIAGNOSIS — F41.8 ANXIETY WITH DEPRESSION: ICD-10-CM

## 2024-02-08 RX ORDER — DULOXETIN HYDROCHLORIDE 60 MG/1
60 CAPSULE, DELAYED RELEASE ORAL DAILY
Qty: 30 CAPSULE | Refills: 5 | Status: SHIPPED | OUTPATIENT
Start: 2024-02-08

## 2024-02-26 ENCOUNTER — OFFICE VISIT (OUTPATIENT)
Dept: FAMILY MEDICINE CLINIC | Facility: CLINIC | Age: 59
End: 2024-02-26
Payer: COMMERCIAL

## 2024-02-26 VITALS
DIASTOLIC BLOOD PRESSURE: 88 MMHG | TEMPERATURE: 95.9 F | SYSTOLIC BLOOD PRESSURE: 126 MMHG | HEIGHT: 70 IN | WEIGHT: 280 LBS | BODY MASS INDEX: 40.09 KG/M2 | OXYGEN SATURATION: 97 % | HEART RATE: 84 BPM

## 2024-02-26 DIAGNOSIS — I47.10 PAROXYSMAL SVT (SUPRAVENTRICULAR TACHYCARDIA): ICD-10-CM

## 2024-02-26 DIAGNOSIS — E55.9 VITAMIN D INSUFFICIENCY: ICD-10-CM

## 2024-02-26 DIAGNOSIS — E78.5 DYSLIPIDEMIA: ICD-10-CM

## 2024-02-26 DIAGNOSIS — M25.511 CHRONIC PAIN OF BOTH SHOULDERS: ICD-10-CM

## 2024-02-26 DIAGNOSIS — G89.29 CHRONIC PAIN OF BOTH SHOULDERS: ICD-10-CM

## 2024-02-26 DIAGNOSIS — M25.512 CHRONIC PAIN OF BOTH SHOULDERS: ICD-10-CM

## 2024-02-26 DIAGNOSIS — E78.2 MIXED HYPERLIPIDEMIA: Primary | ICD-10-CM

## 2024-02-26 PROBLEM — I50.21 ACUTE SYSTOLIC CONGESTIVE HEART FAILURE (HCC): Status: RESOLVED | Noted: 2022-05-02 | Resolved: 2024-02-26

## 2024-02-26 PROCEDURE — 99214 OFFICE O/P EST MOD 30 MIN: CPT | Performed by: FAMILY MEDICINE

## 2024-02-26 RX ORDER — VALSARTAN 40 MG/1
TABLET ORAL
COMMUNITY
Start: 2024-02-08

## 2024-02-26 NOTE — PROGRESS NOTES
Name: Yazan Child      : 1965      MRN: [id number]  Encounter Provider: Senthil Wyatt DO  Encounter Date: 2024   Encounter department: [facility]    Assessment & Plan   I encouraged him to get his lab work done.  Follow-up with specialist as scheduled.  I will see him back in 3 months or as needed.  Does not wish to have the flu shot.  1. Mixed hyperlipidemia    2. Vitamin D insufficiency    3. Paroxysmal SVT (supraventricular tachycardia)    4. Dyslipidemia    5. Chronic pain of both shoulders        Depression Screening and Follow-up Plan: Patient was screened for depression during today's encounter. They screened negative with a PHQ-9 score of 0.        Subjective     Patient here today for follow-up.  Denies any chest pain or shortness of breath.  Did test positive for COVID a couple weeks ago, doing well.  He did not get his lab work done yet.  He recently applied for disability.      Review of Systems   Constitutional: Negative.    Respiratory: Negative.     Cardiovascular: Negative.    Gastrointestinal: Negative.    Genitourinary: Negative.    Musculoskeletal:  Positive for arthralgias.       Past Medical History:   Diagnosis Date   • Acute systolic congestive heart failure (HCC)    • Anxiety    • CHF (congestive heart failure) (HCC)    • GERD (gastroesophageal reflux disease)    • Hyperlipidemia    • Kidney stones      Past Surgical History:   Procedure Laterality Date   • CARDIAC ELECTROPHYSIOLOGY STUDY AND ABLATION     • CARDIAC ELECTROPHYSIOLOGY STUDY AND ABLATION     • CARDIAC SURGERY  2014    ablation   • CHOLECYSTECTOMY     • COLONOSCOPY     • ELBOW SURGERY Right    • FL RETROGRADE PYELOGRAM  2019   • FL RETROGRADE PYELOGRAM  2019   • FL RETROGRADE PYELOGRAM  2019   • HERNIA REPAIR Right     ing    • IR NEPHROSTOMY TUBE PLACEMENT  2019   • KIDNEY STONE SURGERY     • KNEE ARTHROSCOPY Left    • KNEE CARTILAGE SURGERY     • CT CYSTO BLADDER W/URETERAL  CATHETERIZATION Right 6/29/2019    Procedure: CYSTOSCOPY RETROGRADE PYELOGRAM WITH INSERTION STENT URETERAL;  Surgeon: Brayden Song MD;  Location: [facility];  Service: Urology   • CO CYSTO/URETERO W/LITHOTRIPSY &INDWELL STENT INSRT Right 8/29/2019    Procedure: CYSTOSCOPY URETEROSCOPY WITH LITHOTRIPSY HOLMIUM LASER, RETROGRADE PYELOGRAM;  Surgeon: Eddie Juárez MD;  Location: [facility];  Service: Urology   • CO PERQ NL/PL LITHOTRP COMPLEX >2 CM MLT LOCATIONS Right 9/25/2019    Procedure: NEPHROLITHOTOMY  PERCUTANEOUS ([facility]). MEATAL DILATION; ANTEGRADE URETEROSCOPY AND STENT REMOVAL;  Surgeon: Eddie Juárez MD;  [facility];  Service: Urology   • WRIST SURGERY Right      Family History   Problem Relation Age of Onset   • Cancer Mother    • Cancer Father      Social History     Socioeconomic History   • Marital status: /Civil Union     Spouse name: None   • Number of children: None   • Years of education: None   • Highest education level: None   Occupational History   • None   Tobacco Use   • Smoking status: Never   • Smokeless tobacco: Former   • Tobacco comments:     former chewing tobacco-quit 2014   Vaping Use   • Vaping status: Never Used   Substance and Sexual Activity   • Alcohol use: Not Currently     Comment: occasionally   • Drug use: No   • Sexual activity: Not Currently   Other Topics Concern   • None   Social History Narrative   • None     Social Determinants of Health     Financial Resource Strain: Not on file   Food Insecurity: No Food Insecurity (5/3/2022)    Hunger Vital Sign    • Worried About Running Out of Food in the Last Year: Never true    • Ran Out of Food in the Last Year: Never true   Transportation Needs: No Transportation Needs (5/3/2022)    PRAPARE - Transportation    • Lack of Transportation (Medical): No    • Lack of Transportation (Non-Medical): No   Physical Activity: Not on file   Stress: Not on file   Social Connections: Not on file   Intimate  Partner Violence: Not on file   Housing Stability: Low Risk  (5/3/2022)    Housing Stability Vital Sign    • Unable to Pay for Housing in the Last Year: No    • Number of Places Lived in the Last Year: 1    • Unstable Housing in the Last Year: No     Current Outpatient Medications on File Prior to Visit   Medication Sig   • allopurinol (ZYLOPRIM) 100 mg tablet Take 1 tablet (100 mg total) by mouth daily   • atorvastatin (LIPITOR) 80 mg tablet    • cholecalciferol (VITAMIN D3) 1,000 units tablet Take 2 tablets (2,000 Units total) by mouth daily For a low vitamin D level   • DULoxetine (CYMBALTA) 60 mg delayed release capsule TAKE ONE (1) CAPSULE BY MOUTH ONCE DAILY   • Entresto 24-26 MG TABS TAKE 1 TABLET BY MOUTH IN THE MORNING AND 1 TABLET BEFORE BEDTIME   • Jardiance 10 MG TABS tablet TAKE ONE (1) TABLET BY MOUTH IN THE MORNING   • metoprolol succinate (TOPROL-XL) 25 mg 24 hr tablet TAKE ONE (1) TABLET BY MOUTH IN THE MORNING   • potassium chloride (MICRO-K) 10 MEQ CR capsule TAKE 1 CAPSULE BY MOUTH DAILY WITH TORSEMIDE   • torsemide (DEMADEX) 20 mg tablet    • valsartan (DIOVAN) 40 mg tablet    • Aspirin Low Dose 81 MG EC tablet TAKE ONE (1) TABLET BY MOUTH DAILY TO PREVENT A HEART ATTACK (Patient not taking: Reported on 2/26/2024)   • [DISCONTINUED] ibuprofen (MOTRIN) 800 mg tablet Take 1 tablet (800 mg total) by mouth 3 (three) times a day as needed for pain, fever     Allergies   Allergen Reactions   • Ancef [Cefazolin] Nausea Only     Preoperative admin-nausea and dry heaving   • Other Itching     Peanuts    • Peanut-Containing Drug Products - Food Allergy Itching   • Sulfa Antibiotics Rash   • Toradol [Ketorolac Tromethamine] Rash     Tolerates ibuprofen     Immunization History   Administered Date(s) Administered   • COVID-19 J&J (Fiorella) vaccine 0.5 mL 01/25/2022   • Tdap 02/02/2022   • Tuberculin Skin Test-PPD Intradermal 03/07/2023       Objective     /88   Pulse 84   Temp (!) 95.9 °F (35.5 °C)   " Ht 5' 10\" (1.778 m)   Wt 127 kg (280 lb)   SpO2 97%   BMI 40.18 kg/m²     Physical Exam  Vitals reviewed.   Constitutional:       General: He is not in acute distress.     Appearance: Normal appearance. He is well-developed. He is not ill-appearing, toxic-appearing or diaphoretic.   HENT:      Head: Normocephalic and atraumatic.   Eyes:      Conjunctiva/sclera: Conjunctivae normal.   Cardiovascular:      Rate and Rhythm: Normal rate and regular rhythm.      Heart sounds: Normal heart sounds. No murmur heard.     No friction rub. No gallop.   Pulmonary:      Effort: Pulmonary effort is normal. No respiratory distress.      Breath sounds: Normal breath sounds. No wheezing, rhonchi or rales.   Musculoskeletal:      Right lower leg: No edema.      Left lower leg: No edema.   Neurological:      General: No focal deficit present.      Mental Status: He is alert and oriented to person, place, and time.   Psychiatric:         Mood and Affect: Mood normal.         Behavior: Behavior normal.         Thought Content: Thought content normal.         Judgment: Judgment normal.       Senthil Wyatt, DO    "

## 2024-03-09 DIAGNOSIS — I50.21 ACUTE SYSTOLIC CONGESTIVE HEART FAILURE (HCC): ICD-10-CM

## 2024-03-11 RX ORDER — POTASSIUM CHLORIDE 750 MG/1
CAPSULE, EXTENDED RELEASE ORAL
Qty: 30 CAPSULE | Refills: 5 | Status: SHIPPED | OUTPATIENT
Start: 2024-03-11

## 2024-04-07 ENCOUNTER — HOSPITAL ENCOUNTER (EMERGENCY)
Facility: HOSPITAL | Age: 59
Discharge: HOME | End: 2024-04-07
Attending: EMERGENCY MEDICINE
Payer: COMMERCIAL

## 2024-04-07 ENCOUNTER — APPOINTMENT (EMERGENCY)
Dept: CT IMAGING | Facility: HOSPITAL | Age: 59
End: 2024-04-07
Payer: COMMERCIAL

## 2024-04-07 VITALS
BODY MASS INDEX: 39.86 KG/M2 | RESPIRATION RATE: 18 BRPM | OXYGEN SATURATION: 92 % | TEMPERATURE: 97.2 F | HEART RATE: 85 BPM | SYSTOLIC BLOOD PRESSURE: 128 MMHG | WEIGHT: 277.78 LBS | DIASTOLIC BLOOD PRESSURE: 75 MMHG

## 2024-04-07 DIAGNOSIS — N20.1 URETERAL STONE: Primary | ICD-10-CM

## 2024-04-07 LAB
ALBUMIN SERPL BCP-MCNC: 4.1 G/DL (ref 3.5–5)
ALP SERPL-CCNC: 62 U/L (ref 34–104)
ALT SERPL W P-5'-P-CCNC: 16 U/L (ref 7–52)
ANION GAP SERPL CALCULATED.3IONS-SCNC: 11 MMOL/L (ref 4–13)
AST SERPL W P-5'-P-CCNC: 14 U/L (ref 13–39)
BACTERIA UR QL AUTO: ABNORMAL /HPF
BASOPHILS # BLD AUTO: 0.03 THOUSANDS/ÂΜL (ref 0–0.1)
BASOPHILS NFR BLD AUTO: 0 % (ref 0–1)
BILIRUB SERPL-MCNC: 0.6 MG/DL (ref 0.2–1)
BILIRUB UR QL STRIP: NEGATIVE
BUN SERPL-MCNC: 22 MG/DL (ref 5–25)
CALCIUM SERPL-MCNC: 9.3 MG/DL (ref 8.4–10.2)
CHLORIDE SERPL-SCNC: 102 MMOL/L (ref 96–108)
CLARITY UR: ABNORMAL
CO2 SERPL-SCNC: 22 MMOL/L (ref 21–32)
COLOR UR: YELLOW
CREAT SERPL-MCNC: 1.17 MG/DL (ref 0.6–1.3)
EOSINOPHIL # BLD AUTO: 0.04 THOUSAND/ÂΜL (ref 0–0.61)
EOSINOPHIL NFR BLD AUTO: 0 % (ref 0–6)
ERYTHROCYTE [DISTWIDTH] IN BLOOD BY AUTOMATED COUNT: 13.8 % (ref 11.6–15.1)
GFR SERPL CREATININE-BSD FRML MDRD: 67 ML/MIN/1.73SQ M
GLUCOSE SERPL-MCNC: 129 MG/DL (ref 65–140)
GLUCOSE UR STRIP-MCNC: NEGATIVE MG/DL
HCT VFR BLD AUTO: 42.9 % (ref 36.5–49.3)
HGB BLD-MCNC: 14.5 G/DL (ref 12–17)
HGB UR QL STRIP.AUTO: ABNORMAL
IMM GRANULOCYTES # BLD AUTO: 0.04 THOUSAND/UL (ref 0–0.2)
IMM GRANULOCYTES NFR BLD AUTO: 0 % (ref 0–2)
KETONES UR STRIP-MCNC: NEGATIVE MG/DL
LEUKOCYTE ESTERASE UR QL STRIP: NEGATIVE
LIPASE SERPL-CCNC: 21 U/L (ref 11–82)
LYMPHOCYTES # BLD AUTO: 1.16 THOUSANDS/ÂΜL (ref 0.6–4.47)
LYMPHOCYTES NFR BLD AUTO: 13 % (ref 14–44)
MCH RBC QN AUTO: 31.2 PG (ref 26.8–34.3)
MCHC RBC AUTO-ENTMCNC: 33.8 G/DL (ref 31.4–37.4)
MCV RBC AUTO: 92 FL (ref 82–98)
MONOCYTES # BLD AUTO: 0.55 THOUSAND/ÂΜL (ref 0.17–1.22)
MONOCYTES NFR BLD AUTO: 6 % (ref 4–12)
NEUTROPHILS # BLD AUTO: 7.35 THOUSANDS/ÂΜL (ref 1.85–7.62)
NEUTS SEG NFR BLD AUTO: 81 % (ref 43–75)
NITRITE UR QL STRIP: NEGATIVE
NON-SQ EPI CELLS URNS QL MICRO: ABNORMAL /HPF
NRBC BLD AUTO-RTO: 0 /100 WBCS
PH UR STRIP.AUTO: 5.5 [PH]
PLATELET # BLD AUTO: 261 THOUSANDS/UL (ref 149–390)
PMV BLD AUTO: 9.9 FL (ref 8.9–12.7)
POTASSIUM SERPL-SCNC: 4 MMOL/L (ref 3.5–5.3)
PROT SERPL-MCNC: 7.2 G/DL (ref 6.4–8.4)
PROT UR STRIP-MCNC: NEGATIVE MG/DL
RBC # BLD AUTO: 4.65 MILLION/UL (ref 3.88–5.62)
RBC #/AREA URNS AUTO: ABNORMAL /HPF
SODIUM SERPL-SCNC: 135 MMOL/L (ref 135–147)
SP GR UR STRIP.AUTO: 1.02 (ref 1–1.03)
URATE CRY URNS QL MICRO: ABNORMAL /HPF
UROBILINOGEN UR QL STRIP.AUTO: 0.2 E.U./DL
WBC # BLD AUTO: 9.17 THOUSAND/UL (ref 4.31–10.16)
WBC #/AREA URNS AUTO: ABNORMAL /HPF

## 2024-04-07 PROCEDURE — 85025 COMPLETE CBC W/AUTO DIFF WBC: CPT | Performed by: EMERGENCY MEDICINE

## 2024-04-07 PROCEDURE — 81001 URINALYSIS AUTO W/SCOPE: CPT | Performed by: EMERGENCY MEDICINE

## 2024-04-07 PROCEDURE — 99285 EMERGENCY DEPT VISIT HI MDM: CPT | Performed by: EMERGENCY MEDICINE

## 2024-04-07 PROCEDURE — 36415 COLL VENOUS BLD VENIPUNCTURE: CPT | Performed by: EMERGENCY MEDICINE

## 2024-04-07 PROCEDURE — 83690 ASSAY OF LIPASE: CPT | Performed by: EMERGENCY MEDICINE

## 2024-04-07 PROCEDURE — 80053 COMPREHEN METABOLIC PANEL: CPT | Performed by: EMERGENCY MEDICINE

## 2024-04-07 PROCEDURE — 74176 CT ABD & PELVIS W/O CONTRAST: CPT

## 2024-04-07 RX ORDER — HYDROMORPHONE HCL/PF 1 MG/ML
0.5 SYRINGE (ML) INJECTION ONCE
Status: COMPLETED | OUTPATIENT
Start: 2024-04-07 | End: 2024-04-07

## 2024-04-07 RX ORDER — ONDANSETRON 2 MG/ML
4 INJECTION INTRAMUSCULAR; INTRAVENOUS ONCE
Status: COMPLETED | OUTPATIENT
Start: 2024-04-07 | End: 2024-04-07

## 2024-04-07 RX ORDER — MORPHINE SULFATE 15 MG/1
7.5 TABLET ORAL EVERY 4 HOURS PRN
Qty: 10 TABLET | Refills: 0 | Status: SHIPPED | OUTPATIENT
Start: 2024-04-07 | End: 2024-04-17

## 2024-04-07 RX ORDER — IBUPROFEN 800 MG/1
800 TABLET ORAL ONCE
Status: COMPLETED | OUTPATIENT
Start: 2024-04-07 | End: 2024-04-07

## 2024-04-07 RX ORDER — ONDANSETRON 4 MG/1
4 TABLET, ORALLY DISINTEGRATING ORAL EVERY 6 HOURS PRN
Qty: 20 TABLET | Refills: 0 | Status: SHIPPED | OUTPATIENT
Start: 2024-04-07

## 2024-04-07 RX ADMIN — SODIUM CHLORIDE 1000 ML: 0.9 INJECTION, SOLUTION INTRAVENOUS at 07:52

## 2024-04-07 RX ADMIN — HYDROMORPHONE HYDROCHLORIDE 0.5 MG: 1 INJECTION, SOLUTION INTRAMUSCULAR; INTRAVENOUS; SUBCUTANEOUS at 11:54

## 2024-04-07 RX ADMIN — SODIUM CHLORIDE 1000 ML: 0.9 INJECTION, SOLUTION INTRAVENOUS at 10:39

## 2024-04-07 RX ADMIN — HYDROMORPHONE HYDROCHLORIDE 0.5 MG: 1 INJECTION, SOLUTION INTRAMUSCULAR; INTRAVENOUS; SUBCUTANEOUS at 09:21

## 2024-04-07 RX ADMIN — HYDROMORPHONE HYDROCHLORIDE 0.5 MG: 1 INJECTION, SOLUTION INTRAMUSCULAR; INTRAVENOUS; SUBCUTANEOUS at 07:55

## 2024-04-07 RX ADMIN — ONDANSETRON 4 MG: 2 INJECTION INTRAMUSCULAR; INTRAVENOUS at 07:54

## 2024-04-07 RX ADMIN — IBUPROFEN 800 MG: 800 TABLET, FILM COATED ORAL at 10:34

## 2024-04-07 NOTE — DISCHARGE INSTRUCTIONS
Please follow up with urology.     Please take motrin 600 mg every 6-8 hours for pain. You may take morphine 7.5 mg every 4 hours as needed for severe pain .

## 2024-04-07 NOTE — ED PROVIDER NOTES
History  Chief Complaint   Patient presents with    Flank Pain     Right sided flank pain that started around 0200 this morning, patient states that he has a kidney stone and needs fluids. Feels nauseous. Has a history of kidney stones      HPI    59-year-old male with past medical history of kidney stones, hyperlipidemia and CHF who presents for evaluation of right-sided flank pain.  Patient states pain started suddenly at around 2 AM.  He states pain radiates towards the right testicle.  He states pain has been constant since it started.  He has had associated nausea but no vomiting.  Denies fevers or chills.  Denies chest pain, shortness of breath, or cough.  Denies diarrhea.  Denies hematuria or dysuria.  Patient states he has had kidney stones in the past and this feels similar.    Prior to Admission Medications   Prescriptions Last Dose Informant Patient Reported? Taking?   Aspirin Low Dose 81 MG EC tablet   No No   Sig: TAKE ONE (1) TABLET BY MOUTH DAILY TO PREVENT A HEART ATTACK   Patient not taking: Reported on 2/26/2024   DULoxetine (CYMBALTA) 60 mg delayed release capsule   No No   Sig: TAKE ONE (1) CAPSULE BY MOUTH ONCE DAILY   Entresto 24-26 MG TABS   No No   Sig: TAKE 1 TABLET BY MOUTH IN THE MORNING AND 1 TABLET BEFORE BEDTIME   Jardiance 10 MG TABS tablet   No No   Sig: TAKE ONE (1) TABLET BY MOUTH IN THE MORNING   allopurinol (ZYLOPRIM) 100 mg tablet   No No   Sig: Take 1 tablet (100 mg total) by mouth daily   atorvastatin (LIPITOR) 80 mg tablet   Yes No   cholecalciferol (VITAMIN D3) 1,000 units tablet   No No   Sig: Take 2 tablets (2,000 Units total) by mouth daily For a low vitamin D level   metoprolol succinate (TOPROL-XL) 25 mg 24 hr tablet   No No   Sig: TAKE ONE (1) TABLET BY MOUTH IN THE MORNING   potassium chloride (MICRO-K) 10 MEQ CR capsule   No No   Sig: TAKE ONE (1) CAPSULE BY MOUTH DAILY WITH TORSEMIDE   torsemide (DEMADEX) 20 mg tablet   Yes No   valsartan (DIOVAN) 40 mg tablet   Yes  No      Facility-Administered Medications: None       Past Medical History:   Diagnosis Date    Acute systolic congestive heart failure (HCC)     Anxiety     CHF (congestive heart failure) (HCC)     GERD (gastroesophageal reflux disease)     Hyperlipidemia     Kidney stones        Past Surgical History:   Procedure Laterality Date    CARDIAC ELECTROPHYSIOLOGY STUDY AND ABLATION      CARDIAC ELECTROPHYSIOLOGY STUDY AND ABLATION      CARDIAC SURGERY  2014    ablation    CHOLECYSTECTOMY      COLONOSCOPY      ELBOW SURGERY Right     FL RETROGRADE PYELOGRAM  6/29/2019    FL RETROGRADE PYELOGRAM  8/29/2019    FL RETROGRADE PYELOGRAM  9/25/2019    HERNIA REPAIR Right     ing     IR NEPHROSTOMY TUBE PLACEMENT  8/30/2019    KIDNEY STONE SURGERY      KNEE ARTHROSCOPY Left     KNEE CARTILAGE SURGERY      MD CYSTO BLADDER W/URETERAL CATHETERIZATION Right 6/29/2019    Procedure: CYSTOSCOPY RETROGRADE PYELOGRAM WITH INSERTION STENT URETERAL;  Surgeon: Brayden Song MD;  [facility];  Service: Urology    MD CYSTO/URETERO W/LITHOTRIPSY &INDWELL STENT INSRT Right 8/29/2019    Procedure: CYSTOSCOPY URETEROSCOPY WITH LITHOTRIPSY HOLMIUM LASER, RETROGRADE PYELOGRAM;  Surgeon: Eddie Juárez MD;  Location[facility];  Service: Urology    MD PERQ NL/PL LITHOTRP COMPLEX >2 CM MLT LOCATIONS Right 9/25/2019    Procedure: NEPHROLITHOTOMY  PERCUTANEOUS ([facility]). MEATAL DILATION; ANTEGRADE URETEROSCOPY AND STENT REMOVAL;  Surgeon: Eddie Juárez MD;  [facility];  Service: Urology    WRIST SURGERY Right        Family History   Problem Relation Age of Onset    Cancer Mother     Cancer Father      I have reviewed and agree with the history as documented.    E-Cigarette/Vaping    E-Cigarette Use Never User      E-Cigarette/Vaping Substances    Nicotine No     THC No     CBD No     Flavoring No     Other No     Unknown No      Social History     Tobacco Use    Smoking status: Never    Smokeless tobacco: Former     Tobacco comments:     former chewing tobacco-quit 2014   Vaping Use    Vaping status: Never Used   Substance Use Topics    Alcohol use: Not Currently     Comment: occasionally    Drug use: No       Review of Systems   Constitutional:  Negative for appetite change, chills and fever.   HENT:  Negative for congestion, rhinorrhea and sore throat.    Respiratory:  Negative for cough and shortness of breath.    Cardiovascular:  Negative for chest pain.   Gastrointestinal:  Positive for abdominal pain and nausea. Negative for diarrhea and vomiting.   Genitourinary:  Positive for flank pain and testicular pain. Negative for dysuria, frequency, hematuria, scrotal swelling and urgency.   Musculoskeletal:  Negative for arthralgias and myalgias.   Skin:  Negative for rash.   Neurological:  Negative for dizziness, weakness, light-headedness, numbness and headaches.   All other systems reviewed and are negative.      Physical Exam  Physical Exam  Vitals and nursing note reviewed.   Constitutional:       General: He is not in acute distress.     Appearance: Normal appearance. He is well-developed. He is obese. He is not ill-appearing, toxic-appearing or diaphoretic.   HENT:      Head: Normocephalic and atraumatic.      Right Ear: External ear normal.      Left Ear: External ear normal.      Nose: Nose normal.      Mouth/Throat:      Mouth: Mucous membranes are moist.      Pharynx: Oropharynx is clear.   Eyes:      Extraocular Movements: Extraocular movements intact.      Conjunctiva/sclera: Conjunctivae normal.   Cardiovascular:      Rate and Rhythm: Normal rate and regular rhythm.      Pulses: Normal pulses.      Heart sounds: Normal heart sounds. No murmur heard.     No friction rub. No gallop.   Pulmonary:      Effort: Pulmonary effort is normal. No respiratory distress.      Breath sounds: Normal breath sounds. No wheezing or rales.   Abdominal:      General: There is no distension.      Palpations: Abdomen is soft.       Tenderness: There is no abdominal tenderness. There is right CVA tenderness. There is no left CVA tenderness, guarding or rebound.   Musculoskeletal:         General: No tenderness.      Cervical back: Neck supple.      Right lower leg: No edema.      Left lower leg: No edema.   Skin:     General: Skin is warm and dry.      Coloration: Skin is not pale.      Findings: No rash.   Neurological:      General: No focal deficit present.      Mental Status: He is alert and oriented to person, place, and time.      Cranial Nerves: No cranial nerve deficit.      Sensory: No sensory deficit.      Motor: No weakness.   Psychiatric:         Mood and Affect: Mood normal.         Behavior: Behavior normal.         Vital Signs  ED Triage Vitals [04/07/24 0736]   Temperature Pulse Respirations Blood Pressure SpO2   (!) 97.2 °F (36.2 °C) 85 18 128/75 92 %      Temp Source Heart Rate Source Patient Position - Orthostatic VS BP Location FiO2 (%)   Temporal Monitor Lying Left arm --      Pain Score       7           Vitals:    04/07/24 0736   BP: 128/75   Pulse: 85   Patient Position - Orthostatic VS: Lying         Visual Acuity      ED Medications  Medications   HYDROmorphone (DILAUDID) injection 0.5 mg (0.5 mg Intravenous Given 4/7/24 0755)   ondansetron (ZOFRAN) injection 4 mg (4 mg Intravenous Given 4/7/24 0754)   sodium chloride 0.9 % bolus 1,000 mL (0 mL Intravenous Stopped 4/7/24 0952)   HYDROmorphone (DILAUDID) injection 0.5 mg (0.5 mg Intravenous Given 4/7/24 0921)   ibuprofen (MOTRIN) tablet 800 mg (800 mg Oral Given 4/7/24 1034)   sodium chloride 0.9 % bolus 1,000 mL (0 mL Intravenous Stopped 4/7/24 1225)   HYDROmorphone (DILAUDID) injection 0.5 mg (0.5 mg Intravenous Given 4/7/24 1154)       Diagnostic Studies  Results Reviewed       Procedure Component Value Units Date/Time    Urine Microscopic [[id number]]  (Abnormal) Collected: 04/07/24 0942    Lab Status: Final result Specimen: Urine, Clean Catch Updated: 04/07/24 1016      RBC, UA 4-10 /hpf      WBC, UA 1-2 /hpf      Epithelial Cells None Seen /hpf      Bacteria, UA None Seen /hpf      Uric Acid Charlee, UA Occasional /hpf     UA w Reflex to Microscopic w Reflex to Culture [[id number]]  (Abnormal) Collected: 04/07/24 0942    Lab Status: Final result Specimen: Urine, Clean Catch Updated: 04/07/24 1003     Color, UA Yellow     Clarity, UA Slightly Cloudy     Specific Gravity, UA 1.025     pH, UA 5.5     Leukocytes, UA Negative     Nitrite, UA Negative     Protein, UA Negative mg/dl      Glucose, UA Negative mg/dl      Ketones, UA Negative mg/dl      Urobilinogen, UA 0.2 E.U./dl      Bilirubin, UA Negative     Occult Blood, UA Moderate    Comprehensive metabolic panel [[id number]] Collected: 04/07/24 0751    Lab Status: Final result Specimen: Blood from Arm, Right Updated: 04/07/24 0816     Sodium 135 mmol/L      Potassium 4.0 mmol/L      Chloride 102 mmol/L      CO2 22 mmol/L      ANION GAP 11 mmol/L      BUN 22 mg/dL      Creatinine 1.17 mg/dL      Glucose 129 mg/dL      Calcium 9.3 mg/dL      AST 14 U/L      ALT 16 U/L      Alkaline Phosphatase 62 U/L      Total Protein 7.2 g/dL      Albumin 4.1 g/dL      Total Bilirubin 0.60 mg/dL      eGFR 67 ml/min/1.73sq m     Narrative:      National Kidney Disease Foundation guidelines for Chronic Kidney Disease (CKD):     Stage 1 with normal or high GFR (GFR > 90 mL/min/1.73 square meters)    Stage 2 Mild CKD (GFR = 60-89 mL/min/1.73 square meters)    Stage 3A Moderate CKD (GFR = 45-59 mL/min/1.73 square meters)    Stage 3B Moderate CKD (GFR = 30-44 mL/min/1.73 square meters)    Stage 4 Severe CKD (GFR = 15-29 mL/min/1.73 square meters)    Stage 5 End Stage CKD (GFR <15 mL/min/1.73 square meters)  Note: GFR calculation is accurate only with a steady state creatinine    Lipase [[id number]]  (Normal) Collected: 04/07/24 0751    Lab Status: Final result Specimen: Blood from Arm, Right Updated: 04/07/24 0816     Lipase 21 u/L     CBC and differential  [[id number]]  (Abnormal) Collected: 04/07/24 0751    Lab Status: Final result Specimen: Blood from Arm, Right Updated: 04/07/24 0759     WBC 9.17 Thousand/uL      RBC 4.65 Million/uL      Hemoglobin 14.5 g/dL      Hematocrit 42.9 %      MCV 92 fL      MCH 31.2 pg      MCHC 33.8 g/dL      RDW 13.8 %      MPV 9.9 fL      Platelets 261 Thousands/uL      nRBC 0 /100 WBCs      Neutrophils Relative 81 %      Immature Grans % 0 %      Lymphocytes Relative 13 %      Monocytes Relative 6 %      Eosinophils Relative 0 %      Basophils Relative 0 %      Neutrophils Absolute 7.35 Thousands/µL      Absolute Immature Grans 0.04 Thousand/uL      Absolute Lymphocytes 1.16 Thousands/µL      Absolute Monocytes 0.55 Thousand/µL      Eosinophils Absolute 0.04 Thousand/µL      Basophils Absolute 0.03 Thousands/µL                    CT renal stone study abdomen pelvis wo contrast   Final Result by Dakota Steward MD (04/07 0820)      There is moderate right hydroureteronephrosis and perinephric stranding due to 2 obstructing calculi in the mid right ureter at the L3-4 level measuring 5 mm and 2 mm.         Workstation performed: [id number]                    Procedures  Procedures         ED Course                               SBIRT 22yo+      Flowsheet Row Most Recent Value   Initial Alcohol Screen: US AUDIT-C     1. How often do you have a drink containing alcohol? 0 Filed at: 04/07/2024 0737   2. How many drinks containing alcohol do you have on a typical day you are drinking?  0 Filed at: 04/07/2024 0737   3a. Male UNDER 65: How often do you have five or more drinks on one occasion? 0 Filed at: 04/07/2024 0737   Audit-C Score 0 Filed at: 04/07/2024 0737   ROLANDO: How many times in the past year have you...    Used an illegal drug or used a prescription medication for non-medical reasons? Never Filed at: 04/07/2024 0737                      Medical Decision Making  Amount and/or Complexity of Data Reviewed  Labs: ordered.  Radiology:  ordered.    Risk  Prescription drug management.      59-year-old male with past medical history of kidney stones, hyperlipidemia and CHF who presents for evaluation of right-sided flank pain.    Differential diagnosis includes: kidney stone, UTI, muscular pain, appendicitis, diverticulitis.   Will check CBC to evaluate for anemia or leukocytosis, CMP to evaluate for metabolic abnormality, lipase to evaluate for pancreatitis, UA to evaluate for hematuria or UTI.  Will check CT renal stone study to evaluate for kidney stone or other intra-abdominal pathology.  Will treat symptomatically and reassess.  Reviewed labs, no marked abnormalities.  Reviewed and interpreted CT scan, shows right-sided hydro, mid ureteral 2 mm and 5 mm stones.  Reassessed patient, pain is improving but still having some pain.  Will give additional dose of Dilaudid.  Reviewed UA, shows hematuria but no evidence of UTI.  He states pain is controlled.  He feels comfortable with discharge with urology follow-up.  Will have patient take Flomax daily.  Will prescribe morphine as needed for severe pain, will have patient take Motrin for mild to moderate pain.  Will also prescribe Zofran.  Discussed with patient strict return precautions.  Patient expressed understanding and was agreeable for discharge.       Disposition  Final diagnoses:   Ureteral stone     Time reflects when diagnosis was documented in both MDM as applicable and the Disposition within this note       Time User Action Codes Description Comment    4/7/2024 10:38 AM [place] Rodgers Add [N20.1] Ureteral stone           ED Disposition       ED Disposition   Discharge    Condition   Stable    Date/Time   Sun Apr 7, 2024 1036    Comment   Yazan Child discharge to home/self care.                   Follow-up Information       Follow up With Specialties Details Why Contact Info Additional Information    [facility] Schedule an appointment as soon as  possible for a visit   [address] [facility] [id number] [phone] [facility] [place], [address], [place], [id number] [phone]            Discharge Medication List as of 4/7/2024 12:25 PM        START taking these medications    Details   morphine (MSIR) 15 mg tablet Take 0.5 tablets (7.5 mg total) by mouth every 4 (four) hours as needed for severe pain for up to 10 days Max Daily Amount: 45 mg, Starting Sun 4/7/2024, Until Wed 4/17/2024 at 2359, Normal      ondansetron (ZOFRAN-ODT) 4 mg disintegrating tablet Take 1 tablet (4 mg total) by mouth every 6 (six) hours as needed for nausea or vomiting, Starting Sun 4/7/2024, Normal           CONTINUE these medications which have NOT CHANGED    Details   allopurinol (ZYLOPRIM) 100 mg tablet Take 1 tablet (100 mg total) by mouth daily, Starting Mon 4/17/2023, Normal      Aspirin Low Dose 81 MG EC tablet TAKE ONE (1) TABLET BY MOUTH DAILY TO PREVENT A HEART ATTACK, Normal      atorvastatin (LIPITOR) 80 mg tablet Starting Thu 4/13/2023, Historical Med      cholecalciferol (VITAMIN D3) 1,000 units tablet Take 2 tablets (2,000 Units total) by mouth daily For a low vitamin D level, Starting Tue 6/28/2022, Normal      DULoxetine (CYMBALTA) 60 mg delayed release capsule TAKE ONE (1) CAPSULE BY MOUTH ONCE DAILY, Starting Thu 2/8/2024, Normal      Entresto 24-26 MG TABS TAKE 1 TABLET BY MOUTH IN THE MORNING AND 1 TABLET BEFORE BEDTIME, Normal      Jardiance 10 MG TABS tablet TAKE ONE (1) TABLET BY MOUTH IN THE MORNING, Normal      metoprolol succinate (TOPROL-XL) 25 mg 24 hr tablet TAKE ONE (1) TABLET BY MOUTH IN THE MORNING, Normal      potassium chloride (MICRO-K) 10 MEQ CR capsule TAKE ONE (1) CAPSULE BY MOUTH DAILY WITH TORSEMIDE, Normal      torsemide (DEMADEX) 20 mg tablet Starting Mon 6/5/2023, Historical Med      valsartan (DIOVAN) 40 mg tablet Historical Med                 PDMP Review         Value Time  User    PDMP Reviewed  Yes 11/30/2021  8:09 AM Senthil Wyatt DO            ED Provider  Electronically Signed by             Marysol Marie MD  04/07/24 4732

## 2024-05-17 ENCOUNTER — TELEPHONE (OUTPATIENT)
Age: 59
End: 2024-05-17

## 2024-05-17 DIAGNOSIS — I50.21 ACUTE SYSTOLIC CONGESTIVE HEART FAILURE (HCC): Primary | ICD-10-CM

## 2024-05-17 DIAGNOSIS — I50.21 ACUTE SYSTOLIC CONGESTIVE HEART FAILURE (HCC): ICD-10-CM

## 2024-05-17 RX ORDER — SACUBITRIL AND VALSARTAN 24; 26 MG/1; MG/1
1 TABLET, FILM COATED ORAL 2 TIMES DAILY
Qty: 60 TABLET | Refills: 3 | Status: SHIPPED | OUTPATIENT
Start: 2024-05-17

## 2024-05-17 RX ORDER — TORSEMIDE 20 MG/1
TABLET ORAL
Qty: 60 TABLET | Refills: 3 | Status: SHIPPED | OUTPATIENT
Start: 2024-05-17

## 2024-05-17 NOTE — TELEPHONE ENCOUNTER
Pt requested Entresto be refilled at the pharmacy.  Pharmacy said he has not had it filled since 10/2023.  Asking if it is ok to refill?

## 2024-05-17 NOTE — TELEPHONE ENCOUNTER
Yes, it is okay to refill the Entresto.  I will put in the refill.  Please call patient and ask if he follows up with cardiology

## 2024-06-26 ENCOUNTER — OFFICE VISIT (OUTPATIENT)
Dept: FAMILY MEDICINE CLINIC | Facility: CLINIC | Age: 59
End: 2024-06-26
Payer: COMMERCIAL

## 2024-06-26 VITALS
SYSTOLIC BLOOD PRESSURE: 120 MMHG | RESPIRATION RATE: 18 BRPM | WEIGHT: 275.4 LBS | OXYGEN SATURATION: 94 % | TEMPERATURE: 98.2 F | HEIGHT: 70 IN | DIASTOLIC BLOOD PRESSURE: 86 MMHG | BODY MASS INDEX: 39.43 KG/M2 | HEART RATE: 79 BPM

## 2024-06-26 DIAGNOSIS — E78.2 MIXED HYPERLIPIDEMIA: ICD-10-CM

## 2024-06-26 DIAGNOSIS — E79.0 HYPERURICEMIA: Primary | ICD-10-CM

## 2024-06-26 DIAGNOSIS — I50.21 ACUTE SYSTOLIC CONGESTIVE HEART FAILURE (HCC): ICD-10-CM

## 2024-06-26 PROBLEM — N20.0 KIDNEY STONE: Status: RESOLVED | Noted: 2017-11-20 | Resolved: 2024-06-26

## 2024-06-26 PROBLEM — G89.29 CHRONIC PAIN OF BOTH SHOULDERS: Status: RESOLVED | Noted: 2021-05-05 | Resolved: 2024-06-26

## 2024-06-26 PROBLEM — M25.511 CHRONIC PAIN OF BOTH SHOULDERS: Status: RESOLVED | Noted: 2021-05-05 | Resolved: 2024-06-26

## 2024-06-26 PROBLEM — G89.29 CHRONIC PAIN OF LEFT KNEE: Status: RESOLVED | Noted: 2021-05-05 | Resolved: 2024-06-26

## 2024-06-26 PROBLEM — L29.9 EAR ITCHING: Status: RESOLVED | Noted: 2020-05-26 | Resolved: 2024-06-26

## 2024-06-26 PROBLEM — M25.562 CHRONIC PAIN OF LEFT KNEE: Status: RESOLVED | Noted: 2021-05-05 | Resolved: 2024-06-26

## 2024-06-26 PROBLEM — N20.1 RIGHT URETERAL CALCULUS: Status: RESOLVED | Noted: 2019-07-19 | Resolved: 2024-06-26

## 2024-06-26 PROBLEM — G56.20 ULNAR NERVE ENTRAPMENT AT ELBOW: Status: RESOLVED | Noted: 2021-06-21 | Resolved: 2024-06-26

## 2024-06-26 PROBLEM — E78.6 LOW HDL (UNDER 40): Status: RESOLVED | Noted: 2022-05-03 | Resolved: 2024-06-26

## 2024-06-26 PROBLEM — K59.00 CONSTIPATION: Status: RESOLVED | Noted: 2019-06-28 | Resolved: 2024-06-26

## 2024-06-26 PROBLEM — Z90.49 S/P LAPAROSCOPIC CHOLECYSTECTOMY: Status: RESOLVED | Noted: 2022-05-11 | Resolved: 2024-06-26

## 2024-06-26 PROBLEM — E83.39 HYPERPHOSPHATEMIA: Status: RESOLVED | Noted: 2022-05-05 | Resolved: 2024-06-26

## 2024-06-26 PROBLEM — G56.03 BILATERAL CARPAL TUNNEL SYNDROME: Status: RESOLVED | Noted: 2021-06-21 | Resolved: 2024-06-26

## 2024-06-26 PROBLEM — M25.512 CHRONIC PAIN OF BOTH SHOULDERS: Status: RESOLVED | Noted: 2021-05-05 | Resolved: 2024-06-26

## 2024-06-26 PROBLEM — E66.9 GENERALIZED OBESITY: Status: RESOLVED | Noted: 2017-11-15 | Resolved: 2024-06-26

## 2024-06-26 PROBLEM — E78.1 HYPERTRIGLYCERIDEMIA: Status: RESOLVED | Noted: 2022-05-03 | Resolved: 2024-06-26

## 2024-06-26 PROCEDURE — 99213 OFFICE O/P EST LOW 20 MIN: CPT | Performed by: PHYSICIAN ASSISTANT

## 2024-06-26 RX ORDER — METOPROLOL SUCCINATE 25 MG/1
25 TABLET, EXTENDED RELEASE ORAL DAILY
Qty: 30 TABLET | Refills: 5 | Status: SHIPPED | OUTPATIENT
Start: 2024-06-26

## 2024-06-26 RX ORDER — ATORVASTATIN CALCIUM 80 MG/1
80 TABLET, FILM COATED ORAL DAILY
Qty: 30 TABLET | Refills: 5 | Status: SHIPPED | OUTPATIENT
Start: 2024-06-26

## 2024-06-26 RX ORDER — ALLOPURINOL 100 MG/1
100 TABLET ORAL DAILY
Qty: 30 TABLET | Refills: 5 | Status: SHIPPED | OUTPATIENT
Start: 2024-06-26

## 2024-06-26 NOTE — ASSESSMENT & PLAN NOTE
Wt Readings from Last 3 Encounters:   06/26/24 125 kg (275 lb 6.4 oz)   04/07/24 126 kg (277 lb 12.5 oz)   02/26/24 127 kg (280 lb)     Pt to restart medications, has been out of some x few months. He's overdue to see his cardiologist (Noelle), pt to call for an appointment.

## 2024-06-26 NOTE — PROGRESS NOTES
Ambulatory Visit  Name: Yazan Child      : 1965      MRN: [id number]  Encounter Provider: Vera Kaba PA-C  Encounter Date: 2024   Encounter department: [facility]    Assessment & Plan   1. Hyperuricemia  Assessment & Plan:  Refill allopurinol, pt has been out of it x few months.   Orders:  -     allopurinol (ZYLOPRIM) 100 mg tablet; Take 1 tablet (100 mg total) by mouth daily  2. Acute systolic congestive heart failure (HCC)  Assessment & Plan:  Wt Readings from Last 3 Encounters:   24 125 kg (275 lb 6.4 oz)   24 126 kg (277 lb 12.5 oz)   24 127 kg (280 lb)     Pt to restart medications, has been out of some x few months. He's overdue to see his cardiologist (Noelle), pt to call for an appointment.           Orders:  -     Empagliflozin (Jardiance) 10 MG TABS tablet; Take 1 tablet (10 mg total) by mouth daily  -     metoprolol succinate (TOPROL-XL) 25 mg 24 hr tablet; Take 1 tablet (25 mg total) by mouth daily  3. Mixed hyperlipidemia  Assessment & Plan:  Restart atovastatin, pt to have labs drawn. He has been out of atorvastatin x few months.   Orders:  -     atorvastatin (LIPITOR) 80 mg tablet; Take 1 tablet (80 mg total) by mouth daily      Depression Screening and Follow-up Plan: Patient was screened for depression during today's encounter. They screened negative with a PHQ-9 score of 0.      History of Present Illness     Yazan is here today for routine visit.  Notes that he is not taking all of his medication, has been out of a few since last year. He is also overdue to see cardiologist (Noelle), states has been over 6 months since his OV there.         Review of Systems   Constitutional:  Negative for chills and fever.   HENT:  Negative for ear pain and sore throat.    Eyes:  Negative for pain and visual disturbance.   Respiratory:  Negative for cough and shortness of breath.    Cardiovascular:  Negative for chest pain and palpitations.  "  Gastrointestinal:  Negative for abdominal pain and vomiting.   Genitourinary:  Negative for dysuria and hematuria.   Musculoskeletal:  Negative for arthralgias and back pain.   Skin:  Negative for color change and rash.   Neurological:  Negative for seizures and syncope.   All other systems reviewed and are negative.      Objective     /86   Pulse 79   Temp 98.2 °F (36.8 °C) (Temporal)   Resp 18   Ht 5' 10\" (1.778 m)   Wt 125 kg (275 lb 6.4 oz)   SpO2 94%   BMI 39.52 kg/m²     Physical Exam  Vitals reviewed.   Constitutional:       General: He is not in acute distress.     Appearance: He is well-developed. He is not diaphoretic.   HENT:      Head: Normocephalic and atraumatic.      Right Ear: Hearing, tympanic membrane, ear canal and external ear normal.      Left Ear: Hearing, tympanic membrane, ear canal and external ear normal.      Nose: Nose normal.      Mouth/Throat:      Mouth: Mucous membranes are moist.      Pharynx: Oropharynx is clear. Uvula midline. No oropharyngeal exudate.   Eyes:      General: No scleral icterus.        Right eye: No discharge.         Left eye: No discharge.      Conjunctiva/sclera: Conjunctivae normal.   Neck:      Thyroid: No thyromegaly.      Vascular: No carotid bruit.   Cardiovascular:      Rate and Rhythm: Normal rate and regular rhythm.      Heart sounds: Normal heart sounds. No murmur heard.  Pulmonary:      Effort: Pulmonary effort is normal. No respiratory distress.      Breath sounds: Normal breath sounds. No wheezing.   Abdominal:      General: Bowel sounds are normal. There is no distension.      Palpations: Abdomen is soft. There is no mass.      Tenderness: There is no abdominal tenderness. There is no guarding or rebound.   Musculoskeletal:         General: No tenderness. Normal range of motion.      Cervical back: Neck supple.   Lymphadenopathy:      Cervical: No cervical adenopathy.   Skin:     General: Skin is warm and dry.      Findings: No erythema " or rash.   Neurological:      Mental Status: He is alert and oriented to person, place, and time.   Psychiatric:         Behavior: Behavior normal.         Thought Content: Thought content normal.         Judgment: Judgment normal.       Administrative Statements

## 2024-09-27 ENCOUNTER — HOSPITAL ENCOUNTER (INPATIENT)
Facility: HOSPITAL | Age: 59
LOS: 1 days | Discharge: ANOTHER HOSPITAL | End: 2024-09-29
Attending: EMERGENCY MEDICINE | Admitting: INTERNAL MEDICINE
Payer: COMMERCIAL

## 2024-09-27 ENCOUNTER — APPOINTMENT (EMERGENCY)
Dept: RADIOLOGY | Facility: HOSPITAL | Age: 59
End: 2024-09-27
Payer: COMMERCIAL

## 2024-09-27 ENCOUNTER — TELEPHONE (OUTPATIENT)
Dept: FAMILY MEDICINE CLINIC | Facility: CLINIC | Age: 59
End: 2024-09-27

## 2024-09-27 ENCOUNTER — APPOINTMENT (OUTPATIENT)
Dept: NON INVASIVE DIAGNOSTICS | Facility: HOSPITAL | Age: 59
End: 2024-09-27
Payer: COMMERCIAL

## 2024-09-27 ENCOUNTER — DOCUMENTATION (OUTPATIENT)
Dept: FAMILY MEDICINE CLINIC | Facility: CLINIC | Age: 59
End: 2024-09-27

## 2024-09-27 VITALS — DIASTOLIC BLOOD PRESSURE: 66 MMHG | SYSTOLIC BLOOD PRESSURE: 88 MMHG

## 2024-09-27 DIAGNOSIS — I48.91 ATRIAL FIBRILLATION WITH RAPID VENTRICULAR RESPONSE (HCC): Primary | ICD-10-CM

## 2024-09-27 DIAGNOSIS — R53.83 FATIGUE: ICD-10-CM

## 2024-09-27 DIAGNOSIS — M79.18 CHRONIC MUSCULOSKELETAL PAIN: ICD-10-CM

## 2024-09-27 DIAGNOSIS — M75.40: ICD-10-CM

## 2024-09-27 DIAGNOSIS — K21.9 GASTROESOPHAGEAL REFLUX DISEASE WITHOUT ESOPHAGITIS: ICD-10-CM

## 2024-09-27 DIAGNOSIS — Z86.79 HISTORY OF CHF (CONGESTIVE HEART FAILURE): ICD-10-CM

## 2024-09-27 DIAGNOSIS — G89.29 CHRONIC MUSCULOSKELETAL PAIN: ICD-10-CM

## 2024-09-27 PROBLEM — E66.812 CLASS 2 OBESITY DUE TO EXCESS CALORIES WITHOUT SERIOUS COMORBIDITY IN ADULT: Status: ACTIVE | Noted: 2024-09-27

## 2024-09-27 PROBLEM — E66.09 CLASS 2 OBESITY DUE TO EXCESS CALORIES WITHOUT SERIOUS COMORBIDITY IN ADULT: Status: ACTIVE | Noted: 2024-09-27

## 2024-09-27 PROBLEM — I50.22 CHRONIC SYSTOLIC (CONGESTIVE) HEART FAILURE (HCC): Status: ACTIVE | Noted: 2024-09-27

## 2024-09-27 LAB
2HR DELTA HS TROPONIN: -1 NG/L
4HR DELTA HS TROPONIN: -1 NG/L
ALBUMIN SERPL BCG-MCNC: 4.3 G/DL (ref 3.5–5)
ALP SERPL-CCNC: 82 U/L (ref 34–104)
ALT SERPL W P-5'-P-CCNC: 36 U/L (ref 7–52)
ANION GAP SERPL CALCULATED.3IONS-SCNC: 12 MMOL/L (ref 4–13)
AORTIC ROOT: 3.5 CM
APICAL FOUR CHAMBER EJECTION FRACTION: 26 %
ASCENDING AORTA: 3.4 CM
AST SERPL W P-5'-P-CCNC: 30 U/L (ref 13–39)
ATRIAL RATE: 138 BPM
ATRIAL RATE: 70 BPM
ATRIAL RATE: 93 BPM
BASOPHILS # BLD AUTO: 0.04 THOUSANDS/ΜL (ref 0–0.1)
BASOPHILS NFR BLD AUTO: 1 % (ref 0–1)
BILIRUB SERPL-MCNC: 0.77 MG/DL (ref 0.2–1)
BNP SERPL-MCNC: 268 PG/ML (ref 0–100)
BSA FOR ECHO PROCEDURE: 2.39 M2
BUN SERPL-MCNC: 25 MG/DL (ref 5–25)
CALCIUM SERPL-MCNC: 9.3 MG/DL (ref 8.4–10.2)
CARDIAC TROPONIN I PNL SERPL HS: 7 NG/L
CARDIAC TROPONIN I PNL SERPL HS: 7 NG/L
CARDIAC TROPONIN I PNL SERPL HS: 8 NG/L
CHLORIDE SERPL-SCNC: 106 MMOL/L (ref 96–108)
CO2 SERPL-SCNC: 23 MMOL/L (ref 21–32)
CREAT SERPL-MCNC: 1.06 MG/DL (ref 0.6–1.3)
D DIMER PPP FEU-MCNC: 0.54 UG/ML FEU
EOSINOPHIL # BLD AUTO: 0.12 THOUSAND/ΜL (ref 0–0.61)
EOSINOPHIL NFR BLD AUTO: 2 % (ref 0–6)
ERYTHROCYTE [DISTWIDTH] IN BLOOD BY AUTOMATED COUNT: 14.2 % (ref 11.6–15.1)
FRACTIONAL SHORTENING: 20 (ref 28–44)
GFR SERPL CREATININE-BSD FRML MDRD: 75 ML/MIN/1.73SQ M
GLUCOSE SERPL-MCNC: 115 MG/DL (ref 65–140)
HCT VFR BLD AUTO: 50.6 % (ref 36.5–49.3)
HGB BLD-MCNC: 17.2 G/DL (ref 12–17)
IMM GRANULOCYTES # BLD AUTO: 0.01 THOUSAND/UL (ref 0–0.2)
IMM GRANULOCYTES NFR BLD AUTO: 0 % (ref 0–2)
INTERVENTRICULAR SEPTUM IN DIASTOLE (PARASTERNAL SHORT AXIS VIEW): 1.1 CM
INTERVENTRICULAR SEPTUM: 1.1 CM (ref 0.6–1.1)
LEFT ATRIUM SIZE: 4.3 CM
LEFT INTERNAL DIMENSION IN SYSTOLE: 3.9 CM (ref 2.1–4)
LEFT VENTRICULAR INTERNAL DIMENSION IN DIASTOLE: 4.9 CM (ref 3.5–6)
LEFT VENTRICULAR POSTERIOR WALL IN END DIASTOLE: 1.2 CM
LEFT VENTRICULAR STROKE VOLUME: 43 ML
LVSV (TEICH): 43 ML
LYMPHOCYTES # BLD AUTO: 1.96 THOUSANDS/ΜL (ref 0.6–4.47)
LYMPHOCYTES NFR BLD AUTO: 32 % (ref 14–44)
MCH RBC QN AUTO: 31.1 PG (ref 26.8–34.3)
MCHC RBC AUTO-ENTMCNC: 34 G/DL (ref 31.4–37.4)
MCV RBC AUTO: 92 FL (ref 82–98)
MONOCYTES # BLD AUTO: 0.68 THOUSAND/ΜL (ref 0.17–1.22)
MONOCYTES NFR BLD AUTO: 11 % (ref 4–12)
MV PEAK A VEL: 0 M/S
MV PEAK E VEL: 56 CM/S
NEUTROPHILS # BLD AUTO: 3.28 THOUSANDS/ΜL (ref 1.85–7.62)
NEUTS SEG NFR BLD AUTO: 54 % (ref 43–75)
NRBC BLD AUTO-RTO: 0 /100 WBCS
PLATELET # BLD AUTO: 306 THOUSANDS/UL (ref 149–390)
PMV BLD AUTO: 10.4 FL (ref 8.9–12.7)
POTASSIUM SERPL-SCNC: 3.9 MMOL/L (ref 3.5–5.3)
PROT SERPL-MCNC: 7.6 G/DL (ref 6.4–8.4)
QRS AXIS: -33 DEGREES
QRS AXIS: -46 DEGREES
QRS AXIS: 221 DEGREES
QRSD INTERVAL: 70 MS
QRSD INTERVAL: 72 MS
QRSD INTERVAL: 74 MS
QT INTERVAL: 314 MS
QT INTERVAL: 328 MS
QT INTERVAL: 340 MS
QTC INTERVAL: 438 MS
QTC INTERVAL: 484 MS
QTC INTERVAL: 516 MS
RBC # BLD AUTO: 5.53 MILLION/UL (ref 3.88–5.62)
SL CV LV EF: 25
SL CV PED ECHO LEFT VENTRICLE DIASTOLIC VOLUME (MOD BIPLANE) 2D: 111 ML
SL CV PED ECHO LEFT VENTRICLE SYSTOLIC VOLUME (MOD BIPLANE) 2D: 68 ML
SODIUM SERPL-SCNC: 141 MMOL/L (ref 135–147)
T WAVE AXIS: -1 DEGREES
T WAVE AXIS: 115 DEGREES
T WAVE AXIS: 61 DEGREES
TR MAX PG: 5 MMHG
TR PEAK VELOCITY: 1.1 M/S
TRICUSPID VALVE PEAK REGURGITATION VELOCITY: 1.11 M/S
TSH SERPL DL<=0.05 MIU/L-ACNC: 2.81 UIU/ML (ref 0.45–4.5)
VENTRICULAR RATE: 100 BPM
VENTRICULAR RATE: 143 BPM
VENTRICULAR RATE: 149 BPM
WBC # BLD AUTO: 6.09 THOUSAND/UL (ref 4.31–10.16)

## 2024-09-27 PROCEDURE — 85379 FIBRIN DEGRADATION QUANT: CPT | Performed by: EMERGENCY MEDICINE

## 2024-09-27 PROCEDURE — 83880 ASSAY OF NATRIURETIC PEPTIDE: CPT | Performed by: EMERGENCY MEDICINE

## 2024-09-27 PROCEDURE — 71045 X-RAY EXAM CHEST 1 VIEW: CPT

## 2024-09-27 PROCEDURE — 85025 COMPLETE CBC W/AUTO DIFF WBC: CPT | Performed by: EMERGENCY MEDICINE

## 2024-09-27 PROCEDURE — 99244 OFF/OP CNSLTJ NEW/EST MOD 40: CPT | Performed by: INTERNAL MEDICINE

## 2024-09-27 PROCEDURE — 99223 1ST HOSP IP/OBS HIGH 75: CPT | Performed by: INTERNAL MEDICINE

## 2024-09-27 PROCEDURE — 99291 CRITICAL CARE FIRST HOUR: CPT | Performed by: EMERGENCY MEDICINE

## 2024-09-27 PROCEDURE — 36415 COLL VENOUS BLD VENIPUNCTURE: CPT | Performed by: EMERGENCY MEDICINE

## 2024-09-27 PROCEDURE — 84484 ASSAY OF TROPONIN QUANT: CPT | Performed by: EMERGENCY MEDICINE

## 2024-09-27 PROCEDURE — 93306 TTE W/DOPPLER COMPLETE: CPT | Performed by: INTERNAL MEDICINE

## 2024-09-27 PROCEDURE — 93306 TTE W/DOPPLER COMPLETE: CPT

## 2024-09-27 PROCEDURE — 99285 EMERGENCY DEPT VISIT HI MDM: CPT

## 2024-09-27 PROCEDURE — 93005 ELECTROCARDIOGRAM TRACING: CPT

## 2024-09-27 PROCEDURE — 84443 ASSAY THYROID STIM HORMONE: CPT | Performed by: PHYSICIAN ASSISTANT

## 2024-09-27 PROCEDURE — 84484 ASSAY OF TROPONIN QUANT: CPT | Performed by: PHYSICIAN ASSISTANT

## 2024-09-27 PROCEDURE — 80053 COMPREHEN METABOLIC PANEL: CPT | Performed by: EMERGENCY MEDICINE

## 2024-09-27 PROCEDURE — 96374 THER/PROPH/DIAG INJ IV PUSH: CPT

## 2024-09-27 RX ORDER — ONDANSETRON 2 MG/ML
4 INJECTION INTRAMUSCULAR; INTRAVENOUS EVERY 6 HOURS PRN
Status: DISCONTINUED | OUTPATIENT
Start: 2024-09-27 | End: 2024-09-29 | Stop reason: HOSPADM

## 2024-09-27 RX ORDER — DULOXETIN HYDROCHLORIDE 30 MG/1
60 CAPSULE, DELAYED RELEASE ORAL DAILY
Status: DISCONTINUED | OUTPATIENT
Start: 2024-09-28 | End: 2024-09-27

## 2024-09-27 RX ORDER — METOPROLOL TARTRATE 1 MG/ML
5 INJECTION, SOLUTION INTRAVENOUS EVERY 6 HOURS PRN
Status: DISCONTINUED | OUTPATIENT
Start: 2024-09-27 | End: 2024-09-29 | Stop reason: HOSPADM

## 2024-09-27 RX ORDER — MORPHINE SULFATE 15 MG/1
TABLET ORAL
COMMUNITY
End: 2024-09-29

## 2024-09-27 RX ORDER — TAMSULOSIN HYDROCHLORIDE 0.4 MG/1
1 CAPSULE ORAL AS NEEDED
COMMUNITY
End: 2024-09-29

## 2024-09-27 RX ORDER — METOPROLOL TARTRATE 25 MG/1
25 TABLET, FILM COATED ORAL 3 TIMES DAILY
Status: DISCONTINUED | OUTPATIENT
Start: 2024-09-27 | End: 2024-09-27

## 2024-09-27 RX ORDER — ATORVASTATIN CALCIUM 40 MG/1
80 TABLET, FILM COATED ORAL DAILY
Status: DISCONTINUED | OUTPATIENT
Start: 2024-09-27 | End: 2024-09-29 | Stop reason: HOSPADM

## 2024-09-27 RX ORDER — METOPROLOL TARTRATE 25 MG/1
25 TABLET, FILM COATED ORAL 4 TIMES DAILY
Status: DISCONTINUED | OUTPATIENT
Start: 2024-09-27 | End: 2024-09-29 | Stop reason: HOSPADM

## 2024-09-27 RX ORDER — CALCIUM CARBONATE 500 MG/1
1000 TABLET, CHEWABLE ORAL DAILY PRN
Status: DISCONTINUED | OUTPATIENT
Start: 2024-09-27 | End: 2024-09-29 | Stop reason: HOSPADM

## 2024-09-27 RX ORDER — TORSEMIDE 20 MG/1
20 TABLET ORAL 2 TIMES DAILY
Status: DISCONTINUED | OUTPATIENT
Start: 2024-09-27 | End: 2024-09-29 | Stop reason: HOSPADM

## 2024-09-27 RX ORDER — DULOXETIN HYDROCHLORIDE 30 MG/1
90 CAPSULE, DELAYED RELEASE ORAL DAILY
Status: DISCONTINUED | OUTPATIENT
Start: 2024-09-28 | End: 2024-09-29 | Stop reason: HOSPADM

## 2024-09-27 RX ORDER — ACETAMINOPHEN 325 MG/1
650 TABLET ORAL EVERY 6 HOURS PRN
Status: DISCONTINUED | OUTPATIENT
Start: 2024-09-27 | End: 2024-09-29 | Stop reason: HOSPADM

## 2024-09-27 RX ORDER — DILTIAZEM HYDROCHLORIDE 5 MG/ML
15 INJECTION INTRAVENOUS ONCE
Status: COMPLETED | OUTPATIENT
Start: 2024-09-27 | End: 2024-09-27

## 2024-09-27 RX ORDER — ALLOPURINOL 100 MG/1
100 TABLET ORAL DAILY
Status: DISCONTINUED | OUTPATIENT
Start: 2024-09-28 | End: 2024-09-29 | Stop reason: HOSPADM

## 2024-09-27 RX ADMIN — DILTIAZEM HYDROCHLORIDE 15 MG: 5 INJECTION INTRAVENOUS at 12:18

## 2024-09-27 RX ADMIN — METOPROLOL TARTRATE 25 MG: 25 TABLET, FILM COATED ORAL at 21:37

## 2024-09-27 RX ADMIN — APIXABAN 5 MG: 5 TABLET, FILM COATED ORAL at 17:21

## 2024-09-27 RX ADMIN — TORSEMIDE 20 MG: 20 TABLET ORAL at 21:37

## 2024-09-27 RX ADMIN — METOPROLOL TARTRATE 25 MG: 25 TABLET, FILM COATED ORAL at 17:21

## 2024-09-27 RX ADMIN — ATORVASTATIN CALCIUM 80 MG: 40 TABLET, FILM COATED ORAL at 17:21

## 2024-09-27 NOTE — ED PROVIDER NOTES
Final diagnoses:   Atrial fibrillation with rapid ventricular response (HCC)   Fatigue   History of CHF (congestive heart failure)     ED Disposition       ED Disposition   Admit    Condition   Stable    Date/Time   Fri Sep 27, 2024  2:32 PM    Comment   Case was discussed with TEQUILA and the patient's admission status was agreed to be Admission Status: observation status to the service of Dr. Gifford.               Assessment & Plan       Medical Decision Making  Amount and/or Complexity of Data Reviewed  Labs: ordered.  Radiology: ordered.    Risk  Prescription drug management.  Decision regarding hospitalization.      59-year-old male with history of systolic CHF, paroxysmal A-fib presenting for evaluation of shortness of breath and fatigue.    Patient initially tachycardic with heart rate in the 160s to 170s, initial EKG showing A-fib with RVR.  Blood pressure normal.  Will check labs to evaluate for anemia, metabolic abnormality, ACS, PE.  Will obtain chest x-ray to evaluate for pulmonary edema or cardiomegaly.  Will treat with diltiazem and reassess.  Reviewed labs, no marked abnormalities.  D-dimer slightly elevated but negative for age adjustment.  Reassessed patient, heart rate improved after diltiazem bolus.  Will start on drip to maintain heart rate.  Patient does state he is feeling better when his heart rate is lowered.  Will discuss with cardiology for any further recommendations.  Patient states he does follow with [facility] and is planned for an ablation in a few weeks although we do not have any records from his [facility] cardiologist.  Will plan for admission here for further monitoring.  Patient agreeable for admission.  Discussed with medicine.       Medications   diltiazem (CARDIZEM) injection 15 mg (15 mg Intravenous Given 9/27/24 1218)       ED Risk Strat Scores                           SBIRT 22yo+      Flowsheet Row Most Recent Value   Initial Alcohol Screen: US AUDIT-C     1. How often do you  have a drink containing alcohol? 0 Filed at: 09/27/2024 1205   2. How many drinks containing alcohol do you have on a typical day you are drinking?  0 Filed at: 09/27/2024 1205   3a. Male UNDER 65: How often do you have five or more drinks on one occasion? 0 Filed at: 09/27/2024 1205   Audit-C Score 0 Filed at: 09/27/2024 1205   ROLANDO: How many times in the past year have you...    Used an illegal drug or used a prescription medication for non-medical reasons? Never Filed at: 09/27/2024 1205                            History of Present Illness       Chief Complaint   Patient presents with    Hypotension     Pt was seen at the family doctor today and was sent to the ER to be evaluated for low blood pressure. Pt states for about a week he has felt weak, dizzy, and SOB. Pt denies any chest pain.        Past Medical History:   Diagnosis Date    Acute systolic congestive heart failure (HCC)     Anxiety     CHF (congestive heart failure) (HCC)     GERD (gastroesophageal reflux disease)     Hyperlipidemia     Kidney stones       Past Surgical History:   Procedure Laterality Date    CARDIAC ELECTROPHYSIOLOGY STUDY AND ABLATION      CARDIAC ELECTROPHYSIOLOGY STUDY AND ABLATION      CARDIAC SURGERY  2014    ablation    CHOLECYSTECTOMY      COLONOSCOPY      ELBOW SURGERY Right     FL RETROGRADE PYELOGRAM  6/29/2019    FL RETROGRADE PYELOGRAM  8/29/2019    FL RETROGRADE PYELOGRAM  9/25/2019    HERNIA REPAIR Right     ing     IR NEPHROSTOMY TUBE PLACEMENT  8/30/2019    KIDNEY STONE SURGERY      KNEE ARTHROSCOPY Left     KNEE CARTILAGE SURGERY      FL CYSTO BLADDER W/URETERAL CATHETERIZATION Right 6/29/2019    Procedure: CYSTOSCOPY RETROGRADE PYELOGRAM WITH INSERTION STENT URETERAL;  Surgeon: Brayden Song MD;  Location: [facility];  Service: Urology    FL CYSTO/URETERO W/LITHOTRIPSY &INDWELL STENT INSRT Right 8/29/2019    Procedure: CYSTOSCOPY URETEROSCOPY WITH LITHOTRIPSY HOLMIUM LASER, RETROGRADE PYELOGRAM;  Surgeon:  Eddie Juárez MD;  Location: [facility];  Service: Urology    IA PERQ NL/PL LITHOTRP COMPLEX >2 CM MLT LOCATIONS Right 9/25/2019    Procedure: NEPHROLITHOTOMY  PERCUTANEOUS ([facility]). MEATAL DILATION; ANTEGRADE URETEROSCOPY AND STENT REMOVAL;  Surgeon: Eddie Juárez MD;  Location: [facility];  Service: Urology    WRIST SURGERY Right       Family History   Problem Relation Age of Onset    Cancer Mother     Cancer Father       Social History     Tobacco Use    Smoking status: Never    Smokeless tobacco: Former    Tobacco comments:     former chewing tobacco-quit 2014   Vaping Use    Vaping status: Never Used   Substance Use Topics    Alcohol use: Not Currently     Comment: occasionally    Drug use: No      E-Cigarette/Vaping    E-Cigarette Use Never User       E-Cigarette/Vaping Substances    Nicotine No     THC No     CBD No     Flavoring No     Other No     Unknown No       I have reviewed and agree with the history as documented.     HPI    59-year-old male with history of systolic CHF, paroxysmal A-fib presenting for evaluation of shortness of breath and fatigue.  Patient states he has been having ongoing symptoms for the past week and a half.  He states he feels lightheaded when he stands up.  He is also felt short of breath today.  Denies orthopnea, weight gain or leg swelling.  He states he has been compliant with all of his medications.  Denies any fevers.  Denies any chest pain.  Denies nausea, vomiting, or diarrhea.    Review of Systems   Constitutional:  Positive for diaphoresis and fatigue. Negative for appetite change, chills and fever.   HENT:  Negative for congestion, rhinorrhea and sore throat.    Respiratory:  Positive for shortness of breath. Negative for cough.    Cardiovascular:  Negative for chest pain.   Gastrointestinal:  Negative for abdominal pain, diarrhea, nausea and vomiting.   Musculoskeletal:  Negative for arthralgias and myalgias.   Skin:  Negative for rash.   Neurological:   Negative for dizziness, weakness, light-headedness, numbness and headaches.   All other systems reviewed and are negative.          Objective       ED Triage Vitals   Temperature Pulse Blood Pressure Respirations SpO2 Patient Position - Orthostatic VS   09/27/24 1202 09/27/24 1202 09/27/24 1202 09/27/24 1202 09/27/24 1202 09/27/24 1202   (!) 96.9 °F (36.1 °C) 63 113/59 18 94 % Sitting      Temp Source Heart Rate Source BP Location FiO2 (%) Pain Score    09/27/24 1202 09/27/24 1202 09/27/24 1202 -- 09/27/24 1600    Temporal Monitor Left arm  No Pain      Vitals      Date and Time Temp Pulse SpO2 Resp BP Pain Score FACES Pain Rating User   09/29/24 1928 -- -- -- 16 -- -- -- RRR   09/29/24 1928 97.5 °F (36.4 °C) 56 93 % -- 111/77 -- -- DII   09/29/24 1208 -- 112 -- -- -- -- -- KN   09/29/24 0800 -- -- -- -- -- No Pain -- KN   09/29/24 0653 97.6 °F (36.4 °C) 41 99 % 12 106/67 -- -- DII   09/29/24 0412 -- 56 93 % -- 104/66 -- -- DII   09/29/24 0409 -- 55 94 % -- 104/66 -- -- DII   09/28/24 2245 -- -- -- -- -- No Pain -- RRR   09/28/24 2245 -- -- -- 18 -- -- -- JR   09/28/24 2245 98.4 °F (36.9 °C) 60 94 % -- 99/64 -- -- DII   09/28/24 2100 -- 72 91 % -- 100/62 -- -- DII   09/28/24 2023 -- -- 90 % -- -- -- -- RRR   09/28/24 1905 -- -- -- 16 -- -- -- JR   09/28/24 1905 97.5 °F (36.4 °C) 55 93 % -- 94/61 -- -- DII   09/28/24 1427 97.4 °F (36.3 °C) 57 93 % 14 95/64 -- -- DII   09/28/24 1240 -- 110 -- -- 104/61 -- -- KN   09/28/24 1130 97.8 °F (36.6 °C) 52 91 % 16 114/61 -- -- DII   09/28/24 0800 -- -- -- -- -- No Pain -- KN   09/28/24 0735 97.7 °F (36.5 °C) 56 93 % 12 123/79 -- -- DII   09/28/24 0435 97.9 °F (36.6 °C) 77 96 % 17 117/77 -- -- DII   09/27/24 2246 97.8 °F (36.6 °C) 111 93 % 14 109/75 -- -- DII   09/27/24 2136 -- 75 94 % -- 111/70 -- -- DII   09/27/24 1948 -- -- 94 % -- -- No Pain -- RRR   09/27/24 1945 -- 56 93 % -- 91/56 -- -- DII   09/27/24 1859 -- 97 93 % -- 91/59 -- -- DII   09/27/24 1854 98.1 °F (36.7 °C)  99 92 % 16 102/60 -- -- DII   09/27/24 1721 97.2 °F (36.2 °C) 66 91 % 16 111/64 -- -- JK   09/27/24 1600 -- -- 95 % -- -- No Pain -- JK   09/27/24 1442 -- 110 -- -- 102/61 -- -- EP   09/27/24 1400 -- 90 94 % 16 119/58 -- -- CD   09/27/24 1315 -- 102 94 % 20 126/74 -- -- CD   09/27/24 1234 -- 94 93 % 18 -- -- -- CD   09/27/24 1230 -- 99 89 % 17 115/64 -- -- CD   09/27/24 1225 -- 106 -- -- -- -- -- CD   09/27/24 1218 -- 143 94 % 20 110/73 -- -- CD   09/27/24 1207 -- 154 -- -- -- -- -- CD   09/27/24 1202 96.9 °F (36.1 °C) 63 94 % 18 113/59 -- -- CD            Physical Exam  Vitals and nursing note reviewed.   Constitutional:       General: He is not in acute distress.     Appearance: Normal appearance. He is well-developed. He is obese. He is not ill-appearing, toxic-appearing or diaphoretic.   HENT:      Head: Normocephalic and atraumatic.      Right Ear: External ear normal.      Left Ear: External ear normal.      Nose: Nose normal.      Mouth/Throat:      Mouth: Mucous membranes are moist.      Pharynx: Oropharynx is clear.   Eyes:      Extraocular Movements: Extraocular movements intact.      Conjunctiva/sclera: Conjunctivae normal.   Cardiovascular:      Rate and Rhythm: Tachycardia present. Rhythm irregular.      Pulses: Normal pulses.      Heart sounds: Normal heart sounds. No murmur heard.     No friction rub. No gallop.   Pulmonary:      Effort: Pulmonary effort is normal. No respiratory distress.      Breath sounds: Normal breath sounds. No wheezing or rales.   Abdominal:      General: There is no distension.      Palpations: Abdomen is soft.      Tenderness: There is no abdominal tenderness. There is no guarding or rebound.   Musculoskeletal:         General: No tenderness.      Cervical back: Neck supple.      Right lower leg: No edema.      Left lower leg: No edema.   Skin:     General: Skin is warm and dry.      Coloration: Skin is not pale.      Findings: No rash.   Neurological:      General: No focal  deficit present.      Mental Status: He is alert and oriented to person, place, and time.      Cranial Nerves: No cranial nerve deficit.      Sensory: No sensory deficit.      Motor: No weakness.   Psychiatric:         Mood and Affect: Mood normal.         Behavior: Behavior normal.         Results Reviewed       Procedure Component Value Units Date/Time    TSH, 3rd generation with Free T4 reflex [[id number]]  (Normal) Collected: 09/27/24 1217    Lab Status: Final result Specimen: Blood from Arm, Right Updated: 09/27/24 1631     TSH 3RD GENERATON 2.810 uIU/mL     HS Troponin I 4hr [[id number]]  (Normal) Collected: 09/27/24 1545    Lab Status: Final result Specimen: Blood from Arm, Left Updated: 09/27/24 1615     hs TnI 4hr 7 ng/L      Delta 4hr hsTnI -1 ng/L     HS Troponin I 2hr [[id number]]  (Normal) Collected: 09/27/24 1420    Lab Status: Final result Specimen: Blood from Arm, Right Updated: 09/27/24 1447     hs TnI 2hr 7 ng/L      Delta 2hr hsTnI -1 ng/L     D-dimer, quantitative [[id number]]  (Abnormal) Collected: 09/27/24 1217    Lab Status: Final result Specimen: Blood from Arm, Right Updated: 09/27/24 1352     D-Dimer, Quant 0.54 ug/ml FEU     Narrative:      In the evaluation for possible pulmonary embolism, in the appropriate (Well's Score of 4 or less) patient, the age adjusted d-dimer cutoff for this patient can be calculated as:    Age x 0.01 (in ug/mL) for Age-adjusted D-dimer exclusion threshold for a patient over 50 years.    HS Troponin 0hr (reflex protocol) [[id number]]  (Normal) Collected: 09/27/24 1217    Lab Status: Final result Specimen: Blood from Arm, Right Updated: 09/27/24 1253     hs TnI 0hr 8 ng/L     B-Type Natriuretic Peptide(BNP) [[id number]]  (Abnormal) Collected: 09/27/24 1217    Lab Status: Final result Specimen: Blood from Arm, Right Updated: 09/27/24 1251      pg/mL     Comprehensive metabolic panel [[id number]] Collected: 09/27/24 1217    Lab Status: Final result Specimen: Blood  from Arm, Right Updated: 09/27/24 1247     Sodium 141 mmol/L      Potassium 3.9 mmol/L      Chloride 106 mmol/L      CO2 23 mmol/L      ANION GAP 12 mmol/L      BUN 25 mg/dL      Creatinine 1.06 mg/dL      Glucose 115 mg/dL      Calcium 9.3 mg/dL      AST 30 U/L      ALT 36 U/L      Alkaline Phosphatase 82 U/L      Total Protein 7.6 g/dL      Albumin 4.3 g/dL      Total Bilirubin 0.77 mg/dL      eGFR 75 ml/min/1.73sq m     Narrative:      National Kid[employer] Disease Foundation guidelines for Chronic Kidney Disease (CKD):     Stage 1 with normal or high GFR (GFR > 90 mL/min/1.73 square meters)    Stage 2 Mild CKD (GFR = 60-89 mL/min/1.73 square meters)    Stage 3A Moderate CKD (GFR = 45-59 mL/min/1.73 square meters)    Stage 3B Moderate CKD (GFR = 30-44 mL/min/1.73 square meters)    Stage 4 Severe CKD (GFR = 15-29 mL/min/1.73 square meters)    Stage 5 End Stage CKD (GFR <15 mL/min/1.73 square meters)  Note: GFR calculation is accurate only with a steady state creatinine    CBC and differential [[id number]]  (Abnormal) Collected: 09/27/24 1217    Lab Status: Final result Specimen: Blood from Arm, Right Updated: 09/27/24 1228     WBC 6.09 Thousand/uL      RBC 5.53 Million/uL      Hemoglobin 17.2 g/dL      Hematocrit 50.6 %      MCV 92 fL      MCH 31.1 pg      MCHC 34.0 g/dL      RDW 14.2 %      MPV 10.4 fL      Platelets 306 Thousands/uL      nRBC 0 /100 WBCs      Segmented % 54 %      Immature Grans % 0 %      Lymphocytes % 32 %      Monocytes % 11 %      Eosinophils Relative 2 %      Basophils Relative 1 %      Absolute Neutrophils 3.28 Thousands/µL      Absolute Immature Grans 0.01 Thousand/uL      Absolute Lymphocytes 1.96 Thousands/µL      Absolute Monocytes 0.68 Thousand/µL      Eosinophils Absolute 0.12 Thousand/µL      Basophils Absolute 0.04 Thousands/µL             XR chest 1 view portable   Final Interpretation by Rodrigo Aldrich MD (09/27 3416)      No acute cardiopulmonary disease.             Workstation performed: [id number]             CriticalCare Time    Date/Time: 9/27/2024 1:33 PM    Performed by: Marysol Marie MD  Authorized by: Marysol Marie MD    Critical care provider statement:     Critical care time (minutes):  34    Critical care start time:  9/27/2024 12:33 PM    Critical care end time:  9/27/2024 1:33 PM    Critical care time was exclusive of:  Separately billable procedures and treating other patients and teaching time    Critical care was necessary to treat or prevent imminent or life-threatening deterioration of the following conditions:  Cardiac failure and circulatory failure    Critical care was time spent personally by me on the following activities:  Blood draw for specimens, obtaining history from patient or surrogate, development of treatment plan with patient or surrogate, discussions with consultants, evaluation of patient's response to treatment, examination of patient, review of old charts, re-evaluation of patient's condition, ordering and review of radiographic studies, ordering and review of laboratory studies and ordering and performing treatments and interventions    I assumed direction of critical care for this patient from another provider in my specialty: no    ECG 12 Lead Documentation Only    Date/Time: 9/27/2024 12:06 PM    Performed by: Marysol Marie MD  Authorized by: Marysol Marie MD    Indications / Diagnosis:  Sob  ECG reviewed by me, the ED Provider: yes    Patient location:  ED  Previous ECG:     Previous ECG:  Compared to current    Similarity:  No change    Comparison to cardiac monitor: Yes    Interpretation:     Interpretation: abnormal    Rate:     ECG rate:  149    ECG rate assessment: tachycardic    Rhythm:     Rhythm: atrial fibrillation    Ectopy:     Ectopy: none    QRS:     QRS axis:  Left    QRS intervals:  Normal  Conduction:     Conduction: normal    ST segments:     ST segments:  Normal  T waves:     T waves: normal    Other  findings:     Other findings: poor R wave progression    ECG 12 Lead Documentation Only    Date/Time: 9/27/2024 1:35 PM    Performed by: Marysol Marie MD  Authorized by: Marysol Marie MD    Indications / Diagnosis:  Tachycardic  ECG reviewed by me, the ED Provider: yes    Patient location:  ED  Previous ECG:     Previous ECG:  Compared to current    Similarity:  Changes noted    Comparison to cardiac monitor: Yes    Interpretation:     Interpretation: abnormal    Rate:     ECG rate:  100    ECG rate assessment: tachycardic    Rhythm:     Rhythm: atrial fibrillation    Ectopy:     Ectopy: none    QRS:     QRS axis:  Left    QRS intervals:  Normal  Conduction:     Conduction: normal    ST segments:     ST segments:  Normal  T waves:     T waves: normal        ED Medication and Procedure Management   Prior to Admission Medications   Prescriptions Last Dose Informant Patient Reported? Taking?   DULoxetine (CYMBALTA) 60 mg delayed release capsule 9/27/2024 Self No Yes   Sig: TAKE ONE (1) CAPSULE BY MOUTH ONCE DAILY   Patient taking differently: Take 60 mg by mouth daily Takes 90 mg   Empagliflozin (Jardiance) 10 MG TABS tablet 9/27/2024  No Yes   Sig: Take 1 tablet (10 mg total) by mouth daily   allopurinol (ZYLOPRIM) 100 mg tablet 9/27/2024  No Yes   Sig: Take 1 tablet (100 mg total) by mouth daily   atorvastatin (LIPITOR) 80 mg tablet 9/27/2024  No Yes   Sig: Take 1 tablet (80 mg total) by mouth daily   cholecalciferol (VITAMIN D3) 1,000 units tablet 9/27/2024  No Yes   Sig: Take 2 tablets (2,000 Units total) by mouth daily For a low vitamin D level   metoprolol succinate (TOPROL-XL) 25 mg 24 hr tablet 9/27/2024  No Yes   Sig: Take 1 tablet (25 mg total) by mouth daily   morphine (MSIR) 15 mg tablet More than a month  Yes No   ondansetron (ZOFRAN-ODT) 4 mg disintegrating tablet More than a month  No No   Sig: Take 1 tablet (4 mg total) by mouth every 6 (six) hours as needed for nausea or vomiting   potassium  chloride (MICRO-K) 10 MEQ CR capsule 9/27/2024  No Yes   Sig: TAKE ONE (1) CAPSULE BY MOUTH DAILY WITH TORSEMIDE   sacubitril-valsartan (Entresto) 24-26 MG TABS 9/27/2024  No Yes   Sig: Take 1 tablet by mouth 2 (two) times a day Morning and before bedtime   tamsulosin (FLOMAX) 0.4 mg More than a month  Yes No   Sig: Take 1 capsule by mouth as needed For kidney stones   torsemide (DEMADEX) 20 mg tablet 9/27/2024  No Yes   Sig: TAKE ONE (1) TABLET BY MOUTH IN THE MORNING & AT BEDTIME      Facility-Administered Medications: None     Discharge Medication List as of 9/29/2024  8:28 PM        START taking these medications    Details   acetaminophen (TYLENOL) 325 mg tablet Take 2 tablets (650 mg total) by mouth every 6 (six) hours as needed for mild pain, Starting Sun 9/29/2024, No Print      calcium carbonate (TUMS) 500 mg chewable tablet Chew 2 tablets (1,000 mg total) daily as needed for indigestion or heartburn, Starting Sun 9/29/2024, No Print      metoprolol (LOPRESSOR) 5 mg/5 mL injection Inject 5 mL (5 mg total) into a catheter in a vein every 6 (six) hours as needed (HR > 120), Starting Sun 9/29/2024, No Print      metoprolol tartrate (LOPRESSOR) 25 mg tablet Take 1 tablet (25 mg total) by mouth 4 (four) times a day, Starting Sun 9/29/2024, No Print           CONTINUE these medications which have CHANGED    Details   DULoxetine (CYMBALTA) 30 mg delayed release capsule Take 3 capsules (90 mg total) by mouth daily, Starting Mon 9/30/2024, No Print           CONTINUE these medications which have NOT CHANGED    Details   allopurinol (ZYLOPRIM) 100 mg tablet Take 1 tablet (100 mg total) by mouth daily, Starting Wed 6/26/2024, Normal      atorvastatin (LIPITOR) 80 mg tablet Take 1 tablet (80 mg total) by mouth daily, Starting Wed 6/26/2024, Normal      cholecalciferol (VITAMIN D3) 1,000 units tablet Take 2 tablets (2,000 Units total) by mouth daily For a low vitamin D level, Starting Tue 6/28/2022, Normal       Empagliflozin (Jardiance) 10 MG TABS tablet Take 1 tablet (10 mg total) by mouth daily, Starting Wed 6/26/2024, Normal      potassium chloride (MICRO-K) 10 MEQ CR capsule TAKE ONE (1) CAPSULE BY MOUTH DAILY WITH TORSEMIDE, Normal      sacubitril-valsartan (Entresto) 24-26 MG TABS Take 1 tablet by mouth 2 (two) times a day Morning and before bedtime, Starting Fri 5/17/2024, Normal      torsemide (DEMADEX) 20 mg tablet TAKE ONE (1) TABLET BY MOUTH IN THE MORNING & AT BEDTIME, Normal           STOP taking these medications       apixaban (ELIQUIS) 5 mg Comments:   Reason for Stopping:         metoprolol succinate (TOPROL-XL) 25 mg 24 hr tablet Comments:   Reason for Stopping:         morphine (MSIR) 15 mg tablet Comments:   Reason for Stopping:         ondansetron (ZOFRAN-ODT) 4 mg disintegrating tablet Comments:   Reason for Stopping:         tamsulosin (FLOMAX) 0.4 mg Comments:   Reason for Stopping:             Outpatient Discharge Orders   Transfer to other facility     ED SEPSIS DOCUMENTATION   Time reflects when diagnosis was documented in both MDM as applicable and the Disposition within this note       Time User Action Codes Description Comment    9/27/2024  2:32 PM Marysol Marie [I48.91] Atrial fibrillation with rapid ventricular response (HCC)     9/27/2024  2:32 PM Marysol Marie [R53.83] Fatigue     9/27/2024  2:32 PM Marysol Marie [Z86.79] History of CHF (congestive heart failure)     9/29/2024  9:43 AM Dany Gifford [M79.18,  G89.29] Chronic musculoskeletal pain     9/29/2024  9:43 AM Dany Gifford [M75.40] Impingement syndrome of shoulder region     9/29/2024  9:43 AM Dany Gifford [K21.9] Gastroesophageal reflux disease without esophagitis                  Marysol Marie MD  09/30/24 1529

## 2024-09-27 NOTE — PLAN OF CARE
Problem: PAIN - ADULT  Goal: Verbalizes/displays adequate comfort level or baseline comfort level  Description: Interventions:  - Encourage patient to monitor pain and request assistance  - Assess pain using appropriate pain scale  - Administer analgesics based on type and severity of pain and evaluate response  - Implement non-pharmacological measures as appropriate and evaluate response  - Consider cultural and social influences on pain and pain management  - Notify physician/advanced practitioner if interventions unsuccessful or patient reports new pain  Outcome: Progressing     Problem: PAIN - ADULT  Goal: Verbalizes/displays adequate comfort level or baseline comfort level  Description: Interventions:  - Encourage patient to monitor pain and request assistance  - Assess pain using appropriate pain scale  - Administer analgesics based on type and severity of pain and evaluate response  - Implement non-pharmacological measures as appropriate and evaluate response  - Consider cultural and social influences on pain and pain management  - Notify physician/advanced practitioner if interventions unsuccessful or patient reports new pain  Outcome: Progressing     Problem: PAIN - ADULT  Goal: Verbalizes/displays adequate comfort level or baseline comfort level  Description: Interventions:  - Encourage patient to monitor pain and request assistance  - Assess pain using appropriate pain scale  - Administer analgesics based on type and severity of pain and evaluate response  - Implement non-pharmacological measures as appropriate and evaluate response  - Consider cultural and social influences on pain and pain management  - Notify physician/advanced practitioner if interventions unsuccessful or patient reports new pain  Outcome: Progressing     Problem: INFECTION - ADULT  Goal: Absence or prevention of progression during hospitalization  Description: INTERVENTIONS:  - Assess and monitor for signs and symptoms of  infection  - Monitor lab/diagnostic results  - Monitor all insertion sites, i.e. indwelling lines, tubes, and drains  - Monitor endotracheal if appropriate and nasal secretions for changes in amount and color  - Cha[place]in appropriate cooling/warming therapies per order  - Administer medications as ordered  - Instruct and encourage patient and family to use good hand hygiene technique  - Identify and instruct in appropriate isolation precautions for identified infection/condition  Outcome: Progressing     Problem: SAFETY ADULT  Goal: Patient will remain free of falls  Description: INTERVENTIONS:  - Educate patient/family on patient safety including physical limitations  - Instruct patient to call for assistance with activity   - Consult OT/PT to assist with strengthening/mobility   - Keep Call bell within reach  - Keep bed low and locked with side rails adjusted as appropriate  - Keep care items and personal belongings within reach  - Initiate and maintain comfort rounds  - Make Fall Risk Sign visible to staff  - Offer Toileting every 2 Hours, in advance of need  - Initiate/Maintain bed/chair alarm  - Obtain necessary fall risk management equipment: nonskid footwear  - Apply yellow socks and bracelet for high fall risk patients  - Consider moving patient to room near nurses station  Outcome: Progressing  Goal: Maintain or return to baseline ADL function  Description: INTERVENTIONS:  -  Assess patient's ability to carry out ADLs; assess patient's baseline for ADL function and identify physical deficits which impact ability to perform ADLs (bathing, care of mouth/teeth, toileting, grooming, dressing, etc.)  - Assess/evaluate cause of self-care deficits   - Assess range of motion  - Assess patient's mobility; develop plan if impaired  - Assess patient's need for assistive devices and provide as appropriate  - Encourage maximum independence but intervene and supervise when necessary  - Involve family in performance of  ADLs  - Assess for home care needs following discharge   - Consider OT consult to assist with ADL evaluation and planning for discharge  - Provide patient education as appropriate  Outcome: Progressing  Goal: Maintains/Returns to pre admission functional level  Description: INTERVENTIONS:  - Perform AM-PAC 6 Click Basic Mobility/ Daily Activity assessment daily.  - Set and communicate daily mobility goal to care team and patient/family/caregiver.   - Collaborate with rehabilitation services on mobility goals if consulted  - Perform Range of Motion 3 times a day.  - Reposition patient every 2 hours.  - Dangle patient 3 times a day  - Stand patient 3 times a day  - Ambulate patient 3 times a day  - Out of bed to chair 3 times a day   - Out of bed for meals 3 times a day  - Out of bed for toileting  - Record patient progress and toleration of activity level   Outcome: Progressing     Problem: DISCHARGE PLANNING  Goal: Discharge to home or other facility with appropriate resources  Description: INTERVENTIONS:  - Identify barriers to discharge w/patient and caregiver  - Arrange for needed discharge resources and transportation as appropriate  - Identify discharge learning needs (meds, wound care, etc.)  - Arrange for interpretive services to assist at discharge as needed  - Refer to Case Management Department for coordinating discharge planning if the patient needs post-hospital services based on physician/advanced practitioner order or complex needs related to functional status, cognitive ability, or social support system  Outcome: Progressing     Problem: Knowledge Deficit  Goal: Patient/family/caregiver demonstrates understanding of disease process, treatment plan, medications, and discharge instructions  Description: Complete learning assessment and assess knowledge base.  Interventions:  - Provide teaching at level of understanding  - Provide teaching via preferred learning methods  Outcome: Progressing     Problem:  CARDIOVASCULAR - ADULT  Goal: Maintains optimal cardiac output and hemodynamic stability  Description: INTERVENTIONS:  - Monitor I/O, vital signs and rhythm  - Monitor for S/S and trends of decreased cardiac output  - Administer and titrate ordered vasoactive medications to optimize hemodynamic stability  - Assess quality of pulses, skin color and temperature  - Assess for signs of decreased coronary artery perfusion  - Instruct patient to report change in severity of symptoms  Outcome: Progressing  Goal: Absence of cardiac dysrhythmias or at baseline rhythm  Description: INTERVENTIONS:  - Continuous cardiac monitoring, vital signs, obtain 12 lead EKG if ordered  - Administer antiarrhythmic and heart rate control medications as ordered  - Monitor electrolytes and administer replacement therapy as ordered  Outcome: Progressing

## 2024-09-27 NOTE — ASSESSMENT & PLAN NOTE
Wt Readings from Last 3 Encounters:   09/27/24 125 kg (275 lb 9.2 oz)   06/26/24 125 kg (275 lb 6.4 oz)   04/07/24 126 kg (277 lb 12.5 oz)     Last ECHO in 2022 with EF of 40%  Update ECHO at this time  Daily weights, I/O  Continue on torsemide  BNP and CXR not suggestive of exacerbation. Has been having SOB which may be secondary to an exacerbation versus the a fib with RVR  Await ECHO results and cardio recs

## 2024-09-27 NOTE — CONSULTS
Consultation - Cardiology   Yazan Child 59 y.o. male MRN: [id number]  Unit/Bed#: 402-01 Encounter: [id number]    Inpatient consult to Cardiology  Consult performed by: Michelle León DO  Consult ordered by: Daniella Hannon PA-C          History of Present Illness   Physician Requesting Consult: Dany Gifford DO  Reason for Consult / Principal Problem: Atrial fibrillation       Assessment:  Atrial fibrillation with a rapid ventricular response - [id number] = 1  Given 1 dose of IV Cardizem in the emergency department  Repeat echocardiogram noted with a decline in ejection fraction, IV Cardizem is to be avoided  IV amiodarone is to be avoided given onset unknown, likely at least a week ago and not on anticoagulation  Will increase metoprolol to tartrate to 25 mg QID with transition back to succinate upon discharge  Recommend systemic anticoagulation with Eliquis in preparation for RUDI guided cardioversion if he does not revert back to normal sinus rhythm on his own  Recommend outpatient sleep study for evaluation of NAVIN as a contributor, TSH within normal limits  Plan for some type of ablation per patient at [facility] - records unavailable  Needs rhythm control strategy in setting of cardiomyopathy  Nonischemic cardiomyopathy, EF 25%  EF previously reported as 40%, now 25%  Previous ischemic evaluation in 2022 with nuclear stress test without ischemia or scar  He is unsure if he has had a repeat echocardiogram since 2022  Maintained on proper medical therapy with Entresto, Jardiance, metoprolol succinate  Appears euvolemic on exam on current oral diuretic regimen  History previous ablation  Done about 15 years ago at [facility] in [place]  Cannot elaborate details regarding what rhythm he had ablated  Dyslipidemia  Continue outpatient high intensity statin regimen  Possible NAVIN   Recommend outpatient sleep study      HPI: Yazan Child is a 59 y.o. year old male who has a history of an arrhythmia  admitted with rapid atrial fibrillation.  He states that about 15 years ago at [facility] in [place] he had an ablation.  He is unclear what rhythm was ablated.  Over the last year he has felt unwell.  He states he was followed by a cardiologist in [place] and underwent ambulatory rhythm monitoring.  Given this physician retired he went to see a cardiologist at [facility] in [place].  He reports he was referred to another cardiologist (assuming an electrophysiologist) through [facility] and he is scheduled to undergo repeat ablation based on the monitor results he had done in the recent past.  He cannot recall the names of any of his physicians.    He was recently vacationing.  He states while on vacation he was drinking alcohol.  There was a day he had 8 alcoholic beverages in one sitting.  When he got home last week he started to feel unwell.  He was very fatigued.  He reports that earlier today he became lightheaded when he would attempt to stand up.  He also felt short of breath with minimal exertion.  Because of the symptoms he presented to the emergency department for further evaluation and management.  He was found to be in atrial fibrillation with a rapid ventricular response in the ED.  He was given a dose of IV Cardizem and admitted for further evaluation and management.    He was seen in 2022 by one of my associates after he was admitted to the hospital with congestive heart failure.  At that time he was found to have an EF of 40%.  He was diuresed with IV diuretics and placed on proper medical therapy upon discharge.  he underwent a nuclear stress test which was without ischemia or scar.  He has been maintained on a medical regimen of Entresto, Jardiance, and metoprolol succinate.  He is also maintained on oral torsemide.    He denies any signs or symptoms of congestive heart failure including lower extremity edema, paroxysmal nocturnal dyspnea, acute weight gain or  increasing abdominal girth.  He denies syncope or presyncope.  He denies any exertional chest pain.  He denies symptoms of claudication.    He has never been tested for obstructive sleep apnea.    ROS: Positive as per the HPI, all other systems were reviewed and were negative.    Historical Information   Past Medical History:   Diagnosis Date    Acute systolic congestive heart failure (HCC)     Anxiety     CHF (congestive heart failure) (HCC)     GERD (gastroesophageal reflux disease)     Hyperlipidemia     Kidney stones      Past Surgical History:   Procedure Laterality Date    CARDIAC ELECTROPHYSIOLOGY STUDY AND ABLATION      CARDIAC ELECTROPHYSIOLOGY STUDY AND ABLATION      CARDIAC SURGERY  2014    ablation    CHOLECYSTECTOMY      COLONOSCOPY      ELBOW SURGERY Right     FL RETROGRADE PYELOGRAM  6/29/2019    FL RETROGRADE PYELOGRAM  8/29/2019    FL RETROGRADE PYELOGRAM  9/25/2019    HERNIA REPAIR Right     ing     IR NEPHROSTOMY TUBE PLACEMENT  8/30/2019    KIDNEY STONE SURGERY      KNEE ARTHROSCOPY Left     KNEE CARTILAGE SURGERY      MN CYSTO BLADDER W/URETERAL CATHETERIZATION Right 6/29/2019    Procedure: CYSTOSCOPY RETROGRADE PYELOGRAM WITH INSERTION STENT URETERAL;  Surgeon: Brayden Song MD;  Location: [facility];  Service: Urology    MN CYSTO/URETERO W/LITHOTRIPSY &INDWELL STENT INSRT Right 8/29/2019    Procedure: CYSTOSCOPY URETEROSCOPY WITH LITHOTRIPSY HOLMIUM LASER, RETROGRADE PYELOGRAM;  Surgeon: Eddie Juárez MD;  Location: [facility];  Service: Urology    MN PERQ NL/PL LITHOTRP COMPLEX >2 CM MLT LOCATIONS Right 9/25/2019    Procedure: NEPHROLITHOTOMY  PERCUTANEOUS (PCNL). MEATAL DILATION; ANTEGRADE URETEROSCOPY AND STENT REMOVAL;  Surgeon: Eddie Juárez MD;  Location: [facility];  Service: Urology    WRIST SURGERY Right      Social History     Substance and Sexual Activity   Alcohol Use Not Currently    Comment: occasionally     Social History     Substance and Sexual Activity  "  Drug Use No     Social History     Tobacco Use   Smoking Status Never   Smokeless Tobacco Former   Tobacco Comments    former chewing tobacco-quit      Family History: Family history non-contributory    Meds/Allergies   all current active meds have been reviewed       Allergies   Allergen Reactions    Ancef [Cefazolin] Nausea Only     Preoperative admin-nausea and dry heaving    Other Itching     Peanuts     Peanut-Containing Drug Products - Food Allergy Itching    Sulfa Antibiotics Rash    Toradol [Ketorolac Tromethamine] Rash     Tolerates ibuprofen       Objective   Vitals: Blood pressure 102/61, pulse (!) 110, temperature (!) 96.9 °F (36.1 °C), temperature source Temporal, resp. rate 16, height 5' 10\" (1.778 m), weight 125 kg (275 lb 9.2 oz), SpO2 94%., Body mass index is 39.54 kg/m².,   Orthostatic Blood Pressures      Flowsheet Row Most Recent Value   Blood Pressure 102/61 filed at 2024 1442   Patient Position - Orthostatic VS Sitting filed at 2024 1400          Systolic (24hrs), Av , Min:88 , Max:126     Diastolic (24hrs), Av, Min:58, Max:74    No intake or output data in the 24 hours ending 24 1615    Weight (last 2 days)       Date/Time Weight    24 1442 125 (275.58)            Invasive Devices       Peripheral Intravenous Line  Duration             Peripheral IV 24 Right Antecubital <1 day                        Physical Exam: /61   Pulse (!) 110   Temp (!) 96.9 °F (36.1 °C) (Temporal)   Resp 16   Ht 5' 10\" (1.778 m)   Wt 125 kg (275 lb 9.2 oz)   SpO2 95%   BMI 39.54 kg/m²     General Appearance:    Alert, cooperative, no distress, appears stated age   Head:    Normocephalic, without obvious abnormality, atraumatic   Eyes:    PERRL, conjunctiva/corneas clear, EOM's intact, fundi     benign, both eyes        Throat:   Lips, mucosa, and tongue normal; teeth and gums normal   Neck:   Supple, symmetrical, trachea midline, no adenopathy;        thyroid:  " No enlargement/tenderness/nodules; no carotid    bruit or JVD   Back:     Symmetric, no curvature, ROM normal, no CVA tenderness   Lungs:     Clear to auscultation bilaterally, respirations unlabored   Chest wall:    No tenderness or deformity   Heart:    Irregularly irregular, tachycardic, variable S1/S2, no murmur    Abdomen:     Soft, non-tender, bowel sounds active all four quadrants,     no masses, no organomegaly   Extremities:   Extremities normal, atraumatic, no cyanosis or edema   Pulses:   2+ and symmetric all extremities   Skin:   Skin color, texture, turgor normal, no rashes or lesions   Lymph nodes:   Cervical, supraclavicular, and axillary nodes normal   Neurologic:   CNII-XII intact. Normal strength, sensation and reflexes       throughout           Laboratory Results:        CBC with diff:   Results from last 7 days   Lab Units 09/27/24  1217   WBC Thousand/uL 6.09   HEMOGLOBIN g/dL 17.2*   HEMATOCRIT % 50.6*   MCV fL 92   PLATELETS Thousands/uL 306   RBC Million/uL 5.53   MCH pg 31.1   MCHC g/dL 34.0   RDW % 14.2   MPV fL 10.4   NRBC AUTO /100 WBCs 0         CMP:  Results from last 7 days   Lab Units 09/27/24  1217   POTASSIUM mmol/L 3.9   CHLORIDE mmol/L 106   CO2 mmol/L 23   BUN mg/dL 25   CREATININE mg/dL 1.06   CALCIUM mg/dL 9.3   AST U/L 30   ALT U/L 36   ALK PHOS U/L 82   EGFR ml/min/1.73sq m 75         BMP:  Results from last 7 days   Lab Units 09/27/24  1217   POTASSIUM mmol/L 3.9   CHLORIDE mmol/L 106   CO2 mmol/L 23   BUN mg/dL 25   CREATININE mg/dL 1.06   CALCIUM mg/dL 9.3       BNP:    Recent Labs     09/27/24  1217   *       Cardiac testing:   No results found for this or any previous visit.    No results found for this or any previous visit.    No results found for this or any previous visit.    Results for orders placed during the hospital encounter of 05/02/22    NM myocardial perfusion spect (stress and/or rest)    Interpretation Summary    Resting ECG: The ECG shows normal  sinus rhythm. Low QRS voltage. PRWP.    Stress ECG: A pharmacological stress test was performed using regadenoson. The patient reached the end of the protocol. The patient reported abdominal discomfort during the stress test. Symptoms began during stress and ended during recovery.    Stress ECG: No ST deviation is noted. The stress ECG is negative for ischemia after maximal exercise, without reproduction of symptoms. Arrhythmias during stress: rare PVCs. The ECG was negative for ischemia.    Perfusion: There are no perfusion defects.    Stress Function: Left ventricular function post-stress is abnormal. Global function is moderately reduced. Post-stress ejection fraction is 39 %.    Stress Combined Conclusion: The ECG and SPECT imaging portions of the stress study are concordant with no evidence of stress induced myocardial ischemia.        Imaging: Reviewed radiology reports from this admission including: chest xray.  Echo complete w/ contrast if indicated    Result Date: 9/27/2024  Narrative:   Left Ventricle: Left ventricular cavity size is normal. Wall thickness is mildly increased. There is concentric remodeling. The left ventricular ejection fraction is 25 to 30%. Systolic function is severely reduced. There is global hypokinesis with regional variation. Unable to assess diastolic function due to atrial fibrillation.   Right Ventricle: Right ventricle is not well visualized. Systolic function is reduced. Technically difficult study despite administration of echo contrast.     XR chest 1 view portable    Result Date: 9/27/2024  Narrative: XR CHEST PORTABLE INDICATION: sob, tachycardia. COMPARISON: 2/26/2023. FINDINGS: Clear lungs. No pneumothorax or pleural effusion. Normal cardiomediastinal silhouette. No acute osseous abnormality within the limitations of portable radiography. Normal upper abdomen.     Impression: No acute cardiopulmonary disease. Workstation performed: [id number]       EKG reviewed  personally:     Counseling / Coordination of Care  Total floor / unit time spent today 60 minutes.  Greater than 50% of total time was spent with the patient and / or family counseling and / or coordination of care.       Code Status: Level 1 - Full Code

## 2024-09-27 NOTE — CASE MANAGEMENT
Case Management Discharge Planning Note    Patient name Yazan Child  Location /402-01 MRN [id number]  : 1965 Date 2024       Current Admission Date: 2024  Current Admission Diagnosis:Atrial fibrillation with RVR (HCC)   Patient Active Problem List    Diagnosis Date Noted Date Diagnosed    Atrial fibrillation with RVR (HCC) 2024     Chronic systolic (congestive) heart failure (HCC) 2024     Class 2 obesity due to excess calories without serious comorbidity in adult 2024     Acute systolic congestive heart failure (HCC) 2024     Paroxysmal SVT (supraventricular tachycardia) 2022     Vitamin D insufficiency 2022     Mixed hyperlipidemia 2022     Snoring 2022     Elevated platelet count 2022     Coronary artery calcification seen on CT scan 2022     Hepatic steatosis 2022     GERD (gastroesophageal reflux disease)      Anxiety      Dyslipidemia 2021     Osteoarthritis of knee 2021     Impingement syndrome of shoulder region 2021     Hyperuricemia 2021     Recurrent unilateral inguinal hernia 2019     Retained ureteral stent 2019     Ureteral calculus 2019     Adjustment disorder with mixed anxiety and depressed mood 2018     Anorgasmia of male 2017     Bilateral hand numbness 2017     Tremor 2017     Chronic musculoskeletal pain 11/15/2017     History of alcohol abuse 2013       LOS (days): 0  Geometric Mean LOS (GMLOS) (days):   Days to GMLOS:     OBJECTIVE:            Current admission status: Observation   Preferred Pharmacy:   [id number] - Closed - [place] - [address]  [address]  [place] [address]  Phone: [phone] Fax: [phone]    [facility]  - [place], [place] - [address] [facility] [facility]   [address]   [facility] [address]  Phone: [phone] Fax: [phone]    [facility] -  [place] - [address] [facility]  [address]  [place] [address]  Phone: [phone] Fax: [phone]    Primary Care Provider: Vera Kaba PA-C    Primary Insurance: BLUE CROSS  Secondary Insurance:     DISCHARGE DETAILS:  Chart review done. Plans at this time are home on dc with OP follow up. No Cm needs identified at this time. CM will follow in the event any dc needs arise.

## 2024-09-27 NOTE — ASSESSMENT & PLAN NOTE
Patient reports 2 weeks of feeling unwell, found to be in a fib with RVR in the ED  Responded well to a one time IV dose of cardizem  Takes metoprolol 25 mg daily - convert to short acting and utilize 25 mg QID  Monitor on telemetry  Risk factors include: lack of sleep at the onset of symptoms, increased alcohol intake around the onset of symptoms (on vacation, he typically does not drink), known to snore at night  Patient currently does not think he would like to undergo a sleep study  ECHO pending  [id number] score is only 1. Can remain OFF anticoagulation unless cardioversion is anticipated  Cardiology consult

## 2024-09-27 NOTE — H&P
H&P - Hospitalist   Name: Yazan Child 59 y.o. male I MRN: [id number]  Unit/Bed#: 402-01 I Date of Admission: 9/27/2024   Date of Service: 9/27/2024 I Hospital Day: 0     Assessment & Plan  Atrial fibrillation with RVR (HCC)  Patient reports 2 weeks of feeling unwell, found to be in a fib with RVR in the ED  Responded well to a one time IV dose of cardizem  Takes metoprolol 25 mg daily - convert to short acting and utilize 25 mg QID  Monitor on telemetry  Risk factors include: lack of sleep at the onset of symptoms, increased alcohol intake around the onset of symptoms (on vacation, he typically does not drink), known to snore at night  Patient currently does not think he would like to undergo a sleep study  ECHO pending  [id number] score is only 1. Can remain OFF anticoagulation unless cardioversion is anticipated  Cardiology consult  Chronic systolic (congestive) heart failure (HCC)  Wt Readings from Last 3 Encounters:   09/27/24 125 kg (275 lb 9.2 oz)   06/26/24 125 kg (275 lb 6.4 oz)   04/07/24 126 kg (277 lb 12.5 oz)     Last ECHO in 2022 with EF of 40%  Update ECHO at this time  Daily weights, I/O  Continue on torsemide  BNP and CXR not suggestive of exacerbation. Has been having SOB which may be secondary to an exacerbation versus the a fib with RVR  Await ECHO results and cardio recs        Dyslipidemia  Cont on statin therapy  Class 2 obesity due to excess calories without serious comorbidity in adult  Lifestyle modifications recommended    VTE Pharmacologic Prophylaxis:   Low Risk (Score 0-2) - Encourage Ambulation.  Code Status: Level 1 - Full Code   Discussion with family: Patient declined call to .     Anticipated Length of Stay: Patient will be admitted on an observation basis with an anticipated length of stay of less than 2 midnights secondary to a fib.    History of Present Illness   Chief Complaint: SOB    Yazan Child is a 59 y.o. male with a PMH of SVT, HLD who presents  with SOB.  He reports his symptoms have been ongoing for approximately 1.5 weeks.  He states he cannot recall the onset of symptoms but it was shortly after a vacation he had new work where he was not getting an adequate amount of sleep and he was drinking more frequently than he normally would.  Patient reports that he has been getting short of breath, having palpitations and he has noticed that he has been getting more dizzy especially with positional changes.  Denies any orthopnea, fever, chills, diarrhea, nausea, vomiting, cough, chest pain.  He does note that he snores but has never had a sleep study in the past.  Found to be in A-fib with RVR in the ED and given a dose of IV Cardizem which controlled his rates for now.  He does have a history of SVT for which she had an ablation in the past and is scheduled for another ablation soon and for which she takes metoprolol.  He has not missed any doses.    Review of Systems   Constitutional:  Positive for activity change. Negative for diaphoresis and fever.   Respiratory:  Positive for shortness of breath. Negative for cough.    Cardiovascular:  Positive for palpitations. Negative for chest pain and leg swelling.   Gastrointestinal:  Negative for constipation, diarrhea, nausea and vomiting.   Neurological:  Positive for dizziness. Negative for weakness.   Psychiatric/Behavioral:  Negative for confusion. The patient is not nervous/anxious.        I have reviewed the patient's PMH, PSH, Social History, Family History, Meds, and Allergies  Social History:  Marital Status: /[employer] Union   Occupation:   Patient Pre-hospital Living Situation: Home  Patient Pre-hospital Level of Mobility: walks  Patient Pre-hospital Diet Restrictions: none    Objective     Vitals:   Blood Pressure: 102/61 (09/27/24 1442)  Pulse: (!) 110 (09/27/24 1442)  Temperature: (!) 96.9 °F (36.1 °C) (09/27/24 1202)  Temp Source: Temporal (09/27/24 1202)  Respirations: 16 (09/27/24  "1400)  Height: 5' 10\" (177.8 cm) (09/27/24 1442)  Weight - Scale: 125 kg (275 lb 9.2 oz) (09/27/24 1442)  SpO2: 94 % (09/27/24 1400)    Physical Exam  Vitals and nursing note reviewed.   Constitutional:       General: He is not in acute distress.     Appearance: He is obese.   Cardiovascular:      Rate and Rhythm: Normal rate. Rhythm irregular.      Pulses: Normal pulses.      Heart sounds: Normal heart sounds. No murmur heard.     No gallop.   Pulmonary:      Effort: Pulmonary effort is normal.      Breath sounds: Normal breath sounds. No wheezing, rhonchi or rales.   Abdominal:      General: Bowel sounds are normal.      Palpations: Abdomen is soft.      Tenderness: There is no abdominal tenderness. There is no guarding.   Musculoskeletal:      Right lower leg: No edema.      Left lower leg: No edema.   Skin:     General: Skin is warm and dry.   Neurological:      Mental Status: He is alert. Mental status is at baseline.   Psychiatric:         Mood and Affect: Mood normal.         Behavior: Behavior normal.         Lines/Drains:  Lines/Drains/Airways       Active Status       None                        Additional Data:   Lab Results: I have reviewed the following results: CBC/BMP:   .     09/27/24  1217   WBC 6.09   HGB 17.2*   HCT 50.6*      SODIUM 141   K 3.9      CO2 23   BUN 25   CREATININE 1.06   GLUC 115      Results from last 7 days   Lab Units 09/27/24  1217   WBC Thousand/uL 6.09   HEMOGLOBIN g/dL 17.2*   HEMATOCRIT % 50.6*   PLATELETS Thousands/uL 306   SEGS PCT % 54   LYMPHO PCT % 32   MONO PCT % 11   EOS PCT % 2     Results from last 7 days   Lab Units 09/27/24  1217   SODIUM mmol/L 141   POTASSIUM mmol/L 3.9   CHLORIDE mmol/L 106   CO2 mmol/L 23   BUN mg/dL 25   CREATININE mg/dL 1.06   ANION GAP mmol/L 12   CALCIUM mg/dL 9.3   ALBUMIN g/dL 4.3   TOTAL BILIRUBIN mg/dL 0.77   ALK PHOS U/L 82   ALT U/L 36   AST U/L 30   GLUCOSE RANDOM mg/dL 115             Lab Results   Component Value Date "    HGBA1C 5.4 05/03/2022           Imaging Review: Reviewed radiology reports from this admission including: chest xray and Echocardiogram.  Other Studies: EKG was reviewed.     Administrative Statements   I have spent a total time of 75 minutes in caring for this patient on the day of the visit/encounter including Diagnostic results, Prognosis, Risks and benefits of tx options, Instructions for management, Importance of tx compliance, Risk factor reductions, Counseling / Coordination of care, Documenting in the medical record, Reviewing / ordering tests, medicine, procedures  , Obtaining or reviewing history  , and Communicating with other healthcare professionals .    ** Please Note: This note has been constructed using a voice recognition system. **

## 2024-09-27 NOTE — ED NOTES
Echo at bedside. Pt will be brought to room 402 after echo is complete.      Abigail Carrizales RN  09/27/24 3629

## 2024-09-28 LAB
ALBUMIN SERPL BCG-MCNC: 4.1 G/DL (ref 3.5–5)
ALP SERPL-CCNC: 76 U/L (ref 34–104)
ALT SERPL W P-5'-P-CCNC: 34 U/L (ref 7–52)
ANION GAP SERPL CALCULATED.3IONS-SCNC: 11 MMOL/L (ref 4–13)
AST SERPL W P-5'-P-CCNC: 23 U/L (ref 13–39)
BILIRUB SERPL-MCNC: 0.75 MG/DL (ref 0.2–1)
BUN SERPL-MCNC: 25 MG/DL (ref 5–25)
CALCIUM SERPL-MCNC: 9.1 MG/DL (ref 8.4–10.2)
CHLORIDE SERPL-SCNC: 104 MMOL/L (ref 96–108)
CO2 SERPL-SCNC: 25 MMOL/L (ref 21–32)
CREAT SERPL-MCNC: 1.08 MG/DL (ref 0.6–1.3)
ERYTHROCYTE [DISTWIDTH] IN BLOOD BY AUTOMATED COUNT: 14.3 % (ref 11.6–15.1)
GFR SERPL CREATININE-BSD FRML MDRD: 74 ML/MIN/1.73SQ M
GLUCOSE SERPL-MCNC: 107 MG/DL (ref 65–140)
HCT VFR BLD AUTO: 49.3 % (ref 36.5–49.3)
HGB BLD-MCNC: 16.6 G/DL (ref 12–17)
MAGNESIUM SERPL-MCNC: 2.1 MG/DL (ref 1.9–2.7)
MCH RBC QN AUTO: 30.8 PG (ref 26.8–34.3)
MCHC RBC AUTO-ENTMCNC: 33.7 G/DL (ref 31.4–37.4)
MCV RBC AUTO: 92 FL (ref 82–98)
PLATELET # BLD AUTO: 286 THOUSANDS/UL (ref 149–390)
PMV BLD AUTO: 10.4 FL (ref 8.9–12.7)
POTASSIUM SERPL-SCNC: 3.6 MMOL/L (ref 3.5–5.3)
PROT SERPL-MCNC: 7.4 G/DL (ref 6.4–8.4)
RBC # BLD AUTO: 5.39 MILLION/UL (ref 3.88–5.62)
SODIUM SERPL-SCNC: 140 MMOL/L (ref 135–147)
WBC # BLD AUTO: 7.03 THOUSAND/UL (ref 4.31–10.16)

## 2024-09-28 PROCEDURE — 99232 SBSQ HOSP IP/OBS MODERATE 35: CPT | Performed by: INTERNAL MEDICINE

## 2024-09-28 PROCEDURE — 85027 COMPLETE CBC AUTOMATED: CPT | Performed by: PHYSICIAN ASSISTANT

## 2024-09-28 PROCEDURE — 83735 ASSAY OF MAGNESIUM: CPT | Performed by: PHYSICIAN ASSISTANT

## 2024-09-28 PROCEDURE — 80053 COMPREHEN METABOLIC PANEL: CPT | Performed by: PHYSICIAN ASSISTANT

## 2024-09-28 RX ORDER — POTASSIUM CHLORIDE 1500 MG/1
40 TABLET, EXTENDED RELEASE ORAL ONCE
Status: COMPLETED | OUTPATIENT
Start: 2024-09-28 | End: 2024-09-28

## 2024-09-28 RX ADMIN — METOPROLOL TARTRATE 5 MG: 5 INJECTION INTRAVENOUS at 09:52

## 2024-09-28 RX ADMIN — METOPROLOL TARTRATE 25 MG: 25 TABLET, FILM COATED ORAL at 17:18

## 2024-09-28 RX ADMIN — DULOXETINE HYDROCHLORIDE 90 MG: 30 CAPSULE, DELAYED RELEASE ORAL at 08:01

## 2024-09-28 RX ADMIN — METOPROLOL TARTRATE 25 MG: 25 TABLET, FILM COATED ORAL at 12:40

## 2024-09-28 RX ADMIN — ALLOPURINOL 100 MG: 100 TABLET ORAL at 08:01

## 2024-09-28 RX ADMIN — POTASSIUM CHLORIDE 40 MEQ: 1500 TABLET, EXTENDED RELEASE ORAL at 12:40

## 2024-09-28 RX ADMIN — TORSEMIDE 20 MG: 20 TABLET ORAL at 08:01

## 2024-09-28 RX ADMIN — APIXABAN 5 MG: 5 TABLET, FILM COATED ORAL at 08:01

## 2024-09-28 RX ADMIN — METOPROLOL TARTRATE 25 MG: 25 TABLET, FILM COATED ORAL at 08:00

## 2024-09-28 RX ADMIN — APIXABAN 5 MG: 5 TABLET, FILM COATED ORAL at 17:18

## 2024-09-28 RX ADMIN — EMPAGLIFLOZIN 10 MG: 10 TABLET, FILM COATED ORAL at 08:01

## 2024-09-28 RX ADMIN — Medication 2000 UNITS: at 08:01

## 2024-09-28 RX ADMIN — SACUBITRIL AND VALSARTAN 1 TABLET: 24; 26 TABLET, FILM COATED ORAL at 17:18

## 2024-09-28 RX ADMIN — SACUBITRIL AND VALSARTAN 1 TABLET: 24; 26 TABLET, FILM COATED ORAL at 08:01

## 2024-09-28 RX ADMIN — ATORVASTATIN CALCIUM 80 MG: 40 TABLET, FILM COATED ORAL at 17:18

## 2024-09-28 NOTE — PROGRESS NOTES
Progress Note - Hospitalist   Name: Yazan Child 59 y.o. male I MRN: [id number]  Unit/Bed#: 402-01 I Date of Admission: 9/27/2024   Date of Service: 9/28/2024 I Hospital Day: 0    Assessment & Plan  Atrial fibrillation with RVR (HCC)  Patient reports 2 weeks of feeling unwell, found to be in a fib with RVR in the ED  Responded well to a one time IV dose of cardizem  Takes metoprolol 25 mg daily - convert to short acting and utilize 25 mg QID  Monitor on telemetry  Risk factors include: lack of sleep at the onset of symptoms, increased alcohol intake around the onset of symptoms (on vacation, he typically does not drink), known to snore at night  Outpatient sleep study recommended  Echocardiogram with reduced ejection fraction, plan of care reviewed with cardiology  Anticoagulation therapy started in anticipation of cardioversion  Cardiology is recommending DC cardioversion.  Chronic systolic (congestive) heart failure (HCC)  Wt Readings from Last 3 Encounters:   09/28/24 118 kg (259 lb 0.7 oz)   06/26/24 125 kg (275 lb 6.4 oz)   04/07/24 126 kg (277 lb 12.5 oz)     Last ECHO in 2022 with EF of 40%  With reduced EF to 25 to 30%  Continue on torsemide  BNP and CXR not suggestive of exacerbation. Has been having SOB which may be secondary to an exacerbation versus the a fib with RVR  Dyslipidemia  Cont on statin therapy  Class 2 obesity due to excess calories without serious comorbidity in adult  Lifestyle modifications recommended    VTE Pharmacologic Prophylaxis:   High Risk (Score >/= 5) - Pharmacological DVT Prophylaxis Ordered: apixaban (Eliquis). Sequential Compression Devices Ordered.    Mobility:   Basic Mobility Inpatient Raw Score: 24  JH-HLM Goal: 8: Walk 250 feet or more  JH-HLM Achieved: 8: Walk 250 feet ot more  JH-HLM Goal achieved. Continue to encourage appropriate mobility.    Patient Centered Rounds: I performed bedside rounds with nursing staff today.     Daughter was updated at bedside with plan  of care on 2024    Current Length of Stay: 0 day(s)  Current Patient Status: Observation   Certification Statement:  Patient will require continued cardiac monitoring  Discharge Plan:  Pending heart rate monitoring    Code Status: Level 1 - Full Code    Subjective       Objective     Vitals:   Temp (24hrs), Av.6 °F (36.4 °C), Min:96.9 °F (36.1 °C), Max:98.1 °F (36.7 °C)    Temp:  [96.9 °F (36.1 °C)-98.1 °F (36.7 °C)] 97.8 °F (36.6 °C)  HR:  [] 52  Resp:  [12-20] 16  BP: ()/(56-79) 114/61  SpO2:  [89 %-96 %] 91 %  Body mass index is 37.17 kg/m².     Input and Output Summary (last 24 hours):     Intake/Output Summary (Last 24 hours) at 2024 1154  Last data filed at 2024 0808  Gross per 24 hour   Intake 700 ml   Output --   Net 700 ml       Physical Exam  Vitals and nursing note reviewed.   HENT:      Head: Normocephalic and atraumatic.   Cardiovascular:      Rate and Rhythm: Tachycardia present. Rhythm irregular.   Pulmonary:      Effort: Pulmonary effort is normal.   Musculoskeletal:         General: Normal range of motion.   Neurological:      Mental Status: He is alert. Mental status is at baseline.          Lines/Drains:  Lines/Drains/Airways       Active Status       None                      Telemetry:  Telemetry Orders (From admission, onward)               24 Hour Telemetry Monitoring  Continuous x 24 Hours (Telem)        Question:  Reason for 24 Hour Telemetry  Answer:  Arrhythmias requiring acute medical intervention / PPM or ICD malfunction                     Telemetry Reviewed: Atrial fibrillation. HR averaging 100-1 20, heart rate has been variable  Indication for Continued Telemetry Use: Arrthymias requiring medical therapy               Lab Results: I have reviewed the following results:    Results from last 7 days   Lab Units 24  0446 24  1217   WBC Thousand/uL 7.03 6.09   HEMOGLOBIN g/dL 16.6 17.2*   HEMATOCRIT % 49.3 50.6*   PLATELETS Thousands/uL 286 306    SEGS PCT %  --  54   LYMPHO PCT %  --  32   MONO PCT %  --  11   EOS PCT %  --  2     Results from last 7 days   Lab Units 09/28/24  0446   SODIUM mmol/L 140   POTASSIUM mmol/L 3.6   CHLORIDE mmol/L 104   CO2 mmol/L 25   BUN mg/dL 25   CREATININE mg/dL 1.08   ANION GAP mmol/L 11   CALCIUM mg/dL 9.1   ALBUMIN g/dL 4.1   TOTAL BILIRUBIN mg/dL 0.75   ALK PHOS U/L 76   ALT U/L 34   AST U/L 23   GLUCOSE RANDOM mg/dL 107                       Recent Cultures (last 7 days):         Imaging Review: No pertinent imaging studies reviewed.  Other Studies: No additional pertinent studies reviewed.    Last 24 Hours Medication List:     Current Facility-Administered Medications:     acetaminophen (TYLENOL) tablet 650 mg, Q6H PRN    allopurinol (ZYLOPRIM) tablet 100 mg, Daily    apixaban (ELIQUIS) tablet 5 mg, BID    atorvastatin (LIPITOR) tablet 80 mg, Daily    calcium carbonate (TUMS) chewable tablet 1,000 mg, Daily PRN    Cholecalciferol (VITAMIN D3) tablet 2,000 Units, Daily    DULoxetine (CYMBALTA) delayed release capsule 90 mg, Daily    Empagliflozin (JARDIANCE) tablet 10 mg, Daily    metoprolol (LOPRESSOR) injection 5 mg, Q6H PRN    metoprolol tartrate (LOPRESSOR) tablet 25 mg, 4x Daily    ondansetron (ZOFRAN) injection 4 mg, Q6H PRN    perflutren lipid microsphere (DEFINITY) injection, Once in imaging    sacubitril-valsartan (ENTRESTO) 24-26 MG per tablet 1 tablet, BID    torsemide (DEMADEX) tablet 20 mg, BID    Administrative Statements   Today, Patient Was Seen By: Dany Gifford DO      **Please Note: This note may have been constructed using a voice recognition system.**

## 2024-09-28 NOTE — PLAN OF CARE
Problem: PAIN - ADULT  Goal: Verbalizes/displays adequate comfort level or baseline comfort level  Description: Interventions:  - Encourage patient to monitor pain and request assistance  - Assess pain using appropriate pain scale  - Administer analgesics based on type and severity of pain and evaluate response  - Implement non-pharmacological measures as appropriate and evaluate response  - Consider cultural and social influences on pain and pain management  - Notify physician/advanced practitioner if interventions unsuccessful or patient reports new pain  Outcome: Progressing     Problem: INFECTION - ADULT  Goal: Absence or prevention of progression during hospitalization  Description: INTERVENTIONS:  - Assess and monitor for signs and symptoms of infection  - Monitor lab/diagnostic results  - Monitor all insertion sites, i.e. indwelling lines, tubes, and drains  - Monitor endotracheal if appropriate and nasal secretions for changes in amount and color  - [place] appropriate cooling/warming therapies per order  - Administer medications as ordered  - Instruct and encourage patient and family to use good hand hygiene technique  - Identify and instruct in appropriate isolation precautions for identified infection/condition  Outcome: Progressing     Problem: SAFETY ADULT  Goal: Patient will remain free of falls  Description: INTERVENTIONS:  - Educate patient/family on patient safety including physical limitations  - Instruct patient to call for assistance with activity   - Consult OT/PT to assist with strengthening/mobility   - Keep Call bell within reach  - Keep bed low and locked with side rails adjusted as appropriate  - Keep care items and personal belongings within reach  - Initiate and maintain comfort rounds  - Make Fall Risk Sign visible to staff  - Offer Toileting every 2 Hours, in advance of need  - Initiate/Maintain bed/chair alarm  - Obtain necessary fall risk management equipment: nonskid footwear  -  Apply yellow socks and bracelet for high fall risk patients  - Consider moving patient to room near nurses station  Outcome: Progressing  Goal: Maintain or return to baseline ADL function  Description: INTERVENTIONS:  -  Assess patient's ability to carry out ADLs; assess patient's baseline for ADL function and identify physical deficits which impact ability to perform ADLs (bathing, care of mouth/teeth, toileting, grooming, dressing, etc.)  - Assess/evaluate cause of self-care deficits   - Assess range of motion  - Assess patient's mobility; develop plan if impaired  - Assess patient's need for assistive devices and provide as appropriate  - Encourage maximum independence but intervene and supervise when necessary  - Involve family in performance of ADLs  - Assess for home care needs following discharge   - Consider OT consult to assist with ADL evaluation and planning for discharge  - Provide patient education as appropriate  Outcome: Progressing  Goal: Maintains/Returns to pre admission functional level  Description: INTERVENTIONS:  - Perform AM-PAC 6 Click Basic Mobility/ Daily Activity assessment daily.  - Set and communicate daily mobility goal to care team and patient/family/caregiver.   - Collaborate with rehabilitation services on mobility goals if consulted  - Perform Range of Motion 3 times a day.  - Reposition patient every 2 hours.  - Dangle patient 3 times a day  - Stand patient 3 times a day  - Ambulate patient 3 times a day  - Out of bed to chair 3 times a day   - Out of bed for meals 3 times a day  - Out of bed for toileting  - Record patient progress and toleration of activity level   Outcome: Progressing     Problem: DISCHARGE PLANNING  Goal: Discharge to home or other facility with appropriate resources  Description: INTERVENTIONS:  - Identify barriers to discharge w/patient and caregiver  - Arrange for needed discharge resources and transportation as appropriate  - Identify discharge learning  needs (meds, wound care, etc.)  - Arrange for interpretive services to assist at discharge as needed  - Refer to Case Management Department for coordinating discharge planning if the patient needs post-hospital services based on physician/advanced practitioner order or complex needs related to functional status, cognitive ability, or social support system  Outcome: Progressing     Problem: Knowledge Deficit  Goal: Patient/family/caregiver demonstrates understanding of disease process, treatment plan, medications, and discharge instructions  Description: Complete learning assessment and assess knowledge base.  Interventions:  - Provide teaching at level of understanding  - Provide teaching via preferred learning methods  Outcome: Progressing     Problem: CARDIOVASCULAR - ADULT  Goal: Maintains optimal cardiac output and hemodynamic stability  Description: INTERVENTIONS:  - Monitor I/O, vital signs and rhythm  - Monitor for S/S and trends of decreased cardiac output  - Administer and titrate ordered vasoactive medications to optimize hemodynamic stability  - Assess quality of pulses, skin color and temperature  - Assess for signs of decreased coronary artery perfusion  - Instruct patient to report change in severity of symptoms  Outcome: Progressing  Goal: Absence of cardiac dysrhythmias or at baseline rhythm  Description: INTERVENTIONS:  - Continuous cardiac monitoring, vital signs, obtain 12 lead EKG if ordered  - Administer antiarrhythmic and heart rate control medications as ordered  - Monitor electrolytes and administer replacement therapy as ordered  Outcome: Progressing

## 2024-09-28 NOTE — UTILIZATION REVIEW
"Initial Clinical Review    Admission: Date/Time/Statement:   Admission Orders (From admission, onward)       Ordered        09/27/24 1433  Place in Observation  Once                       Orders Placed This Encounter   Procedures    Place in Observation     Standing Status:   Standing     Number of Occurrences:   1     Order Specific Question:   Level of Care     Answer:   Med Surg [16]     ED Arrival Information       Expected   -    Arrival   9/27/2024 11:56    Acuity   Emergent              Means of arrival   Walk-In    Escorted by   Friend    Service   Hospitalist    Admission type   Emergency              Arrival complaint   weakness, dizziness, SOB and hypotension        Chief Complaint   Patient presents with    Hypotension     Pt was seen at the family doctor today and was sent to the ER to be evaluated for low blood pressure. Pt states for about a week he has felt weak, dizzy, and SOB. Pt denies any chest pain.      Initial Presentation:  58 y/o male with PMHx arrhythmia with ablation at [facility] approx 15 years ago, also admission in 2022 2nd CHF with LV EF of 40% - maintained on a medical regimen of Entresto, Jardiance,  metoprolol + demadex - presented to [facility] ED on 9/27/24 per PCP 2nd weakness, dizziness, SOB and hypotension. Reports \"feeling unwell last year\".  Stated he was recently seen by another ? Cardiologist/ Electrophysiologist through [facility] and scheduled to undergo repeat ablation 2nd cardiac monitor results found recently.  n the recent past.    Reports recently vacationing and drinking alcohol - having 8 alcoholic beverages in one sitting then when he got home last week he started to feel unwell - very fatigued and lightheaded when attempting to stand up, also reports SOB with minimal exertion and presenting to the ED - found to be in atrial fibrillation with a rapid ventricular response in the ED.  He was given a dose of IV Cardizem and admitted for further " evaluation and management.  In ED - Atrial Fib with RVR to 154  ROS/ Exam: rapid, irregular rhythym - atrial fib per EKG  EKG: Atria Fib with RVR to 154/ min  Labs: K+ 3.9  Troponin negative   ED Tx: IV cardizem 15 mg + IV Cardizem gtt  Placed in Observation 9/27/24 at 1433 -     Atrial fibrillation with a rapid ventricular response - QOD1HOT-TRHm = 1  Given 1 dose of IV Cardizem in the emergency department  Repeat echocardiogram noted with a decline in ejection fraction, IV Cardizem is to be avoided  IV amiodarone is to be avoided given onset unknown, likely at least a week ago and not on anticoagulation  Will increase metoprolol to tartrate to 25 mg QID with transition back to succinate upon discharge  Recommend systemic anticoagulation with Eliquis in preparation for RUDI guided cardioversion if he does not revert back to normal sinus rhythm on his own  Recommend outpatient sleep study for evaluation of NAVIN as a contributor, TSH within normal limits  Plan for some type of ablation per patient at [facility] - records unavailable  Needs rhythm control strategy in setting of cardiomyopathy  Nonischemic cardiomyopathy, EF 25%  EF previously reported as 40%, now 25%  Previous ischemic evaluation in 2022 with nuclear stress test without ischemia or scar  He is unsure if he has had a repeat echocardiogram since 2022  Maintained on proper medical therapy with Entresto, Jardiance, metoprolol succinate  Appears euvolemic on exam on current oral diuretic regimen  History previous ablation  Done about 15 years ago at [facility] in [place]  Cannot elaborate details regarding what rhythm he had ablated  Dyslipidemia  Continue outpatient high intensity statin regimen  Possible NAVIN   Recommend outpatient sleep study     Anticipated Length of Stay/Certification Statement: Patient will be admitted on an observation basis with an anticipated length of stay of less than 2 midnights secondary to a fib.     9/27/24  CARDIOLOGY:  Atrial fibrillation with a rapid ventricular response - WCQ3HFA-ZVAr = 1  Given 1 dose of IV Cardizem in the emergency department  Repeat echocardiogram noted with a decline in ejection fraction, IV Cardizem is to be avoided  IV amiodarone is to be avoided given onset unknown, likely at least a week ago and not on anticoagulation  Will increase metoprolol to tartrate to 25 mg QID with transition back to succinate upon discharge  Recommend systemic anticoagulation with Eliquis in preparation for RUDI guided cardioversion if he does not revert back to normal sinus rhythm on his own  Recommend outpatient sleep study for evaluation of NAVIN as a contributor, TSH within normal limits  Plan for some type of ablation per patient at [facility] - records unavailable  Needs rhythm control strategy in setting of cardiomyopathy  Nonischemic cardiomyopathy, EF 25%  EF previously reported as 40%, now 25%  Previous ischemic evaluation in 2022 with nuclear stress test without ischemia or scar  He is unsure if he has had a repeat echocardiogram since 2022  Maintained on proper medical therapy with Entresto, Jardiance, metoprolol succinate  Appears euvolemic on exam on current oral diuretic regimen  History previous ablation  Done about 15 years ago at [facility] in [place]  Cannot elaborate details regarding what rhythm he had ablated  Dyslipidemia  Continue outpatient high intensity statin regimen  Possible NAVIN   Recommend outpatient sleep study    9/28/24 - DAY 2:  Atrial fibrillation with RVR (HCC)  Patient reports 2 weeks of feeling unwell, found to be in a fib with RVR in the ED  Responded well to a one time IV dose of cardizem  Takes metoprolol 25 mg daily - convert to short acting and utilize 25 mg QID  Monitor on telemetry  Risk factors include: lack of sleep at the onset of symptoms, increased alcohol intake around the onset of symptoms (on vacation, he typically does not drink), known to snore at  night  Outpatient sleep study recommended  Echocardiogram with reduced ejection fraction, plan of care reviewed with cardiology  Anticoagulation therapy started in anticipation of cardioversion  Cardiology is recommending DC cardioversion.      ED Treatment-Medication Administration from 09/27/2024 1156 to 09/27/2024 1525         Date/Time Order Dose Route Action     09/27/2024 1218 diltiazem (CARDIZEM) injection 15 mg 15 mg Intravenous Given     Scheduled Medications:  allopurinol, 100 mg, Oral, Daily  apixaban, 5 mg, Oral, BID  atorvastatin, 80 mg, Oral, Daily  cholecalciferol, 2,000 Units, Oral, Daily  DULoxetine, 90 mg, Oral, Daily  Empagliflozin, 10 mg, Oral, Daily  metoprolol tartrate, 25 mg, Oral, 4x Daily  sacubitril-valsartan, 1 tablet, Oral, BID  torsemide, 20 mg, Oral, BID    Continuous IV Infusions:  IV diltiazem (CARDIZEM) 125 mg in sodium chloride 0.9 % 125 mL infusion   [[id number]]  Ordered Dose: 1-15 mg/hr Route: Intravenous Frequency: Titrated, @ 1-15 mL/hr   Scheduled Start Date/Time: 09/27/24 1230 End Date/Time: 09/27/24 1557      PRN Meds:  acetaminophen, 650 mg, Oral, Q6H PRN  calcium carbonate, 1,000 mg, Oral, Daily PRN  metoprolol, 5 mg, Intravenous, Q6H PRN HR > 120 - 9/28 X 1  ondansetron, 4 mg, Intravenous, Q6H PRN  perflutren lipid microsphere, 0.4 mL/min, Intravenous, Once in imaging      ED Triage Vitals   Temperature Pulse Respirations Blood Pressure SpO2 Pain Score   09/27/24 1202 09/27/24 1202 09/27/24 1202 09/27/24 1202 09/27/24 1202 09/27/24 1600   (!) 96.9 °F (36.1 °C) 63 18 113/59 94 % No Pain     Weight (last 2 days)       Date/Time Weight    09/28/24 0600 118 (259.04)    09/27/24 1721 120 (264)    09/27/24 1558 120 (264.2)    09/27/24 1442 125 (275.58)     Vital Signs (last 3 days)      Date/Time Temp Pulse Resp BP MAP (mmHg) SpO2 Calculated FIO2 (%) - Nasal Cannula Nasal Cannula O2 Flow Rate (L/min) O2 Device Patient Position - Orthostatic VS   09/29/24 06:53:52 97.6 °F (36.4  °C) 41 Abnormal  12 106/67 80 99 % -- -- -- --   09/29/24 04:12:58 -- 56 -- 104/66 79 93 % -- -- -- --   09/29/24 04:09:35 -- 55 -- 104/66 79 94 % -- -- -- --   09/28/24 22:45:44 98.4 °F (36.9 °C) 60 18 99/64 76 94 % -- -- None (Room air) Lying   09/28/24 21:00:18 -- 72 -- 100/62 75 91 % -- -- -- Lying   09/28/24 2023 -- -- -- -- -- 90 % -- -- None (Room air) --   09/28/24 19:05:57 97.5 °F (36.4 °C) 55 16 94/61 72 93 % -- -- None (Room air) Lying   09/28/24 14:27:03 97.4 °F (36.3 °C) Abnormal  57 14 95/64 74 93 % -- -- -- --   09/28/24 1240 -- 110 Abnormal  -- 104/61 -- -- -- -- -- --   09/28/24 11:30:15 97.8 °F (36.6 °C) 52 Abnormal  16 114/61 79 91 % -- -- -- --   09/28/24 07:35:12 97.7 °F (36.5 °C) 56 12 123/79 94 93 % -- -- None (Room air) Lying   09/28/24 04:35:36 97.9 °F (36.6 °C) 77 17 117/77 90 96 % -- -- None (Room air) Lying   09/27/24 22:46:21 97.8 °F (36.6 °C) 111 Abnormal  14 109/75 86 93 % -- -- -- Lying   09/27/24 21:36:33 -- 75 -- 111/70 84 94 % -- -- -- Lying   09/27/24 1948 -- -- -- -- -- 94 % -- -- None (Room air) --   09/27/24 19:45:38 -- 56 -- 91/56 68 93 % -- -- -- Lying   09/27/24 18:59:43 -- 97 -- 91/59 70 93 % -- -- -- --   09/27/24 18:54:49 98.1 °F (36.7 °C) 99 16 102/60 74 92 % -- -- -- Lying   09/27/24 1721 97.2 °F (36.2 °C) Abnormal  66 16 111/64 80 91 % -- -- -- Sitting   09/27/24 1600 -- -- -- -- -- 95 % -- -- None (Room air) --   09/27/24 1442 -- 110 Abnormal  -- 102/61 -- -- -- -- -- --   09/27/24 1400 -- 90 16 119/58 82 94 % 26 1.5 L/min Nasal cannula Sitting   09/27/24 1315 -- 102 20 126/74 89 94 % 26 1.5 L/min Nasal cannula Sitting   09/27/24 1234 -- 94 18 -- -- 93 % 26 1.5 L/min Nasal cannula --   09/27/24 1230 -- 99 17 115/64 83 89 % Abnormal  -- -- None (Room air) Sitting   09/27/24 1225 -- 106 Abnormal  -- -- -- -- -- -- -- --   09/27/24 1218 -- 143 Abnormal  20 110/73 87 94 % -- -- None (Room air) Sitting   09/27/24 1215 -- -- -- -- -- -- -- -- None (Room air) --   09/27/24  1207 -- 154 Abnormal  -- -- -- -- -- -- -- --   09/27/24 1202 96.9 °F (36.1 °C) Abnormal  63 18 113/59 79 94 % -- -- None (Room air) Sitting       Pertinent Labs/Diagnostic Test Results:   9/27/24 - 12 LEAD EKG:  Component Value   Ventricular Rate 149   Atrial Rate 138   WV Interval    QRSD Interval 72   QT Interval 328   QTC Interval 516   P Axis    QRS Axis -46   T Wave Axis -1     Echo complete w/ contrast if indicated   Final Result (09/27 1612)        Left Ventricle: Left ventricular cavity size is normal. Wall thickness    is mildly increased. There is concentric remodeling. The left ventricular    ejection fraction is 25 to 30%. Systolic function is severely reduced.    There is global hypokinesis with regional variation. Unable to assess    diastolic function due to atrial fibrillation.     Right Ventricle: Right ventricle is not well visualized. Systolic    function is reduced.       Results from last 7 days   Lab Units 09/29/24 0419 09/28/24 0446 09/27/24  1217   WBC Thousand/uL 9.25 7.03 6.09   HEMOGLOBIN g/dL 17.6* 16.6 17.2*   HEMATOCRIT % 52.0* 49.3 50.6*   PLATELETS Thousands/uL 320 286 306   TOTAL NEUT ABS Thousands/µL 5.78  --  3.28     Results from last 7 days   Lab Units 09/29/24 0419 09/28/24 0446 09/27/24  1217   SODIUM mmol/L 141 140 141   POTASSIUM mmol/L 3.7 3.6 3.9   CHLORIDE mmol/L 105 104 106   CO2 mmol/L 25 25 23   ANION GAP mmol/L 11 11 12   BUN mg/dL 26* 25 25   CREATININE mg/dL 1.06 1.08 1.06   EGFR ml/min/1.73sq m 75 74 75   CALCIUM mg/dL 9.5 9.1 9.3   MAGNESIUM mg/dL 2.3 2.1  --      Results from last 7 days   Lab Units 09/28/24 0446 09/27/24  1217   AST U/L 23 30   ALT U/L 34 36   ALK PHOS U/L 76 82   TOTAL PROTEIN g/dL 7.4 7.6   ALBUMIN g/dL 4.1 4.3   TOTAL BILIRUBIN mg/dL 0.75 0.77     Results from last 7 days   Lab Units 09/29/24 0419 09/28/24  0446 09/27/24  1217   GLUCOSE RANDOM mg/dL 113 107 115     Results from last 7 days   Lab Units 09/27/24  1545 09/27/24  1420  09/27/24  1217   HS TNI 0HR ng/L  --   --  8   HS TNI 2HR ng/L  --  7  --    HSTNI D2 ng/L  --  -1  --    HS TNI 4HR ng/L 7  --   --    HSTNI D4 ng/L -1  --   --      Results from last 7 days   Lab Units 09/27/24  1217   D-DIMER QUANTITATIVE ug/ml FEU 0.54*     Results from last 7 days   Lab Units 09/27/24  1217   TSH 3RD GENERATON uIU/mL 2.810     Results from last 7 days   Lab Units 09/27/24  1217   BNP pg/mL 268*     Past Medical History:   Diagnosis Date    Acute systolic congestive heart failure (HCC)     Anxiety     CHF (congestive heart failure) (HCC)     GERD (gastroesophageal reflux disease)     Hyperlipidemia     Kidney stones      Present on Admission:   Atrial fibrillation with RVR (HCC)   Dyslipidemia   Chronic systolic (congestive) heart failure (HCC)   Class 2 obesity due to excess calories without serious comorbidity in adult    Admitting Diagnosis: Fatigue [R53.83]  Hypotension [I95.9]  History of CHF (congestive heart failure) [Z86.79]  Atrial fibrillation with rapid ventricular response (HCC) [I48.91]    Age/Sex: 59 y.o. male    Network Utilization Review Department  ATTENTION: Please call with any questions or concerns to [phone] and carefully listen to the prompts so that you are directed to the right person. All voicemails are confidential.   For Discharge needs, contact Care Management DC Support Team at [phone] opt. 2  Send all requests for admission clinical reviews, approved or denied determinations and any other requests to dedicated fax number below belonging to the [facility] where the patient is receiving treatment. List of dedicated fax numbers for the Facilities:  FACILITY NAME UR FAX NUMBER   ADMISSION DENIALS (Administrative/Medical Necessity) [phone]   DISCHARGE SUPPORT TEAM (NETWORK) [phone]   PARENT CHILD HEALTH (Maternity/NICU/Pediatrics) [phone]   [facility] [phone]   [facility] [phone]   [facility]  [facility] [phone]   [facility] [phone]   [facility] [phone]   [facility] [phone]   [facility] [phone]   [facility] [phone]   [facility] [phone]   [facility] [phone]   [facility] [phone]   [facility] [phone]

## 2024-09-28 NOTE — PLAN OF CARE
Problem: PAIN - ADULT  Goal: Verbalizes/displays adequate comfort level or baseline comfort level  Description: Interventions:  - Encourage patient to monitor pain and request assistance  - Assess pain using appropriate pain scale  - Administer analgesics based on type and severity of pain and evaluate response  - Implement non-pharmacological measures as appropriate and evaluate response  - Consider cultural and social influences on pain and pain management  - Notify physician/advanced practitioner if interventions unsuccessful or patient reports new pain  Outcome: Progressing     Problem: INFECTION - ADULT  Goal: Absence or prevention of progression during hospitalization  Description: INTERVENTIONS:  - Assess and monitor for signs and symptoms of infection  - Monitor lab/diagnostic results  - Monitor all insertion sites, i.e. indwelling lines, tubes, and drains  - Monitor endotracheal if appropriate and nasal secretions for changes in amount and color  - Basehor appropriate cooling/warming therapies per order  - Administer medications as ordered  - Instruct and encourage patient and family to use good hand hygiene technique  - Identify and instruct in appropriate isolation precautions for identified infection/condition  Outcome: Progressing     Problem: SAFETY ADULT  Goal: Patient will remain free of falls  Description: INTERVENTIONS:  - Educate patient/family on patient safety including physical limitations  - Instruct patient to call for assistance with activity   - Consult OT/PT to assist with strengthening/mobility   - Keep Call bell within reach  - Keep bed low and locked with side rails adjusted as appropriate  - Keep care items and personal belongings within reach  - Initiate and maintain comfort rounds  - Make Fall Risk Sign visible to staff  - Offer Toileting every 2 Hours, in advance of need  - Initiate/Maintain bed/chair alarm  - Obtain necessary fall risk management equipment: nonskid footwear  -  Apply yellow socks and bracelet for high fall risk patients  - Consider moving patient to room near nurses station  Outcome: Progressing  Goal: Maintain or return to baseline ADL function  Description: INTERVENTIONS:  -  Assess patient's ability to carry out ADLs; assess patient's baseline for ADL function and identify physical deficits which impact ability to perform ADLs (bathing, care of mouth/teeth, toileting, grooming, dressing, etc.)  - Assess/evaluate cause of self-care deficits   - Assess range of motion  - Assess patient's mobility; develop plan if impaired  - Assess patient's need for assistive devices and provide as appropriate  - Encourage maximum independence but intervene and supervise when necessary  - Involve family in performance of ADLs  - Assess for home care needs following discharge   - Consider OT consult to assist with ADL evaluation and planning for discharge  - Provide patient education as appropriate  Outcome: Progressing  Goal: Maintains/Returns to pre admission functional level  Description: INTERVENTIONS:  - Perform AM-PAC 6 Click Basic Mobility/ Daily Activity assessment daily.  - Set and communicate daily mobility goal to care team and patient/family/caregiver.   - Collaborate with rehabilitation services on mobility goals if consulted  - Perform Range of Motion 3 times a day.  - Reposition patient every 2 hours.  - Dangle patient 3 times a day  - Stand patient 3 times a day  - Ambulate patient 3 times a day  - Out of bed to chair 3 times a day   - Out of bed for meals 3 times a day  - Out of bed for toileting  - Record patient progress and toleration of activity level   Outcome: Progressing     Problem: DISCHARGE PLANNING  Goal: Discharge to home or other facility with appropriate resources  Description: INTERVENTIONS:  - Identify barriers to discharge w/patient and caregiver  - Arrange for needed discharge resources and transportation as appropriate  - Identify discharge learning  needs (meds, wound care, etc.)  - Arrange for interpretive services to assist at discharge as needed  - Refer to Case Management Department for coordinating discharge planning if the patient needs post-hospital services based on physician/advanced practitioner order or complex needs related to functional status, cognitive ability, or social support system  Outcome: Progressing

## 2024-09-28 NOTE — ASSESSMENT & PLAN NOTE
Wt Readings from Last 3 Encounters:   09/28/24 118 kg (259 lb 0.7 oz)   06/26/24 125 kg (275 lb 6.4 oz)   04/07/24 126 kg (277 lb 12.5 oz)     Last ECHO in 2022 with EF of 40%  With reduced EF to 25 to 30%  Continue on torsemide  BNP and CXR not suggestive of exacerbation. Has been having SOB which may be secondary to an exacerbation versus the a fib with RVR

## 2024-09-28 NOTE — CASE MANAGEMENT
Case Management Discharge Planning Note    Patient name Yazan Child  Location /402-01 MRN [id number]  : 1965 Date 2024       Current Admission Date: 2024  Current Admission Diagnosis:Atrial fibrillation with RVR (HCC)   Patient Active Problem List    Diagnosis Date Noted Date Diagnosed    Atrial fibrillation with RVR (HCC) 2024     Chronic systolic (congestive) heart failure (HCC) 2024     Class 2 obesity due to excess calories without serious comorbidity in adult 2024     Acute systolic congestive heart failure (HCC) 2024     Paroxysmal SVT (supraventricular tachycardia) 2022     Vitamin D insufficiency 2022     Mixed hyperlipidemia 2022     Snoring 2022     Elevated platelet count 2022     Coronary artery calcification seen on CT scan 2022     Hepatic steatosis 2022     GERD (gastroesophageal reflux disease)      Anxiety      Dyslipidemia 2021     Osteoarthritis of knee 2021     Impingement syndrome of shoulder region 2021     Hyperuricemia 2021     Recurrent unilateral inguinal hernia 2019     Retained ureteral stent 2019     Ureteral calculus 2019     Adjustment disorder with mixed anxiety and depressed mood 2018     Anorgasmia of male 2017     Bilateral hand numbness 2017     Tremor 2017     Chronic musculoskeletal pain 11/15/2017     History of alcohol abuse 2013       LOS (days): 0  Geometric Mean LOS (GMLOS) (days):   Days to GMLOS:     OBJECTIVE:            Current admission status: Observation   Preferred Pharmacy:   [id number] - Closed - [place] - [address]  [address]  [place] [address]  Phone: [phone] Fax: [phone]    [facility]  - [place], [place] - [address]   [address]   [facility] [address]  Phone: [phone] Fax: [phone]    [facility] -  [place] - [address] [facility]  [place] [address]  Phone: [phone] Fax: [phone]    Primary Care Provider: Vera Kaba PA-C    Primary Insurance: BLUE CROSS  Secondary Insurance:     DISCHARGE DETAILS:  30 day free card for Eliquis was given to pt's nurse to provide to pt when he is dc'd home.

## 2024-09-28 NOTE — ASSESSMENT & PLAN NOTE
Patient reports 2 weeks of feeling unwell, found to be in a fib with RVR in the ED  Responded well to a one time IV dose of cardizem  Takes metoprolol 25 mg daily - convert to short acting and utilize 25 mg QID  Monitor on telemetry  Risk factors include: lack of sleep at the onset of symptoms, increased alcohol intake around the onset of symptoms (on vacation, he typically does not drink), known to snore at night  Outpatient sleep study recommended  Echocardiogram with reduced ejection fraction, plan of care reviewed with cardiology  Anticoagulation therapy started in anticipation of cardioversion  Cardiology is recommending DC cardioversion.

## 2024-09-29 VITALS
WEIGHT: 259.04 LBS | BODY MASS INDEX: 37.08 KG/M2 | HEIGHT: 70 IN | OXYGEN SATURATION: 93 % | TEMPERATURE: 97.5 F | RESPIRATION RATE: 16 BRPM | SYSTOLIC BLOOD PRESSURE: 111 MMHG | HEART RATE: 56 BPM | DIASTOLIC BLOOD PRESSURE: 77 MMHG

## 2024-09-29 LAB
ANION GAP SERPL CALCULATED.3IONS-SCNC: 11 MMOL/L (ref 4–13)
APTT PPP: 31 SECONDS (ref 23–34)
APTT PPP: 41 SECONDS (ref 23–34)
BASOPHILS # BLD AUTO: 0.04 THOUSANDS/ΜL (ref 0–0.1)
BASOPHILS NFR BLD AUTO: 0 % (ref 0–1)
BUN SERPL-MCNC: 26 MG/DL (ref 5–25)
CALCIUM SERPL-MCNC: 9.5 MG/DL (ref 8.4–10.2)
CHLORIDE SERPL-SCNC: 105 MMOL/L (ref 96–108)
CO2 SERPL-SCNC: 25 MMOL/L (ref 21–32)
CREAT SERPL-MCNC: 1.06 MG/DL (ref 0.6–1.3)
EOSINOPHIL # BLD AUTO: 0.15 THOUSAND/ΜL (ref 0–0.61)
EOSINOPHIL NFR BLD AUTO: 2 % (ref 0–6)
ERYTHROCYTE [DISTWIDTH] IN BLOOD BY AUTOMATED COUNT: 13.8 % (ref 11.6–15.1)
GFR SERPL CREATININE-BSD FRML MDRD: 75 ML/MIN/1.73SQ M
GLUCOSE SERPL-MCNC: 113 MG/DL (ref 65–140)
HCT VFR BLD AUTO: 52 % (ref 36.5–49.3)
HGB BLD-MCNC: 17.6 G/DL (ref 12–17)
IMM GRANULOCYTES # BLD AUTO: 0.04 THOUSAND/UL (ref 0–0.2)
IMM GRANULOCYTES NFR BLD AUTO: 0 % (ref 0–2)
LYMPHOCYTES # BLD AUTO: 2.57 THOUSANDS/ΜL (ref 0.6–4.47)
LYMPHOCYTES NFR BLD AUTO: 28 % (ref 14–44)
MAGNESIUM SERPL-MCNC: 2.3 MG/DL (ref 1.9–2.7)
MCH RBC QN AUTO: 30.3 PG (ref 26.8–34.3)
MCHC RBC AUTO-ENTMCNC: 33.8 G/DL (ref 31.4–37.4)
MCV RBC AUTO: 90 FL (ref 82–98)
MONOCYTES # BLD AUTO: 0.67 THOUSAND/ΜL (ref 0.17–1.22)
MONOCYTES NFR BLD AUTO: 7 % (ref 4–12)
NEUTROPHILS # BLD AUTO: 5.78 THOUSANDS/ΜL (ref 1.85–7.62)
NEUTS SEG NFR BLD AUTO: 63 % (ref 43–75)
NRBC BLD AUTO-RTO: 0 /100 WBCS
PLATELET # BLD AUTO: 320 THOUSANDS/UL (ref 149–390)
PMV BLD AUTO: 10.8 FL (ref 8.9–12.7)
POTASSIUM SERPL-SCNC: 3.7 MMOL/L (ref 3.5–5.3)
RBC # BLD AUTO: 5.81 MILLION/UL (ref 3.88–5.62)
SODIUM SERPL-SCNC: 141 MMOL/L (ref 135–147)
WBC # BLD AUTO: 9.25 THOUSAND/UL (ref 4.31–10.16)

## 2024-09-29 PROCEDURE — 80048 BASIC METABOLIC PNL TOTAL CA: CPT | Performed by: INTERNAL MEDICINE

## 2024-09-29 PROCEDURE — 83735 ASSAY OF MAGNESIUM: CPT | Performed by: INTERNAL MEDICINE

## 2024-09-29 PROCEDURE — 99239 HOSP IP/OBS DSCHRG MGMT >30: CPT | Performed by: INTERNAL MEDICINE

## 2024-09-29 PROCEDURE — 85730 THROMBOPLASTIN TIME PARTIAL: CPT | Performed by: INTERNAL MEDICINE

## 2024-09-29 PROCEDURE — 93005 ELECTROCARDIOGRAM TRACING: CPT

## 2024-09-29 PROCEDURE — 85025 COMPLETE CBC W/AUTO DIFF WBC: CPT | Performed by: INTERNAL MEDICINE

## 2024-09-29 RX ORDER — METOPROLOL TARTRATE 25 MG/1
25 TABLET, FILM COATED ORAL 4 TIMES DAILY
Start: 2024-09-29

## 2024-09-29 RX ORDER — DULOXETIN HYDROCHLORIDE 30 MG/1
90 CAPSULE, DELAYED RELEASE ORAL DAILY
Start: 2024-09-30

## 2024-09-29 RX ORDER — ACETAMINOPHEN 325 MG/1
650 TABLET ORAL EVERY 6 HOURS PRN
Start: 2024-09-29

## 2024-09-29 RX ORDER — METOPROLOL TARTRATE 1 MG/ML
5 INJECTION, SOLUTION INTRAVENOUS EVERY 6 HOURS PRN
Start: 2024-09-29

## 2024-09-29 RX ORDER — POTASSIUM CHLORIDE 1500 MG/1
40 TABLET, EXTENDED RELEASE ORAL ONCE
Status: COMPLETED | OUTPATIENT
Start: 2024-09-29 | End: 2024-09-29

## 2024-09-29 RX ORDER — CALCIUM CARBONATE 500 MG/1
1000 TABLET, CHEWABLE ORAL DAILY PRN
Start: 2024-09-29

## 2024-09-29 RX ORDER — HEPARIN SODIUM 10000 [USP'U]/100ML
3-20 INJECTION, SOLUTION INTRAVENOUS
Status: DISCONTINUED | OUTPATIENT
Start: 2024-09-29 | End: 2024-09-29 | Stop reason: HOSPADM

## 2024-09-29 RX ADMIN — TORSEMIDE 20 MG: 20 TABLET ORAL at 08:58

## 2024-09-29 RX ADMIN — EMPAGLIFLOZIN 10 MG: 10 TABLET, FILM COATED ORAL at 08:58

## 2024-09-29 RX ADMIN — METOPROLOL TARTRATE 25 MG: 25 TABLET, FILM COATED ORAL at 08:58

## 2024-09-29 RX ADMIN — DULOXETINE HYDROCHLORIDE 90 MG: 30 CAPSULE, DELAYED RELEASE ORAL at 08:58

## 2024-09-29 RX ADMIN — APIXABAN 5 MG: 5 TABLET, FILM COATED ORAL at 08:58

## 2024-09-29 RX ADMIN — ATORVASTATIN CALCIUM 80 MG: 40 TABLET, FILM COATED ORAL at 18:10

## 2024-09-29 RX ADMIN — HEPARIN SODIUM 11.1 UNITS/KG/HR: 10000 INJECTION, SOLUTION INTRAVENOUS at 10:55

## 2024-09-29 RX ADMIN — POTASSIUM CHLORIDE 40 MEQ: 1500 TABLET, EXTENDED RELEASE ORAL at 10:43

## 2024-09-29 RX ADMIN — Medication 2000 UNITS: at 08:58

## 2024-09-29 RX ADMIN — SACUBITRIL AND VALSARTAN 1 TABLET: 24; 26 TABLET, FILM COATED ORAL at 08:58

## 2024-09-29 RX ADMIN — ALLOPURINOL 100 MG: 100 TABLET ORAL at 08:58

## 2024-09-29 RX ADMIN — METOPROLOL TARTRATE 25 MG: 25 TABLET, FILM COATED ORAL at 12:08

## 2024-09-29 RX ADMIN — SACUBITRIL AND VALSARTAN 1 TABLET: 24; 26 TABLET, FILM COATED ORAL at 18:10

## 2024-09-29 RX ADMIN — METOPROLOL TARTRATE 25 MG: 25 TABLET, FILM COATED ORAL at 18:10

## 2024-09-29 NOTE — PLAN OF CARE
Problem: PAIN - ADULT  Goal: Verbalizes/displays adequate comfort level or baseline comfort level  Description: Interventions:  - Encourage patient to monitor pain and request assistance  - Assess pain using appropriate pain scale  - Administer analgesics based on type and severity of pain and evaluate response  - Implement non-pharmacological measures as appropriate and evaluate response  - Consider cultural and social influences on pain and pain management  - Notify physician/advanced practitioner if interventions unsuccessful or patient reports new pain  Outcome: Progressing     Problem: INFECTION - ADULT  Goal: Absence or prevention of progression during hospitalization  Description: INTERVENTIONS:  - Assess and monitor for signs and symptoms of infection  - Monitor lab/diagnostic results  - Monitor all insertion sites, i.e. indwelling lines, tubes, and drains  - Monitor endotracheal if appropriate and nasal secretions for changes in amount and color  - [place] appropriate cooling/warming therapies per order  - Administer medications as ordered  - Instruct and encourage patient and family to use good hand hygiene technique  - Identify and instruct in appropriate isolation precautions for identified infection/condition  Outcome: Progressing     Problem: SAFETY ADULT  Goal: Patient will remain free of falls  Description: INTERVENTIONS:  - Educate patient/family on patient safety including physical limitations  - Instruct patient to call for assistance with activity   - Consult OT/PT to assist with strengthening/mobility   - Keep Call bell within reach  - Keep bed low and locked with side rails adjusted as appropriate  - Keep care items and personal belongings within reach  - Initiate and maintain comfort rounds  - Make Fall Risk Sign visible to staff  - Offer Toileting every 2 Hours, in advance of need  - Initiate/Maintain bed/chair alarm  - Obtain necessary fall risk management equipment: nonskid footwear  -  Apply yellow socks and bracelet for high fall risk patients  - Consider moving patient to room near nurses station  Outcome: Progressing  Goal: Maintain or return to baseline ADL function  Description: INTERVENTIONS:  -  Assess patient's ability to carry out ADLs; assess patient's baseline for ADL function and identify physical deficits which impact ability to perform ADLs (bathing, care of mouth/teeth, toileting, grooming, dressing, etc.)  - Assess/evaluate cause of self-care deficits   - Assess range of motion  - Assess patient's mobility; develop plan if impaired  - Assess patient's need for assistive devices and provide as appropriate  - Encourage maximum independence but intervene and supervise when necessary  - Involve family in performance of ADLs  - Assess for home care needs following discharge   - Consider OT consult to assist with ADL evaluation and planning for discharge  - Provide patient education as appropriate  Outcome: Progressing  Goal: Maintains/Returns to pre admission functional level  Description: INTERVENTIONS:  - Perform AM-PAC 6 Click Basic Mobility/ Daily Activity assessment daily.  - Set and communicate daily mobility goal to care team and patient/family/caregiver.   - Collaborate with rehabilitation services on mobility goals if consulted  - Perform Range of Motion 3 times a day.  - Reposition patient every 2 hours.  - Dangle patient 3 times a day  - Stand patient 3 times a day  - Ambulate patient 3 times a day  - Out of bed to chair 3 times a day   - Out of bed for meals 3 times a day  - Out of bed for toileting  - Record patient progress and toleration of activity level   Outcome: Progressing     Problem: DISCHARGE PLANNING  Goal: Discharge to home or other facility with appropriate resources  Description: INTERVENTIONS:  - Identify barriers to discharge w/patient and caregiver  - Arrange for needed discharge resources and transportation as appropriate  - Identify discharge learning  needs (meds, wound care, etc.)  - Arrange for interpretive services to assist at discharge as needed  - Refer to Case Management Department for coordinating discharge planning if the patient needs post-hospital services based on physician/advanced practitioner order or complex needs related to functional status, cognitive ability, or social support system  Outcome: Progressing     Problem: Knowledge Deficit  Goal: Patient/family/caregiver demonstrates understanding of disease process, treatment plan, medications, and discharge instructions  Description: Complete learning assessment and assess knowledge base.  Interventions:  - Provide teaching at level of understanding  - Provide teaching via preferred learning methods  Outcome: Progressing     Problem: CARDIOVASCULAR - ADULT  Goal: Maintains optimal cardiac output and hemodynamic stability  Description: INTERVENTIONS:  - Monitor I/O, vital signs and rhythm  - Monitor for S/S and trends of decreased cardiac output  - Administer and titrate ordered vasoactive medications to optimize hemodynamic stability  - Assess quality of pulses, skin color and temperature  - Assess for signs of decreased coronary artery perfusion  - Instruct patient to report change in severity of symptoms  Outcome: Progressing  Goal: Absence of cardiac dysrhythmias or at baseline rhythm  Description: INTERVENTIONS:  - Continuous cardiac monitoring, vital signs, obtain 12 lead EKG if ordered  - Administer antiarrhythmic and heart rate control medications as ordered  - Monitor electrolytes and administer replacement therapy as ordered  Outcome: Progressing

## 2024-09-29 NOTE — ASSESSMENT & PLAN NOTE
Patient reports 2 weeks of feeling unwell, found to be in a fib with RVR in the ED  Outpatient sleep study recommended  Echocardiogram with reduced ejection fraction, plan of care reviewed with cardiology  Anticoagulation therapy started in anticipation of cardioversion, Eliquis changed to IV heparin at the request of [facility] cardiology  Transfer for RUDI with cardioversion

## 2024-09-29 NOTE — PLAN OF CARE
Problem: PAIN - ADULT  Goal: Verbalizes/displays adequate comfort level or baseline comfort level  Description: Interventions:  - Encourage patient to monitor pain and request assistance  - Assess pain using appropriate pain scale  - Administer analgesics based on type and severity of pain and evaluate response  - Implement non-pharmacological measures as appropriate and evaluate response  - Consider cultural and social influences on pain and pain management  - Notify physician/advanced practitioner if interventions unsuccessful or patient reports new pain  Outcome: Progressing     Problem: INFECTION - ADULT  Goal: Absence or prevention of progression during hospitalization  Description: INTERVENTIONS:  - Assess and monitor for signs and symptoms of infection  - Monitor lab/diagnostic results  - Monitor all insertion sites, i.e. indwelling lines, tubes, and drains  - Monitor endotracheal if appropriate and nasal secretions for changes in amount and color  - [place] appropriate cooling/warming therapies per order  - Administer medications as ordered  - Instruct and encourage patient and family to use good hand hygiene technique  - Identify and instruct in appropriate isolation precautions for identified infection/condition  Outcome: Progressing     Problem: SAFETY ADULT  Goal: Patient will remain free of falls  Description: INTERVENTIONS:  - Educate patient/family on patient safety including physical limitations  - Instruct patient to call for assistance with activity   - Consult OT/PT to assist with strengthening/mobility   - Keep Call bell within reach  - Keep bed low and locked with side rails adjusted as appropriate  - Keep care items and personal belongings within reach  - Initiate and maintain comfort rounds  - Make Fall Risk Sign visible to staff  - Offer Toileting every 2 Hours, in advance of need  - Initiate/Maintain bed/chair alarm  - Obtain necessary fall risk management equipment: nonskid footwear  -  Apply yellow socks and bracelet for high fall risk patients  - Consider moving patient to room near nurses station  Outcome: Progressing  Goal: Maintain or return to baseline ADL function  Description: INTERVENTIONS:  -  Assess patient's ability to carry out ADLs; assess patient's baseline for ADL function and identify physical deficits which impact ability to perform ADLs (bathing, care of mouth/teeth, toileting, grooming, dressing, etc.)  - Assess/evaluate cause of self-care deficits   - Assess range of motion  - Assess patient's mobility; develop plan if impaired  - Assess patient's need for assistive devices and provide as appropriate  - Encourage maximum independence but intervene and supervise when necessary  - Involve family in performance of ADLs  - Assess for home care needs following discharge   - Consider OT consult to assist with ADL evaluation and planning for discharge  - Provide patient education as appropriate  Outcome: Progressing  Goal: Maintains/Returns to pre admission functional level  Description: INTERVENTIONS:  - Perform AM-PAC 6 Click Basic Mobility/ Daily Activity assessment daily.  - Set and communicate daily mobility goal to care team and patient/family/caregiver.   - Collaborate with rehabilitation services on mobility goals if consulted  - Perform Range of Motion 3 times a day.  - Reposition patient every 2 hours.  - Dangle patient 3 times a day  - Stand patient 3 times a day  - Ambulate patient 3 times a day  - Out of bed to chair 3 times a day   - Out of bed for meals 3 times a day  - Out of bed for toileting  - Record patient progress and toleration of activity level   Outcome: Progressing

## 2024-09-29 NOTE — ASSESSMENT & PLAN NOTE
Wt Readings from Last 3 Encounters:   09/29/24 118 kg (259 lb 0.7 oz)   06/26/24 125 kg (275 lb 6.4 oz)   04/07/24 126 kg (277 lb 12.5 oz)     Last ECHO in 2022 with EF of 40%  Now with reduced EF to 25 to 30%  Continue on torsemide

## 2024-09-29 NOTE — DISCHARGE SUMMARY
Discharge Summary - Hospitalist   Name: Yazan Child 59 y.o. male I MRN: [id number]  Unit/Bed#: 402-01 I Date of Admission: 9/27/2024   Date of Service: 9/29/2024 I Hospital Day: 0     Assessment & Plan  Atrial fibrillation with RVR (HCC)  Patient reports 2 weeks of feeling unwell, found to be in a fib with RVR in the ED  Outpatient sleep study recommended  Echocardiogram with reduced ejection fraction, plan of care reviewed with cardiology  Anticoagulation therapy started in anticipation of cardioversion, Eliquis changed to IV heparin at the request of [facility] cardiology  Transfer for RUDI with cardioversion  Chronic systolic (congestive) heart failure (HCC)  Wt Readings from Last 3 Encounters:   09/29/24 118 kg (259 lb 0.7 oz)   06/26/24 125 kg (275 lb 6.4 oz)   04/07/24 126 kg (277 lb 12.5 oz)     Last ECHO in 2022 with EF of 40%  Now with reduced EF to 25 to 30%  Continue on torsemide    Dyslipidemia  Cont on statin therapy  Class 2 obesity due to excess calories without serious comorbidity in adult  Lifestyle modifications recommended     Medical Problems       Resolved Problems  Date Reviewed: 6/26/2024   None       Discharging Physician / Practitioner: Dany Gifford DO  PCP: Vera Kaba PA-C  Admission Date:   Admission Orders (From admission, onward)       Ordered        09/27/24 1433  Place in Observation  Once                          Discharge Date: 09/29/24    Consultations During Hospital Stay:  Cardiology    Procedures Performed:   None    Significant Findings / Test Results:   Echocardiogram with decreased ejection fraction of 25% which is worse from prior 2022  Complications: None    Reason for Admission: Shortness of breath, hypotension.    Hospital Course:   Yazan Child is a 59 y.o. male patient who originally presented to the hospital on 9/27/2024 due to hypotension noted at primary care office, patient felt lightheaded weak, had transient hypotension, patient also reported  "ongoing shortness of breath decreased activity level for about a week and a half.    Hospital Course: In the emergency room patient was noted to have atrial fibrillation with rapid ventricular response, patient has previous history of chronic systolic heart failure with decreased ejection fraction, patient received IV Cardizem in the ER, this did improve heart rate, subsequent testing including echocardiogram were completed, echo showed decreased EF compared to prior.    Patient was seen by [facility] cardiology, recommendation was for RUDI with cardioversion, due to patient being established with [facility] cardiology team patient requested transfer to [facility] for RUDI cardioversion.    The procedure could be completed at [facility] but patient choice was respected and arrangements were made for transfer to [facility].    Condition at Discharge: fair    Discharge Day Visit / Exam:   Subjective: Intermittent episodes of lightheadedness even while hospitalized  Vitals: Blood Pressure: 106/67 (09/29/24 0653)  Pulse: (!) 41 (09/29/24 0653)  Temperature: 97.6 °F (36.4 °C) (09/29/24 0653)  Temp Source: Oral (09/28/24 2245)  Respirations: 12 (09/29/24 0653)  Height: 5' 10\" (177.8 cm) (09/27/24 1721)  Weight - Scale: 118 kg (259 lb 0.7 oz) (09/29/24 0600)  SpO2: 99 % (09/29/24 0653)  Telemetry shows continuous A-fib with periods of RVR    Exam:   Physical Exam  Vitals and nursing note reviewed.   Constitutional:       General: He is not in acute distress.     Appearance: He is not ill-appearing, toxic-appearing or diaphoretic.   HENT:      Head: Normocephalic.   Cardiovascular:      Rate and Rhythm: Normal rate.   Pulmonary:      Effort: Pulmonary effort is normal.   Musculoskeletal:         General: Normal range of motion.   Skin:     General: Skin is warm and dry.   Neurological:      Mental Status: He is alert and oriented to person, place, and time. Mental status is at " baseline.      Cranial Nerves: No cranial nerve deficit.      Motor: No weakness.      Gait: Gait normal.   Psychiatric:         Mood and Affect: Mood normal.         Behavior: Behavior normal.         Thought Content: Thought content normal.         Judgment: Judgment normal.          Discussion with Family: Updated  (daughter) via phone.    Discharge instructions/Information to patient and family:   See after visit summary for information provided to patient and family.      Provisions for Follow-Up Care:  See after visit summary for information related to follow-up care and any pertinent home health orders.      Mobility at time of Discharge:   Basic Mobility Inpatient Raw Score: 24  JH-HLM Goal: 8: Walk 250 feet or more  JH-HLM Achieved: 8: Walk 250 feet ot more  HLM Goal achieved. Continue to encourage appropriate mobility.     Disposition:   Acute Care Hospital Transfer to [facility]    Planned Readmission: No    Discharge Medications:  See after visit summary for reconciled discharge medications provided to patient and/or family.      Administrative Statements   Discharge Statement:  I have spent a total time of 45 minutes in caring for this patient on the day of the visit/encounter. .    **Please Note: This note may have been constructed using a voice recognition system**

## 2024-09-30 LAB
ATRIAL RATE: 136 BPM
ATRIAL RATE: 138 BPM
ATRIAL RATE: 93 BPM
QRS AXIS: -24 DEGREES
QRS AXIS: -33 DEGREES
QRS AXIS: -46 DEGREES
QRSD INTERVAL: 72 MS
QRSD INTERVAL: 74 MS
QRSD INTERVAL: 76 MS
QT INTERVAL: 328 MS
QT INTERVAL: 332 MS
QT INTERVAL: 340 MS
QTC INTERVAL: 438 MS
QTC INTERVAL: 486 MS
QTC INTERVAL: 516 MS
T WAVE AXIS: -1 DEGREES
T WAVE AXIS: 37 DEGREES
T WAVE AXIS: 61 DEGREES
VENTRICULAR RATE: 100 BPM
VENTRICULAR RATE: 129 BPM
VENTRICULAR RATE: 149 BPM

## 2024-09-30 PROCEDURE — 93010 ELECTROCARDIOGRAM REPORT: CPT | Performed by: INTERNAL MEDICINE

## 2024-09-30 NOTE — NURSING NOTE
Patient was picked up and is getting transferred to [facility]. Report was given to the assigned RN, [place]. Patient said that he already called his wife about the transfer.

## 2024-09-30 NOTE — CASE MANAGEMENT
Case Management Discharge Planning Note    Patient name Yazan Child  Location /402-01 MRN [id number]  : 1965 Date 2024       Current Admission Date: 2024  Current Admission Diagnosis:Atrial fibrillation with RVR (HCC)   Patient Active Problem List    Diagnosis Date Noted Date Diagnosed    Atrial fibrillation with RVR (HCC) 2024     Chronic systolic (congestive) heart failure (HCC) 2024     Class 2 obesity due to excess calories without serious comorbidity in adult 2024     Acute systolic congestive heart failure (HCC) 2024     Paroxysmal SVT (supraventricular tachycardia) 2022     Vitamin D insufficiency 2022     Mixed hyperlipidemia 2022     Snoring 2022     Elevated platelet count 2022     Coronary artery calcification seen on CT scan 2022     Hepatic steatosis 2022     GERD (gastroesophageal reflux disease)      Anxiety      Dyslipidemia 2021     Osteoarthritis of knee 2021     Impingement syndrome of shoulder region 2021     Hyperuricemia 2021     Recurrent unilateral inguinal hernia 2019     Retained ureteral stent 2019     Ureteral calculus 2019     Adjustment disorder with mixed anxiety and depressed mood 2018     Anorgasmia of male 2017     Bilateral hand numbness 2017     Tremor 2017     Chronic musculoskeletal pain 11/15/2017     History of alcohol abuse 2013       LOS (days): 1  Geometric Mean LOS (GMLOS) (days):   Days to GMLOS:     OBJECTIVE:  Risk of Unplanned Readmission Score: 12.22         Current admission status: Inpatient   Preferred Pharmacy:   [id number] - Closed - [place] - [address]  [address]  [place] [address]  Phone: [phone] Fax: [phone]    [facility]  - [place], [place] - [address]   [address]   [facility] [address]  Phone: [phone] Fax:  [phone]    [facility] - [address] [address]  [facility] [address] [facility]  [place] [address]  Phone: [phone] Fax: [phone]    Primary Care Provider: Vera Kaba PA-C    Primary Insurance: BLUE CROSS  Secondary Insurance:     DISCHARGE DETAILS:  Late entry:Pt was transferred on 9/29/24 to [facility].

## 2024-09-30 NOTE — UTILIZATION REVIEW
Initial Clinical Review    Admission: Date/Time/Statement:     OBSERVATION 9-27-24 CHANGED TO INPATIENT 9-29-24   Certification Statement:  Patient will require continued cardiac monitoring  Discharge Plan:  Pending heart rate monitoring    Admission Orders (From admission, onward)       Ordered        09/29/24 1853  INPATIENT ADMISSION  Once            09/27/24 1433  Place in Observation  Once                             Echocardiogram with reduced ejection fraction, plan of care reviewed with cardiology. Anticoagulation therapy started in anticipation of cardioversion. Eliquis changed to iv heparin.    Cardiology is recommending DC cardioversion.transfer for RUDI with cardioversion.        Discharged 9-29-24   Hospital Course: In the emergency room patient was noted to have atrial fibrillation with rapid ventricular response, patient has previous history of chronic systolic heart failure with decreased ejection fraction, patient received IV Cardizem in the ER, this did improve heart rate, subsequent testing including echocardiogram were completed, echo showed decreased EF compared to prior.     Patient was seen by [facility] cardiology, recommendation was for RUDI with cardioversion, due to patient being established with [facility] cardiology team patient requested transfer to [facility] for RUDI cardioversion.

## 2024-10-03 NOTE — UTILIZATION REVIEW
"      Marta Squires, RN   Registered Nurse  Specialty: Utilization Review     Utilization Review     Addendum     Date of Service: 9/28/2024 10:15 AM     Expand All Collapse All    Initial Clinical Review     Admission: Date/Time/Statement:   Admission Orders (From admission, onward)          Ordered         09/27/24 1433   Place in Observation  Once                                 Orders Placed This Encounter   Procedures    Place in Observation       Standing Status:   Standing       Number of Occurrences:   1       Order Specific Question:   Level of Care       Answer:   Med Surg [16]      ED Arrival Information         Expected   -    Arrival   9/27/2024 11:56    Acuity   Emergent                 Means of arrival   Walk-In    Escorted by   Friend    Service   Hospitalist    Admission type   Emergency                 Arrival complaint   weakness, dizziness, SOB and hypotension              Chief Complaint   Patient presents with    Hypotension       Pt was seen at the family doctor today and was sent to the ER to be evaluated for low blood pressure. Pt states for about a week he has felt weak, dizzy, and SOB. Pt denies any chest pain.       Initial Presentation:  60 y/o male with PMHx arrhythmia with ablation at [facility] approx 15 years ago, also admission in 2022 2nd CHF with LV EF of 40% - maintained on a medical regimen of Entresto, Jardiance,  metoprolol + demadex - presented to [facility] ED on 9/27/24 per PCP 2nd weakness, dizziness, SOB and hypotension. Reports \"feeling unwell last year\".  Stated he was recently seen by another ? Cardiologist/ Electrophysiologist through [facility] and scheduled to undergo repeat ablation 2nd cardiac monitor results found recently.  n the recent past.    Reports recently vacationing and drinking alcohol - having 8 alcoholic beverages in one sitting then when he got home last week he started to feel unwell - very fatigued and lightheaded when attempting " to stand up, also reports SOB with minimal exertion and presenting to the ED - found to be in atrial fibrillation with a rapid ventricular response in the ED.  He was given a dose of IV Cardizem and admitted for further evaluation and management.  In ED - Atrial Fib with RVR to 154  ROS/ Exam: rapid, irregular rhythym - atrial fib per EKG  EKG: Atria Fib with RVR to 154/ min  Labs: K+ 3.9  Troponin negative   ED Tx: IV cardizem 15 mg + IV Cardizem gtt  Placed in Observation 9/27/24 at 1433 -      Atrial fibrillation with a rapid ventricular response - [id number] = 1  Given 1 dose of IV Cardizem in the emergency department  Repeat echocardiogram noted with a decline in ejection fraction, IV Cardizem is to be avoided  IV amiodarone is to be avoided given onset unknown, likely at least a week ago and not on anticoagulation  Will increase metoprolol to tartrate to 25 mg QID with transition back to succinate upon discharge  Recommend systemic anticoagulation with Eliquis in preparation for RUDI guided cardioversion if he does not revert back to normal sinus rhythm on his own  Recommend outpatient sleep study for evaluation of NAVIN as a contributor, TSH within normal limits  Plan for some type of ablation per patient at [facility] - records unavailable  Needs rhythm control strategy in setting of cardiomyopathy  Nonischemic cardiomyopathy, EF 25%  EF previously reported as 40%, now 25%  Previous ischemic evaluation in 2022 with nuclear stress test without ischemia or scar  He is unsure if he has had a repeat echocardiogram since 2022  Maintained on proper medical therapy with Entresto, Jardiance, metoprolol succinate  Appears euvolemic on exam on current oral diuretic regimen  History previous ablation  Done about 15 years ago at [facility]  Cannot elaborate details regarding what rhythm he had ablated  Dyslipidemia  Continue outpatient high intensity statin regimen  Possible NAVIN   Recommend  outpatient sleep study     Anticipated Length of Stay/Certification Statement: Patient will be admitted on an observation basis with an anticipated length of stay of less than 2 midnights secondary to a fib.      9/27/24 CARDIOLOGY:  Atrial fibrillation with a rapid ventricular response - [id number] = 1  Given 1 dose of IV Cardizem in the emergency department  Repeat echocardiogram noted with a decline in ejection fraction, IV Cardizem is to be avoided  IV amiodarone is to be avoided given onset unknown, likely at least a week ago and not on anticoagulation  Will increase metoprolol to tartrate to 25 mg QID with transition back to succinate upon discharge  Recommend systemic anticoagulation with Eliquis in preparation for RUDI guided cardioversion if he does not revert back to normal sinus rhythm on his own  Recommend outpatient sleep study for evaluation of NAVIN as a contributor, TSH within normal limits  Plan for some type of ablation per patient at [facility] - records unavailable  Needs rhythm control strategy in setting of cardiomyopathy  Nonischemic cardiomyopathy, EF 25%  EF previously reported as 40%, now 25%  Previous ischemic evaluation in 2022 with nuclear stress test without ischemia or scar  He is unsure if he has had a repeat echocardiogram since 2022  Maintained on proper medical therapy with Entresto, Jardiance, metoprolol succinate  Appears euvolemic on exam on current oral diuretic regimen  History previous ablation  Done about 15 years ago at [facility] in [place]  Cannot elaborate details regarding what rhythm he had ablated  Dyslipidemia  Continue outpatient high intensity statin regimen  Possible NAVIN   Recommend outpatient sleep study     9/28/24 - DAY 2:  Atrial fibrillation with RVR (HCC)  Patient reports 2 weeks of feeling unwell, found to be in a fib with RVR in the ED  Responded well to a one time IV dose of cardizem  Takes metoprolol 25 mg daily - convert to short acting and  utilize 25 mg QID  Monitor on telemetry  Risk factors include: lack of sleep at the onset of symptoms, increased alcohol intake around the onset of symptoms (on vacation, he typically does not drink), known to snore at night  Outpatient sleep study recommended  Echocardiogram with reduced ejection fraction, plan of care reviewed with cardiology  Anticoagulation therapy started in anticipation of cardioversion  Cardiology is recommending DC cardioversion.        ED Treatment-Medication Administration from 09/27/2024 1156 to 09/27/2024 1525           Date/Time Order Dose Route Action       09/27/2024 1218 diltiazem (CARDIZEM) injection 15 mg 15 mg Intravenous Given      Scheduled Medications:  allopurinol, 100 mg, Oral, Daily  apixaban, 5 mg, Oral, BID  atorvastatin, 80 mg, Oral, Daily  cholecalciferol, 2,000 Units, Oral, Daily  DULoxetine, 90 mg, Oral, Daily  Empagliflozin, 10 mg, Oral, Daily  metoprolol tartrate, 25 mg, Oral, 4x Daily  sacubitril-valsartan, 1 tablet, Oral, BID  torsemide, 20 mg, Oral, BID     Continuous IV Infusions:  IV diltiazem (CARDIZEM) 125 mg in sodium chloride 0.9 % 125 mL infusion   [[id number]]        Ordered Dose: 1-15 mg/hr Route: Intravenous Frequency: Titrated, @ 1-15 mL/hr   Scheduled Start Date/Time: 09/27/24 1230 End Date/Time: 09/27/24 1557        PRN Meds:  acetaminophen, 650 mg, Oral, Q6H PRN  calcium carbonate, 1,000 mg, Oral, Daily PRN  metoprolol, 5 mg, Intravenous, Q6H PRN HR > 120 - 9/28 X 1  ondansetron, 4 mg, Intravenous, Q6H PRN  perflutren lipid microsphere, 0.4 mL/min, Intravenous, Once in imaging                ED Triage Vitals   Temperature Pulse Respirations Blood Pressure SpO2 Pain Score   09/27/24 1202 09/27/24 1202 09/27/24 1202 09/27/24 1202 09/27/24 1202 09/27/24 1600   (!) 96.9 °F (36.1 °C) 63 18 113/59 94 % No Pain      Weight (last 2 days)         Date/Time Weight     09/28/24 0600 118 (259.04)     09/27/24 1721 120 (264)     09/27/24 1558 120 (264.2)      09/27/24 1442 125 (275.58)      Vital Signs (last 3 days)       Date/Time Temp Pulse Resp BP MAP (mmHg) SpO2 Calculated FIO2 (%) - Nasal Cannula Nasal Cannula O2 Flow Rate (L/min) O2 Device Patient Position - Orthostatic VS   09/29/24 06:53:52 97.6 °F (36.4 °C) 41 Abnormal  12 106/67 80 99 % -- -- -- --   09/29/24 04:12:58 -- 56 -- 104/66 79 93 % -- -- -- --   09/29/24 04:09:35 -- 55 -- 104/66 79 94 % -- -- -- --   09/28/24 22:45:44 98.4 °F (36.9 °C) 60 18 99/64 76 94 % -- -- None (Room air) Lying   09/28/24 21:00:18 -- 72 -- 100/62 75 91 % -- -- -- Lying   09/28/24 2023 -- -- -- -- -- 90 % -- -- None (Room air) --   09/28/24 19:05:57 97.5 °F (36.4 °C) 55 16 94/61 72 93 % -- -- None (Room air) Lying   09/28/24 14:27:03 97.4 °F (36.3 °C) Abnormal  57 14 95/64 74 93 % -- -- -- --   09/28/24 1240 -- 110 Abnormal  -- 104/61 -- -- -- -- -- --   09/28/24 11:30:15 97.8 °F (36.6 °C) 52 Abnormal  16 114/61 79 91 % -- -- -- --   09/28/24 07:35:12 97.7 °F (36.5 °C) 56 12 123/79 94 93 % -- -- None (Room air) Lying   09/28/24 04:35:36 97.9 °F (36.6 °C) 77 17 117/77 90 96 % -- -- None (Room air) Lying   09/27/24 22:46:21 97.8 °F (36.6 °C) 111 Abnormal  14 109/75 86 93 % -- -- -- Lying   09/27/24 21:36:33 -- 75 -- 111/70 84 94 % -- -- -- Lying   09/27/24 1948 -- -- -- -- -- 94 % -- -- None (Room air) --   09/27/24 19:45:38 -- 56 -- 91/56 68 93 % -- -- -- Lying   09/27/24 18:59:43 -- 97 -- 91/59 70 93 % -- -- -- --   09/27/24 18:54:49 98.1 °F (36.7 °C) 99 16 102/60 74 92 % -- -- -- Lying   09/27/24 1721 97.2 °F (36.2 °C) Abnormal  66 16 111/64 80 91 % -- -- -- Sitting   09/27/24 1600 -- -- -- -- -- 95 % -- -- None (Room air) --   09/27/24 1442 -- 110 Abnormal  -- 102/61 -- -- -- -- -- --   09/27/24 1400 -- 90 16 119/58 82 94 % 26 1.5 L/min Nasal cannula Sitting   09/27/24 1315 -- 102 20 126/74 89 94 % 26 1.5 L/min Nasal cannula Sitting   09/27/24 1234 -- 94 18 -- -- 93 % 26 1.5 L/min Nasal cannula --   09/27/24 1230 -- 99 17 115/64  83 89 % Abnormal  -- -- None (Room air) Sitting   09/27/24 1225 -- 106 Abnormal  -- -- -- -- -- -- -- --   09/27/24 1218 -- 143 Abnormal  20 110/73 87 94 % -- -- None (Room air) Sitting   09/27/24 1215 -- -- -- -- -- -- -- -- None (Room air) --   09/27/24 1207 -- 154 Abnormal  -- -- -- -- -- -- -- --   09/27/24 1202 96.9 °F (36.1 °C) Abnormal  63 18 113/59 79 94 % -- -- None (Room air) Sitting         Pertinent Labs/Diagnostic Test Results:   9/27/24 - 12 LEAD EKG:  Component Value   Ventricular Rate 149   Atrial Rate 138   WV Interval     QRSD Interval 72   QT Interval 328   QTC Interval 516   P Axis     QRS Axis -46   T Wave Axis -1      Echo complete w/ contrast if indicated   Final Result (09/27 1612)         Left Ventricle: Left ventricular cavity size is normal. Wall thickness    is mildly increased. There is concentric remodeling. The left ventricular    ejection fraction is 25 to 30%. Systolic function is severely reduced.    There is global hypokinesis with regional variation. Unable to assess    diastolic function due to atrial fibrillation.     Right Ventricle: Right ventricle is not well visualized. Systolic    function is reduced.              Results from last 7 days   Lab Units 09/29/24 0419 09/28/24  0446 09/27/24  1217   WBC Thousand/uL 9.25 7.03 6.09   HEMOGLOBIN g/dL 17.6* 16.6 17.2*   HEMATOCRIT % 52.0* 49.3 50.6*   PLATELETS Thousands/uL 320 286 306   TOTAL NEUT ABS Thousands/µL 5.78  --  3.28             Results from last 7 days   Lab Units 09/29/24 0419 09/28/24  0446 09/27/24  1217   SODIUM mmol/L 141 140 141   POTASSIUM mmol/L 3.7 3.6 3.9   CHLORIDE mmol/L 105 104 106   CO2 mmol/L 25 25 23   ANION GAP mmol/L 11 11 12   BUN mg/dL 26* 25 25   CREATININE mg/dL 1.06 1.08 1.06   EGFR ml/min/1.73sq m 75 74 75   CALCIUM mg/dL 9.5 9.1 9.3   MAGNESIUM mg/dL 2.3 2.1  --             Results from last 7 days   Lab Units 09/28/24  0446 09/27/24  1217   AST U/L 23 30   ALT U/L 34 36   ALK PHOS U/L 76 82    TOTAL PROTEIN g/dL 7.4 7.6   ALBUMIN g/dL 4.1 4.3   TOTAL BILIRUBIN mg/dL 0.75 0.77             Results from last 7 days   Lab Units 09/29/24  0419 09/28/24  0446 09/27/24  1217   GLUCOSE RANDOM mg/dL 113 107 115             Results from last 7 days   Lab Units 09/27/24  1545 09/27/24  1420 09/27/24  1217   HS TNI 0HR ng/L  --   --  8   HS TNI 2HR ng/L  --  7  --    HSTNI D2 ng/L  --  -1  --    HS TNI 4HR ng/L 7  --   --    HSTNI D4 ng/L -1  --   --            Results from last 7 days   Lab Units 09/27/24  1217   D-DIMER QUANTITATIVE ug/ml FEU 0.54*           Results from last 7 days   Lab Units 09/27/24  1217   TSH 3RD GENERATON uIU/mL 2.810           Results from last 7 days   Lab Units 09/27/24  1217   BNP pg/mL 268*      Medical History        Past Medical History:   Diagnosis Date    Acute systolic congestive heart failure (HCC)      Anxiety      CHF (congestive heart failure) (HCC)      GERD (gastroesophageal reflux disease)      Hyperlipidemia      Kidney stones           Present on Admission:   Atrial fibrillation with RVR (HCC)   Dyslipidemia   Chronic systolic (congestive) heart failure (HCC)   Class 2 obesity due to excess calories without serious comorbidity in adult     Admitting Diagnosis: Fatigue [R53.83]  Hypotension [I95.9]  History of CHF (congestive heart failure) [Z86.79]  Atrial fibrillation with rapid ventricular response (HCC) [I48.91]     Age/Sex: 59 y.o. male     Network Utilization Review Department  ATTENTION: Please call with any questions or concerns to [phone] and carefully listen to the prompts so that you are directed to the right person. All voicemails are confidential.   For Discharge needs, contact Care Management DC Support Team at [phone] opt. 2  Send all requests for admission clinical reviews, approved or denied determinations and any other requests to dedicated fax number below belonging to the campus where the patient is receiving treatment. List of dedicated fax  numbers for the Facilities:  FACILITY NAME UR FAX NUMBER   ADMISSION DENIALS (Administrative/Medical Necessity) [phone]   DISCHARGE SUPPORT TEAM (NETWORK) [phone]   PARENT CHILD HEALTH (Maternity/NICU/Pediatrics) [phone]   [facility] [phone]   [facility] [phone]   [facility] [phone]   [facility] [phone]   [facility] [phone]   General acute hospital [phone]   [facility] [phone]   [facility] [phone]   [facility] [phone]   Critical access hospital [phone]   [facility] [phone]   [facility] [phone]                    Revision History           Ayse Fish RN   Registered Nurse  Specialty: Utilization Review     Utilization Review     Signed     Date of Service: 9/29/2024  8:00 PM     Signed         Initial Clinical Review     Admission: Date/Time/Statement:      OBSERVATION 9-27-24 CHANGED TO INPATIENT 9-29-24   Certification Statement:  Patient will require continued cardiac monitoring  Discharge Plan:  Pending heart rate monitoring     Admission Orders (From admission, onward)          Ordered         09/29/24 1853   INPATIENT ADMISSION  Once             09/27/24 1433   Place in Observation  Once                                  Echocardiogram with reduced ejection fraction, plan of care reviewed with cardiology. Anticoagulation therapy started in anticipation of cardioversion. Eliquis changed to iv heparin.    Cardiology is recommending DC cardioversion.transfer for RUDI with cardioversion.          Discharged 9-29-24   Hospital Course: In the emergency room patient was noted to have atrial fibrillation with rapid ventricular  response, patient has previous history of chronic systolic heart failure with decreased ejection fraction, patient received IV Cardizem in the ER, this did improve heart rate, subsequent testing including echocardiogram were completed, echo showed decreased EF compared to prior.     Patient was seen by [facility] cardiology, recommendation was for RUDI with cardioversion, due to patient being established with [facility] cardiology team patient requested transfer to [facility] for RUDI cardioversion.

## 2024-11-07 ENCOUNTER — OFFICE VISIT (OUTPATIENT)
Dept: FAMILY MEDICINE CLINIC | Facility: CLINIC | Age: 59
End: 2024-11-07
Payer: COMMERCIAL

## 2024-11-07 VITALS
DIASTOLIC BLOOD PRESSURE: 84 MMHG | WEIGHT: 270.6 LBS | SYSTOLIC BLOOD PRESSURE: 118 MMHG | HEIGHT: 70 IN | OXYGEN SATURATION: 96 % | BODY MASS INDEX: 38.74 KG/M2 | TEMPERATURE: 97.2 F | HEART RATE: 96 BPM

## 2024-11-07 DIAGNOSIS — N20.0 NEPHROLITHIASIS: ICD-10-CM

## 2024-11-07 DIAGNOSIS — G89.29 CHRONIC MUSCULOSKELETAL PAIN: ICD-10-CM

## 2024-11-07 DIAGNOSIS — E79.0 HYPERURICEMIA: ICD-10-CM

## 2024-11-07 DIAGNOSIS — M79.18 CHRONIC MUSCULOSKELETAL PAIN: ICD-10-CM

## 2024-11-07 DIAGNOSIS — I48.91 ATRIAL FIBRILLATION, UNSPECIFIED TYPE (HCC): ICD-10-CM

## 2024-11-07 DIAGNOSIS — I42.8 NONISCHEMIC CARDIOMYOPATHY (HCC): ICD-10-CM

## 2024-11-07 DIAGNOSIS — I50.22 CHRONIC SYSTOLIC (CONGESTIVE) HEART FAILURE (HCC): Primary | ICD-10-CM

## 2024-11-07 DIAGNOSIS — E78.5 DYSLIPIDEMIA: ICD-10-CM

## 2024-11-07 DIAGNOSIS — E78.2 MIXED HYPERLIPIDEMIA: ICD-10-CM

## 2024-11-07 DIAGNOSIS — Z15.89 MONOALLELIC MUTATION OF TTN GENE: ICD-10-CM

## 2024-11-07 PROCEDURE — 99214 OFFICE O/P EST MOD 30 MIN: CPT | Performed by: FAMILY MEDICINE

## 2024-11-07 RX ORDER — ALLOPURINOL 100 MG/1
100 TABLET ORAL DAILY
Qty: 30 TABLET | Refills: 5 | Status: SHIPPED | OUTPATIENT
Start: 2024-11-07

## 2024-11-07 RX ORDER — DULOXETIN HYDROCHLORIDE 30 MG/1
30 CAPSULE, DELAYED RELEASE ORAL DAILY
Qty: 30 CAPSULE | Refills: 5 | Status: SHIPPED | OUTPATIENT
Start: 2024-11-07

## 2024-11-07 RX ORDER — DULOXETIN HYDROCHLORIDE 60 MG/1
CAPSULE, DELAYED RELEASE ORAL
COMMUNITY
End: 2024-11-07 | Stop reason: SDUPTHER

## 2024-11-07 RX ORDER — METOPROLOL SUCCINATE 50 MG/1
50 TABLET, EXTENDED RELEASE ORAL 2 TIMES DAILY
Qty: 60 TABLET | Refills: 5 | Status: SHIPPED | OUTPATIENT
Start: 2024-11-07

## 2024-11-07 RX ORDER — ATORVASTATIN CALCIUM 80 MG/1
80 TABLET, FILM COATED ORAL DAILY
Qty: 30 TABLET | Refills: 5 | Status: SHIPPED | OUTPATIENT
Start: 2024-11-07

## 2024-11-07 RX ORDER — DULOXETIN HYDROCHLORIDE 60 MG/1
60 CAPSULE, DELAYED RELEASE ORAL DAILY
Qty: 30 CAPSULE | Refills: 5 | Status: SHIPPED | OUTPATIENT
Start: 2024-11-07

## 2024-11-07 NOTE — ASSESSMENT & PLAN NOTE
Patient has a history of nephrolithiasis.  Patient also has a history of gout.  Currently on allopurinol.  Uric acid being monitored.  Orders:    apixaban (ELIQUIS) 5 mg; Take 1 tablet (5 mg total) by mouth 2 (two) times a day    allopurinol (ZYLOPRIM) 100 mg tablet; Take 1 tablet (100 mg total) by mouth daily    metoprolol succinate (Toprol XL) 50 mg 24 hr tablet; Take 1 tablet (50 mg total) by mouth 2 (two) times a day    DULoxetine (CYMBALTA) 60 mg delayed release capsule; Take 1 capsule (60 mg total) by mouth daily

## 2024-11-07 NOTE — ASSESSMENT & PLAN NOTE
Done at [facility].  I will attempt to obtain them.  Orders:    apixaban (ELIQUIS) 5 mg; Take 1 tablet (5 mg total) by mouth 2 (two) times a day    metoprolol succinate (Toprol XL) 50 mg 24 hr tablet; Take 1 tablet (50 mg total) by mouth 2 (two) times a day    DULoxetine (CYMBALTA) 60 mg delayed release capsule; Take 1 capsule (60 mg total) by mouth daily

## 2024-11-07 NOTE — ASSESSMENT & PLAN NOTE
Currently tolerating atorvastatin 80 mg daily.  Obtain lab work that was done at [facility]  Orders:    apixaban (ELIQUIS) 5 mg; Take 1 tablet (5 mg total) by mouth 2 (two) times a day    atorvastatin (LIPITOR) 80 mg tablet; Take 1 tablet (80 mg total) by mouth daily    metoprolol succinate (Toprol XL) 50 mg 24 hr tablet; Take 1 tablet (50 mg total) by mouth 2 (two) times a day    DULoxetine (CYMBALTA) 60 mg delayed release capsule; Take 1 capsule (60 mg total) by mouth daily

## 2024-11-07 NOTE — ASSESSMENT & PLAN NOTE
Wt Readings from Last 3 Encounters:   11/07/24 123 kg (270 lb 9.6 oz)   09/29/24 118 kg (259 lb 0.7 oz)   06/26/24 125 kg (275 lb 6.4 oz)     Myopathy with a reduced ejection fraction.  Ejection fraction approximately 20 to 25% with diffuse hypokinesis by transthoracic echocardiogram.  It is nonischemic in etiology.  Continue current medications.  Patient has follow-up appointment with cardiology at [facility].          Orders:    apixaban (ELIQUIS) 5 mg; Take 1 tablet (5 mg total) by mouth 2 (two) times a day    metoprolol succinate (Toprol XL) 50 mg 24 hr tablet; Take 1 tablet (50 mg total) by mouth 2 (two) times a day    DULoxetine (CYMBALTA) 60 mg delayed release capsule; Take 1 capsule (60 mg total) by mouth daily

## 2024-11-07 NOTE — ASSESSMENT & PLAN NOTE
Patient is currently taking a total daily dose of Cymbalta 90 mg a day.  He is states that this does not completely take away the pain.  Will readdress at follow-up visit  Orders:    apixaban (ELIQUIS) 5 mg; Take 1 tablet (5 mg total) by mouth 2 (two) times a day    metoprolol succinate (Toprol XL) 50 mg 24 hr tablet; Take 1 tablet (50 mg total) by mouth 2 (two) times a day    DULoxetine (CYMBALTA) 30 mg delayed release capsule; Take 1 capsule (30 mg total) by mouth daily    DULoxetine (CYMBALTA) 60 mg delayed release capsule; Take 1 capsule (60 mg total) by mouth daily

## 2024-11-07 NOTE — ASSESSMENT & PLAN NOTE
The patient is currently in sinus rhythm.  He is status post cardioversion.  He has also had ablation in the past.  Patient has an upcoming echocardiogram and cardiology visit at [facility]  Orders:    apixaban (ELIQUIS) 5 mg; Take 1 tablet (5 mg total) by mouth 2 (two) times a day    metoprolol succinate (Toprol XL) 50 mg 24 hr tablet; Take 1 tablet (50 mg total) by mouth 2 (two) times a day    DULoxetine (CYMBALTA) 60 mg delayed release capsule; Take 1 capsule (60 mg total) by mouth daily

## 2024-11-07 NOTE — PROGRESS NOTES
Ambulatory Visit  Name: Yazan Child      : 1965      MRN: [id number]  Encounter Provider: Augustus Martínez DO  Encounter Date: 2024   Encounter department: [facility]    Assessment & Plan  Chronic systolic (congestive) heart failure (HCC)  Wt Readings from Last 3 Encounters:   24 123 kg (270 lb 9.6 oz)   24 118 kg (259 lb 0.7 oz)   24 125 kg (275 lb 6.4 oz)     Myopathy with a reduced ejection fraction.  Ejection fraction approximately 20 to 25% with diffuse hypokinesis by transthoracic echocardiogram.  It is nonischemic in etiology.  Continue current medications.  Patient has follow-up appointment with cardiology at [facility].          Orders:    apixaban (ELIQUIS) 5 mg; Take 1 tablet (5 mg total) by mouth 2 (two) times a day    metoprolol succinate (Toprol XL) 50 mg 24 hr tablet; Take 1 tablet (50 mg total) by mouth 2 (two) times a day    DULoxetine (CYMBALTA) 60 mg delayed release capsule; Take 1 capsule (60 mg total) by mouth daily    Dyslipidemia  Done at [facility].  I will attempt to obtain them.  Orders:    apixaban (ELIQUIS) 5 mg; Take 1 tablet (5 mg total) by mouth 2 (two) times a day    metoprolol succinate (Toprol XL) 50 mg 24 hr tablet; Take 1 tablet (50 mg total) by mouth 2 (two) times a day    DULoxetine (CYMBALTA) 60 mg delayed release capsule; Take 1 capsule (60 mg total) by mouth daily    Atrial fibrillation, unspecified type (HCC)  The patient is currently in sinus rhythm.  He is status post cardioversion.  He has also had ablation in the past.  Patient has an upcoming echocardiogram and cardiology visit at [facility]  Orders:    apixaban (ELIQUIS) 5 mg; Take 1 tablet (5 mg total) by mouth 2 (two) times a day    metoprolol succinate (Toprol XL) 50 mg 24 hr tablet; Take 1 tablet (50 mg total) by mouth 2 (two) times a day    DULoxetine (CYMBALTA) 60 mg delayed release capsule; Take 1 capsule (60 mg total) by mouth daily    Nonischemic  cardiomyopathy (HCC)  Review of past progress notes from [facility] revealed that the patient had a left heart cardiac catheterization which showed no significant coronary artery disease to explain the cardiomyopathy.  It is believed that the cardiomyopathy is nonischemic in etiology  Orders:    apixaban (ELIQUIS) 5 mg; Take 1 tablet (5 mg total) by mouth 2 (two) times a day    metoprolol succinate (Toprol XL) 50 mg 24 hr tablet; Take 1 tablet (50 mg total) by mouth 2 (two) times a day    DULoxetine (CYMBALTA) 60 mg delayed release capsule; Take 1 capsule (60 mg total) by mouth daily    Hyperuricemia  Patient has a history of nephrolithiasis.  Patient also has a history of gout.  Currently on allopurinol.  Uric acid being monitored.  Orders:    apixaban (ELIQUIS) 5 mg; Take 1 tablet (5 mg total) by mouth 2 (two) times a day    allopurinol (ZYLOPRIM) 100 mg tablet; Take 1 tablet (100 mg total) by mouth daily    metoprolol succinate (Toprol XL) 50 mg 24 hr tablet; Take 1 tablet (50 mg total) by mouth 2 (two) times a day    DULoxetine (CYMBALTA) 60 mg delayed release capsule; Take 1 capsule (60 mg total) by mouth daily    Nephrolithiasis    Orders:    apixaban (ELIQUIS) 5 mg; Take 1 tablet (5 mg total) by mouth 2 (two) times a day    metoprolol succinate (Toprol XL) 50 mg 24 hr tablet; Take 1 tablet (50 mg total) by mouth 2 (two) times a day    DULoxetine (CYMBALTA) 60 mg delayed release capsule; Take 1 capsule (60 mg total) by mouth daily    Mixed hyperlipidemia  Currently tolerating atorvastatin 80 mg daily.  Obtain lab work that was done at [facility]  Orders:    apixaban (ELIQUIS) 5 mg; Take 1 tablet (5 mg total) by mouth 2 (two) times a day    atorvastatin (LIPITOR) 80 mg tablet; Take 1 tablet (80 mg total) by mouth daily    metoprolol succinate (Toprol XL) 50 mg 24 hr tablet; Take 1 tablet (50 mg total) by mouth 2 (two) times a day    DULoxetine (CYMBALTA) 60 mg delayed release capsule; Take 1  capsule (60 mg total) by mouth daily    Chronic musculoskeletal pain  Patient is currently taking a total daily dose of Cymbalta 90 mg a day.  He is states that this does not completely take away the pain.  Will readdress at follow-up visit  Orders:    apixaban (ELIQUIS) 5 mg; Take 1 tablet (5 mg total) by mouth 2 (two) times a day    metoprolol succinate (Toprol XL) 50 mg 24 hr tablet; Take 1 tablet (50 mg total) by mouth 2 (two) times a day    DULoxetine (CYMBALTA) 30 mg delayed release capsule; Take 1 capsule (30 mg total) by mouth daily    DULoxetine (CYMBALTA) 60 mg delayed release capsule; Take 1 capsule (60 mg total) by mouth daily    Monoallelic mutation of TTN gene  This may be the etiology of the dilated cardiomyopathy.  Continue current medications.          History of Present Illness     Patient is a pleasant 59-year-old male who presents today for follow-up admission to [facility] in [place].  The patient initially presented to [facility] with complaints of fatigue and hypotension.  The patient was noted to be in atrial fibrillation with a rapid ventricular response.  The patient was transferred to [facility] where his cardiologist are located.  The patient underwent a transthoracic echocardiogram which revealed an ejection fraction of 20 to 25% with diffuse hypokinesis.  It is noted in the discharge summary that the patient did have a left heart catheterization in the past which showed no significant coronary artery disease.  He was diagnosed with dilated cardiomyopathy (nons ischemic in etiology).  The patient underwent cardioversion.  It is worth noting that in the past patient had ablation therapy.          Review of Systems   Constitutional:  Negative for chills and fever.   HENT:  Negative for ear pain and sore throat.    Eyes:  Negative for pain and visual disturbance.   Respiratory:  Negative for cough and shortness of breath.    Cardiovascular:  Negative for  "chest pain and palpitations.   Gastrointestinal:  Negative for abdominal pain and vomiting.   Genitourinary:  Negative for dysuria and hematuria.   Musculoskeletal:  Negative for arthralgias and back pain.   Skin:  Negative for color change and rash.   Neurological:  Negative for seizures and syncope.   All other systems reviewed and are negative.          Objective     /84   Pulse 96   Temp (!) 97.2 °F (36.2 °C)   Ht 5' 10\" (1.778 m)   Wt 123 kg (270 lb 9.6 oz)   SpO2 96%   BMI 38.83 kg/m²     Physical Exam  Vitals and nursing note reviewed.   Constitutional:       General: He is not in acute distress.     Appearance: Normal appearance. He is well-developed.   HENT:      Head: Normocephalic and atraumatic.      Right Ear: Tympanic membrane normal.      Left Ear: Tympanic membrane normal.      Mouth/Throat:      Mouth: Mucous membranes are moist.      Pharynx: Oropharynx is clear.   Eyes:      Extraocular Movements: Extraocular movements intact.      Conjunctiva/sclera: Conjunctivae normal.      Pupils: Pupils are equal, round, and reactive to light.   Cardiovascular:      Rate and Rhythm: Normal rate and regular rhythm.      Heart sounds: Normal heart sounds. No murmur heard.     No friction rub.   Pulmonary:      Effort: Pulmonary effort is normal. No respiratory distress.      Breath sounds: Normal breath sounds.   Abdominal:      General: Bowel sounds are normal.      Palpations: Abdomen is soft.      Tenderness: There is no abdominal tenderness. There is no guarding or rebound.      Hernia: No hernia is present.   Musculoskeletal:         General: No swelling.      Cervical back: Neck supple.   Skin:     General: Skin is warm and dry.      Capillary Refill: Capillary refill takes less than 2 seconds.   Neurological:      General: No focal deficit present.      Mental Status: He is alert and oriented to person, place, and time.      Gait: Gait normal.   Psychiatric:         Mood and Affect: Mood " normal.         Behavior: Behavior normal.         Thought Content: Thought content normal.       Administrative Statements   I have spent a total time of 40 minutes in caring for this patient on the day of the visit/encounter including Importance of tx compliance, Documenting in the medical record, Reviewing / ordering tests, medicine, procedures  , and Obtaining or reviewing history  .

## 2024-11-07 NOTE — ASSESSMENT & PLAN NOTE
Review of past progress notes from [facility] revealed that the patient had a left heart cardiac catheterization which showed no significant coronary artery disease to explain the cardiomyopathy.  It is believed that the cardiomyopathy is nonischemic in etiology  Orders:    apixaban (ELIQUIS) 5 mg; Take 1 tablet (5 mg total) by mouth 2 (two) times a day    metoprolol succinate (Toprol XL) 50 mg 24 hr tablet; Take 1 tablet (50 mg total) by mouth 2 (two) times a day    DULoxetine (CYMBALTA) 60 mg delayed release capsule; Take 1 capsule (60 mg total) by mouth daily

## 2024-11-07 NOTE — ASSESSMENT & PLAN NOTE
Orders:    apixaban (ELIQUIS) 5 mg; Take 1 tablet (5 mg total) by mouth 2 (two) times a day    metoprolol succinate (Toprol XL) 50 mg 24 hr tablet; Take 1 tablet (50 mg total) by mouth 2 (two) times a day    DULoxetine (CYMBALTA) 60 mg delayed release capsule; Take 1 capsule (60 mg total) by mouth daily

## 2025-01-02 ENCOUNTER — OFFICE VISIT (OUTPATIENT)
Dept: FAMILY MEDICINE CLINIC | Facility: CLINIC | Age: 60
End: 2025-01-02
Payer: COMMERCIAL

## 2025-01-02 ENCOUNTER — APPOINTMENT (OUTPATIENT)
Dept: LAB | Facility: MEDICAL CENTER | Age: 60
End: 2025-01-02
Payer: COMMERCIAL

## 2025-01-02 VITALS
OXYGEN SATURATION: 97 % | HEART RATE: 83 BPM | TEMPERATURE: 96.7 F | SYSTOLIC BLOOD PRESSURE: 138 MMHG | DIASTOLIC BLOOD PRESSURE: 82 MMHG | WEIGHT: 275.2 LBS | BODY MASS INDEX: 39.4 KG/M2 | HEIGHT: 70 IN

## 2025-01-02 DIAGNOSIS — R73.9 HYPERGLYCEMIA: ICD-10-CM

## 2025-01-02 DIAGNOSIS — E78.5 DYSLIPIDEMIA: ICD-10-CM

## 2025-01-02 DIAGNOSIS — Z12.5 SCREENING FOR PROSTATE CANCER: ICD-10-CM

## 2025-01-02 DIAGNOSIS — Z98.890 S/P CUBITAL TUNNEL RELEASE: ICD-10-CM

## 2025-01-02 DIAGNOSIS — Z12.11 SCREENING FOR COLON CANCER: ICD-10-CM

## 2025-01-02 DIAGNOSIS — Z15.89 MONOALLELIC MUTATION OF TTN GENE: ICD-10-CM

## 2025-01-02 DIAGNOSIS — K76.0 HEPATIC STEATOSIS: ICD-10-CM

## 2025-01-02 DIAGNOSIS — I48.91 ATRIAL FIBRILLATION, UNSPECIFIED TYPE (HCC): ICD-10-CM

## 2025-01-02 DIAGNOSIS — G89.29 CHRONIC MUSCULOSKELETAL PAIN: ICD-10-CM

## 2025-01-02 DIAGNOSIS — R20.0 BILATERAL HAND NUMBNESS: ICD-10-CM

## 2025-01-02 DIAGNOSIS — Z98.890 H/O CARPAL TUNNEL REPAIR: ICD-10-CM

## 2025-01-02 DIAGNOSIS — I42.8 NONISCHEMIC CARDIOMYOPATHY (HCC): ICD-10-CM

## 2025-01-02 DIAGNOSIS — I50.22 CHRONIC SYSTOLIC (CONGESTIVE) HEART FAILURE (HCC): Primary | ICD-10-CM

## 2025-01-02 DIAGNOSIS — M79.18 CHRONIC MUSCULOSKELETAL PAIN: ICD-10-CM

## 2025-01-02 DIAGNOSIS — E79.0 HYPERURICEMIA: ICD-10-CM

## 2025-01-02 DIAGNOSIS — S02.5XXA CLOSED FRACTURE OF TOOTH, INITIAL ENCOUNTER: ICD-10-CM

## 2025-01-02 DIAGNOSIS — G62.9 NEUROPATHY: ICD-10-CM

## 2025-01-02 DIAGNOSIS — M75.40 IMPINGEMENT SYNDROME OF SHOULDER REGION, UNSPECIFIED LATERALITY: ICD-10-CM

## 2025-01-02 LAB
ALBUMIN SERPL BCG-MCNC: 4.2 G/DL (ref 3.5–5)
ALP SERPL-CCNC: 80 U/L (ref 34–104)
ALT SERPL W P-5'-P-CCNC: 21 U/L (ref 7–52)
ANION GAP SERPL CALCULATED.3IONS-SCNC: 9 MMOL/L (ref 4–13)
AST SERPL W P-5'-P-CCNC: 15 U/L (ref 13–39)
BASOPHILS # BLD AUTO: 0.02 THOUSANDS/ΜL (ref 0–0.1)
BASOPHILS NFR BLD AUTO: 0 % (ref 0–1)
BILIRUB SERPL-MCNC: 0.72 MG/DL (ref 0.2–1)
BUN SERPL-MCNC: 17 MG/DL (ref 5–25)
CALCIUM SERPL-MCNC: 9.2 MG/DL (ref 8.4–10.2)
CHLORIDE SERPL-SCNC: 106 MMOL/L (ref 96–108)
CHOLEST SERPL-MCNC: 127 MG/DL (ref ?–200)
CO2 SERPL-SCNC: 27 MMOL/L (ref 21–32)
CREAT SERPL-MCNC: 0.81 MG/DL (ref 0.6–1.3)
EOSINOPHIL # BLD AUTO: 0.16 THOUSAND/ΜL (ref 0–0.61)
EOSINOPHIL NFR BLD AUTO: 2 % (ref 0–6)
ERYTHROCYTE [DISTWIDTH] IN BLOOD BY AUTOMATED COUNT: 13.9 % (ref 11.6–15.1)
EST. AVERAGE GLUCOSE BLD GHB EST-MCNC: 108 MG/DL
GFR SERPL CREATININE-BSD FRML MDRD: 96 ML/MIN/1.73SQ M
GLUCOSE P FAST SERPL-MCNC: 90 MG/DL (ref 65–99)
HBA1C MFR BLD: 5.4 %
HCT VFR BLD AUTO: 43.6 % (ref 36.5–49.3)
HDLC SERPL-MCNC: 35 MG/DL
HGB BLD-MCNC: 15 G/DL (ref 12–17)
IMM GRANULOCYTES # BLD AUTO: 0.01 THOUSAND/UL (ref 0–0.2)
IMM GRANULOCYTES NFR BLD AUTO: 0 % (ref 0–2)
LDLC SERPL CALC-MCNC: 62 MG/DL (ref 0–100)
LYMPHOCYTES # BLD AUTO: 1.75 THOUSANDS/ΜL (ref 0.6–4.47)
LYMPHOCYTES NFR BLD AUTO: 25 % (ref 14–44)
MCH RBC QN AUTO: 31.7 PG (ref 26.8–34.3)
MCHC RBC AUTO-ENTMCNC: 34.4 G/DL (ref 31.4–37.4)
MCV RBC AUTO: 92 FL (ref 82–98)
MONOCYTES # BLD AUTO: 0.48 THOUSAND/ΜL (ref 0.17–1.22)
MONOCYTES NFR BLD AUTO: 7 % (ref 4–12)
NEUTROPHILS # BLD AUTO: 4.64 THOUSANDS/ΜL (ref 1.85–7.62)
NEUTS SEG NFR BLD AUTO: 66 % (ref 43–75)
NRBC BLD AUTO-RTO: 0 /100 WBCS
PLATELET # BLD AUTO: 250 THOUSANDS/UL (ref 149–390)
PMV BLD AUTO: 10.8 FL (ref 8.9–12.7)
POTASSIUM SERPL-SCNC: 3.8 MMOL/L (ref 3.5–5.3)
PROT SERPL-MCNC: 7.1 G/DL (ref 6.4–8.4)
PSA SERPL-MCNC: 0.43 NG/ML (ref 0–4)
RBC # BLD AUTO: 4.73 MILLION/UL (ref 3.88–5.62)
SODIUM SERPL-SCNC: 142 MMOL/L (ref 135–147)
TRIGL SERPL-MCNC: 148 MG/DL (ref ?–150)
TSH SERPL DL<=0.05 MIU/L-ACNC: 2.38 UIU/ML (ref 0.45–4.5)
URATE SERPL-MCNC: 5.4 MG/DL (ref 3.5–8.5)
WBC # BLD AUTO: 7.06 THOUSAND/UL (ref 4.31–10.16)

## 2025-01-02 PROCEDURE — 85025 COMPLETE CBC W/AUTO DIFF WBC: CPT

## 2025-01-02 PROCEDURE — 84443 ASSAY THYROID STIM HORMONE: CPT

## 2025-01-02 PROCEDURE — 83036 HEMOGLOBIN GLYCOSYLATED A1C: CPT

## 2025-01-02 PROCEDURE — 80053 COMPREHEN METABOLIC PANEL: CPT

## 2025-01-02 PROCEDURE — 36415 COLL VENOUS BLD VENIPUNCTURE: CPT

## 2025-01-02 PROCEDURE — 80061 LIPID PANEL: CPT

## 2025-01-02 PROCEDURE — 84550 ASSAY OF BLOOD/URIC ACID: CPT

## 2025-01-02 PROCEDURE — 99214 OFFICE O/P EST MOD 30 MIN: CPT | Performed by: FAMILY MEDICINE

## 2025-01-02 PROCEDURE — G0103 PSA SCREENING: HCPCS

## 2025-01-02 RX ORDER — GABAPENTIN 100 MG/1
100 CAPSULE ORAL 3 TIMES DAILY
Qty: 90 CAPSULE | Refills: 1 | Status: SHIPPED | OUTPATIENT
Start: 2025-01-02

## 2025-01-02 RX ORDER — VALSARTAN 40 MG/1
TABLET ORAL
COMMUNITY
Start: 2024-12-04

## 2025-01-02 NOTE — ASSESSMENT & PLAN NOTE
Patient is currently on a total daily dose of Cymbalta 90 mg daily to help take away chronic pain.

## 2025-01-02 NOTE — ASSESSMENT & PLAN NOTE
Please see below.  History of cubital tunnel release bilaterally.  [facility] by [facility] in [place].  Patient with symptoms.  Follow-up with Ortho stated that they could not do nothing more.  Referred to physical therapy, referred to pain management which had nothing more to offer.  Patient continues to have discomfort.  Tylenol was not helping.  Patient cannot take NSAIDs due to being on Eliquis.

## 2025-01-02 NOTE — ASSESSMENT & PLAN NOTE
Wt Readings from Last 3 Encounters:   01/02/25 125 kg (275 lb 3.2 oz)   11/07/24 123 kg (270 lb 9.6 oz)   09/29/24 118 kg (259 lb 0.7 oz)   Patient has a history of myopathy and has a reduced ejection fraction which is around 20 to 25%.  There is diffuse hypokinesis by transthoracic echocardiogram.  Cardiology suspects it is nonischemic in etiology.  Will continue current medications.  Patient is stable.  Patient's cardiologist is at [facility] and has upcoming follow-up appointment.

## 2025-01-02 NOTE — ASSESSMENT & PLAN NOTE
Patient with a history of carpal tunnel repair bilaterally.  This was done by [facility] in [place].  The patient continues to have symptoms and weakness in both hands.  He cannot pick things up.  He is dropping things.  He followed up with [facility] [place] who stated that there is nothing more they can do.  They recommended physical therapy but they were not sure if this would work.  They referred him to pain management.  Pain management had nothing to offer.

## 2025-01-02 NOTE — ASSESSMENT & PLAN NOTE
Patient remains on allopurinol.  Patient has a history of nephrolithiasis which is secondary to hyperuricemia.  Orders:    Uric acid; Future

## 2025-01-02 NOTE — ASSESSMENT & PLAN NOTE
Patient is not sinus rhythm at this time.  He is status post cardioversion.  He tells me he has had an ablation in the past.  Patient does have an upcoming echocardiogram with a cardiology consultation at [facility].  My attempts to get these records have been unsuccessful despite the patient signing a medical release.  The patient remains on metoprolol succinate 50 mg twice a day for rate control.  He is also on Eliquis 5 mg 2 times a day for anticoagulation.  Orders:    Hemoglobin A1C; Future    Comprehensive metabolic panel; Future    CBC and differential; Future    TSH, 3rd generation with Free T4 reflex; Future

## 2025-01-02 NOTE — ASSESSMENT & PLAN NOTE
Patient did have left heart cardiac catheterization at [facility] which showed no significant coronary artery disease which would explain the cardiomyopathy.  The diagnosis was nonischemic cardiomyopathy.  Will continue current medications and have patient follow-up with Noelle Alvarez.  Orders:    Hemoglobin A1C; Future    Comprehensive metabolic panel; Future    CBC and differential; Future    TSH, 3rd generation with Free T4 reflex; Future    Lipid Panel with Direct LDL reflex; Future    PSA, Total Screen; Future

## 2025-01-02 NOTE — PROGRESS NOTES
Name: Yazan Child      : 1965      MRN: [id number]  Encounter Provider: Augustus Martínez DO  Encounter Date: 2025   Encounter department: [facility]  :  Assessment & Plan  Chronic systolic (congestive) heart failure (HCC)  Wt Readings from Last 3 Encounters:   25 125 kg (275 lb 3.2 oz)   24 123 kg (270 lb 9.6 oz)   24 118 kg (259 lb 0.7 oz)   Patient has a history of myopathy and has a reduced ejection fraction which is around 20 to 25%.  There is diffuse hypokinesis by transthoracic echocardiogram.  Cardiology suspects it is nonischemic in etiology.  Will continue current medications.  Patient is stable.  Patient's cardiologist is at [facility] and has upcoming follow-up appointment.                 Nonischemic cardiomyopathy (HCC)  Patient did have left heart cardiac catheterization at [facility] which showed no significant coronary artery disease which would explain the cardiomyopathy.  The diagnosis was nonischemic cardiomyopathy.  Will continue current medications and have patient follow-up with [facility].  Orders:    Hemoglobin A1C; Future    Comprehensive metabolic panel; Future    CBC and differential; Future    TSH, 3rd generation with Free T4 reflex; Future    Lipid Panel with Direct LDL reflex; Future    PSA, Total Screen; Future    Atrial fibrillation, unspecified type (HCC)  Patient is not sinus rhythm at this time.  He is status post cardioversion.  He tells me he has had an ablation in the past.  Patient does have an upcoming echocardiogram with a cardiology consultation at [facility].  My attempts to get these records have been unsuccessful despite the patient signing a medical release.  The patient remains on metoprolol succinate 50 mg twice a day for rate control.  He is also on Eliquis 5 mg 2 times a day for anticoagulation.  Orders:    Hemoglobin A1C; Future    Comprehensive metabolic panel; Future    CBC and differential; Future     TSH, 3rd generation with Free T4 reflex; Future    Hepatic steatosis  Continue to control risk factors.  Avoid alcohol.  Continue to monitor lab work.  Need past ultrasounds to review.       Bilateral hand numbness  Patient is status post carpal tunnel surgery as well as cubital tunnel release       Impingement syndrome of shoulder region, unspecified laterality  Orthopedics has been following.       Dyslipidemia  Patient did have lab work done at [facility].  Patient signed a release of medical records to obtain these results.  Results are pending.  Orders:    Lipid Panel with Direct LDL reflex; Future    Screening for colon cancer    Orders:    Ambulatory Referral to Gastroenterology; Future    Closed fracture of tooth, initial encounter    Orders:    Ambulatory Referral to Oral Maxillofacial Surgery; Future    Chronic musculoskeletal pain  Patient is currently on a total daily dose of Cymbalta 90 mg daily to help take away chronic pain.       Hyperuricemia  Patient remains on allopurinol.  Patient has a history of nephrolithiasis which is secondary to hyperuricemia.  Orders:    Uric acid; Future    Monoallelic mutation of TTN gene  It is suspected that this could be the cause of the nonischemic cardiomyopathy.  Cardiology is following.       Hyperglycemia    Orders:    Hemoglobin A1C; Future    Screening for prostate cancer    Orders:    PSA, Total Screen; Future    S/P cubital tunnel release  Please see below.  History of cubital tunnel release bilaterally.  [facility] [place].  Patient with symptoms.  Follow-up with Ortho stated that they could not do nothing more.  Referred to physical therapy, referred to pain management which had nothing more to offer.  Patient continues to have discomfort.  Tylenol was not helping.  Patient cannot take NSAIDs due to being on Eliquis.       H/O carpal tunnel repair  Patient with a history of carpal tunnel repair bilaterally.  This was done by  [facility] [place].  The patient continues to have symptoms and weakness in both hands.  He cannot pick things up.  He is dropping things.  He followed up with [facility] [place] who stated that there is nothing more they can do.  They recommended physical therapy but they were not sure if this would work.  They referred him to pain management.  Pain management had nothing to offer.       Neuropathy    Orders:    gabapentin (NEURONTIN) 100 mg capsule; Take 1 capsule (100 mg total) by mouth 3 (three) times a day           History of Present Illness     The patient is a pleasant 59-year-old male who presents today for follow-up care.  I still with to the patient's cardiovascular system from [facility].  I will attempt to get these records.  In the meantime I will for fasting lab work to facility.    The patient has a history of cubital tunnel release and carpal tunnel surgery bilaterally.  Unfortunately, the patient continues to have symptoms.  The patient followed up with [facility] [place] who stated that there is nothing more they can do.  He had an EMG nerve conduction study which showed perhaps scar tissue in these areas.  No further surgery advised.  Patient was referred to pain management.  Pain management had nothing more to offer.  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  He is unable to use a hammer.  It is affected his ability to do his job.  He is currently applying for disability.  He does have an  to assist him with this.      Review of Systems   Constitutional:  Negative for chills and fever.   HENT:  Negative for ear pain and sore throat.    Eyes:  Negative for pain and visual disturbance.   Respiratory:  Negative for cough and shortness of breath.    Cardiovascular:  Negative for chest pain and palpitations.   Gastrointestinal:  Negative for abdominal pain and vomiting.   Genitourinary:  Negative for dysuria and  "hematuria.   Musculoskeletal:  Positive for arthralgias (Hands and elbow). Negative for back pain.   Skin:  Negative for color change and rash.   Neurological:  Positive for weakness (Bilateral opponents muscles in hands) and numbness (Bilateral hands). Negative for seizures and syncope.   Psychiatric/Behavioral: Negative.     All other systems reviewed and are negative.      Objective   /82   Pulse 83   Temp (!) 96.7 °F (35.9 °C)   Ht 5' 10\" (1.778 m)   Wt 125 kg (275 lb 3.2 oz)   SpO2 97%   BMI 39.49 kg/m²      Physical Exam  Vitals and nursing note reviewed.   Constitutional:       General: He is not in acute distress.     Appearance: He is well-developed. He is not ill-appearing, toxic-appearing or diaphoretic.   HENT:      Head: Normocephalic and atraumatic.   Eyes:      Conjunctiva/sclera: Conjunctivae normal.   Cardiovascular:      Rate and Rhythm: Normal rate and regular rhythm.      Pulses: Normal pulses.      Heart sounds: Normal heart sounds. No murmur heard.  Pulmonary:      Effort: Pulmonary effort is normal. No respiratory distress.      Breath sounds: Normal breath sounds. No wheezing or rales.   Abdominal:      General: Bowel sounds are normal.      Palpations: Abdomen is soft.      Tenderness: There is no abdominal tenderness.   Musculoskeletal:         General: No swelling.      Cervical back: Neck supple.   Skin:     General: Skin is warm and dry.      Capillary Refill: Capillary refill takes less than 2 seconds.   Neurological:      General: No focal deficit present.      Mental Status: He is alert and oriented to person, place, and time.      Comments: Patient has positive symptoms on percussion of the ulnar nerve at the elbow.  Prior surgical incisions seen.    Patient has positive Tinel and Phalen sign bilateral hands.  There is decreased strength in the interosseous muscles and the opponens muscles.   Psychiatric:         Mood and Affect: Mood normal.         Behavior: Behavior " normal.         Thought Content: Thought content normal.

## 2025-01-02 NOTE — ASSESSMENT & PLAN NOTE
Patient did have lab work done at [facility].  Patient signed a release of medical records to obtain these results.  Results are pending.  Orders:    Lipid Panel with Direct LDL reflex; Future

## 2025-01-02 NOTE — ASSESSMENT & PLAN NOTE
Continue to control risk factors.  Avoid alcohol.  Continue to monitor lab work.  Need past ultrasounds to review.

## 2025-01-03 ENCOUNTER — RESULTS FOLLOW-UP (OUTPATIENT)
Dept: FAMILY MEDICINE CLINIC | Facility: CLINIC | Age: 60
End: 2025-01-03

## 2025-01-10 ENCOUNTER — PREP FOR PROCEDURE (OUTPATIENT)
Dept: GASTROENTEROLOGY | Facility: MEDICAL CENTER | Age: 60
End: 2025-01-10

## 2025-01-10 ENCOUNTER — TELEPHONE (OUTPATIENT)
Dept: GASTROENTEROLOGY | Facility: MEDICAL CENTER | Age: 60
End: 2025-01-10

## 2025-01-10 DIAGNOSIS — Z12.11 SCREENING FOR COLON CANCER: Primary | ICD-10-CM

## 2025-01-10 NOTE — TELEPHONE ENCOUNTER
Our mutual patient, Yazan Child, is scheduled for the following   procedure: Colonoscopy              On: 02/18/2025              With: Dr. Jaiyeola    Yazan Child is taking the following blood thinner: Eliquis       Can this be stopped 2 days prior to the procedure?         Thank you,  Renea URBINA  [facility]

## 2025-02-05 ENCOUNTER — HOSPITAL ENCOUNTER (OUTPATIENT)
Dept: NON INVASIVE DIAGNOSTICS | Facility: HOSPITAL | Age: 60
Discharge: HOME | End: 2025-02-05
Attending: INTERNAL MEDICINE
Payer: COMMERCIAL

## 2025-02-05 VITALS
SYSTOLIC BLOOD PRESSURE: 138 MMHG | DIASTOLIC BLOOD PRESSURE: 82 MMHG | HEIGHT: 70 IN | HEART RATE: 80 BPM | BODY MASS INDEX: 39.37 KG/M2 | WEIGHT: 275 LBS

## 2025-02-05 DIAGNOSIS — I48.0 AF (PAROXYSMAL ATRIAL FIBRILLATION) (HCC): ICD-10-CM

## 2025-02-05 DIAGNOSIS — I42.0: ICD-10-CM

## 2025-02-05 DIAGNOSIS — I47.10 SVT (SUPRAVENTRICULAR TACHYCARDIA) (HCC): ICD-10-CM

## 2025-02-05 DIAGNOSIS — E78.5 DYSLIPIDEMIA: ICD-10-CM

## 2025-02-05 LAB
AORTIC ROOT: 3.7 CM
ASCENDING AORTA: 3.1 CM
BSA FOR ECHO PROCEDURE: 2.39 M2
E WAVE DECELERATION TIME: 157 MS
E/A RATIO: 1.14
FRACTIONAL SHORTENING: 40 (ref 28–44)
INTERVENTRICULAR SEPTUM IN DIASTOLE (PARASTERNAL SHORT AXIS VIEW): 1.1 CM
INTERVENTRICULAR SEPTUM: 1.1 CM (ref 0.6–1.1)
LAAS-AP2: 21.4 CM2
LAAS-AP4: 17.6 CM2
LEFT ATRIUM SIZE: 4.9 CM
LEFT ATRIUM VOLUME (MOD BIPLANE): 57 ML
LEFT ATRIUM VOLUME INDEX (MOD BIPLANE): 23.8 ML/M2
LEFT INTERNAL DIMENSION IN SYSTOLE: 2.9 CM (ref 2.1–4)
LEFT VENTRICLE DIASTOLIC VOLUME (MOD BIPLANE): 96 ML
LEFT VENTRICLE DIASTOLIC VOLUME INDEX (MOD BIPLANE): 40.2 ML/M2
LEFT VENTRICLE SYSTOLIC VOLUME (MOD BIPLANE): 36 ML
LEFT VENTRICLE SYSTOLIC VOLUME INDEX (MOD BIPLANE): 15.1 ML/M2
LEFT VENTRICULAR INTERNAL DIMENSION IN DIASTOLE: 4.8 CM (ref 3.5–6)
LEFT VENTRICULAR POSTERIOR WALL IN END DIASTOLE: 1.4 CM
LEFT VENTRICULAR STROKE VOLUME: 75 ML
LV EF BIPLANE MOD: 62 %
LV EF US.2D.A4C+ESTIMATED: 61 %
LVSV (TEICH): 75 ML
MV E'TISSUE VEL-SEP: 11 CM/S
MV PEAK A VEL: 0.57 M/S
MV PEAK E VEL: 65 CM/S
MV STENOSIS PRESSURE HALF TIME: 45 MS
MV VALVE AREA P 1/2 METHOD: 4.89
RA PRESSURE ESTIMATED: 3 MMHG
RIGHT ATRIUM AREA SYSTOLE A4C: 16 CM2
RIGHT VENTRICLE ID DIMENSION: 2.6 CM
RV PSP: 27 MMHG
SL CV LEFT ATRIUM LENGTH A2C: 5.2 CM
SL CV LV EF: 40
SL CV PED ECHO LEFT VENTRICLE DIASTOLIC VOLUME (MOD BIPLANE) 2D: 108 ML
SL CV PED ECHO LEFT VENTRICLE SYSTOLIC VOLUME (MOD BIPLANE) 2D: 32 ML
TR MAX PG: 24 MMHG
TR PEAK VELOCITY: 2.4 M/S
TRICUSPID ANNULAR PLANE SYSTOLIC EXCURSION: 2.3 CM
TRICUSPID VALVE PEAK REGURGITATION VELOCITY: 2.43 M/S

## 2025-02-05 PROCEDURE — 93306 TTE W/DOPPLER COMPLETE: CPT

## 2025-02-07 ENCOUNTER — OFFICE VISIT (OUTPATIENT)
Dept: FAMILY MEDICINE CLINIC | Facility: CLINIC | Age: 60
End: 2025-02-07
Payer: COMMERCIAL

## 2025-02-07 VITALS
OXYGEN SATURATION: 98 % | HEART RATE: 81 BPM | BODY MASS INDEX: 39.57 KG/M2 | HEIGHT: 70 IN | TEMPERATURE: 96.8 F | WEIGHT: 276.4 LBS | SYSTOLIC BLOOD PRESSURE: 130 MMHG | DIASTOLIC BLOOD PRESSURE: 92 MMHG

## 2025-02-07 DIAGNOSIS — I42.8 NONISCHEMIC CARDIOMYOPATHY (HCC): ICD-10-CM

## 2025-02-07 DIAGNOSIS — E78.5 DYSLIPIDEMIA: ICD-10-CM

## 2025-02-07 DIAGNOSIS — N20.0 NEPHROLITHIASIS: ICD-10-CM

## 2025-02-07 DIAGNOSIS — M79.18 CHRONIC MUSCULOSKELETAL PAIN: ICD-10-CM

## 2025-02-07 DIAGNOSIS — G89.29 CHRONIC MUSCULOSKELETAL PAIN: ICD-10-CM

## 2025-02-07 DIAGNOSIS — K21.9 GASTROESOPHAGEAL REFLUX DISEASE, UNSPECIFIED WHETHER ESOPHAGITIS PRESENT: ICD-10-CM

## 2025-02-07 DIAGNOSIS — I48.91 ATRIAL FIBRILLATION, UNSPECIFIED TYPE (HCC): Primary | ICD-10-CM

## 2025-02-07 DIAGNOSIS — I50.22 CHRONIC SYSTOLIC (CONGESTIVE) HEART FAILURE (HCC): ICD-10-CM

## 2025-02-07 DIAGNOSIS — G62.9 NEUROPATHY: ICD-10-CM

## 2025-02-07 DIAGNOSIS — I47.10 PAROXYSMAL SVT (SUPRAVENTRICULAR TACHYCARDIA) (HCC): ICD-10-CM

## 2025-02-07 DIAGNOSIS — K76.0 HEPATIC STEATOSIS: ICD-10-CM

## 2025-02-07 DIAGNOSIS — E79.0 HYPERURICEMIA: ICD-10-CM

## 2025-02-07 PROCEDURE — 99214 OFFICE O/P EST MOD 30 MIN: CPT | Performed by: FAMILY MEDICINE

## 2025-02-07 RX ORDER — PREGABALIN 50 MG/1
50 CAPSULE ORAL 3 TIMES DAILY
Qty: 30 CAPSULE | Refills: 5 | Status: SHIPPED | OUTPATIENT
Start: 2025-02-07

## 2025-02-07 NOTE — ASSESSMENT & PLAN NOTE
CBC and CMP were satisfactory.  Additionally, the TSH was normal.  Patient was given gabapentin to see if this helped.  However, he could not tolerate it because it made him have nightmares.  And that was just with 1 capsule at bedtime.  The patient's neuropathic pain started after his carpal tunnel release and cubital tunnel release.  It significantly impairs his ability to do his daily functions.  Considering Neurontin did not work, I am very hesitant to try a tricyclic antidepressant due to the patient's cardiac history.  I will try and get Lyrica approved to see if this helps the patient.  Please note patient is already on Cymbalta and that does not seem to be taking care of the neuropathic pain  Orders:    pregabalin (LYRICA) 50 mg capsule; Take 1 capsule (50 mg total) by mouth 3 (three) times a day

## 2025-02-07 NOTE — ASSESSMENT & PLAN NOTE
Recent liver tests were normal.  Need to continue to monitor at least every 6 months.  Weight loss discussion.  Modify risk factors.  Avoid alcohol.

## 2025-02-07 NOTE — ASSESSMENT & PLAN NOTE
Cardiology is following.  Await further recommendations.  Continue current medications.  Most recent echo revealed an improvement in ejection fraction to 40 to 45%

## 2025-02-07 NOTE — ASSESSMENT & PLAN NOTE
Wt Readings from Last 3 Encounters:   02/07/25 125 kg (276 lb 6.4 oz)   02/05/25 125 kg (275 lb)   01/02/25 125 kg (275 lb 3.2 oz)

## 2025-02-07 NOTE — PROGRESS NOTES
Name: Yazan Child      : 1965      MRN: [id number]  Encounter Provider: Augustus Martínez DO  Encounter Date: 2025   Encounter department: [facility]  :  Assessment & Plan  Atrial fibrillation, unspecified type (HCC)  Rate controlled and patient remains on anticoagulation with Eliquis.       Chronic systolic (congestive) heart failure (HCC)  Wt Readings from Last 3 Encounters:   25 125 kg (276 lb 6.4 oz)   25 125 kg (275 lb)   25 125 kg (275 lb 3.2 oz)                    Nonischemic cardiomyopathy (HCC)  Cardiology is following.  Await further recommendations.  Continue current medications.  Most recent echo revealed an improvement in ejection fraction to 40 to 45%       Paroxysmal SVT (supraventricular tachycardia) (HCC)  Cardiology is currently following.  Await further recommendations.       Gastroesophageal reflux disease, unspecified whether esophagitis present  Currently stable and controlled.  Continue current treatment.       Hepatic steatosis  Recent liver tests were normal.  Need to continue to monitor at least every 6 months.  Weight loss discussion.  Modify risk factors.  Avoid alcohol.       Chronic musculoskeletal pain  Continue Cymbalta as prescribed.       Dyslipidemia  Tolerating current treatment.  Total cholesterol is now decreased to 127, triglycerides 148, HDL 35 and LDL 62.  This is much improved.       Hyperuricemia  The uric acid was satisfactory at 5.4.  Continue current treatment.       Neuropathy  CBC and CMP were satisfactory.  Additionally, the TSH was normal.  Patient was given gabapentin to see if this helped.  However, he could not tolerate it because it made him have nightmares.  And that was just with 1 capsule at bedtime.  The patient's neuropathic pain started after his carpal tunnel release and cubital tunnel release.  It significantly impairs his ability to do his daily functions.  Considering Neurontin did not work, I am very  hesitant to try a tricyclic antidepressant due to the patient's cardiac history.  I will try and get Lyrica approved to see if this helps the patient.  Please note patient is already on Cymbalta and that does not seem to be taking care of the neuropathic pain  Orders:    pregabalin (LYRICA) 50 mg capsule; Take 1 capsule (50 mg total) by mouth 3 (three) times a day    Nephrolithiasis  The suspected etiology is uric acid stones.  Uric acid level was 5.4 which is acceptable.       Other lab work reviewed.  PSA 0.430 which is normal.       History of Present Illness   The patient is a pleasant 60-year-old male who presents today for follow-up.  The patient does have a complex medical history.  He is followed by [facility] cardiology.  I have been attempting to get those notes.  After further reviewing the medical chart, I did receive notes from Dr. Augustus Mack Jr.  He is going to see the patient back in 3 months.  The echocardiogram has shown improved ejection fraction to 40 to 45%.  He changed no medications.    Patient also has a history of supraventricular tachycardia.  He is being monitored by cardiology at [facility].  He received a call referring to a run of supraventricular tachycardia and has an upcoming appointment at [facility] to address next Tuesday.    The patient has a history of carpal tunnel release and cubital tunnel release with neuropathic pain.  This is unresponsible to Cymbalta as well as gabapentin.  Patient had a reaction to gabapentin which produced side effects.  I am hesitant to start a tricyclic antidepressant due to the patient's cardiac history.  I will try Lyrica 50 mg 3 times a day and adjust as we move forward.  The patient is a lewis.  I do not feel that the patient can return to this occupation considering his medical history and the history of the neuropathic pain affecting the function of his hands, wrists and elbows.      Review of Systems   Constitutional:  Negative for  "chills and fever.   HENT:  Negative for ear pain and sore throat.    Eyes:  Negative for pain and visual disturbance.   Respiratory:  Negative for cough and shortness of breath.    Cardiovascular:  Negative for chest pain and palpitations.   Gastrointestinal:  Negative for abdominal pain and vomiting.   Genitourinary:  Negative for dysuria and hematuria.   Musculoskeletal:  Negative for arthralgias and back pain.   Skin:  Negative for color change and rash.   Neurological:  Negative for seizures and syncope.   Psychiatric/Behavioral: Negative.     All other systems reviewed and are negative.      Objective   /92   Pulse 81   Temp (!) 96.8 °F (36 °C)   Ht 5' 10\" (1.778 m)   Wt 125 kg (276 lb 6.4 oz)   SpO2 98%   BMI 39.66 kg/m²      Physical Exam  Vitals and nursing note reviewed.   Constitutional:       General: He is not in acute distress.     Appearance: Normal appearance. He is well-developed.   HENT:      Head: Normocephalic and atraumatic.      Right Ear: Tympanic membrane normal.      Left Ear: Tympanic membrane normal.      Mouth/Throat:      Mouth: Mucous membranes are moist.      Pharynx: Oropharynx is clear.   Eyes:      Extraocular Movements: Extraocular movements intact.      Conjunctiva/sclera: Conjunctivae normal.      Pupils: Pupils are equal, round, and reactive to light.   Cardiovascular:      Rate and Rhythm: Normal rate and regular rhythm.      Heart sounds: Normal heart sounds. No murmur heard.     No friction rub.   Pulmonary:      Effort: Pulmonary effort is normal. No respiratory distress.      Breath sounds: Normal breath sounds.   Abdominal:      General: Bowel sounds are normal.      Palpations: Abdomen is soft.      Tenderness: There is no abdominal tenderness. There is no guarding or rebound.      Hernia: No hernia is present.   Musculoskeletal:         General: No swelling.      Cervical back: Neck supple.   Skin:     General: Skin is warm and dry.      Capillary Refill: " Capillary refill takes less than 2 seconds.   Neurological:      General: No focal deficit present.      Mental Status: He is alert and oriented to person, place, and time.      Gait: Gait normal.   Psychiatric:         Mood and Affect: Mood normal.         Behavior: Behavior normal.         Thought Content: Thought content normal.

## 2025-02-07 NOTE — ASSESSMENT & PLAN NOTE
Tolerating current treatment.  Total cholesterol is now decreased to 127, triglycerides 148, HDL 35 and LDL 62.  This is much improved.

## 2025-02-18 ENCOUNTER — ANESTHESIA (OUTPATIENT)
Dept: PERIOP | Facility: HOSPITAL | Age: 60
End: 2025-02-18
Payer: COMMERCIAL

## 2025-02-18 ENCOUNTER — ANESTHESIA EVENT (OUTPATIENT)
Dept: PERIOP | Facility: HOSPITAL | Age: 60
End: 2025-02-18
Payer: COMMERCIAL

## 2025-02-18 ENCOUNTER — HOSPITAL ENCOUNTER (OUTPATIENT)
Dept: PERIOP | Facility: HOSPITAL | Age: 60
Setting detail: OUTPATIENT SURGERY
Discharge: HOME | End: 2025-02-18
Attending: STUDENT IN AN ORGANIZED HEALTH CARE EDUCATION/TRAINING PROGRAM
Payer: COMMERCIAL

## 2025-02-18 VITALS
SYSTOLIC BLOOD PRESSURE: 128 MMHG | HEART RATE: 70 BPM | TEMPERATURE: 97.2 F | DIASTOLIC BLOOD PRESSURE: 72 MMHG | RESPIRATION RATE: 18 BRPM | OXYGEN SATURATION: 96 %

## 2025-02-18 DIAGNOSIS — Z12.11 SCREENING FOR COLON CANCER: ICD-10-CM

## 2025-02-18 PROCEDURE — 45385 COLONOSCOPY W/LESION REMOVAL: CPT | Performed by: INTERNAL MEDICINE

## 2025-02-18 PROCEDURE — 88305 TISSUE EXAM BY PATHOLOGIST: CPT | Performed by: PATHOLOGY

## 2025-02-18 RX ORDER — PROPOFOL 10 MG/ML
INJECTION, EMULSION INTRAVENOUS CONTINUOUS PRN
Status: DISCONTINUED | OUTPATIENT
Start: 2025-02-18 | End: 2025-02-18

## 2025-02-18 RX ORDER — PROPOFOL 10 MG/ML
INJECTION, EMULSION INTRAVENOUS AS NEEDED
Status: DISCONTINUED | OUTPATIENT
Start: 2025-02-18 | End: 2025-02-18

## 2025-02-18 RX ORDER — SODIUM CHLORIDE, SODIUM LACTATE, POTASSIUM CHLORIDE, CALCIUM CHLORIDE 600; 310; 30; 20 MG/100ML; MG/100ML; MG/100ML; MG/100ML
100 INJECTION, SOLUTION INTRAVENOUS CONTINUOUS
Status: DISCONTINUED | OUTPATIENT
Start: 2025-02-18 | End: 2025-02-22 | Stop reason: HOSPADM

## 2025-02-18 RX ORDER — SODIUM CHLORIDE, SODIUM LACTATE, POTASSIUM CHLORIDE, CALCIUM CHLORIDE 600; 310; 30; 20 MG/100ML; MG/100ML; MG/100ML; MG/100ML
100 INJECTION, SOLUTION INTRAVENOUS CONTINUOUS
Status: CANCELLED | OUTPATIENT
Start: 2025-02-18

## 2025-02-18 RX ORDER — LIDOCAINE HYDROCHLORIDE 10 MG/ML
INJECTION, SOLUTION EPIDURAL; INFILTRATION; INTRACAUDAL; PERINEURAL AS NEEDED
Status: DISCONTINUED | OUTPATIENT
Start: 2025-02-18 | End: 2025-02-18

## 2025-02-18 RX ORDER — METOPROLOL TARTRATE 1 MG/ML
INJECTION, SOLUTION INTRAVENOUS AS NEEDED
Status: DISCONTINUED | OUTPATIENT
Start: 2025-02-18 | End: 2025-02-18

## 2025-02-18 RX ADMIN — METOPROLOL TARTRATE 2 MG: 5 INJECTION INTRAVENOUS at 13:49

## 2025-02-18 RX ADMIN — SODIUM CHLORIDE, SODIUM LACTATE, POTASSIUM CHLORIDE, AND CALCIUM CHLORIDE 100 ML/HR: .6; .31; .03; .02 INJECTION, SOLUTION INTRAVENOUS at 11:21

## 2025-02-18 RX ADMIN — PROPOFOL 20 MG: 10 INJECTION, EMULSION INTRAVENOUS at 13:50

## 2025-02-18 RX ADMIN — PROPOFOL 20 MG: 10 INJECTION, EMULSION INTRAVENOUS at 13:48

## 2025-02-18 RX ADMIN — LIDOCAINE HYDROCHLORIDE 50 MG: 10 INJECTION, SOLUTION EPIDURAL; INFILTRATION; INTRACAUDAL; PERINEURAL at 13:44

## 2025-02-18 RX ADMIN — PROPOFOL 100 MCG/KG/MIN: 10 INJECTION, EMULSION INTRAVENOUS at 13:48

## 2025-02-18 RX ADMIN — PROPOFOL 150 MG: 10 INJECTION, EMULSION INTRAVENOUS at 13:44

## 2025-02-18 NOTE — ANESTHESIA PREPROCEDURE EVALUATION
EF40-45%    Medical History    History Comments   Kidney stones    Anxiety    Hyperlipidemia    GERD (gastroesophageal reflux disease)    CHF (congestive heart failure) (HCC)    Acute systolic congestive heart failure (HCC)    Procedure:  COLONOSCOPY    Relevant Problems   ANESTHESIA (within normal limits)      CARDIO   (+) Acute systolic congestive heart failure (HCC)   (+) Atrial fibrillation (HCC)   (+) Atrial fibrillation with RVR (HCC)   (+) Coronary artery calcification seen on CT scan   (+) Mixed hyperlipidemia   (+) Paroxysmal SVT (supraventricular tachycardia) (HCC)      GI/HEPATIC   (+) GERD (gastroesophageal reflux disease)   (+) Hepatic steatosis      /RENAL   (+) Nephrolithiasis      MUSCULOSKELETAL   (+) Osteoarthritis of knee      NEURO/PSYCH   (+) Anxiety   (+) Chronic musculoskeletal pain        Physical Exam    Airway    Mallampati score: III  TM Distance: >3 FB  Neck ROM: full     Dental       Cardiovascular  Rate: normal    Pulmonary  Pulmonary exam normal     Other Findings  Per pt denies anything remaining that is loose or removeable      Anesthesia Plan  ASA Score- 3     Anesthesia Type- IV sedation with anesthesia with ASA Monitors.         Additional Monitors:     Airway Plan:     Comment: Per pt denies anything remaining that is loose or removeable.       Plan Factors-Exercise tolerance (METS): >4 METS.    Chart reviewed. EKG reviewed. Imaging results reviewed. Existing labs reviewed. Patient summary reviewed.                  Induction- intravenous.    Postoperative Plan-         Informed Consent- Anesthetic plan and risks discussed with patient.  I personally reviewed this patient with the CRNA. Discussed and agreed on the Anesthesia Plan with the CRNA..      NPO Status:  Vitals Value Taken Time   Date of last liquid 02/18/25 02/18/25 1108   Time of last liquid 0630 02/18/25 1108   Date of last solid 02/17/25 02/18/25 1108   Time of last solid 0900 02/18/25 1108

## 2025-02-18 NOTE — H&P
H&P - Gastroenterology   Name: Yazan Child 60 y.o. male I MRN: [id number]  Unit/Bed#:  I Date of Admission: 2/18/2025   Date of Service: 2/18/2025 I Hospital Day: 0     Assessment & Plan   This is a 60 y.o. year old male here for colonoscopy, and he is stable and optimized for his procedure.    History of Present Illness    Yazan Child is a 60 y.o. year old male who presents for history of colon polyps    REVIEW OF SYSTEMS: Per the HPI, and otherwise unremarkable.    Historical Information   Past Medical History:   Diagnosis Date    Acute systolic congestive heart failure (HCC) 5/2/2022    Anxiety     CHF (congestive heart failure) (HCC)     GERD (gastroesophageal reflux disease)     Hyperlipidemia     Kidney stones      Past Surgical History:   Procedure Laterality Date    CARDIAC ELECTROPHYSIOLOGY STUDY AND ABLATION      CARDIAC ELECTROPHYSIOLOGY STUDY AND ABLATION      CARDIAC SURGERY  2014    ablation    CHOLECYSTECTOMY      COLONOSCOPY      ELBOW SURGERY Right     FL RETROGRADE PYELOGRAM  6/29/2019    FL RETROGRADE PYELOGRAM  8/29/2019    FL RETROGRADE PYELOGRAM  9/25/2019    HERNIA REPAIR Right     ing     IR NEPHROSTOMY TUBE PLACEMENT  8/30/2019    KIDNEY STONE SURGERY      KNEE ARTHROSCOPY Left     KNEE CARTILAGE SURGERY      UT CYSTO/URETERO W/LITHOTRIPSY &INDWELL STENT INSRT Right 8/29/2019    Procedure: CYSTOSCOPY URETEROSCOPY WITH LITHOTRIPSY HOLMIUM LASER, RETROGRADE PYELOGRAM;  Surgeon: Eddie Juárez MD;  Location: [facility];  Service: Urology    UT CYSTOURETHROSCOPY W/URETERAL CATHETERIZATION Right 6/29/2019    Procedure: CYSTOSCOPY RETROGRADE PYELOGRAM WITH INSERTION STENT URETERAL;  Surgeon: Brayden Song MD;  Location: [facility];  Service: Urology    UT PERQ NL/PL LITHOTRP COMPLEX >2 CM MLT LOCATIONS Right 9/25/2019    Procedure: NEPHROLITHOTOMY  PERCUTANEOUS ([facility]). MEATAL DILATION; ANTEGRADE URETEROSCOPY AND STENT REMOVAL;  Surgeon: Eddie Juárez MD;  Location: [facility];  Service: Urology    WRIST SURGERY Right      Social History     Tobacco Use    Smoking status: Never    Smokeless tobacco: Former    Tobacco comments:     former chewing tobacco-quit 2014   Vaping Use    Vaping status: Never Used   Substance and Sexual Activity    Alcohol use: Not Currently     Comment: occasionally    Drug use: No    Sexual activity: Not Currently     E-Cigarette/Vaping    E-Cigarette Use Never User      E-Cigarette/Vaping Substances    Nicotine No     THC No     CBD No     Flavoring No     Other No     Unknown No      Family history non-contributory    Meds/Allergies     Current Outpatient Medications:     acetaminophen (TYLENOL) 325 mg tablet    allopurinol (ZYLOPRIM) 100 mg tablet    atorvastatin (LIPITOR) 80 mg tablet    cholecalciferol (VITAMIN D3) 1,000 units tablet    DULoxetine (CYMBALTA) 30 mg delayed release capsule    DULoxetine (CYMBALTA) 60 mg delayed release capsule    Empagliflozin (Jardiance) 10 MG TABS tablet    metoprolol succinate (Toprol XL) 50 mg 24 hr tablet    potassium chloride (MICRO-K) 10 MEQ CR capsule    sacubitril-valsartan (Entresto) 24-26 MG TABS    valsartan (DIOVAN) 40 mg tablet    apixaban (ELIQUIS) 5 mg    pregabalin (LYRICA) 50 mg capsule    torsemide (DEMADEX) 20 mg tablet    Current Facility-Administered Medications:     lactated ringers infusion, 100 mL/hr, Intravenous, Continuous, 100 mL/hr at 02/18/25 1121  Allergies   Allergen Reactions    Ancef [Cefazolin] Nausea Only     Preoperative admin-nausea and dry heaving    Other Itching     Peanuts     Peanut-Containing Drug Products - Food Allergy Itching    Peanut (Diagnostic) - Food Allergy Itching and Rash    Sulfa Antibiotics Rash    Toradol [Ketorolac Tromethamine] Rash     Tolerates ibuprofen       Objective :  Temp:  [97.3 °F (36.3 °C)] 97.3 °F (36.3 °C)  HR:  [83] 83  BP: (119)/(81) 119/81  Resp:  [18] 18  SpO2:  [96 %] 96 %  O2 Device: None (Room air)    Physical Exam  Gen: NAD  Head:  NCAT  CV: RRR  CHEST: Clear  ABD: soft, NT/ND  EXT: no edema

## 2025-02-18 NOTE — ANESTHESIA POSTPROCEDURE EVALUATION
Post-Op Assessment Note    CV Status:  Stable  Pain Score: 0    Pain management: adequate       Mental Status:  Sleepy and arousable   Hydration Status:  Euvolemic   PONV Controlled:  Controlled   Airway Patency:  Patent     Post Op Vitals Reviewed: Yes    No anethesia notable event occurred.    Staff: Anesthesiologist, CRNA           Last Filed PACU Vitals:  Vitals Value Taken Time   Temp 99.9    Pulse 77 02/18/25 1411   /67    Resp 20    SpO2 95 % 02/18/25 1411   Vitals shown include unfiled device data.

## 2025-02-21 ENCOUNTER — RESULTS FOLLOW-UP (OUTPATIENT)
Dept: GASTROENTEROLOGY | Facility: MEDICAL CENTER | Age: 60
End: 2025-02-21

## 2025-02-21 PROCEDURE — 88305 TISSUE EXAM BY PATHOLOGIST: CPT | Performed by: PATHOLOGY

## 2025-02-21 NOTE — RESULT ENCOUNTER NOTE
Results relayed via Mychart  Multiple subcentimeter adenomatous polyps.  Repeat colonoscopy in 3 years

## 2025-02-26 ENCOUNTER — TELEPHONE (OUTPATIENT)
Age: 60
End: 2025-02-26

## 2025-02-26 NOTE — TELEPHONE ENCOUNTER
[place]-Nurse care management([employer]) called in to report she has been trying to get a hold of the pt to offer care management services.   PCP if you could inform the pt of her information at his next appt, per [place] request.  [place] call back #[phone] ext:[phone].

## 2025-02-26 NOTE — RESULT ENCOUNTER NOTE
Called and spoke with the patient, went over the result report and recommendation. Patient verbalized understanding, had no questions and thanked us.    3 year colonoscopy recall was placed. Thank you.

## 2025-02-26 NOTE — RESULT ENCOUNTER NOTE
Please call the patient with the results    I sent the result via [employer] but received a notification that it was not viewed.    The  colon polyp removed was called an adenoma. This is a pre-cancerous lesion and was completely removed. There was no evidence of cancer in the polyp.     You should have the colonoscopy repeated in 3 years due to a history of colon polyps.

## 2025-03-05 ENCOUNTER — HOSPITAL ENCOUNTER (OUTPATIENT)
Dept: MRI IMAGING | Facility: HOSPITAL | Age: 60
Discharge: HOME | End: 2025-03-05
Payer: COMMERCIAL

## 2025-03-05 DIAGNOSIS — I47.29 OTHER VENTRICULAR TACHYCARDIA (HCC): ICD-10-CM

## 2025-03-05 PROCEDURE — A9585 GADOBUTROL INJECTION: HCPCS | Performed by: INTERNAL MEDICINE

## 2025-03-05 PROCEDURE — 75561 CARDIAC MRI FOR MORPH W/DYE: CPT

## 2025-03-05 RX ORDER — GADOBUTROL 604.72 MG/ML
24 INJECTION INTRAVENOUS
Status: COMPLETED | OUTPATIENT
Start: 2025-03-05 | End: 2025-03-05

## 2025-03-05 RX ADMIN — GADOBUTROL 24 ML: 604.72 INJECTION INTRAVENOUS at 12:29

## 2025-04-01 ENCOUNTER — DOCUMENTATION (OUTPATIENT)
Dept: FAMILY MEDICINE CLINIC | Facility: CLINIC | Age: 60
End: 2025-04-01

## 2025-04-01 ENCOUNTER — APPOINTMENT (EMERGENCY)
Dept: RADIOLOGY | Facility: HOSPITAL | Age: 60
End: 2025-04-01
Payer: COMMERCIAL

## 2025-04-01 ENCOUNTER — HOSPITAL ENCOUNTER (EMERGENCY)
Facility: HOSPITAL | Age: 60
Discharge: HOME | End: 2025-04-01
Attending: EMERGENCY MEDICINE
Payer: COMMERCIAL

## 2025-04-01 VITALS
BODY MASS INDEX: 39.35 KG/M2 | WEIGHT: 274.25 LBS | DIASTOLIC BLOOD PRESSURE: 87 MMHG | OXYGEN SATURATION: 93 % | SYSTOLIC BLOOD PRESSURE: 146 MMHG | RESPIRATION RATE: 20 BRPM | TEMPERATURE: 97.6 F | HEART RATE: 76 BPM

## 2025-04-01 VITALS — SYSTOLIC BLOOD PRESSURE: 142 MMHG | DIASTOLIC BLOOD PRESSURE: 80 MMHG

## 2025-04-01 DIAGNOSIS — R09.89 PULMONARY VENOUS CONGESTION: Primary | ICD-10-CM

## 2025-04-01 LAB
2HR DELTA HS TROPONIN: 0 NG/L
ALBUMIN SERPL BCG-MCNC: 4.6 G/DL (ref 3.5–5)
ALP SERPL-CCNC: 95 U/L (ref 34–104)
ALT SERPL W P-5'-P-CCNC: 18 U/L (ref 7–52)
ANION GAP SERPL CALCULATED.3IONS-SCNC: 7 MMOL/L (ref 4–13)
APTT PPP: 26 SECONDS (ref 23–34)
AST SERPL W P-5'-P-CCNC: 12 U/L (ref 13–39)
ATRIAL RATE: 75 BPM
BASOPHILS # BLD AUTO: 0.05 THOUSANDS/ÂΜL (ref 0–0.1)
BASOPHILS NFR BLD AUTO: 1 % (ref 0–1)
BILIRUB SERPL-MCNC: 1.02 MG/DL (ref 0.2–1)
BNP SERPL-MCNC: 273 PG/ML (ref 0–100)
BUN SERPL-MCNC: 18 MG/DL (ref 5–25)
CALCIUM SERPL-MCNC: 9.6 MG/DL (ref 8.4–10.2)
CARDIAC TROPONIN I PNL SERPL HS: 5 NG/L (ref ?–50)
CARDIAC TROPONIN I PNL SERPL HS: 5 NG/L (ref ?–50)
CHLORIDE SERPL-SCNC: 101 MMOL/L (ref 96–108)
CO2 SERPL-SCNC: 32 MMOL/L (ref 21–32)
CREAT SERPL-MCNC: 0.93 MG/DL (ref 0.6–1.3)
EOSINOPHIL # BLD AUTO: 0.13 THOUSAND/ÂΜL (ref 0–0.61)
EOSINOPHIL NFR BLD AUTO: 1 % (ref 0–6)
ERYTHROCYTE [DISTWIDTH] IN BLOOD BY AUTOMATED COUNT: 14.3 % (ref 11.6–15.1)
FLUAV AG UPPER RESP QL IA.RAPID: NEGATIVE
FLUBV AG UPPER RESP QL IA.RAPID: NEGATIVE
GFR SERPL CREATININE-BSD FRML MDRD: 88 ML/MIN/1.73SQ M
GLUCOSE SERPL-MCNC: 98 MG/DL (ref 65–140)
HCT VFR BLD AUTO: 50 % (ref 36.5–49.3)
HGB BLD-MCNC: 16.8 G/DL (ref 12–17)
IMM GRANULOCYTES # BLD AUTO: 0.06 THOUSAND/UL (ref 0–0.2)
IMM GRANULOCYTES NFR BLD AUTO: 1 % (ref 0–2)
INR PPP: 0.93 (ref 0.85–1.19)
LYMPHOCYTES # BLD AUTO: 1.67 THOUSANDS/ÂΜL (ref 0.6–4.47)
LYMPHOCYTES NFR BLD AUTO: 17 % (ref 14–44)
MCH RBC QN AUTO: 30.2 PG (ref 26.8–34.3)
MCHC RBC AUTO-ENTMCNC: 33.6 G/DL (ref 31.4–37.4)
MCV RBC AUTO: 90 FL (ref 82–98)
MONOCYTES # BLD AUTO: 0.6 THOUSAND/ÂΜL (ref 0.17–1.22)
MONOCYTES NFR BLD AUTO: 6 % (ref 4–12)
NEUTROPHILS # BLD AUTO: 7.59 THOUSANDS/ÂΜL (ref 1.85–7.62)
NEUTS SEG NFR BLD AUTO: 74 % (ref 43–75)
NRBC BLD AUTO-RTO: 0 /100 WBCS
P AXIS: -4 DEGREES
PLATELET # BLD AUTO: 284 THOUSANDS/UL (ref 149–390)
PMV BLD AUTO: 10.5 FL (ref 8.9–12.7)
POTASSIUM SERPL-SCNC: 4.2 MMOL/L (ref 3.5–5.3)
PR INTERVAL: 204 MS
PROT SERPL-MCNC: 8.1 G/DL (ref 6.4–8.4)
PROTHROMBIN TIME: 12.9 SECONDS (ref 12.3–15)
QRS AXIS: 50 DEGREES
QRSD INTERVAL: 76 MS
QT INTERVAL: 430 MS
QTC INTERVAL: 480 MS
RBC # BLD AUTO: 5.56 MILLION/UL (ref 3.88–5.62)
SARS-COV+SARS-COV-2 AG RESP QL IA.RAPID: NEGATIVE
SODIUM SERPL-SCNC: 140 MMOL/L (ref 135–147)
T WAVE AXIS: 40 DEGREES
VENTRICULAR RATE: 75 BPM
WBC # BLD AUTO: 10.1 THOUSAND/UL (ref 4.31–10.16)

## 2025-04-01 PROCEDURE — 85730 THROMBOPLASTIN TIME PARTIAL: CPT | Performed by: EMERGENCY MEDICINE

## 2025-04-01 PROCEDURE — 87811 SARS-COV-2 COVID19 W/OPTIC: CPT | Performed by: EMERGENCY MEDICINE

## 2025-04-01 PROCEDURE — 93005 ELECTROCARDIOGRAM TRACING: CPT

## 2025-04-01 PROCEDURE — 36415 COLL VENOUS BLD VENIPUNCTURE: CPT | Performed by: EMERGENCY MEDICINE

## 2025-04-01 PROCEDURE — 80053 COMPREHEN METABOLIC PANEL: CPT | Performed by: EMERGENCY MEDICINE

## 2025-04-01 PROCEDURE — 83880 ASSAY OF NATRIURETIC PEPTIDE: CPT | Performed by: EMERGENCY MEDICINE

## 2025-04-01 PROCEDURE — 71045 X-RAY EXAM CHEST 1 VIEW: CPT

## 2025-04-01 PROCEDURE — 99285 EMERGENCY DEPT VISIT HI MDM: CPT

## 2025-04-01 PROCEDURE — 85610 PROTHROMBIN TIME: CPT | Performed by: EMERGENCY MEDICINE

## 2025-04-01 PROCEDURE — 96374 THER/PROPH/DIAG INJ IV PUSH: CPT

## 2025-04-01 PROCEDURE — 99284 EMERGENCY DEPT VISIT MOD MDM: CPT | Performed by: EMERGENCY MEDICINE

## 2025-04-01 PROCEDURE — 87804 INFLUENZA ASSAY W/OPTIC: CPT | Performed by: EMERGENCY MEDICINE

## 2025-04-01 PROCEDURE — 85025 COMPLETE CBC W/AUTO DIFF WBC: CPT | Performed by: EMERGENCY MEDICINE

## 2025-04-01 PROCEDURE — 84484 ASSAY OF TROPONIN QUANT: CPT | Performed by: EMERGENCY MEDICINE

## 2025-04-01 RX ORDER — FUROSEMIDE 10 MG/ML
40 INJECTION INTRAMUSCULAR; INTRAVENOUS ONCE
Status: COMPLETED | OUTPATIENT
Start: 2025-04-01 | End: 2025-04-01

## 2025-04-01 RX ORDER — FUROSEMIDE 20 MG/1
20 TABLET ORAL 2 TIMES DAILY
Qty: 20 TABLET | Refills: 0 | Status: SHIPPED | OUTPATIENT
Start: 2025-04-01

## 2025-04-01 RX ADMIN — FUROSEMIDE 40 MG: 10 INJECTION, SOLUTION INTRAMUSCULAR; INTRAVENOUS at 11:21

## 2025-04-01 NOTE — ED PROVIDER NOTES
Time reflects when diagnosis was documented in both MDM as applicable and the Disposition within this note       Time User Action Codes Description Comment    4/1/2025  1:20 PM Claudy Peguero Add [R09.89] Pulmonary venous congestion           ED Disposition       ED Disposition   Discharge    Condition   Stable    Date/Time   Tue Apr 1, 2025  1:20 PM    Comment   Yazan Chowdaryana cristina discharge to home/self care.                   Assessment & Plan       Medical Decision Making  60-year-old male presents to the emergency department with complaint of shortness of breath, differential diagnosis include COVID, flu, pneumonia, congestive heart failure, pleural effusion, ACS, will perform chest x-ray, cardiac enzymes, BNP, and reassess.    Cardiac enzymes, including remaining lab work up unremarkable, patient's x-ray did show mild pulmonary venous congestion, concern for congestive heart failure exacerbation, will start patient on diuretics, and have him follow-up with cardiology outpatient.  Discussed all results with the patient.  Strict return precautions given.  Patient understands and agrees with treatment plan.    Amount and/or Complexity of Data Reviewed  Labs: ordered.  Radiology: ordered.    Risk  Prescription drug management.             Medications   furosemide (LASIX) injection 40 mg (40 mg Intravenous Given 4/1/25 1121)       ED Risk Strat Scores   HEART Risk Score      Flowsheet Row Most Recent Value   Heart Score Risk Calculator    History 0 Filed at: 04/01/2025 1015   ECG 0 Filed at: 04/01/2025 1015   Age 2 Filed at: 04/01/2025 1015   Risk Factors 2 Filed at: 04/01/2025 1015   Troponin 0 Filed at: 04/01/2025 1015   HEART Score 4 Filed at: 04/01/2025 1015          HEART Risk Score      Flowsheet Row Most Recent Value   Heart Score Risk Calculator    History 0 Filed at: 04/01/2025 1015   ECG 0 Filed at: 04/01/2025 1015   Age 2 Filed at: 04/01/2025 1015   Risk Factors 2 Filed at: 04/01/2025 1015   Troponin 0 Filed  at: 04/01/2025 1015   HEART Score 4 Filed at: 04/01/2025 1015                              SBIRT 22yo+      Flowsheet Row Most Recent Value   Initial Alcohol Screen: US AUDIT-C     1. How often do you have a drink containing alcohol? 0 Filed at: 04/01/2025 1010   2. How many drinks containing alcohol do you have on a typical day you are drinking?  0 Filed at: 04/01/2025 1010   3a. Male UNDER 65: How often do you have five or more drinks on one occasion? 0 Filed at: 04/01/2025 1010   3b. FEMALE Any Age, or MALE 65+: How often do you have 4 or more drinks on one occassion? 0 Filed at: 04/01/2025 1010   Audit-C Score 0 Filed at: 04/01/2025 1010   ROLANDO: How many times in the past year have you...    Used an illegal drug or used a prescription medication for non-medical reasons? Never Filed at: 04/01/2025 1010                            History of Present Illness       Chief Complaint   Patient presents with    Shortness of Breath     Pt reports increased sob and chest tightness since last night       Past Medical History:   Diagnosis Date    Acute systolic congestive heart failure (HCC) 5/2/2022    Anxiety     CHF (congestive heart failure) (HCC)     GERD (gastroesophageal reflux disease)     Hyperlipidemia     Kidney stones       Past Surgical History:   Procedure Laterality Date    CARDIAC ELECTROPHYSIOLOGY STUDY AND ABLATION      CARDIAC ELECTROPHYSIOLOGY STUDY AND ABLATION      CARDIAC SURGERY  2014    ablation    CHOLECYSTECTOMY      COLONOSCOPY      ELBOW SURGERY Right     FL RETROGRADE PYELOGRAM  6/29/2019    FL RETROGRADE PYELOGRAM  8/29/2019    FL RETROGRADE PYELOGRAM  9/25/2019    HERNIA REPAIR Right     ing     IR NEPHROSTOMY TUBE PLACEMENT  8/30/2019    KIDNEY STONE SURGERY      KNEE ARTHROSCOPY Left     KNEE CARTILAGE SURGERY      SC CYSTO/URETERO W/LITHOTRIPSY &INDWELL STENT INSRT Right 8/29/2019    Procedure: CYSTOSCOPY URETEROSCOPY WITH LITHOTRIPSY HOLMIUM LASER, RETROGRADE PYELOGRAM;  Surgeon: Eddie RAUSCH  MD Franck;  Location: [facility];  Service: Urology    NJ CYSTOURETHROSCOPY W/URETERAL CATHETERIZATION Right 6/29/2019    Procedure: CYSTOSCOPY RETROGRADE PYELOGRAM WITH INSERTION STENT URETERAL;  Surgeon: Brayden Song MD;  Location: [facility];  Service: Urology    NJ PERQ NL/PL LITHOTRP COMPLEX >2 CM MLT LOCATIONS Right 9/25/2019    Procedure: NEPHROLITHOTOMY  PERCUTANEOUS ([facility]). MEATAL DILATION; ANTEGRADE URETEROSCOPY AND STENT REMOVAL;  Surgeon: Eddie Juárez MD;  Location: [facility];  Service: Urology    WRIST SURGERY Right       Family History   Problem Relation Age of Onset    Cancer Mother     Cancer Father       Social History     Tobacco Use    Smoking status: Never    Smokeless tobacco: Former    Tobacco comments:     former chewing tobacco-quit 2014   Vaping Use    Vaping status: Never Used   Substance Use Topics    Alcohol use: Not Currently     Comment: occasionally    Drug use: No      E-Cigarette/Vaping    E-Cigarette Use Never User       E-Cigarette/Vaping Substances    Nicotine No     THC No     CBD No     Flavoring No     Other No     Unknown No       I have reviewed and agree with the history as documented.     60-year-old male with past medical history of SVT, ablation in the past, congestive heart failure, presents to the emergency department with complaint of shortness of breath, patient states that his symptoms have been going on for the past couple of days, he was concerned that he may be having a congestive heart failure exacerbation.  Patient has had no chills, fevers, sweats, patient states that he has had some chest tightness.  He denies any GI or  complaints.      Shortness of Breath  Associated symptoms: no abdominal pain, no chest pain, no cough, no ear pain, no fever, no rash, no sore throat and no vomiting        Review of Systems   Constitutional:  Negative for chills and fever.   HENT:  Negative for ear pain and sore throat.    Eyes:  Negative for pain and  visual disturbance.   Respiratory:  Positive for chest tightness and shortness of breath. Negative for cough.    Cardiovascular:  Negative for chest pain and palpitations.   Gastrointestinal:  Negative for abdominal pain and vomiting.   Genitourinary:  Negative for dysuria and hematuria.   Musculoskeletal:  Negative for arthralgias and back pain.   Skin:  Negative for color change and rash.   Neurological:  Negative for seizures and syncope.   All other systems reviewed and are negative.          Objective       ED Triage Vitals [04/01/25 1005]   Temperature Pulse Blood Pressure Respirations SpO2 Patient Position - Orthostatic VS   97.6 °F (36.4 °C) 76 156/86 16 97 % Sitting      Temp Source Heart Rate Source BP Location FiO2 (%) Pain Score    Temporal Monitor Right arm -- No Pain      Vitals      Date and Time Temp Pulse SpO2 Resp BP Pain Score FACES Pain Rating User   04/01/25 1330 -- 76 93 % 20 146/87 -- -- AD   04/01/25 1245 -- 79 93 % 20 135/88 -- -- AD   04/01/25 1200 -- 77 97 % 20 152/91 -- -- AS   04/01/25 1100 -- 77 95 % 20 131/87 -- -- AS   04/01/25 1030 -- 73 96 % 16 135/78 -- -- AS   04/01/25 1005 97.6 °F (36.4 °C) 76 97 % 16 156/86 No Pain -- AS            Physical Exam  Vitals and nursing note reviewed.   Constitutional:       General: He is not in acute distress.     Appearance: He is well-developed.   HENT:      Head: Normocephalic and atraumatic.   Eyes:      Conjunctiva/sclera: Conjunctivae normal.   Cardiovascular:      Rate and Rhythm: Normal rate and regular rhythm.      Heart sounds: No murmur heard.  Pulmonary:      Effort: Pulmonary effort is normal. No respiratory distress.      Breath sounds: Normal breath sounds.   Abdominal:      Palpations: Abdomen is soft.      Tenderness: There is no abdominal tenderness.   Musculoskeletal:         General: No swelling.      Cervical back: Neck supple.   Skin:     General: Skin is warm and dry.      Capillary Refill: Capillary refill takes less than 2  seconds.   Neurological:      Mental Status: He is alert.   Psychiatric:         Mood and Affect: Mood normal.         Results Reviewed       Procedure Component Value Units Date/Time    HS Troponin I 2hr [[id number]]  (Normal) Collected: 04/01/25 1208    Lab Status: Final result Specimen: Blood from Arm, Right Updated: 04/01/25 1235     hs TnI 2hr 5 ng/L      Delta 2hr hsTnI 0 ng/L     HS Troponin I 4hr [[id number]]     Lab Status: No result Specimen: Blood     B-Type Natriuretic Peptide(BNP) [[id number]]  (Abnormal) Collected: 04/01/25 1020    Lab Status: Final result Specimen: Blood from Arm, Right Updated: 04/01/25 1057      pg/mL     HS Troponin 0hr (reflex protocol) [[id number]]  (Normal) Collected: 04/01/25 1020    Lab Status: Final result Specimen: Blood from Arm, Right Updated: 04/01/25 1051     hs TnI 0hr 5 ng/L     APTT [[id number]]  (Normal) Collected: 04/01/25 1020    Lab Status: Final result Specimen: Blood from Arm, Right Updated: 04/01/25 1044     PTT 26 seconds     Protime-INR [[id number]]  (Normal) Collected: 04/01/25 1020    Lab Status: Final result Specimen: Blood from Arm, Right Updated: 04/01/25 1044     Protime 12.9 seconds      INR 0.93    Narrative:      INR Therapeutic Range    Indication                                             INR Range      Atrial Fibrillation                                               2.0-3.0  Hypercoagulable State                                    2.0.2.3  Left Ventricular Asist Device                            2.0-3.0  Mechanical Heart Valve                                  -    Aortic(with afib, MI, embolism, HF, LA enlargement,    and/or coagulopathy)                                     2.0-3.0 (2.5-3.5)     Mitral                                                             2.5-3.5  Prosthetic/Bioprosthetic Heart Valve               2.0-3.0  Venous thromboembolism (VTE: VT, PE        2.0-3.0    FLU/COVID Rapid Antigen (30 min. TAT) - Preferred screening  test in ED [[id number]]  (Normal) Collected: 04/01/25 1022    Lab Status: Final result Specimen: Nares from Nose Updated: 04/01/25 1044     SARS COV Rapid Antigen Negative     Influenza A Rapid Antigen Negative     Influenza B Rapid Antigen Negative    Narrative:      This test has been performed using the [employer]et[employer] Apple 2 FLU+SARS Antigen test under the Emergency Use Authorization (EUA). This test has been validated by the  and verified by the performing laboratory. The Apple uses lateral flow immunofluorescent sandwich assay to detect SARS-COV, Influenza A and Influenza B Antigen.     The Quidel Apple 2 SARS Antigen test does not differentiate between SARS-CoV and SARS-CoV-2.     Negative results are presumptive and may be confirmed with a molecular assay, if necessary, for patient management. Negative results do not rule out SARS-CoV-2 or influenza infection and should not be used as the sole basis for treatment or patient management decisions. A negative test result may occur if the level of antigen in a sample is below the limit of detection of this test.     Positive results are indicative of the presence of viral antigens, but do not rule out bacterial infection or co-infection with other viruses.     All test results should be used as an adjunct to clinical observations and other information available to the provider.    FOR PEDIATRIC PATIENTS - copy/paste COVID Guidelines URL to browser: [email or web address]    Comprehensive metabolic panel [[id number]]  (Abnormal) Collected: 04/01/25 1020    Lab Status: Final result Specimen: Blood from Arm, Right Updated: 04/01/25 1042     Sodium 140 mmol/L      Potassium 4.2 mmol/L      Chloride 101 mmol/L      CO2 32 mmol/L      ANION GAP 7 mmol/L      BUN 18 mg/dL      Creatinine 0.93 mg/dL      Glucose 98 mg/dL      Calcium 9.6 mg/dL      AST 12 U/L      ALT 18 U/L      Alkaline Phosphatase 95 U/L       Total Protein 8.1 g/dL      Albumin 4.6 g/dL      Total Bilirubin 1.02 mg/dL      eGFR 88 ml/min/1.73sq m     Narrative:      National Kid[employer] Disease Foundation guidelines for Chronic Kidney Disease (CKD):     Stage 1 with normal or high GFR (GFR > 90 mL/min/1.73 square meters)    Stage 2 Mild CKD (GFR = 60-89 mL/min/1.73 square meters)    Stage 3A Moderate CKD (GFR = 45-59 mL/min/1.73 square meters)    Stage 3B Moderate CKD (GFR = 30-44 mL/min/1.73 square meters)    Stage 4 Severe CKD (GFR = 15-29 mL/min/1.73 square meters)    Stage 5 End Stage CKD (GFR <15 mL/min/1.73 square meters)  Note: GFR calculation is accurate only with a steady state creatinine    CBC and differential [[id number]]  (Abnormal) Collected: 04/01/25 1020    Lab Status: Final result Specimen: Blood from Arm, Right Updated: 04/01/25 1028     WBC 10.10 Thousand/uL      RBC 5.56 Million/uL      Hemoglobin 16.8 g/dL      Hematocrit 50.0 %      MCV 90 fL      MCH 30.2 pg      MCHC 33.6 g/dL      RDW 14.3 %      MPV 10.5 fL      Platelets 284 Thousands/uL      nRBC 0 /100 WBCs      Segmented % 74 %      Immature Grans % 1 %      Lymphocytes % 17 %      Monocytes % 6 %      Eosinophils Relative 1 %      Basophils Relative 1 %      Absolute Neutrophils 7.59 Thousands/µL      Absolute Immature Grans 0.06 Thousand/uL      Absolute Lymphocytes 1.67 Thousands/µL      Absolute Monocytes 0.60 Thousand/µL      Eosinophils Absolute 0.13 Thousand/µL      Basophils Absolute 0.05 Thousands/µL             X-ray chest 1 view portable   Final Interpretation by Debi Fatima MD (04/01 1103)      Mild pulmonary venous congestion.            Workstation performed: [id number]             Procedures    ED Medication and Procedure Management   Prior to Admission Medications   Prescriptions Last Dose Informant Patient Reported? Taking?   DULoxetine (CYMBALTA) 30 mg delayed release capsule   No No   Sig: Take 1 capsule (30 mg total) by mouth daily   DULoxetine  (CYMBALTA) 60 mg delayed release capsule   No No   Sig: Take 1 capsule (60 mg total) by mouth daily   Empagliflozin (Jardiance) 10 MG TABS tablet   No No   Sig: Take 1 tablet (10 mg total) by mouth daily   acetaminophen (TYLENOL) 325 mg tablet   No No   Sig: Take 2 tablets (650 mg total) by mouth every 6 (six) hours as needed for mild pain   allopurinol (ZYLOPRIM) 100 mg tablet   No No   Sig: Take 1 tablet (100 mg total) by mouth daily   apixaban (ELIQUIS) 5 mg   No No   Sig: Take 1 tablet (5 mg total) by mouth 2 (two) times a day   atorvastatin (LIPITOR) 80 mg tablet   No No   Sig: Take 1 tablet (80 mg total) by mouth daily   cholecalciferol (VITAMIN D3) 1,000 units tablet   No No   Sig: Take 2 tablets (2,000 Units total) by mouth daily For a low vitamin D level   metoprolol succinate (Toprol XL) 50 mg 24 hr tablet   No No   Sig: Take 1 tablet (50 mg total) by mouth 2 (two) times a day   potassium chloride (MICRO-K) 10 MEQ CR capsule   No No   Sig: TAKE ONE (1) CAPSULE BY MOUTH DAILY WITH TORSEMIDE   pregabalin (LYRICA) 50 mg capsule   No No   Sig: Take 1 capsule (50 mg total) by mouth 3 (three) times a day   sacubitril-valsartan (Entresto) 24-26 MG TABS   No No   Sig: Take 1 tablet by mouth 2 (two) times a day Morning and before bedtime   torsemide (DEMADEX) 20 mg tablet   No No   Sig: TAKE ONE (1) TABLET BY MOUTH IN THE MORNING & AT BEDTIME   valsartan (DIOVAN) 40 mg tablet   Yes No      Facility-Administered Medications: None     Discharge Medication List as of 4/1/2025  1:22 PM        START taking these medications    Details   furosemide (LASIX) 20 mg tablet Take 1 tablet (20 mg total) by mouth 2 (two) times a day, Starting Tue 4/1/2025, Normal           CONTINUE these medications which have NOT CHANGED    Details   acetaminophen (TYLENOL) 325 mg tablet Take 2 tablets (650 mg total) by mouth every 6 (six) hours as needed for mild pain, Starting Sun 9/29/2024, No Print      allopurinol (ZYLOPRIM) 100 mg tablet  Take 1 tablet (100 mg total) by mouth daily, Starting Thu 11/7/2024, Normal      apixaban (ELIQUIS) 5 mg Take 1 tablet (5 mg total) by mouth 2 (two) times a day, Starting Thu 11/7/2024, Normal      atorvastatin (LIPITOR) 80 mg tablet Take 1 tablet (80 mg total) by mouth daily, Starting Thu 11/7/2024, Normal      cholecalciferol (VITAMIN D3) 1,000 units tablet Take 2 tablets (2,000 Units total) by mouth daily For a low vitamin D level, Starting Tue 6/28/2022, Normal      !! DULoxetine (CYMBALTA) 30 mg delayed release capsule Take 1 capsule (30 mg total) by mouth daily, Starting Thu 11/7/2024, Normal      !! DULoxetine (CYMBALTA) 60 mg delayed release capsule Take 1 capsule (60 mg total) by mouth daily, Starting Thu 11/7/2024, Normal      Empagliflozin (Jardiance) 10 MG TABS tablet Take 1 tablet (10 mg total) by mouth daily, Starting Wed 6/26/2024, Normal      metoprolol succinate (Toprol XL) 50 mg 24 hr tablet Take 1 tablet (50 mg total) by mouth 2 (two) times a day, Starting Thu 11/7/2024, Normal      potassium chloride (MICRO-K) 10 MEQ CR capsule TAKE ONE (1) CAPSULE BY MOUTH DAILY WITH TORSEMIDE, Normal      pregabalin (LYRICA) 50 mg capsule Take 1 capsule (50 mg total) by mouth 3 (three) times a day, Starting Fri 2/7/2025, Normal      sacubitril-valsartan (Entresto) 24-26 MG TABS Take 1 tablet by mouth 2 (two) times a day Morning and before bedtime, Starting Fri 5/17/2024, Normal      torsemide (DEMADEX) 20 mg tablet TAKE ONE (1) TABLET BY MOUTH IN THE MORNING & AT BEDTIME, Normal      valsartan (DIOVAN) 40 mg tablet Historical Med       !! - Potential duplicate medications found. Please discuss with provider.          ED SEPSIS DOCUMENTATION   Time reflects when diagnosis was documented in both MDM as applicable and the Disposition within this note       Time User Action Codes Description Comment    4/1/2025  1:20 PM Claudy Peguero Add [R09.89] Pulmonary venous congestion                  Claudy Ivy  DO Marielena  04/01/25 1520

## 2025-04-01 NOTE — PROGRESS NOTES
Pt came in for BP check. Pt reported heavy breathing during the night but also slow breaths.   Pt is going to the ER due to symptoms.

## 2025-04-01 NOTE — DISCHARGE INSTRUCTIONS
You were seen in the emergency department for shortness of breath, your x-ray showed pulmonary venous congestion which may be related to your congestive heart failure, we have provided you with Lasix here, and provided you with a prescription, please pick it up at the pharmacy, take it as directed, and follow-up with cardiology this week for further evaluation.  If your symptoms worsen or persist, please return to the emergency department immediately.

## 2025-05-28 ENCOUNTER — OFFICE VISIT (OUTPATIENT)
Dept: FAMILY MEDICINE CLINIC | Facility: CLINIC | Age: 60
End: 2025-05-28
Payer: COMMERCIAL

## 2025-05-28 VITALS
OXYGEN SATURATION: 96 % | TEMPERATURE: 97 F | WEIGHT: 274.8 LBS | HEIGHT: 70 IN | HEART RATE: 74 BPM | DIASTOLIC BLOOD PRESSURE: 88 MMHG | BODY MASS INDEX: 39.34 KG/M2 | SYSTOLIC BLOOD PRESSURE: 136 MMHG

## 2025-05-28 DIAGNOSIS — E79.0 HYPERURICEMIA: ICD-10-CM

## 2025-05-28 DIAGNOSIS — M79.18 CHRONIC MUSCULOSKELETAL PAIN: ICD-10-CM

## 2025-05-28 DIAGNOSIS — E78.2 MIXED HYPERLIPIDEMIA: ICD-10-CM

## 2025-05-28 DIAGNOSIS — I48.91 ATRIAL FIBRILLATION, UNSPECIFIED TYPE (HCC): Primary | ICD-10-CM

## 2025-05-28 DIAGNOSIS — E78.5 DYSLIPIDEMIA: ICD-10-CM

## 2025-05-28 DIAGNOSIS — N20.0 NEPHROLITHIASIS: ICD-10-CM

## 2025-05-28 DIAGNOSIS — I48.91 ATRIAL FIBRILLATION, UNSPECIFIED TYPE (HCC): ICD-10-CM

## 2025-05-28 DIAGNOSIS — I42.8 NONISCHEMIC CARDIOMYOPATHY (HCC): ICD-10-CM

## 2025-05-28 DIAGNOSIS — I47.10 PAROXYSMAL SVT (SUPRAVENTRICULAR TACHYCARDIA) (HCC): ICD-10-CM

## 2025-05-28 DIAGNOSIS — G62.9 NEUROPATHY: ICD-10-CM

## 2025-05-28 DIAGNOSIS — K76.0 HEPATIC STEATOSIS: ICD-10-CM

## 2025-05-28 DIAGNOSIS — G89.29 CHRONIC MUSCULOSKELETAL PAIN: ICD-10-CM

## 2025-05-28 DIAGNOSIS — Z12.11 SCREEN FOR COLON CANCER: ICD-10-CM

## 2025-05-28 DIAGNOSIS — I50.22 CHRONIC SYSTOLIC (CONGESTIVE) HEART FAILURE (HCC): ICD-10-CM

## 2025-05-28 DIAGNOSIS — K21.9 GASTROESOPHAGEAL REFLUX DISEASE WITHOUT ESOPHAGITIS: ICD-10-CM

## 2025-05-28 PROCEDURE — 99214 OFFICE O/P EST MOD 30 MIN: CPT | Performed by: FAMILY MEDICINE

## 2025-05-28 RX ORDER — DULOXETIN HYDROCHLORIDE 60 MG/1
60 CAPSULE, DELAYED RELEASE ORAL DAILY
Qty: 30 CAPSULE | Refills: 5 | Status: SHIPPED | OUTPATIENT
Start: 2025-05-28

## 2025-05-28 NOTE — PROGRESS NOTES
Name: Yazan Child      : 1965      MRN: [id number]  Encounter Provider: Augustus Martínez DO  Encounter Date: 2025   Encounter department: [facility]  :  Assessment & Plan  Atrial fibrillation, unspecified type (HCC)  Last EKG 2025 revealed normal sinus rhythm.  Cardiology is following.  Rate is controlled.  Remains on anticoagulation with Eliquis.  Orders:    CBC and differential; Future    Comprehensive metabolic panel; Future    TSH, 3rd generation with Free T4 reflex; Future    Chronic systolic (congestive) heart failure (HCC)  Wt Readings from Last 3 Encounters:   25 125 kg (274 lb 12.8 oz)   25 124 kg (274 lb 4 oz)   25 125 kg (276 lb 6.4 oz)   Patient had acute exacerbation in 2025.  It showed some mild pulmonary congestion.  Patient was diuresed and treated appropriately.  Cardiology consult was advised.  Patient did have an MRI of his heart around 2025.  It revealed top normal left ventricular size with mildly reduced ejection fraction of 45%.  There was evidence of global hypokinesis with regional variation.  Normal right ventricular size with low ejection fraction of 41%.  Top normal left atrium.    EKG done on 2025 revealed sinus rhythm, septal infarct (cited on or before 2018.  When compared to prior EKG sinus rhythm has replaced atrial fibrillation.      Patient's cardiologist is Dr. Dia at [facility].    I discussed obtaining daily weights.  If weight goes up by 2 pounds in 24 hours or more than 5 pounds in 5 days, to take an extra diuretic.           Nonischemic cardiomyopathy (HCC)  As stated above.  Reduced ejection fraction.  Left ventricle 45%.  Right ventricle 41%.  Orders:    Lipid Panel with Direct LDL reflex; Future    Paroxysmal SVT (supraventricular tachycardia) (HCC)  Currently controlled on medications.  Cardiology following.       Gastroesophageal reflux disease without esophagitis  Currently controlled.   Continue current medication.       Hepatic steatosis  Discussed monitoring liver function tests at least every 6 months.  Avoid alcohol.  Continue weight loss.  Control risk factors.       Chronic musculoskeletal pain  Patient does have chronic pain.  Continue Cymbalta as prescribed.       Hyperuricemia  The patient's uric acid in the past was satisfactory at 5.4.  Continue current treatment.  Orders:    Uric acid; Future    Dyslipidemia  The patient is currently tolerating current treatment.  Last cholesterol was 127, triglycerides 148, HDL 35 and LDL 62.  This was improved.       Neuropathy  In the past.  Patient was on gabapentin but he could not tolerate it because it made him have nightmares.  The patient states his neuropathic pain started after his carpal tunnel release and cubital tunnel release.  It has impaired his ability to do his daily functions and drive.  Considering Neurontin did not work, I was very hesitant to try a tricyclic antidepressant due to the patient's cardiac history.  I prescribed a trial of Lyrica 50 mg 3 times a day.       Screen for colon cancer  Patient did have a colonoscopy on 2/18/2025.  The patient did have a polyp removed that was an adenoma.  Considering this is precancerous, gastroenterologist advise repeating the colonoscopy in 3 years which would be around 2/21/2028       Nephrolithiasis  It is suspected that the etiology of these kidney stones are uric acid.  Continue to lower uric acid.  Last PSA was normal.       Please note the patient is followed by cardiology at [facility].       History of Present Illness   Patient is a pleasant 60-year-old male who presents today for 3-month follow-up and discussion of chronic disease management.    Of note, the patient was seen in the emergency room in April 2025 with suspicion of acute congestive heart failure.  Cardiology consult as an outpatient was advised.      Review of Systems   Constitutional:  Negative for chills and fever.  "  HENT:  Negative for ear pain and sore throat.    Eyes:  Negative for pain and visual disturbance.   Respiratory:  Negative for cough and shortness of breath.    Cardiovascular:  Negative for chest pain and palpitations.   Gastrointestinal:  Negative for abdominal pain and vomiting.   Genitourinary:  Negative for dysuria and hematuria.   Musculoskeletal:  Negative for arthralgias and back pain.   Skin:  Negative for color change and rash.   Neurological:  Negative for seizures and syncope.   Psychiatric/Behavioral: Negative.     All other systems reviewed and are negative.      Objective   /88   Pulse 74   Temp (!) 97 °F (36.1 °C)   Ht 5' 10\" (1.778 m)   Wt 125 kg (274 lb 12.8 oz)   SpO2 96%   BMI 39.43 kg/m²      Physical Exam  Vitals and nursing note reviewed.   Constitutional:       General: He is not in acute distress.     Appearance: He is well-developed.   HENT:      Head: Normocephalic and atraumatic.      Mouth/Throat:      Mouth: Mucous membranes are moist.     Eyes:      Conjunctiva/sclera: Conjunctivae normal.       Cardiovascular:      Rate and Rhythm: Normal rate and regular rhythm.      Pulses: Normal pulses.      Heart sounds: Normal heart sounds. No murmur heard.  Pulmonary:      Effort: Pulmonary effort is normal. No respiratory distress.      Breath sounds: Normal breath sounds. No wheezing or rales.   Abdominal:      Palpations: Abdomen is soft.      Tenderness: There is no abdominal tenderness. There is no guarding or rebound.     Musculoskeletal:         General: No swelling.      Cervical back: Neck supple.     Skin:     General: Skin is warm and dry.      Capillary Refill: Capillary refill takes less than 2 seconds.     Neurological:      Mental Status: He is alert.     Psychiatric:         Mood and Affect: Mood normal.         Behavior: Behavior normal.         Thought Content: Thought content normal.         "

## 2025-05-28 NOTE — ASSESSMENT & PLAN NOTE
Last EKG April 2025 revealed normal sinus rhythm.  Cardiology is following.  Rate is controlled.  Remains on anticoagulation with Eliquis.  Orders:    CBC and differential; Future    Comprehensive metabolic panel; Future    TSH, 3rd generation with Free T4 reflex; Future

## 2025-05-28 NOTE — ASSESSMENT & PLAN NOTE
Discussed monitoring liver function tests at least every 6 months.  Avoid alcohol.  Continue weight loss.  Control risk factors.

## 2025-05-28 NOTE — ASSESSMENT & PLAN NOTE
The patient is currently tolerating current treatment.  Last cholesterol was 127, triglycerides 148, HDL 35 and LDL 62.  This was improved.

## 2025-05-28 NOTE — ASSESSMENT & PLAN NOTE
Wt Readings from Last 3 Encounters:   05/28/25 125 kg (274 lb 12.8 oz)   04/01/25 124 kg (274 lb 4 oz)   02/07/25 125 kg (276 lb 6.4 oz)   Patient had acute exacerbation in April 2025.  It showed some mild pulmonary congestion.  Patient was diuresed and treated appropriately.  Cardiology consult was advised.  Patient did have an MRI of his heart around March 2025.  It revealed top normal left ventricular size with mildly reduced ejection fraction of 45%.  There was evidence of global hypokinesis with regional variation.  Normal right ventricular size with low ejection fraction of 41%.  Top normal left atrium.    EKG done on 4/1/2025 revealed sinus rhythm, septal infarct (cited on or before February 9, 2018.  When compared to prior EKG sinus rhythm has replaced atrial fibrillation.      Patient's cardiologist is Dr. Dia at [facility].    I discussed obtaining daily weights.  If weight goes up by 2 pounds in 24 hours or more than 5 pounds in 5 days, to take an extra diuretic.

## 2025-05-28 NOTE — ASSESSMENT & PLAN NOTE
As stated above.  Reduced ejection fraction.  Left ventricle 45%.  Right ventricle 41%.  Orders:    Lipid Panel with Direct LDL reflex; Future

## 2025-05-28 NOTE — ASSESSMENT & PLAN NOTE
The patient's uric acid in the past was satisfactory at 5.4.  Continue current treatment.  Orders:    Uric acid; Future

## 2025-05-28 NOTE — ASSESSMENT & PLAN NOTE
It is suspected that the etiology of these kidney stones are uric acid.  Continue to lower uric acid.  Last PSA was normal.

## 2025-06-25 DIAGNOSIS — E78.2 MIXED HYPERLIPIDEMIA: ICD-10-CM

## 2025-06-25 DIAGNOSIS — E79.0 HYPERURICEMIA: ICD-10-CM

## 2025-06-26 RX ORDER — ALLOPURINOL 100 MG/1
100 TABLET ORAL DAILY
Qty: 30 TABLET | Refills: 5 | Status: SHIPPED | OUTPATIENT
Start: 2025-06-26

## 2025-06-26 RX ORDER — ATORVASTATIN CALCIUM 80 MG/1
80 TABLET, FILM COATED ORAL DAILY
Qty: 30 TABLET | Refills: 5 | Status: SHIPPED | OUTPATIENT
Start: 2025-06-26

## 2025-07-06 ENCOUNTER — HOSPITAL ENCOUNTER (EMERGENCY)
Facility: HOSPITAL | Age: 60
Discharge: HOME | End: 2025-07-06
Attending: EMERGENCY MEDICINE | Admitting: EMERGENCY MEDICINE
Payer: COMMERCIAL

## 2025-07-06 ENCOUNTER — APPOINTMENT (EMERGENCY)
Dept: RADIOLOGY | Facility: HOSPITAL | Age: 60
End: 2025-07-06
Payer: COMMERCIAL

## 2025-07-06 VITALS
HEART RATE: 71 BPM | TEMPERATURE: 98 F | OXYGEN SATURATION: 94 % | SYSTOLIC BLOOD PRESSURE: 124 MMHG | RESPIRATION RATE: 20 BRPM | DIASTOLIC BLOOD PRESSURE: 80 MMHG

## 2025-07-06 DIAGNOSIS — R05.9 COUGH: Primary | ICD-10-CM

## 2025-07-06 LAB
ANION GAP SERPL CALCULATED.3IONS-SCNC: 9 MMOL/L (ref 4–13)
ATRIAL RATE: 75 BPM
BASOPHILS # BLD AUTO: 0.03 THOUSANDS/ÂΜL (ref 0–0.1)
BASOPHILS NFR BLD AUTO: 1 % (ref 0–1)
BUN SERPL-MCNC: 12 MG/DL (ref 5–25)
CALCIUM SERPL-MCNC: 9.1 MG/DL (ref 8.4–10.2)
CARDIAC TROPONIN I PNL SERPL HS: 4 NG/L (ref ?–50)
CHLORIDE SERPL-SCNC: 106 MMOL/L (ref 96–108)
CO2 SERPL-SCNC: 24 MMOL/L (ref 21–32)
CREAT SERPL-MCNC: 0.91 MG/DL (ref 0.6–1.3)
EOSINOPHIL # BLD AUTO: 0.16 THOUSAND/ÂΜL (ref 0–0.61)
EOSINOPHIL NFR BLD AUTO: 2 % (ref 0–6)
ERYTHROCYTE [DISTWIDTH] IN BLOOD BY AUTOMATED COUNT: 14.5 % (ref 11.6–15.1)
FLUAV AG UPPER RESP QL IA.RAPID: NEGATIVE
FLUBV AG UPPER RESP QL IA.RAPID: NEGATIVE
GFR SERPL CREATININE-BSD FRML MDRD: 90 ML/MIN/1.73SQ M
GLUCOSE SERPL-MCNC: 107 MG/DL (ref 65–140)
HCT VFR BLD AUTO: 42.3 % (ref 36.5–49.3)
HGB BLD-MCNC: 14.5 G/DL (ref 12–17)
IMM GRANULOCYTES # BLD AUTO: 0.03 THOUSAND/UL (ref 0–0.2)
IMM GRANULOCYTES NFR BLD AUTO: 1 % (ref 0–2)
LACTATE SERPL-SCNC: 1.5 MMOL/L (ref 0.5–2)
LYMPHOCYTES # BLD AUTO: 0.71 THOUSANDS/ÂΜL (ref 0.6–4.47)
LYMPHOCYTES NFR BLD AUTO: 11 % (ref 14–44)
MCH RBC QN AUTO: 31 PG (ref 26.8–34.3)
MCHC RBC AUTO-ENTMCNC: 34.3 G/DL (ref 31.4–37.4)
MCV RBC AUTO: 90 FL (ref 82–98)
MONOCYTES # BLD AUTO: 0.48 THOUSAND/ÂΜL (ref 0.17–1.22)
MONOCYTES NFR BLD AUTO: 7 % (ref 4–12)
NEUTROPHILS # BLD AUTO: 5.18 THOUSANDS/ÂΜL (ref 1.85–7.62)
NEUTS SEG NFR BLD AUTO: 78 % (ref 43–75)
NRBC BLD AUTO-RTO: 0 /100 WBCS
P AXIS: 12 DEGREES
PLATELET # BLD AUTO: 220 THOUSANDS/UL (ref 149–390)
PMV BLD AUTO: 10.2 FL (ref 8.9–12.7)
POTASSIUM SERPL-SCNC: 3.9 MMOL/L (ref 3.5–5.3)
PR INTERVAL: 200 MS
PROCALCITONIN SERPL-MCNC: 0.07 NG/ML
QRS AXIS: 109 DEGREES
QRSD INTERVAL: 76 MS
QT INTERVAL: 416 MS
QTC INTERVAL: 464 MS
RBC # BLD AUTO: 4.68 MILLION/UL (ref 3.88–5.62)
SARS-COV+SARS-COV-2 AG RESP QL IA.RAPID: NEGATIVE
SODIUM SERPL-SCNC: 139 MMOL/L (ref 135–147)
T WAVE AXIS: 70 DEGREES
VENTRICULAR RATE: 75 BPM
WBC # BLD AUTO: 6.59 THOUSAND/UL (ref 4.31–10.16)

## 2025-07-06 PROCEDURE — 84484 ASSAY OF TROPONIN QUANT: CPT | Performed by: EMERGENCY MEDICINE

## 2025-07-06 PROCEDURE — 84145 PROCALCITONIN (PCT): CPT | Performed by: EMERGENCY MEDICINE

## 2025-07-06 PROCEDURE — 99285 EMERGENCY DEPT VISIT HI MDM: CPT

## 2025-07-06 PROCEDURE — 99284 EMERGENCY DEPT VISIT MOD MDM: CPT | Performed by: EMERGENCY MEDICINE

## 2025-07-06 PROCEDURE — 80048 BASIC METABOLIC PNL TOTAL CA: CPT | Performed by: EMERGENCY MEDICINE

## 2025-07-06 PROCEDURE — 71045 X-RAY EXAM CHEST 1 VIEW: CPT

## 2025-07-06 PROCEDURE — 36415 COLL VENOUS BLD VENIPUNCTURE: CPT | Performed by: EMERGENCY MEDICINE

## 2025-07-06 PROCEDURE — 93010 ELECTROCARDIOGRAM REPORT: CPT | Performed by: INTERNAL MEDICINE

## 2025-07-06 PROCEDURE — 93005 ELECTROCARDIOGRAM TRACING: CPT

## 2025-07-06 PROCEDURE — 83605 ASSAY OF LACTIC ACID: CPT | Performed by: EMERGENCY MEDICINE

## 2025-07-06 PROCEDURE — 87811 SARS-COV-2 COVID19 W/OPTIC: CPT | Performed by: EMERGENCY MEDICINE

## 2025-07-06 PROCEDURE — 87804 INFLUENZA ASSAY W/OPTIC: CPT | Performed by: EMERGENCY MEDICINE

## 2025-07-06 PROCEDURE — 85025 COMPLETE CBC W/AUTO DIFF WBC: CPT | Performed by: EMERGENCY MEDICINE

## 2025-07-06 RX ORDER — LORATADINE 10 MG/1
10 TABLET ORAL DAILY
Qty: 10 TABLET | Refills: 0 | Status: SHIPPED | OUTPATIENT
Start: 2025-07-06 | End: 2025-07-16

## 2025-07-06 RX ORDER — NAPROXEN 500 MG/1
500 TABLET ORAL 2 TIMES DAILY WITH MEALS
Qty: 10 TABLET | Refills: 0 | Status: SHIPPED | OUTPATIENT
Start: 2025-07-06 | End: 2025-07-11

## 2025-07-06 RX ORDER — AZITHROMYCIN 250 MG/1
TABLET, FILM COATED ORAL
Qty: 6 TABLET | Refills: 0 | Status: SHIPPED | OUTPATIENT
Start: 2025-07-06 | End: 2025-07-10

## 2025-07-06 NOTE — ED PROVIDER NOTES
Final Diagnosis:  1. Cough        MDM:  - Patient presents for evaluation of cough and shortness of breath.  Will evaluate for ACS, arrhythmia, electrolyte disturbances, signs of infection.  Evaluate for flu and COVID.  - Laboratory analysis was overall unremarkable.  No signs of acute findings on x-ray.  - Oh infection discussed all this with patient.  Likely viral illness.  Continue symptomatic management.  Return cautions reviewed    Chief Complaint   Patient presents with    Shortness of Breath     Patient reports sob beginning last night with nonproductive cough      HPI  Patient presents for evaluation of shortness of breath which started yesterday evening.  He states that he was around a family member that was diagnosed with bronchitis.  He states that this cough is worse whenever he is laying down and better when he is up and around.  States that yesterday he had some fever and chills as well.  Denies any nausea or vomiting.  Denies any history of cardiac or pulmonary disease.      Unless otherwise specified:  - No language barrier.   - History obtained from patient.   - There are no limitations to the history obtained.  - Previous charting was reviewed    PMH:   has a past medical history of Acute systolic congestive heart failure (HCC) (5/2/2022), Anxiety, CHF (congestive heart failure) (HCC), GERD (gastroesophageal reflux disease), Hyperlipidemia, and Kidney stones.    PSH:   has a past surgical history that includes Knee cartilage surgery; Kidney stone surgery; Cardiac electrophysiology study and ablation; FL retrograde pyelogram (6/29/2019); pr cystourethroscopy w/ureteral catheterization (Right, 6/29/2019); Cardiac electrophysiology study and ablation; pr cysto/uretero w/lithotripsy &indwell stent insrt (Right, 8/29/2019); IR nephrostomy tube placement (8/30/2019); FL retrograde pyelogram (8/29/2019); Knee arthroscopy (Left); Hernia repair (Right); Cardiac surgery (2014); Colonoscopy; pr perq nl/pl  lithotrp complex >2 cm mlt locations (Right, 9/25/2019); FL retrograde pyelogram (9/25/2019); Wrist surgery (Right); Elbow surgery (Right); and Cholecystectomy.       ROS:  Review of Systems   - 13 point ROS was performed and all are normal unless stated in the history above.   - Nursing note reviewed. Vitals reviewed.   - Orders placed by myself and/or advanced practitioner / resident.        PE:   Vitals:    07/06/25 1004 07/06/25 1200 07/06/25 1230   BP: 136/72 124/80 124/80   BP Location: Right arm Right arm Right arm   Pulse: 78 74 71   Resp: 18 20 20   Temp: (!) 97 °F (36.1 °C) (!) 97.2 °F (36.2 °C) 98 °F (36.7 °C)   TempSrc: Tympanic Temporal Temporal   SpO2: 91% 97% 94%     Vitals reviewed by me.       Unless otherwise specified above:    General: VS reviewed  Appears in NAD    Head: Normocephalic, atraumatic  Eyes: EOM-I.     ENT: Atraumatic external nose and ears.    No drooling.     Neck: No JVD.    CV: No pallor noted  Lungs:   No tachypnea  No respiratory distress    Abdomen:  Soft, non-tender, non-distended    MSK:   No obvious deformity    Skin: Dry, intact. No obvious rash.    Psychiatric/Behavioral: Appropriate mood and affect   Exam: deferred    Physical Exam     Procedures   - Nursing note reviewed.                      XR chest 1 view portable    (Results Pending)     Orders Placed This Encounter   Procedures    FLU/COVID Rapid Antigen (30 min. TAT) - Preferred screening test in ED    XR chest 1 view portable    CBC and differential    Basic metabolic panel    Lactic acid, plasma (w/reflex if result > 2.0)    HS Troponin 0hr (reflex protocol)    Procalcitonin    Insert peripheral IV    ECG 12 lead     Labs Reviewed   CBC AND DIFFERENTIAL - Abnormal       Result Value Ref Range Status    WBC 6.59  4.31 - 10.16 Thousand/uL Final    RBC 4.68  3.88 - 5.62 Million/uL Final    Hemoglobin 14.5  12.0 - 17.0 g/dL Final    Hematocrit 42.3  36.5 - 49.3 % Final    MCV 90  82 - 98 fL Final    MCH 31.0  26.8  - 34.3 pg Final    MCHC 34.3  31.4 - 37.4 g/dL Final    RDW 14.5  11.6 - 15.1 % Final    MPV 10.2  8.9 - 12.7 fL Final    Platelets 220  149 - 390 Thousands/uL Final    nRBC 0  /100 WBCs Final    Segmented % 78 (*) 43 - 75 % Final    Immature Grans % 1  0 - 2 % Final    Lymphocytes % 11 (*) 14 - 44 % Final    Monocytes % 7  4 - 12 % Final    Eosinophils Relative 2  0 - 6 % Final    Basophils Relative 1  0 - 1 % Final    Absolute Neutrophils 5.18  1.85 - 7.62 Thousands/µL Final    Absolute Immature Grans 0.03  0.00 - 0.20 Thousand/uL Final    Absolute Lymphocytes 0.71  0.60 - 4.47 Thousands/µL Final    Absolute Monocytes 0.48  0.17 - 1.22 Thousand/µL Final    Eosinophils Absolute 0.16  0.00 - 0.61 Thousand/µL Final    Basophils Absolute 0.03  0.00 - 0.10 Thousands/µL Final   COVID-19/INFLUENZA A/B RAPID ANTIGEN (30 MIN.TAT) - Normal    SARS COV Rapid Antigen Negative  Negative Final    Influenza A Rapid Antigen Negative  Negative Final    Influenza B Rapid Antigen Negative  Negative Final    Narrative:     This test has been performed using the [employer] Apple 2 FLU+SARS Antigen test under the Emergency Use Authorization (EUA). This test has been validated by the  and verified by the performing laboratory. The Apple uses lateral flow immunofluorescent sandwich assay to detect SARS-COV, Influenza A and Influenza B Antigen.     The [employer] Apple 2 SARS Antigen test does not differentiate between SARS-CoV and SARS-CoV-2.     Negative results are presumptive and may be confirmed with a molecular assay, if necessary, for patient management. Negative results do not rule out SARS-CoV-2 or influenza infection and should not be used as the sole basis for treatment or patient management decisions. A negative test result may occur if the level of antigen in a sample is below the limit of detection of this test.     Positive results are indicative of the presence of viral antigens, but do not rule out bacterial infection  "or co-infection with other viruses.     All test results should be used as an adjunct to clinical observations and other information available to the provider.    FOR PEDIATRIC PATIENTS - copy/paste COVID Guidelines URL to browser: [email or web address]   LACTIC ACID, PLASMA (W/REFLEX IF RESULT > 2.0) - Normal    LACTIC ACID 1.5  0.5 - 2.0 mmol/L Final    Narrative:     Result may be elevated if tourniquet was used during collection.   HS TROPONIN I 0HR - Normal    hs TnI 0hr 4  \"Refer to ACS Flowchart\"- see link ng/L Final    Comment:                                              Initial (time 0) result  If >=50 ng/L, Myocardial injury suggested ;  Type of myocardial injury and treatment strategy  to be determined.  If 5-49 ng/L, a delta result at 2 hours will be needed to further evaluate.  If <4 ng/L, and chest pain has been >3 hours since onset, patient may qualify for discharge based on the HEART score in the ED.  If <5 ng/L and <3hours since onset of chest pain, a delta result at 2 hours will be needed to further evaluate.    HS Troponin 99th Percentile URL of a Health Population=12 ng/L with a 95% Confidence Interval of 8-18 ng/L.    Second Troponin (time 2 hours)  If calculated delta >= 20 ng/L,  Myocardial injury suggested ; Type of myocardial injury and treatment strategy to be determined.  If 5-49 ng/L and the calculated delta is 5-19 ng/L, consult medical service for evaluation.  Continue evaluation for ischemia on ecg and other possible etiology and repeat hs troponin at 4 hours.  If delta is <5 ng/L at 2 hours, consider discharge based on risk stratification via the HEART score (if in ED), or MEENAKSHI risk score in IP/Observation.    HS Troponin 99th Percentile URL of a Health Population=12 ng/L with a 95% Confidence Interval of 8-18 ng/L.   PROCALCITONIN TEST - Normal    Procalcitonin 0.07  <=0.25 ng/ml Final    Comment: Suspected Lower Respiratory Tract " Infection (LRTI):  - LESS than or EQUAL to 0.25 ng/mL:   low likelihood for bacterial LRTI; antibiotics DISCOURAGED.  - GREATER than 0.25 ng/mL:   increased likelihood for bacterial LRTI; antibiotics ENCOURAGED.    Suspected Sepsis:  - Strongly consider initiating antibiotics in ALL UNSTABLE patients.  - LESS than or EQUAL to 0.5 ng/mL:   low likelihood for bacterial sepsis; antibiotics DISCOURAGED.  - GREATER than 0.5 ng/mL:   increased likelihood for bacterial sepsis; antibiotics ENCOURAGED.  - GREATER than 2 ng/mL:   high risk for severe sepsis / septic shock; antibiotics strongly ENCOURAGED.    Decisions on antibiotic use should not be based solely on Procalcitonin (PCT) levels. If PCT is low but uncertainty exists with stopping antibiotics, repeat PCT in 6-24 hours to confirm the low level. If antibiotics are administered (regardless if initial PCT was high or low), repeat PCT every 1-2 days to consider early antibiotic cessation (when GREATER than 80% decrease from the peak OR when PCT drops below designated cutoffs, whichever comes first), so long as the infection is NOT one that typically requires prolonged treatment durations (e.g., bone/joint infections, endocarditis, Staph. aureus bacteremia).    Situations of FALSE-POSITIVE Procalcitonin values:  1) Newborns < 72 hours old  2) Massive stress from severe trauma / burns, major surgery, acute pancreatitis, cardiogenic / hemorrhagic shock, sickle cell crisis, or other organ perfusion abnormalities  3) Malaria and some Candidal infections  4) Treatment with agents that stimulate cytokines (e.g., OKT3, anti-lymphocyte globulins, alemtuzumab, IL-2, granulocyte transfusion [NOT GCSFs])  5) Chronic renal disease causes elevated baseline levels (consider GREATER than 0.75 ng/mL as an abnormal cut-off); initiating HD/CRRT may cause transient decreases  6) Paraneoplastic syndromes from medullary thyroid or SCLC, some forms of vasculitis, and acute hinfa-ex-lylz  disease    Situations of FALSE-NEGATIVE Procalcitonin values:  1) Too early in clinical course for PCT to have reached its peak (may repeat in 6-24 hours to confirm low level)  2) Localized infection WITHOUT systemic (SIRS / sepsis) response (e.g., an abscess, osteomyelitis, cystitis)  3) Mycobacteria (e.g., Tuberculosis, MAC)  4) Cystic fibrosis exacerbations     BASIC METABOLIC PANEL    Sodium 139  135 - 147 mmol/L Final    Potassium 3.9  3.5 - 5.3 mmol/L Final    Chloride 106  96 - 108 mmol/L Final    CO2 24  21 - 32 mmol/L Final    ANION GAP 9  4 - 13 mmol/L Final    BUN 12  5 - 25 mg/dL Final    Creatinine 0.91  0.60 - 1.30 mg/dL Final    Comment: Standardized to IDMS reference method    Glucose 107  65 - 140 mg/dL Final    Comment: If the patient is fasting, the ADA then defines impaired fasting glucose as > 100 mg/dL and diabetes as > or equal to 123 mg/dL.    Calcium 9.1  8.4 - 10.2 mg/dL Final    eGFR 90  ml/min/1.73sq m Final    Narrative:     National Kidney Disease Foundation guidelines for Chronic Kidney Disease (CKD):     Stage 1 with normal or high GFR (GFR > 90 mL/min/1.73 square meters)    Stage 2 Mild CKD (GFR = 60-89 mL/min/1.73 square meters)    Stage 3A Moderate CKD (GFR = 45-59 mL/min/1.73 square meters)    Stage 3B Moderate CKD (GFR = 30-44 mL/min/1.73 square meters)    Stage 4 Severe CKD (GFR = 15-29 mL/min/1.73 square meters)    Stage 5 End Stage CKD (GFR <15 mL/min/1.73 square meters)  Note: GFR calculation is accurate only with a steady state creatinine         Final Diagnosis:  1. Cough              Unless otherwise noted the patient's medications were reviewed and they should continue as directed.    Unless otherwise specified:  CC is exclusive from any separately billable procedures  CC is exclusive of treating other patients  CC is exclusive of teaching time   All splints are static    Medications - No data to display  Time reflects when diagnosis was documented in both MDM as  applicable and the Disposition within this note       Time User Action Codes Description Comment    7/6/2025 12:14 PM RadhaFranck loon Add [R05.9] Cough           ED Disposition       ED Disposition   Discharge    Condition   Stable    Date/Time   Sun Jul 6, 2025 12:14 PM    Comment   Yazan Mateusz discharge to home/self care.                   Follow-up Information       Follow up With Specialties Details Why Contact Info    Augustus Martínez DO [facility]   [address] [address]  [phone]            Discharge Medication List as of 7/6/2025 12:15 PM        START taking these medications    Details   azithromycin (ZITHROMAX) 250 mg tablet Take 2 tablets today then 1 tablet daily x 4 days, Print      dextromethorphan-guaifenesin (MUCINEX DM)  MG per 12 hr tablet Take 1 tablet by mouth every 12 (twelve) hours for 5 days, Starting Sun 7/6/2025, Until Fri 7/11/2025, Normal      loratadine (CLARITIN) 10 mg tablet Take 1 tablet (10 mg total) by mouth daily for 10 days, Starting Sun 7/6/2025, Until Wed 7/16/2025, Normal      naproxen (Naprosyn) 500 mg tablet Take 1 tablet (500 mg total) by mouth 2 (two) times a day with meals for 5 days, Starting Sun 7/6/2025, Until Fri 7/11/2025, Normal           CONTINUE these medications which have NOT CHANGED    Details   allopurinol (ZYLOPRIM) 100 mg tablet TAKE ONE (1) TABLET (100 MG TOTAL) BY MOUTH DAILY, Starting Thu 6/26/2025, Normal      apixaban (ELIQUIS) 5 mg Take 1 tablet (5 mg total) by mouth 2 (two) times a day, Starting Thu 11/7/2024, Normal      atorvastatin (LIPITOR) 80 mg tablet TAKE ONE (1) TABLET (80 MG TOTAL) BY MOUTH DAILY, Starting Thu 6/26/2025, Normal      cholecalciferol (VITAMIN D3) 1,000 units tablet Take 2 tablets (2,000 Units total) by mouth daily For a low vitamin D level, Starting Tue 6/28/2022, Normal      !! DULoxetine (CYMBALTA) 30 mg delayed release capsule Take 1 capsule (30 mg total) by mouth daily, Starting Thu  11/7/2024, Normal      !! DULoxetine (CYMBALTA) 60 mg delayed release capsule Take 1 capsule (60 mg total) by mouth daily, Starting Wed 5/28/2025, Normal      Empagliflozin (Jardiance) 10 MG TABS tablet Take 1 tablet (10 mg total) by mouth daily, Starting Wed 6/26/2024, Normal      furosemide (LASIX) 20 mg tablet Take 1 tablet (20 mg total) by mouth 2 (two) times a day, Starting Tue 4/1/2025, Normal      metoprolol succinate (Toprol XL) 50 mg 24 hr tablet Take 1 tablet (50 mg total) by mouth 2 (two) times a day, Starting Thu 11/7/2024, Normal      potassium chloride (MICRO-K) 10 MEQ CR capsule TAKE ONE (1) CAPSULE BY MOUTH DAILY WITH TORSEMIDE, Normal      pregabalin (LYRICA) 50 mg capsule Take 1 capsule (50 mg total) by mouth 3 (three) times a day, Starting Fri 2/7/2025, Normal      sacubitril-valsartan (Entresto) 24-26 MG TABS Take 1 tablet by mouth 2 (two) times a day Morning and before bedtime, Starting Fri 5/17/2024, Normal      valsartan (DIOVAN) 40 mg tablet Historical Med      acetaminophen (TYLENOL) 325 mg tablet Take 2 tablets (650 mg total) by mouth every 6 (six) hours as needed for mild pain, Starting Sun 9/29/2024, No Print       !! - Potential duplicate medications found. Please discuss with provider.        No discharge procedures on file.  Prior to Admission Medications   Prescriptions Last Dose Informant Patient Reported? Taking?   DULoxetine (CYMBALTA) 30 mg delayed release capsule 7/6/2025  No Yes   Sig: Take 1 capsule (30 mg total) by mouth daily   DULoxetine (CYMBALTA) 60 mg delayed release capsule 7/6/2025  No Yes   Sig: Take 1 capsule (60 mg total) by mouth daily   Empagliflozin (Jardiance) 10 MG TABS tablet 7/6/2025  No Yes   Sig: Take 1 tablet (10 mg total) by mouth daily   acetaminophen (TYLENOL) 325 mg tablet   No No   Sig: Take 2 tablets (650 mg total) by mouth every 6 (six) hours as needed for mild pain   allopurinol (ZYLOPRIM) 100 mg tablet 7/6/2025  No Yes   Sig: TAKE ONE (1) TABLET  "(100 MG TOTAL) BY MOUTH DAILY   apixaban (ELIQUIS) 5 mg 7/6/2025  No Yes   Sig: Take 1 tablet (5 mg total) by mouth 2 (two) times a day   atorvastatin (LIPITOR) 80 mg tablet 7/6/2025  No Yes   Sig: TAKE ONE (1) TABLET (80 MG TOTAL) BY MOUTH DAILY   cholecalciferol (VITAMIN D3) 1,000 units tablet 7/6/2025  No Yes   Sig: Take 2 tablets (2,000 Units total) by mouth daily For a low vitamin D level   furosemide (LASIX) 20 mg tablet 7/5/2025  No Yes   Sig: Take 1 tablet (20 mg total) by mouth 2 (two) times a day   metoprolol succinate (Toprol XL) 50 mg 24 hr tablet 7/6/2025  No Yes   Sig: Take 1 tablet (50 mg total) by mouth 2 (two) times a day   potassium chloride (MICRO-K) 10 MEQ CR capsule 7/6/2025  No Yes   Sig: TAKE ONE (1) CAPSULE BY MOUTH DAILY WITH TORSEMIDE   pregabalin (LYRICA) 50 mg capsule 7/6/2025  No Yes   Sig: Take 1 capsule (50 mg total) by mouth 3 (three) times a day   sacubitril-valsartan (Entresto) 24-26 MG TABS 7/6/2025  No Yes   Sig: Take 1 tablet by mouth 2 (two) times a day Morning and before bedtime   valsartan (DIOVAN) 40 mg tablet 7/6/2025  Yes Yes      Facility-Administered Medications: None       Portions of the record may have been created with voice recognition software. Occasional wrong word or \"sound a like\" substitutions may have occurred due to the inherent limitations of voice recognition software. Read the chart carefully and recognize, using context, where substitutions have occurred.    Electronically signed by:  MD Jw Lou MD  07/06/25 [id number]    "

## (undated) DEVICE — GUIDEWIRE AMPLATZ SS .038 180CM

## (undated) DEVICE — UROCATCH BAG

## (undated) DEVICE — HEAVY DRAINAGE PACK: Brand: CURITY

## (undated) DEVICE — GUIDEWIRE VASC 0.035 X 150CM

## (undated) DEVICE — SWABSTCK, BENZOIN TINCTURE, 1/PK, STRL: Brand: APLICARE

## (undated) DEVICE — BASKET STONE RTRVL 4 WIRE HELICAL

## (undated) DEVICE — TUBING SUCTION 5MM X 12 FT

## (undated) DEVICE — BETHLEHEM UNIVERSAL MINOR GEN: Brand: CARDINAL HEALTH

## (undated) DEVICE — DRAPE SHEET THREE QUARTER

## (undated) DEVICE — LASER HOLMIUM FIBER 365 MIC

## (undated) DEVICE — SUT SILK 0 FSL 18 IN 678G

## (undated) DEVICE — GUIDEWIRE STRGHT TIP 0.035 IN  SOLO PLUS

## (undated) DEVICE — CATH URETERAL 5FR X 70 CM FLEX TIP POLYUR BARD

## (undated) DEVICE — CHLORHEXIDINE 4PCT 4 OZ

## (undated) DEVICE — CATH BAL DIL NEP 10MM 15CM X-FRC N30 WO INFL DEV

## (undated) DEVICE — URIMETER 2500ML

## (undated) DEVICE — BAG URINE DRAINAGE 2000ML ANTI RFLX LF

## (undated) DEVICE — GUARDIAN LVC: Brand: GUARDIAN

## (undated) DEVICE — SPECIMEN CONTAINER STERILE PEEL PACK

## (undated) DEVICE — ENDOSCOPIC VALVE WITH ADAPTER.: Brand: SURSEAL® II

## (undated) DEVICE — INTENDED FOR TISSUE SEPARATION, AND OTHER PROCEDURES THAT REQUIRE A SHARP SURGICAL BLADE TO PUNCTURE OR CUT.: Brand: BARD-PARKER SAFETY BLADES SIZE 15, STERILE

## (undated) DEVICE — GLOVE SRG BIOGEL 8

## (undated) DEVICE — CATH FOLEY COUNCIL 18FR 5ML 2 WAY LUBRICATH

## (undated) DEVICE — PACK TUR

## (undated) DEVICE — 3M™ TEGADERM™ TRANSPARENT FILM DRESSING FRAME STYLE, 1624W, 2-3/8 IN X 2-3/4 IN (6 CM X 7 CM), 100/CT 4CT/CASE: Brand: 3M™ TEGADERM™

## (undated) DEVICE — INFUSER BAG 3000ML

## (undated) DEVICE — INFLATION DEVICE BASIX 30ATM

## (undated) DEVICE — INTENDED FOR TISSUE SEPARATION, AND OTHER PROCEDURES THAT REQUIRE A SHARP SURGICAL BLADE TO PUNCTURE OR CUT.: Brand: BARD-PARKER SAFETY BLADES SIZE 11, STERILE

## (undated) DEVICE — SCD SEQUENTIAL COMPRESSION COMFORT SLEEVE MEDIUM KNEE LENGTH: Brand: KENDALL SCD

## (undated) DEVICE — CYSTO TUBING TUR Y IRRIGATION

## (undated) DEVICE — URETERAL DUAL LUMEN CATH

## (undated) DEVICE — PROBE CYBERWAND ULTRASONIC

## (undated) DEVICE — LUBRICANT SURGILUBE TUBE 4 OZ  FLIP TOP

## (undated) DEVICE — CATH URET .038 10FR 50CM DUAL LUMEN

## (undated) DEVICE — ARTHROSCOPY FLOOR MAT

## (undated) DEVICE — GAUZE SPONGES,16 PLY: Brand: CURITY

## (undated) DEVICE — ABDOMINAL PAD: Brand: DERMACEA

## (undated) DEVICE — SYRINGE CATH TIP 50ML

## (undated) DEVICE — GLOVE SRG BIOGEL 7.5

## (undated) DEVICE — INVIEW CLEAR LEGGINGS: Brand: CONVERTORS

## (undated) DEVICE — MULTIPURPOSE DRAINAGE CATHETER ULTRATHANE: Brand: COOK

## (undated) DEVICE — TRANSPOSAL ULTRAFLEX DUO/QUAD ULTRA CART MANIFOLD

## (undated) DEVICE — CATH URET 5FR POLYU TIP W/GW 0.038IN NO SIDE HOLES 70CML W/ADPT STRL DISP

## (undated) DEVICE — FIXED CORE WIRE GUIDE SAFE-T-J, CURVED: Brand: COOK

## (undated) DEVICE — TRAY FOLEY 16FR SURESTEP TEMP SENS URIMETER STAT LOK

## (undated) DEVICE — URO CATCHER BAG STERILE 0-UC32